# Patient Record
Sex: MALE | Race: WHITE | Employment: FULL TIME | URBAN - METROPOLITAN AREA
[De-identification: names, ages, dates, MRNs, and addresses within clinical notes are randomized per-mention and may not be internally consistent; named-entity substitution may affect disease eponyms.]

---

## 2022-02-10 ENCOUNTER — APPOINTMENT (OUTPATIENT)
Dept: GENERAL RADIOLOGY | Age: 47
DRG: 432 | End: 2022-02-10
Attending: EMERGENCY MEDICINE
Payer: COMMERCIAL

## 2022-02-10 ENCOUNTER — APPOINTMENT (OUTPATIENT)
Dept: ULTRASOUND IMAGING | Age: 47
DRG: 432 | End: 2022-02-10
Attending: EMERGENCY MEDICINE
Payer: COMMERCIAL

## 2022-02-10 ENCOUNTER — HOSPITAL ENCOUNTER (INPATIENT)
Age: 47
LOS: 10 days | Discharge: HOME | DRG: 432 | End: 2022-02-20
Attending: EMERGENCY MEDICINE | Admitting: FAMILY MEDICINE
Payer: COMMERCIAL

## 2022-02-10 ENCOUNTER — APPOINTMENT (OUTPATIENT)
Dept: VASCULAR SURGERY | Age: 47
DRG: 432 | End: 2022-02-10
Attending: FAMILY MEDICINE
Payer: COMMERCIAL

## 2022-02-10 DIAGNOSIS — E87.20 ACIDOSIS: ICD-10-CM

## 2022-02-10 DIAGNOSIS — D72.829 LEUKOCYTOSIS, UNSPECIFIED TYPE: ICD-10-CM

## 2022-02-10 DIAGNOSIS — K70.31 ALCOHOLIC CIRRHOSIS OF LIVER WITH ASCITES (HCC): ICD-10-CM

## 2022-02-10 DIAGNOSIS — R79.1 ELEVATED INR: ICD-10-CM

## 2022-02-10 DIAGNOSIS — N17.9 AKI (ACUTE KIDNEY INJURY) (HCC): ICD-10-CM

## 2022-02-10 DIAGNOSIS — D69.6 THROMBOCYTOPENIA (HCC): ICD-10-CM

## 2022-02-10 DIAGNOSIS — D64.9 ANEMIA, UNSPECIFIED TYPE: ICD-10-CM

## 2022-02-10 DIAGNOSIS — R79.89 ELEVATED SERUM CREATININE: ICD-10-CM

## 2022-02-10 DIAGNOSIS — K70.31 ASCITES DUE TO ALCOHOLIC CIRRHOSIS (HCC): ICD-10-CM

## 2022-02-10 DIAGNOSIS — F10.930 ALCOHOL WITHDRAWAL SYNDROME WITHOUT COMPLICATION (HCC): ICD-10-CM

## 2022-02-10 DIAGNOSIS — R17 JAUNDICE: ICD-10-CM

## 2022-02-10 DIAGNOSIS — R60.0 BILATERAL LOWER EXTREMITY EDEMA: ICD-10-CM

## 2022-02-10 DIAGNOSIS — K72.10 CHRONIC LIVER FAILURE WITHOUT HEPATIC COMA (HCC): Primary | ICD-10-CM

## 2022-02-10 PROBLEM — E80.6 HYPERBILIRUBINEMIA: Status: ACTIVE | Noted: 2022-02-10

## 2022-02-10 PROBLEM — K74.60 CIRRHOSIS OF LIVER (HCC): Status: ACTIVE | Noted: 2022-02-10

## 2022-02-10 LAB
ALBUMIN SERPL-MCNC: 2.2 G/DL (ref 3.5–5)
ALBUMIN/GLOB SERPL: 0.6 {RATIO} (ref 1.1–2.2)
ALP SERPL-CCNC: 117 U/L (ref 45–117)
ALT SERPL-CCNC: 59 U/L (ref 12–78)
AMORPH CRY URNS QL MICRO: ABNORMAL
ANION GAP SERPL CALC-SCNC: 8 MMOL/L (ref 5–15)
APPEARANCE UR: ABNORMAL
APTT PPP: 31.9 SEC (ref 22.1–31)
AST SERPL-CCNC: 72 U/L (ref 15–37)
ATRIAL RATE: 70 BPM
ATRIAL RATE: 91 BPM
BACTERIA URNS QL MICRO: ABNORMAL /HPF
BASOPHILS # BLD: 0 K/UL (ref 0–0.1)
BASOPHILS NFR BLD: 0 % (ref 0–1)
BILIRUB DIRECT SERPL-MCNC: 14.9 MG/DL (ref 0–0.2)
BILIRUB SERPL-MCNC: 20.7 MG/DL (ref 0.2–1)
BILIRUB UR QL CFM: POSITIVE
BUN SERPL-MCNC: 33 MG/DL (ref 6–20)
BUN/CREAT SERPL: 15 (ref 12–20)
CALCIUM SERPL-MCNC: 9.2 MG/DL (ref 8.5–10.1)
CALCULATED P AXIS, ECG09: 33 DEGREES
CALCULATED P AXIS, ECG09: 33 DEGREES
CALCULATED R AXIS, ECG10: 14 DEGREES
CALCULATED R AXIS, ECG10: 8 DEGREES
CALCULATED T AXIS, ECG11: -10 DEGREES
CALCULATED T AXIS, ECG11: -22 DEGREES
CHLORIDE SERPL-SCNC: 114 MMOL/L (ref 97–108)
CO2 SERPL-SCNC: 16 MMOL/L (ref 21–32)
COLOR UR: ABNORMAL
COMMENT, HOLDF: NORMAL
CREAT SERPL-MCNC: 2.27 MG/DL (ref 0.7–1.3)
DIAGNOSIS, 93000: NORMAL
DIAGNOSIS, 93000: NORMAL
DIFFERENTIAL METHOD BLD: ABNORMAL
EOSINOPHIL # BLD: 0 K/UL (ref 0–0.4)
EOSINOPHIL NFR BLD: 0 % (ref 0–7)
EPITH CASTS URNS QL MICRO: ABNORMAL /LPF
ERYTHROCYTE [DISTWIDTH] IN BLOOD BY AUTOMATED COUNT: 17.1 % (ref 11.5–14.5)
GLOBULIN SER CALC-MCNC: 3.7 G/DL (ref 2–4)
GLUCOSE SERPL-MCNC: 137 MG/DL (ref 65–100)
GLUCOSE UR STRIP.AUTO-MCNC: NEGATIVE MG/DL
HCT VFR BLD AUTO: 24.5 % (ref 36.6–50.3)
HGB BLD-MCNC: 7.3 G/DL (ref 12.1–17)
HGB UR QL STRIP: NEGATIVE
IMM GRANULOCYTES # BLD AUTO: 0.5 K/UL (ref 0–0.04)
IMM GRANULOCYTES NFR BLD AUTO: 2 % (ref 0–0.5)
INR PPP: 2.2 (ref 0.9–1.1)
KETONES UR QL STRIP.AUTO: NEGATIVE MG/DL
LEUKOCYTE ESTERASE UR QL STRIP.AUTO: ABNORMAL
LIPASE SERPL-CCNC: 233 U/L (ref 73–393)
LYMPHOCYTES # BLD: 0.5 K/UL (ref 0.8–3.5)
LYMPHOCYTES NFR BLD: 2 % (ref 12–49)
MCH RBC QN AUTO: 34.8 PG (ref 26–34)
MCHC RBC AUTO-ENTMCNC: 29.8 G/DL (ref 30–36.5)
MCV RBC AUTO: 116.7 FL (ref 80–99)
MONOCYTES # BLD: 1.6 K/UL (ref 0–1)
MONOCYTES NFR BLD: 6 % (ref 5–13)
MUCOUS THREADS URNS QL MICRO: ABNORMAL /LPF
NEUTS SEG # BLD: 23.3 K/UL (ref 1.8–8)
NEUTS SEG NFR BLD: 90 % (ref 32–75)
NITRITE UR QL STRIP.AUTO: POSITIVE
NRBC # BLD: 0.02 K/UL (ref 0–0.01)
NRBC BLD-RTO: 0.1 PER 100 WBC
P-R INTERVAL, ECG05: 132 MS
P-R INTERVAL, ECG05: 138 MS
PH UR STRIP: 5.5 [PH] (ref 5–8)
PLATELET # BLD AUTO: 122 K/UL (ref 150–400)
PMV BLD AUTO: 11 FL (ref 8.9–12.9)
POTASSIUM SERPL-SCNC: 4.2 MMOL/L (ref 3.5–5.1)
PROT SERPL-MCNC: 5.9 G/DL (ref 6.4–8.2)
PROT UR STRIP-MCNC: ABNORMAL MG/DL
PROTHROMBIN TIME: 22.2 SEC (ref 9–11.1)
Q-T INTERVAL, ECG07: 372 MS
Q-T INTERVAL, ECG07: 388 MS
QRS DURATION, ECG06: 86 MS
QRS DURATION, ECG06: 92 MS
QTC CALCULATION (BEZET), ECG08: 419 MS
QTC CALCULATION (BEZET), ECG08: 457 MS
RBC # BLD AUTO: 2.1 M/UL (ref 4.1–5.7)
RBC #/AREA URNS HPF: ABNORMAL /HPF (ref 0–5)
RBC MORPH BLD: ABNORMAL
RBC MORPH BLD: ABNORMAL
SAMPLES BEING HELD,HOLD: NORMAL
SODIUM SERPL-SCNC: 138 MMOL/L (ref 136–145)
SP GR UR REFRACTOMETRY: 1.02 (ref 1–1.03)
THERAPEUTIC RANGE,PTTT: ABNORMAL SECS (ref 58–77)
UROBILINOGEN UR QL STRIP.AUTO: 0.2 EU/DL (ref 0.2–1)
VENTRICULAR RATE, ECG03: 70 BPM
VENTRICULAR RATE, ECG03: 91 BPM
WBC # BLD AUTO: 25.9 K/UL (ref 4.1–11.1)
WBC URNS QL MICRO: ABNORMAL /HPF (ref 0–4)

## 2022-02-10 PROCEDURE — 99223 1ST HOSP IP/OBS HIGH 75: CPT | Performed by: INTERNAL MEDICINE

## 2022-02-10 PROCEDURE — 87040 BLOOD CULTURE FOR BACTERIA: CPT

## 2022-02-10 PROCEDURE — 96365 THER/PROPH/DIAG IV INF INIT: CPT

## 2022-02-10 PROCEDURE — 76705 ECHO EXAM OF ABDOMEN: CPT

## 2022-02-10 PROCEDURE — 85610 PROTHROMBIN TIME: CPT

## 2022-02-10 PROCEDURE — 65660000000 HC RM CCU STEPDOWN

## 2022-02-10 PROCEDURE — 36415 COLL VENOUS BLD VENIPUNCTURE: CPT

## 2022-02-10 PROCEDURE — P9047 ALBUMIN (HUMAN), 25%, 50ML: HCPCS | Performed by: INTERNAL MEDICINE

## 2022-02-10 PROCEDURE — 81001 URINALYSIS AUTO W/SCOPE: CPT

## 2022-02-10 PROCEDURE — 74011000258 HC RX REV CODE- 258: Performed by: INTERNAL MEDICINE

## 2022-02-10 PROCEDURE — 83880 ASSAY OF NATRIURETIC PEPTIDE: CPT

## 2022-02-10 PROCEDURE — 85730 THROMBOPLASTIN TIME PARTIAL: CPT

## 2022-02-10 PROCEDURE — 84484 ASSAY OF TROPONIN QUANT: CPT

## 2022-02-10 PROCEDURE — 71045 X-RAY EXAM CHEST 1 VIEW: CPT

## 2022-02-10 PROCEDURE — 85025 COMPLETE CBC W/AUTO DIFF WBC: CPT

## 2022-02-10 PROCEDURE — 80076 HEPATIC FUNCTION PANEL: CPT

## 2022-02-10 PROCEDURE — 74011000250 HC RX REV CODE- 250: Performed by: FAMILY MEDICINE

## 2022-02-10 PROCEDURE — 74011250637 HC RX REV CODE- 250/637: Performed by: FAMILY MEDICINE

## 2022-02-10 PROCEDURE — 83690 ASSAY OF LIPASE: CPT

## 2022-02-10 PROCEDURE — 93005 ELECTROCARDIOGRAM TRACING: CPT

## 2022-02-10 PROCEDURE — 74011250636 HC RX REV CODE- 250/636: Performed by: INTERNAL MEDICINE

## 2022-02-10 PROCEDURE — 80048 BASIC METABOLIC PNL TOTAL CA: CPT

## 2022-02-10 PROCEDURE — 99285 EMERGENCY DEPT VISIT HI MDM: CPT

## 2022-02-10 RX ORDER — SODIUM CHLORIDE 0.9 % (FLUSH) 0.9 %
5-40 SYRINGE (ML) INJECTION EVERY 8 HOURS
Status: DISCONTINUED | OUTPATIENT
Start: 2022-02-10 | End: 2022-02-20 | Stop reason: HOSPADM

## 2022-02-10 RX ORDER — DIPHENOXYLATE HYDROCHLORIDE AND ATROPINE SULFATE 2.5; .025 MG/1; MG/1
2 TABLET ORAL
COMMUNITY
Start: 2022-01-20 | End: 2022-08-03 | Stop reason: ALTCHOICE

## 2022-02-10 RX ORDER — SODIUM BICARBONATE 650 MG/1
650 TABLET ORAL 3 TIMES DAILY
Status: DISCONTINUED | OUTPATIENT
Start: 2022-02-10 | End: 2022-02-10

## 2022-02-10 RX ORDER — ADALIMUMAB 40MG/0.8ML
40 KIT SUBCUTANEOUS EVERY 2 WEEKS
COMMUNITY
End: 2022-03-02

## 2022-02-10 RX ORDER — MIDODRINE HYDROCHLORIDE 5 MG/1
10 TABLET ORAL
Status: DISCONTINUED | OUTPATIENT
Start: 2022-02-10 | End: 2022-02-17

## 2022-02-10 RX ORDER — CHOLESTYRAMINE 4 G/5.5G
POWDER, FOR SUSPENSION ORAL
COMMUNITY
Start: 2022-01-20 | End: 2022-03-02

## 2022-02-10 RX ORDER — POLYETHYLENE GLYCOL 3350 17 G/17G
17 POWDER, FOR SOLUTION ORAL DAILY PRN
Status: DISCONTINUED | OUTPATIENT
Start: 2022-02-10 | End: 2022-02-20 | Stop reason: HOSPADM

## 2022-02-10 RX ORDER — FOLIC ACID 1 MG/1
1 TABLET ORAL
COMMUNITY
Start: 2022-02-01 | End: 2022-08-03 | Stop reason: ALTCHOICE

## 2022-02-10 RX ORDER — PREDNISOLONE 15 MG/5ML
SOLUTION ORAL
COMMUNITY
Start: 2022-02-01 | End: 2022-02-10

## 2022-02-10 RX ORDER — LANOLIN ALCOHOL/MO/W.PET/CERES
100 CREAM (GRAM) TOPICAL DAILY
Status: DISCONTINUED | OUTPATIENT
Start: 2022-02-11 | End: 2022-02-20 | Stop reason: HOSPADM

## 2022-02-10 RX ORDER — ONDANSETRON 2 MG/ML
4 INJECTION INTRAMUSCULAR; INTRAVENOUS
Status: DISCONTINUED | OUTPATIENT
Start: 2022-02-10 | End: 2022-02-20 | Stop reason: HOSPADM

## 2022-02-10 RX ORDER — SODIUM CHLORIDE 0.9 % (FLUSH) 0.9 %
5-40 SYRINGE (ML) INJECTION AS NEEDED
Status: DISCONTINUED | OUTPATIENT
Start: 2022-02-10 | End: 2022-02-20 | Stop reason: HOSPADM

## 2022-02-10 RX ORDER — ACETAMINOPHEN 650 MG/1
650 SUPPOSITORY RECTAL
Status: DISCONTINUED | OUTPATIENT
Start: 2022-02-10 | End: 2022-02-10

## 2022-02-10 RX ORDER — ACETAMINOPHEN 325 MG/1
650 TABLET ORAL
Status: DISCONTINUED | OUTPATIENT
Start: 2022-02-10 | End: 2022-02-10

## 2022-02-10 RX ORDER — FOLIC ACID 1 MG/1
1 TABLET ORAL DAILY
Status: DISCONTINUED | OUTPATIENT
Start: 2022-02-11 | End: 2022-02-20 | Stop reason: HOSPADM

## 2022-02-10 RX ORDER — UREA 10 %
100 LOTION (ML) TOPICAL DAILY
COMMUNITY
End: 2022-08-03 | Stop reason: ALTCHOICE

## 2022-02-10 RX ORDER — ALBUMIN HUMAN 250 G/1000ML
25 SOLUTION INTRAVENOUS EVERY 6 HOURS
Status: DISCONTINUED | OUTPATIENT
Start: 2022-02-10 | End: 2022-02-20 | Stop reason: HOSPADM

## 2022-02-10 RX ORDER — MIDODRINE HYDROCHLORIDE 10 MG/1
10 TABLET ORAL
COMMUNITY
Start: 2022-01-20 | End: 2022-08-03 | Stop reason: ALTCHOICE

## 2022-02-10 RX ORDER — DIPHENHYDRAMINE HCL 25 MG
25 TABLET ORAL DAILY
COMMUNITY
End: 2022-08-03 | Stop reason: ALTCHOICE

## 2022-02-10 RX ORDER — PREDNISONE 10 MG/1
15 TABLET ORAL
Status: DISCONTINUED | OUTPATIENT
Start: 2022-02-11 | End: 2022-02-10

## 2022-02-10 RX ORDER — SODIUM BICARBONATE 650 MG/1
650 TABLET ORAL 2 TIMES DAILY
COMMUNITY
Start: 2022-02-08 | End: 2022-08-03 | Stop reason: ALTCHOICE

## 2022-02-10 RX ORDER — PREDNISOLONE SODIUM PHOSPHATE 15 MG/5ML
5 SOLUTION ORAL DAILY
COMMUNITY
Start: 2022-02-01 | End: 2022-03-02

## 2022-02-10 RX ORDER — UREA 10 %
100 LOTION (ML) TOPICAL DAILY
Status: DISCONTINUED | OUTPATIENT
Start: 2022-02-11 | End: 2022-02-20 | Stop reason: HOSPADM

## 2022-02-10 RX ADMIN — SODIUM BICARBONATE 650 MG: 650 TABLET ORAL at 17:51

## 2022-02-10 RX ADMIN — ALBUMIN (HUMAN) 25 G: 0.25 INJECTION, SOLUTION INTRAVENOUS at 21:08

## 2022-02-10 RX ADMIN — SODIUM CHLORIDE, PRESERVATIVE FREE 10 ML: 5 INJECTION INTRAVENOUS at 17:51

## 2022-02-10 RX ADMIN — PHYTONADIONE 10 MG: 10 INJECTION, EMULSION INTRAMUSCULAR; INTRAVENOUS; SUBCUTANEOUS at 20:40

## 2022-02-10 RX ADMIN — MIDODRINE HYDROCHLORIDE 10 MG: 5 TABLET ORAL at 17:51

## 2022-02-10 NOTE — PROGRESS NOTES
Admission Medication Reconciliation:    Information obtained from:  Patient/RxQuery  RxQuery data available¹:  YES    Comments/Recommendations: Updated PTA meds/reviewed patient's allergies. 1)  Patient knew his medications well and was able to provide all medications and most doses with little prompting. 2)  Medication changes (since last review): Added  - Humira    Adjusted  - Lomotil changed to 2 tablets q6h PRN  - midodrine changed to 10mg TID  - sodium bicarb changed to 1 tab BID - new medication; patient was unsure if once or twice daily    Removed  - duplicate prednisolone    3)  Of note, patient has been taking prednisolone solution, 10 mL daily. He states today was his last day of 10mL and tomorrow he was supposed to start the 5mL dose for the remainder of the steroid course. 4)  Patient states he has been on Humira for a while, however, recently was instructed by his PCP to hold this medication until he was able to be seen by a GI specialist. His last dose was more than 2 weeks ago at this point. ¹RxQuery pharmacy benefit data reflects medications filled and processed through the patient's insurance, however   this data does NOT capture whether the medication was picked up or is currently being taken by the patient. Allergies:  Naprosyn [naproxen] and Other medication    Significant PMH/Disease States:   Past Medical History:   Diagnosis Date    Alcoholic cirrhosis ([facility]     Crohn disease ([facility]     Small bowel obstruction ([facility]      Chief Complaint for this Admission:    Chief Complaint   Patient presents with    Jaundice    Shortness of Breath     Prior to Admission Medications:   Prior to Admission Medications   Prescriptions Last Dose Informant Taking? Prevalite 4 gram packet Not Taking at Unknown time Self No   Sig: MIX 1 PACKET IN 8 OUNCES OF LIQUID TWICE A DAY      adalimumab (Humira) 40 mg/0.8 mL injection  Self Yes   Si mg by SubCUTAneous route Once every 2 weeks. cyanocobalamin (VITAMIN B12) 100 mcg tablet 2/10/2022 at Unknown time Self Yes   Sig: Take 100 mcg by mouth daily. diphenhydrAMINE (BENADRYL) 25 mg tablet 2/10/2022 at Unknown time Self Yes   Sig: Take 25 mg by mouth daily. diphenoxylate-atropine (LOMOTIL) 2.5-0.025 mg per tablet  Self Yes   Sig: Take 2 Tablets by mouth every six (6) hours as needed. folic acid (FOLVITE) 1 mg tablet 2/10/2022 at Unknown time Self Yes   Sig: Take 1 mg by mouth.   midodrine (PROAMATINE) 10 mg tablet 2/10/2022 at 0800 Self Yes   Sig: Take 10 mg by mouth three (3) times daily (with meals). prednisoLONE (ORAPRED) 15 mg/5 mL (3 mg/mL) solution 2/10/2022 at Unknown time Self Yes   Sig: Take 5 mL by mouth daily. 10 ml PO daily x 7 days, followed by 5 ml PO daily x7 days   sodium bicarbonate 650 mg tablet 2/10/2022 Self Yes   Sig: Take 650 mg by mouth two (2) times a day. Facility-Administered Medications: None     Please contact the main inpatient pharmacy with any questions or concerns at [phone] and we will direct you to the clinical pharmacist covering this patient's care while in-house.      Thomas Ramos, ANGELOD

## 2022-02-10 NOTE — ED TRIAGE NOTES
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

## 2022-02-10 NOTE — ED PROVIDER NOTES
HPI     Please note that this dictation was completed with [employer], the computer voice recognition software. Quite often unanticipated grammatical, syntax, homophones, and other interpretive errors are inadvertently transcribed by the computer software. Please disregard these errors. Please excuse any errors that have escaped final proofreading. 25-year-old male referred by his family physician for worsening liver enzymes. Patient has a complicated past medical history including cirrhosis that they believe is secondary to alcoholic cirrhosis as well as possibly Humira. He used Humira from September until about 3 to 4 weeks ago. He was [facility] to [facility] in January. Patient states that he was therefore liver failure. Diagnoses in epic which have crossed over but records not available are renal failure, metabolic acidosis, acute metabolic encephalopathy, immunocompromise, sepsis, liver mass which patient reports that he had an MRI for showing a fatty liver, Crohn's disease, macrocytic anemia and hyponatremia. He does not know his baseline creatinine at this point. Patient had labs drawn twice since then. Patient reports they are getting worse. I do not have those labs available at this point. Epic states that it is in the results section but it is not present there. Because of the labs, he was referred to the emergency department. Patient has been more sleepy but sleeping regularly at night and not during the day. Denies vomiting. He has an ostomy in the stool output is brown not black. He states that he has not drank alcohol in 6 weeks. Patient denies any abdominal pain, fevers, chest pain. He complains of some shortness of breath. No other complaints at this time. Social history: Last drank alcohol 6 weeks ago. Here with wife.     Past Medical History:   Diagnosis Date    [facility]     Crohn disease ([facility]     Small bowel obstruction [facility])        Past Surgical History:   Procedure Laterality Date    HX OTHER SURGICAL      bowel obstruction and ostomy    HX OTHER SURGICAL  2001    bowel resection secondary to tear in bowel and abscess         No family history on file. Social History     Socioeconomic History    Marital status:      Spouse name: Not on file    Number of children: Not on file    Years of education: Not on file    Highest education level: Not on file   Occupational History    Not on file   Tobacco Use    Smoking status: Not on file    Smokeless tobacco: Not on file   Substance and Sexual Activity    Alcohol use: Not on file    Drug use: Not on file    Sexual activity: Not on file   Other Topics Concern    Not on file   Social History Narrative    Not on file     Social Determinants of Health     Financial Resource Strain:     Difficulty of Paying Living Expenses: Not on file   Food Insecurity:     Worried About Running Out of Food in the Last Year: Not on file    Fabricio of Food in the Last Year: Not on file   Transportation Needs:     Lack of Transportation (Medical): Not on file    Lack of Transportation (Non-Medical):  Not on file   Physical Activity:     Days of Exercise per Week: Not on file    Minutes of Exercise per Session: Not on file   Stress:     Feeling of Stress : Not on file   Social Connections:     Frequency of Communication with Friends and Family: Not on file    Frequency of Social Gatherings with Friends and Family: Not on file    Attends Adventism Services: Not on file    Active Member of Clubs or Organizations: Not on file    Attends Club or Organization Meetings: Not on file    Marital Status: Not on file   Intimate Partner Violence:     Fear of Current or Ex-Partner: Not on file    Emotionally Abused: Not on file    Physically Abused: Not on file    Sexually Abused: Not on file   Housing Stability:     Unable to Pay for Housing in the Last Year: Not on file    Number of Places Lived in the Last Year: Not on file    Unstable Housing in the Last Year: Not on file         ALLERGIES: Naprosyn [naproxen] and Other medication    Review of Systems   Constitutional: Negative for chills and fever. HENT: Negative for congestion. Respiratory: Positive for shortness of breath. Negative for chest tightness. Cardiovascular: Negative for chest pain. Gastrointestinal: Negative for abdominal pain. Skin:        jaundiced   All other systems reviewed and are negative. Vitals:    02/10/22 1304 02/10/22 1507   BP: 124/77 128/78   Pulse: 91 78   Resp: 24 16   Temp: 97.6 °F (36.4 °C) 97.7 °F (36.5 °C)   SpO2: 99% 97%            Physical Exam  Vitals and nursing note reviewed. Constitutional:       General: He is not in acute distress. Appearance: Normal appearance. He is well-developed. He is not ill-appearing. HENT:      Head: Normocephalic and atraumatic. Nose: No congestion or rhinorrhea. Mouth/Throat:      Mouth: Mucous membranes are moist.      Pharynx: No pharyngeal swelling or oropharyngeal exudate. Eyes:      General: Scleral icterus present. Right eye: No discharge. Left eye: No discharge. Conjunctiva/sclera: Conjunctivae normal.   Cardiovascular:      Rate and Rhythm: Normal rate and regular rhythm. Heart sounds: Normal heart sounds. No murmur heard. No friction rub. No gallop. Pulmonary:      Effort: Pulmonary effort is normal. No respiratory distress. Breath sounds: Normal breath sounds. No wheezing or rales. Abdominal:      General: Bowel sounds are normal. There is distension (+ fluid wave). Palpations: Abdomen is soft. Tenderness: There is no abdominal tenderness. There is no guarding. Comments: Ostomy right side of abdomen with brown stool   Musculoskeletal:         General: No tenderness. Normal range of motion. Cervical back: Normal range of motion and neck supple.       Right lower leg: Edema present. Left lower leg: Edema present. Lymphadenopathy:      Cervical: No cervical adenopathy. Skin:     General: Skin is warm and dry. Coloration: Skin is jaundiced. Skin is not pale. Neurological:      Mental Status: He is alert and oriented to person, place, and time. Cranial Nerves: No cranial nerve deficit. Coordination: Coordination normal.                MDM     Complicated [id number] male with a history of multiple medical problems including bowel resections, small bowel obstruction,  Recent admission for sepsis and septic shock, LYNSEY and many others here with reported worsening laboratory work concerning for liver failure. Patient does have ascites clinically. He is obviously jaundice. Appears fairly well-hydrated. Blood pressure okay. Labs are notable for anemia and is well as a leukocytosis. He has an elevated creatinine but I do not know his baseline creatinine. He is no longer immunosuppressed with Humira. Given the shortness of breath have added on a chest x-ray, high-sensitivity troponin as well as proBNP. Ordered liver ultrasound. Likely admit. Perfect Serve Consult for Admission  3:51 PM    ED Room Number: ER29/29  Patient Name and age:  Daniela Felton III 55 y.o.  male  Working Diagnosis:   1. Chronic liver failure without hepatic coma ([facility]    2. Alcoholic cirrhosis of liver with ascites (HCC)    3. Elevated serum creatinine    4. Anemia, unspecified type    5. Leukocytosis, unspecified type    6. Thrombocytopenia (HCC)    7. Elevated INR        COVID-19 Suspicion:  no  Sepsis present:  no  Reassessment needed: no  Code Status:  Full Code  Readmission: no  Isolation Requirements:  no  Recommended Level of Care:  telemetry  Department:[facility] Adult ED - 21   Other:  PCP Dr. Willis Lara at [phone] wants to speak to admitting doctor before evening call. US pending. ED EKG interpretation:  Rhythm: normal sinus rhythm; and regular .  Rate (approx.): 70; Axis: normal; P wave: normal; QRS interval: normal ; ST/T wave: normal;. This EKG was interpreted by Jared Hyatt MD,ED Provider. Recent Results (from the past 24 hour(s))   CBC WITH AUTOMATED DIFF    Collection Time: 02/10/22  1:29 PM   Result Value Ref Range    WBC 25.9 (H) 4.1 - 11.1 K/uL    RBC 2.10 (L) 4.10 - 5.70 M/uL    HGB 7.3 (L) 12.1 - 17.0 g/dL    HCT 24.5 (L) 36.6 - 50.3 %    .7 (H) 80.0 - 99.0 FL    MCH 34.8 (H) 26.0 - 34.0 PG    MCHC 29.8 (L) 30.0 - 36.5 g/dL    RDW 17.1 (H) 11.5 - 14.5 %    PLATELET 766 (L) 799 - 400 K/uL    MPV 11.0 8.9 - 12.9 FL    NRBC 0.1 (H) 0  WBC    ABSOLUTE NRBC 0.02 (H) 0.00 - 0.01 K/uL    NEUTROPHILS 90 (H) 32 - 75 %    LYMPHOCYTES 2 (L) 12 - 49 %    MONOCYTES 6 5 - 13 %    EOSINOPHILS 0 0 - 7 %    BASOPHILS 0 0 - 1 %    IMMATURE GRANULOCYTES 2 (H) 0.0 - 0.5 %    ABS. NEUTROPHILS 23.3 (H) 1.8 - 8.0 K/UL    ABS. LYMPHOCYTES 0.5 (L) 0.8 - 3.5 K/UL    ABS. MONOCYTES 1.6 (H) 0.0 - 1.0 K/UL    ABS. EOSINOPHILS 0.0 0.0 - 0.4 K/UL    ABS. BASOPHILS 0.0 0.0 - 0.1 K/UL    ABS. IMM. GRANS. 0.5 (H) 0.00 - 0.04 K/UL    DF SMEAR SCANNED      RBC COMMENTS MACROCYTOSIS  1+        RBC COMMENTS ANISOCYTOSIS  1+       SAMPLES BEING HELD    Collection Time: 02/10/22  1:29 PM   Result Value Ref Range    SAMPLES BEING HELD 1red     COMMENT        Add-on orders for these samples will be processed based on acceptable specimen integrity and analyte stability, which may vary by analyte.    PROTHROMBIN TIME + INR    Collection Time: 02/10/22  1:29 PM   Result Value Ref Range    INR 2.2 (H) 0.9 - 1.1      Prothrombin time 22.2 (H) 9.0 - 11.1 sec   PTT    Collection Time: 02/10/22  1:29 PM   Result Value Ref Range    aPTT 31.9 (H) 22.1 - 31.0 sec    aPTT, therapeutic range     58.0 - 93.5 SECS   METABOLIC PANEL, BASIC    Collection Time: 02/10/22  1:29 PM   Result Value Ref Range    Sodium 138 136 - 145 mmol/L    Potassium 4.2 3.5 - 5.1 mmol/L    Chloride 114 (H) 97 - 108 mmol/L    CO2 16 (L) 21 - 32 mmol/L    Anion gap 8 5 - 15 mmol/L    Glucose 137 (H) 65 - 100 mg/dL    BUN 33 (H) 6 - 20 MG/DL    Creatinine 2.27 (H) 0.70 - 1.30 MG/DL    BUN/Creatinine ratio 15 12 - 20      GFR est AA 38 (L) >60 ml/min/1.73m2    GFR est non-AA 31 (L) >60 ml/min/1.73m2    Calcium 9.2 8.5 - 10.1 MG/DL   LIPASE    Collection Time: 02/10/22  1:29 PM   Result Value Ref Range    Lipase 233 73 - 393 U/L       No results found.

## 2022-02-11 ENCOUNTER — APPOINTMENT (OUTPATIENT)
Dept: VASCULAR SURGERY | Age: 47
DRG: 432 | End: 2022-02-11
Attending: FAMILY MEDICINE
Payer: COMMERCIAL

## 2022-02-11 ENCOUNTER — APPOINTMENT (OUTPATIENT)
Dept: NON INVASIVE DIAGNOSTICS | Age: 47
DRG: 432 | End: 2022-02-11
Attending: INTERNAL MEDICINE
Payer: COMMERCIAL

## 2022-02-11 LAB
ALBUMIN SERPL-MCNC: 2.6 G/DL (ref 3.5–5)
ALBUMIN/GLOB SERPL: 0.8 {RATIO} (ref 1.1–2.2)
ALP SERPL-CCNC: 102 U/L (ref 45–117)
ALT SERPL-CCNC: 49 U/L (ref 12–78)
AMMONIA PLAS-SCNC: 19 UMOL/L
ANION GAP SERPL CALC-SCNC: 7 MMOL/L (ref 5–15)
AST SERPL-CCNC: 63 U/L (ref 15–37)
BASOPHILS # BLD: 0 K/UL (ref 0–0.1)
BASOPHILS NFR BLD: 0 % (ref 0–1)
BILIRUB SERPL-MCNC: 19.9 MG/DL (ref 0.2–1)
BNP SERPL-MCNC: 4533 PG/ML
BUN SERPL-MCNC: 33 MG/DL (ref 6–20)
BUN/CREAT SERPL: 17 (ref 12–20)
CALCIUM SERPL-MCNC: 9.2 MG/DL (ref 8.5–10.1)
CHLORIDE SERPL-SCNC: 114 MMOL/L (ref 97–108)
CO2 SERPL-SCNC: 17 MMOL/L (ref 21–32)
CREAT SERPL-MCNC: 1.97 MG/DL (ref 0.7–1.3)
DIFFERENTIAL METHOD BLD: ABNORMAL
ECHO LV E' SEPTAL VELOCITY: 12 CM/S
ECHO MV A VELOCITY: 0.44 M/S
ECHO MV E VELOCITY: 1.12 M/S
ECHO MV E/A RATIO: 2.55
ECHO MV E/E' SEPTAL: 9.33
EOSINOPHIL # BLD: 0 K/UL (ref 0–0.4)
EOSINOPHIL NFR BLD: 0 % (ref 0–7)
ERYTHROCYTE [DISTWIDTH] IN BLOOD BY AUTOMATED COUNT: 17.2 % (ref 11.5–14.5)
ERYTHROCYTE [DISTWIDTH] IN BLOOD BY AUTOMATED COUNT: 17.4 % (ref 11.5–14.5)
GLOBULIN SER CALC-MCNC: 3.2 G/DL (ref 2–4)
GLUCOSE SERPL-MCNC: 87 MG/DL (ref 65–100)
HCT VFR BLD AUTO: 19.4 % (ref 36.6–50.3)
HCT VFR BLD AUTO: 20.8 % (ref 36.6–50.3)
HCT VFR BLD AUTO: 22.4 % (ref 36.6–50.3)
HGB BLD-MCNC: 5.9 G/DL (ref 12.1–17)
HGB BLD-MCNC: 6.3 G/DL (ref 12.1–17)
HGB BLD-MCNC: 7 G/DL (ref 12.1–17)
HISTORY CHECKED?,CKHIST: NORMAL
IMM GRANULOCYTES # BLD AUTO: 0.2 K/UL (ref 0–0.04)
IMM GRANULOCYTES NFR BLD AUTO: 1 % (ref 0–0.5)
INR PPP: 1.7 (ref 0.9–1.1)
LYMPHOCYTES # BLD: 1 K/UL (ref 0.8–3.5)
LYMPHOCYTES NFR BLD: 5 % (ref 12–49)
MCH RBC QN AUTO: 35.1 PG (ref 26–34)
MCH RBC QN AUTO: 35.4 PG (ref 26–34)
MCHC RBC AUTO-ENTMCNC: 30.3 G/DL (ref 30–36.5)
MCHC RBC AUTO-ENTMCNC: 30.4 G/DL (ref 30–36.5)
MCV RBC AUTO: 115.5 FL (ref 80–99)
MCV RBC AUTO: 116.9 FL (ref 80–99)
MONOCYTES # BLD: 1 K/UL (ref 0–1)
MONOCYTES NFR BLD: 5 % (ref 5–13)
NEUTS SEG # BLD: 17.3 K/UL (ref 1.8–8)
NEUTS SEG NFR BLD: 89 % (ref 32–75)
NRBC # BLD: 0 K/UL (ref 0–0.01)
NRBC # BLD: 0 K/UL (ref 0–0.01)
NRBC BLD-RTO: 0 PER 100 WBC
NRBC BLD-RTO: 0 PER 100 WBC
PLATELET # BLD AUTO: 78 K/UL (ref 150–400)
PLATELET # BLD AUTO: 87 K/UL (ref 150–400)
PMV BLD AUTO: 11 FL (ref 8.9–12.9)
PMV BLD AUTO: 11.4 FL (ref 8.9–12.9)
POTASSIUM SERPL-SCNC: 3.8 MMOL/L (ref 3.5–5.1)
PROT SERPL-MCNC: 5.8 G/DL (ref 6.4–8.2)
PROTHROMBIN TIME: 17.7 SEC (ref 9–11.1)
RBC # BLD AUTO: 1.68 M/UL (ref 4.1–5.7)
RBC # BLD AUTO: 1.78 M/UL (ref 4.1–5.7)
RBC MORPH BLD: ABNORMAL
SODIUM SERPL-SCNC: 138 MMOL/L (ref 136–145)
TROPONIN-HIGH SENSITIVITY: 12 NG/L (ref 0–76)
WBC # BLD AUTO: 18.9 K/UL (ref 4.1–11.1)
WBC # BLD AUTO: 19.5 K/UL (ref 4.1–11.1)

## 2022-02-11 PROCEDURE — P9016 RBC LEUKOCYTES REDUCED: HCPCS

## 2022-02-11 PROCEDURE — 36430 TRANSFUSION BLD/BLD COMPNT: CPT

## 2022-02-11 PROCEDURE — 85027 COMPLETE CBC AUTOMATED: CPT

## 2022-02-11 PROCEDURE — 93306 TTE W/DOPPLER COMPLETE: CPT

## 2022-02-11 PROCEDURE — 74011250637 HC RX REV CODE- 250/637: Performed by: FAMILY MEDICINE

## 2022-02-11 PROCEDURE — 74011250636 HC RX REV CODE- 250/636: Performed by: INTERNAL MEDICINE

## 2022-02-11 PROCEDURE — 85610 PROTHROMBIN TIME: CPT

## 2022-02-11 PROCEDURE — 74011000250 HC RX REV CODE- 250: Performed by: FAMILY MEDICINE

## 2022-02-11 PROCEDURE — 85018 HEMOGLOBIN: CPT

## 2022-02-11 PROCEDURE — P9047 ALBUMIN (HUMAN), 25%, 50ML: HCPCS | Performed by: INTERNAL MEDICINE

## 2022-02-11 PROCEDURE — 74011250636 HC RX REV CODE- 250/636: Performed by: FAMILY MEDICINE

## 2022-02-11 PROCEDURE — 80053 COMPREHEN METABOLIC PANEL: CPT

## 2022-02-11 PROCEDURE — 99233 SBSQ HOSP IP/OBS HIGH 50: CPT | Performed by: INTERNAL MEDICINE

## 2022-02-11 PROCEDURE — 36415 COLL VENOUS BLD VENIPUNCTURE: CPT

## 2022-02-11 PROCEDURE — 86900 BLOOD TYPING SEROLOGIC ABO: CPT

## 2022-02-11 PROCEDURE — 86923 COMPATIBILITY TEST ELECTRIC: CPT

## 2022-02-11 PROCEDURE — 65660000000 HC RM CCU STEPDOWN

## 2022-02-11 PROCEDURE — 85025 COMPLETE CBC W/AUTO DIFF WBC: CPT

## 2022-02-11 PROCEDURE — 82140 ASSAY OF AMMONIA: CPT

## 2022-02-11 PROCEDURE — 93970 EXTREMITY STUDY: CPT

## 2022-02-11 PROCEDURE — 30233N1 TRANSFUSION OF NONAUTOLOGOUS RED BLOOD CELLS INTO PERIPHERAL VEIN, PERCUTANEOUS APPROACH: ICD-10-PCS | Performed by: INTERNAL MEDICINE

## 2022-02-11 PROCEDURE — 74011000258 HC RX REV CODE- 258: Performed by: FAMILY MEDICINE

## 2022-02-11 RX ORDER — SODIUM CHLORIDE 9 MG/ML
250 INJECTION, SOLUTION INTRAVENOUS AS NEEDED
Status: DISCONTINUED | OUTPATIENT
Start: 2022-02-11 | End: 2022-02-20 | Stop reason: HOSPADM

## 2022-02-11 RX ADMIN — ALBUMIN (HUMAN) 25 G: 0.25 INJECTION, SOLUTION INTRAVENOUS at 14:52

## 2022-02-11 RX ADMIN — PIPERACILLIN AND TAZOBACTAM 3.38 G: 3; .375 INJECTION, POWDER, LYOPHILIZED, FOR SOLUTION INTRAVENOUS at 23:23

## 2022-02-11 RX ADMIN — Medication 100 MG: at 10:00

## 2022-02-11 RX ADMIN — MIDODRINE HYDROCHLORIDE 10 MG: 5 TABLET ORAL at 08:52

## 2022-02-11 RX ADMIN — SODIUM CHLORIDE, PRESERVATIVE FREE 10 ML: 5 INJECTION INTRAVENOUS at 08:58

## 2022-02-11 RX ADMIN — VITAM B12 100 MCG: 100 TAB at 10:00

## 2022-02-11 RX ADMIN — FOLIC ACID 1 MG: 1 TABLET ORAL at 08:52

## 2022-02-11 RX ADMIN — ALBUMIN (HUMAN) 25 G: 0.25 INJECTION, SOLUTION INTRAVENOUS at 03:12

## 2022-02-11 RX ADMIN — MIDODRINE HYDROCHLORIDE 10 MG: 5 TABLET ORAL at 16:58

## 2022-02-11 RX ADMIN — MIDODRINE HYDROCHLORIDE 10 MG: 5 TABLET ORAL at 13:08

## 2022-02-11 RX ADMIN — SODIUM CHLORIDE 250 ML: 9 INJECTION, SOLUTION INTRAVENOUS at 10:50

## 2022-02-11 RX ADMIN — SODIUM CHLORIDE, PRESERVATIVE FREE 10 ML: 5 INJECTION INTRAVENOUS at 13:08

## 2022-02-11 RX ADMIN — PIPERACILLIN AND TAZOBACTAM 3.38 G: 3; .375 INJECTION, POWDER, LYOPHILIZED, FOR SOLUTION INTRAVENOUS at 10:04

## 2022-02-11 RX ADMIN — ALBUMIN (HUMAN) 25 G: 0.25 INJECTION, SOLUTION INTRAVENOUS at 10:00

## 2022-02-11 RX ADMIN — PIPERACILLIN AND TAZOBACTAM 3.38 G: 3; .375 INJECTION, POWDER, LYOPHILIZED, FOR SOLUTION INTRAVENOUS at 16:58

## 2022-02-11 NOTE — CONSULTS
[address] [facility], [place], [address], [facility], [place]      Franky Sood, REMI Gallagher, [facility]     Joann Lott, [facility]   COLTON Caldera, [facility]       Nohelia Meehan [address]    at [address], [address] [address]  22.    [phone]    FAX: [address] [place] [address]    at [address], [address]    [phone]    FAX: [phone]         HEPATOLOGY CONSULT NOTE  I was asked to see this patient in consultation by Dr José Antonio Ma for management of cirrhosis. I have reviewed the Emergency room note, Hospital admission note, Notes by all other physicians who have seen the patient during this hospitalization to date. I have reviewed the problem list and the reason for this hospitalization. I have reviewed the allergies and the medications the patient was taking at home prior to this hospitalization. HISTORY:  The patient is a 55 y.o. male with Crohns disease. He has CT evidence of fatty liver dating back to 2019. In 9/2021 he was started on Humara for treatment of Crohns. In 1/2021 he was hospitalized at [facility] for jaundice with TBILI 9.0 and mild increase in INR to 1.38. He was DC to home in 1/20. He returned to the hospital on 1/26 with TBILI 19, INR 2.50. On 1/28 ETOH blood level was 8. He was treated with prednisone 15 mg every day for presumed alcohol induced hepatitis. US of the liver suggested fatty liver, cirrhosis, small amount of ascites. He was discharged to home on 2/7 with TBILI 19, INR 2.2, Scr 1.7 mg on prednisone 15 mg every day. I was called by his PCP Dr Carolina Heaton in [place], [place] yesterday and suggested he come to the ED at [facility]. He came to the ED today.     In the ED Laboratory studies were significant for:    WBC 25, HB 7.3 gms, , Scr 2.27 mg, AST 72, ALT 59, TBILI 21 mg, INR 2.2,     Imaging of the liver with Ultrasound demonstrated fatty liver and moderate ascites. .    Hepatology Plan:  IV albumin at usual dose. Hold diuretics. Serology for other causes of chronic liver disease. PEth level  Vitamin K 3 does over 3 days. Start liver transplant evaluation. ECHO and Nuclear stress    ASSESSMENT AND PLAN:  Cirrhosis  The diagnosis of cirrhosis is based upon imaging, laboratory studies, complications of cirrhosis. Cirrhosis is presumed secondary to alcohol. The CTP is 12. Child class C. The MELD score is 35. This is chronic decompensated liver disease most likely due to ETOH. This is not acute liver failure due to Humara. The patient has a MELD score greater than 15 and has developed complications of cirrhosis. Will initiate liver transplant evaluation testing. ECHO heat  Nuclear stress. Alcohol liver disease  Suspect the patient has alcohol induced liver disease based upon imaging showing fatty lvier dating back to 2019, pattern of AST>ALT,     The patient states he has been consuming 1-2 alcoholic beverages daily. However, there was a positive ETOH level 2 days after being hospitalized in 1/2022. He is on prednisone for alcoholic hepatitis  Will stop this as risk of infection is > potential benefit    The patient has never been told he had a medical issue from alcohol. The first time he was ever hospitalized due to alcohol and liver disease was in 1/2022. He has never lost time from work due to alcohol. He has no DUI. Unclear why he will not admit to more than 1-2 alcohol beverages per day. The wife told me separately he will go through a 1/2 gallon of [employer] in 2-3 days. Ascites   Ascites developed for the first time in 1/2022. Ascites is persistent despite due to LYNSEY.     Paracentesis is not needed at this time  Will start IV albumin  Hold diuretics    Lower extremity edema  Edema has developed in 1/2022  Will treat with IV albumin    Acute kidney injury  The patient has developed an elevation in serum creatinine   LYNSEY is secondary to the effects of cirrhosis, ETOH, diuretics. It is unlikely this is HRS. Will start IV albumin    Hepatic encephalopathy   He is alert and oriented and canm carry on conversation. Will get serum ammonia  Overt HE has not developed to date. There is no reason for treatment with lactulose of xifaxan    Tremor  Suspect this is due to alcohol withdrawal.  Blood ETOH was positive in 1/2022    Anemia   This is due to multifactorial causes including portal hypertension with chronic GI blood loss, bone marrow suppression secondary to alcohol. Will obtain FE panel to assess for iron stores. Will need EGD at some point to assess for esophageal varices. Thrombocytopenia   This is secondary to cirrhosis. There is no evidence of overt bleeding. No treatment is required. The platelet count is adequate for the patient to undergo procedures without the need for platelet transfusion or platelet growth factors. Coagulopathy  This is secondary to cirrhosis, malnutrition from alcoholism  Will treat with 3 doses of Vitamin K over 3 days. There is no evidence of bleeding. No need for FFP at this time. Metabolic acidosis  This may be due to infection/sepsis. Blood cultures. Start empiric ABX. Stop NABICARB pills,        SYSTEM REVIEW:  Constitution systems: weight loss. Eyes: Yellow   ENT: Negative for sore throat, painful swallowing. Respiratory: Negative for cough, hemoptysis, SOB. Cardiology: Lower leg swelling. GI:  Negative for constipation or diarrhea. : Negative for urinary frequency, dysuria, hematuria, nocturia. Skin: Negative for rash. Hematology: Negative for easy bruising, blood clots. Musculo-skelatal: Negative for back pain, muscle pain, weakness. Neurologic: Tremor. Psychology: Negative for anxiety, depression. FAMILY HISTORY:  The father  Has/had the following following chronic disease(s): complications of Agent Organge. The mother  of DM. There is no family history of liver disease. SOCIAL HISTORY:  The patient is . The patient has 1 child. The patient has never used tobacco products. The patient admits to consuming 1-2 alcoholic beverages per day. I suspect there is a lot more. The patient currently works full time for [employer]. PHYSICAL EXAMINATION:  VS: per nursing note  General:  Ill appearing  Eyes:  Sclera deeply icteric  ENT:  No oral lesions. Thyroid normal.  Nodes:  No adenopathy. Skin:  Spider angiomata. Jaundice. Respiratory:  Lungs clear to auscultation. Cardiovascular:  Regular heart rate. Abdomen:  Soft non-tender, Some ascites may be present. Extremities:  3+ lower extremity edema. Neurologic:  Alert and oriented. Tremor. Cranial nerves grossly intact. Asterixis present. LABORATORY:  Results for Carrington Covarrubias (MRN [id number]) as of 2/10/2022 19:29   Ref. Range 2/10/2022 13:29   WBC Latest Ref Range: 4.1 - 11.1 K/uL 25.9 (H)   HGB Latest Ref Range: 12.1 - 17.0 g/dL 7.3 (L)   PLATELET Latest Ref Range: 150 - 400 K/uL 122 (L)   INR Latest Ref Range: 0.9 - 1.1   2.2 (H)   Sodium Latest Ref Range: 136 - 145 mmol/L 138   Potassium Latest Ref Range: 3.5 - 5.1 mmol/L 4.2   Chloride Latest Ref Range: 97 - 108 mmol/L 114 (H)   CO2 Latest Ref Range: 21 - 32 mmol/L 16 (L)   Glucose Latest Ref Range: 65 - 100 mg/dL 137 (H)   BUN Latest Ref Range: 6 - 20 MG/DL 33 (H)   Creatinine Latest Ref Range: 0.70 - 1.30 MG/DL 2.27 (H)   Bilirubin, total Latest Ref Range: 0.2 - 1.0 MG/DL 20.7 (H)   Albumin Latest Ref Range: 3.5 - 5.0 g/dL 2.2 (L)   ALT Latest Ref Range: 12 - 78 U/L 59   AST Latest Ref Range: 15 - 37 U/L 72 (H)   Alk.  phosphatase Latest Ref Range: 45 - 117 U/L 117       RADIOLOGY:  Ultrasound of liver. Echogenic consistent with fatty liver. NO obvious cirrhosis. No liver mass lesions. No dilated bile ducts. Moderate ascites.       Ilene Nur MD  [facility] [address], [address] [place] [address].  [phone]  [address] [place] [address]

## 2022-02-11 NOTE — ED NOTES
Bedside and Verbal shift change report given to Abraham Medina RN (oncoming nurse) by Enos Osler, RN (offgoing nurse). Report included the following information SBAR, Kardex, ED Summary, Intake/Output, MAR, Recent Results and Med Rec Status.

## 2022-02-11 NOTE — ED NOTES
0802 Bedside and Verbal shift change report given to Sahil De La Vega (oncoming nurse) by Sherryle Munda, RN (offgoing nurse). Report included the following information SBAR, ED Summary, MAR and Recent Results. 0850 Pt to ECHO with tech    0959 Pt back from echo. Albumin started.

## 2022-02-11 NOTE — H&P
[address] [facility] Adult  Hospitalist Group  History and Physical    Date of Service:  2/10/2022  Primary Care Provider: Master Davenport MD  Source of information: The patient and Chart review and his PCP    Chief Complaint: Jaundice and Shortness of Breath      History of Presenting Illness:   Diallo Chopra III is a 55 y.o. male who came to [facility] specifically for admission and for evaluation by Dr Umer Moreau - Hepatology- due to related to elevated bilirubin and suspected cirrhosis. He was referred for admission by his family physician. Patient has a complicated past medical history including suspectedcirrhosis that they believe is secondary to alcohol use/abuse as well as possibly Humira. He used Humira from September until about 3 to 4 weeks ago due to crohns. He was admitted to [facility] then [facility] in January. Patient states that he was therefore liver failure. Diagnoses in epic which have crossed over but records not available are renal failure, metabolic acidosis, acute metabolic encephalopathy, immunocompromise, sepsis, liver mass which patient reports that he had an MRI for showing a fatty liver, Crohn's disease, macrocytic anemia and hyponatremia. He does not know his baseline creatinine at this point. Patient had labs drawn twice since then. Patient reports they are getting worse. His creatinine at one time was as high as 9- but most recently 1.5  Patient has been more sleepy but sleeping regularly at night and not during the day. Denies vomiting. He has an ostomy in the stool output is brown not black. He states that he has not drank alcohol in 6 weeks. Patient denies any abdominal pain, fevers, chest pain. He complains of some shortness of breath. no N/D, no diarrhea, no rashes, + leg swelling, . REVIEW OF SYSTEMS:  A comprehensive review of systems was negative except for that written in the History of Present Illness.      Past Medical History: Diagnosis Date    [facility]olic [facility] ([facility]     Crohn disease ([facility]     Small bowel obstruction (HCC)     hypotension- on midodrine    Past Surgical History:   Procedure Laterality Date    HX OTHER SURGICAL      bowel obstruction and ostomy    HX OTHER SURGICAL  2001    bowel resection secondary to tear in bowel and abscess     Prior to Admission medications    Medication Sig Start Date End Date Taking? Authorizing Provider   Prevalite 4 gram packet MIX 1 PACKET IN 8 OUNCES OF LIQUID TWICE A DAY 1/20/22  Yes Other, MD Nona   diphenhydrAMINE (BENADRYL) 25 mg tablet Take 25 mg by mouth daily. Yes Other, MD Nona   diphenoxylate-atropine (LOMOTIL) 2.5-0.025 mg per tablet Take 2 Tablets by mouth every six (6) hours as needed. 1/20/22  Yes Other, MD Nona   folic acid (FOLVITE) 1 mg tablet Take 1 mg by mouth. 2/1/22  Yes Other, MD Nona   midodrine (PROAMATINE) 10 mg tablet Take 10 mg by mouth three (3) times daily (with meals). 1/20/22  Yes Other, MD Nona   prednisoLONE (ORAPRED) 15 mg/5 mL (3 mg/mL) solution Take 5 mL by mouth daily. 10 ml PO daily x 7 days, followed by 5 ml PO daily x7 days 2/1/22  Yes Other, MD Nona   sodium bicarbonate 650 mg tablet Take 650 mg by mouth two (2) times a day. 2/8/22  Yes Other, MD Nona   cyanocobalamin (VITAMIN B12) 100 mcg tablet Take 100 mcg by mouth daily. Yes Other, MD Nona   adalimumab (Humira) 40 mg/0.8 mL injection 40 mg by SubCUTAneous route Once every 2 weeks. Yes Provider, Historical     Allergies   Allergen Reactions    Naprosyn [Naproxen] Itching    Other Medication Other (comments)     Some Crohns disease medication      No family history of liver disease or cancer  Social History:   Patient , with daughter living with him- worked for [employer] for a few years- admitted to alcohol use daily until 6 months ago    Family and social history were personally reviewed, all pertinent and relevant details are outlined as above.     Objective: Visit Vitals  /74   Pulse 76   Temp 97.9 °F (36.6 °C)   Resp 22   SpO2 95%      O2 Device: None (Room air)    PHYSICAL EXAM:   General: Alert x oriented x 3, awake, no acute distress, resting in bed, pleasant male, no acute distress  HEENT: PEERL, EOMI, moist mucus membranes, scleral icterus  Neck: Supple, no JVD, no meningeal signs  Chest: Clear to auscultation bilaterally   CVS: RRR, S1 S2 heard, no murmurs  Abd: Soft, non-tender, mildly-distended, +bowel sounds, ileostomy R side, prior surgical scars lower abdomen    Ext: No clubbing, no cyanosis, 2++ edema R>L  Neuro/Psych: Pleasant mood and affect, CN 2-12 grossly intact, sensory grossly within normal limit, Strength 5/5 in all extremities,   Cap refill: Brisk, less than 3 seconds  Pulses: 2+, symmetric in all extremities  Skin: Warm, dry, jaundiced    Data Review: All diagnostic labs and studies have been reviewed. Abnormal Labs Reviewed   CBC WITH AUTOMATED DIFF - Abnormal; Notable for the following components:       Result Value    WBC 25.9 (*)     RBC 2.10 (*)     HGB 7.3 (*)     HCT 24.5 (*)     .7 (*)     MCH 34.8 (*)     MCHC 29.8 (*)     RDW 17.1 (*)     PLATELET 105 (*)     NRBC 0.1 (*)     ABSOLUTE NRBC 0.02 (*)     NEUTROPHILS 90 (*)     LYMPHOCYTES 2 (*)     IMMATURE GRANULOCYTES 2 (*)     ABS. NEUTROPHILS 23.3 (*)     ABS. LYMPHOCYTES 0.5 (*)     ABS. MONOCYTES 1.6 (*)     ABS. IMM.  GRANS. 0.5 (*)     All other components within normal limits   PROTHROMBIN TIME + INR - Abnormal; Notable for the following components:    INR 2.2 (*)     Prothrombin time 22.2 (*)     All other components within normal limits   PTT - Abnormal; Notable for the following components:    aPTT 31.9 (*)     All other components within normal limits   METABOLIC PANEL, BASIC - Abnormal; Notable for the following components:    Chloride 114 (*)     CO2 16 (*)     Glucose 137 (*)     BUN 33 (*)     Creatinine 2.27 (*)     GFR est AA 38 (*)     GFR est non-AA 31 (*)     All other components within normal limits   HEPATIC FUNCTION PANEL - Abnormal; Notable for the following components:    Protein, total 5.9 (*)     Albumin 2.2 (*)     A-G Ratio 0.6 (*)     Bilirubin, total 20.7 (*)     Bilirubin, direct 14.9 (*)     AST (SGOT) 72 (*)     All other components within normal limits       All Micro Results     Procedure Component Value Units Date/Time    CULTURE, BLOOD, PAIRED [[id number]] Collected: 02/10/22 2029    Order Status: Completed Specimen: Blood Updated: 02/10/22 2047          IMAGING:   US ABD LTD   Final Result      1. Hepatomegaly, hepatic steatosis and diffuse periportal edema, with an   echogenic lesion in the left hepatic lobe which may represent a hemangioma. However, further characterization and evaluation for chronic liver disease is   suggested with nonemergent dedicated contrast enhanced hepatic MRI. 2. Mild to moderate ascites. 3. Mild right renal cortical thinning is suggested without hydronephrosis or   abnormal echogenicity. Correlate with renal function parameters. XR CHEST PORT   Final Result   Moderately severe acute left lung infiltrate, mild patchy possible right lung   infiltrate. Follow-up with two-view standard chest radiograph when clinically   possible. .  .               ECG/ECHO:    Results for orders placed or performed during the hospital encounter of 02/10/22   EKG, 12 LEAD, INITIAL   Result Value Ref Range    Ventricular Rate 70 BPM    Atrial Rate 70 BPM    P-R Interval 138 ms    QRS Duration 92 ms    Q-T Interval 388 ms    QTC Calculation (Bezet) 419 ms    Calculated P Axis 33 degrees    Calculated R Axis 8 degrees    Calculated T Axis -10 degrees    Diagnosis       Normal sinus rhythm  Normal ECG  When compared with ECG of 10-FEB-2022 13:24,  MANUAL COMPARISON REQUIRED, DATA IS UNCONFIRMED  Confirmed by Daniel Holman MD, Panda Patterson ([id number]) on 2/10/2022 7:11:42 PM          Assessment:   Given the patient's current clinical presentation, there is a high level of concern for decompensation if discharged from the emergency department. Complex decision making was performed, which includes reviewing the patient's available past medical records, laboratory results, and imaging studies. Active Problems:    Acidosis (2/10/2022)      Hyperbilirubinemia (2/10/2022)      Cirrhosis of liver ([address] (2/10/2022)      Plan:     1. Hyperbilirubinemia with jaundice- likely related to alcohol use  - liver US ordered and LFTs reviewed- hepatology consulted  Patient was on prednisone outpatient- resume    2. Crohns- humira on hold- ileostomy from prior surgery appears stable  3. CH- hypotension- on midodrine- resume  4. Bilat LE edema- venous dopplers- no DVT prophylaxis due to autoanticoagulation with elevated INR  5. Coagulopathy- from liver disease- monitor INR  6. Thrombocytopenia- ? Due to liver dz- monitor for now  7. Leukocytosis- ? Due to steroids, monitor for signs of infection  8. Acidosis- likely metabolic from liver dz- resume bicarb  9. LYNSEY on CKD3- monitor creatinine and start IVFLuids in creatinine worsens  10 Full Code- surrogate decision maker- pt's wife        DIET: ADULT DIET Regular   ISOLATION PRECAUTIONS: There are currently no Active Isolations  CODE STATUS: Full Code   DVT PROPHYLAXIS: SCDs  FUNCTIONAL STATUS PRIOR TO HOSPITALIZATION: Fully active and ambulatory; able to carry on all self-care without restriction. EARLY MOBILITY ASSESSMENT: Recommend routine ambulation while hospitalized with the assistance of nursing staff  ANTICIPATED DISCHARGE: Greater than 48 hours.   EMERGENCY CONTACT/SURROGATE DECISION MAKER: wife    Signed By: Malcolm Kim MD     February 10, 2022

## 2022-02-11 NOTE — PROGRESS NOTES
Transition of Care Plan   RUR: 13%   Disposition: The disposition plan is home with family assistance   F/U with PCP/Specialist     Transport: wife      Reason for Admission:  Acidosis, hyperbilirubinemia, & cirrhosis of liver                     RUR Score:  13%                   Plan for utilizing home health:   Not recommended        PCP: First and Last name:  Sheryl Brooks MD     Name of Practice:    Are you a current patient: Yes/No:    Approximate date of last visit:    Can you participate in a virtual visit with your PCP:                     Current Advanced Directive/Advance Care Plan: Full Code      Healthcare Decision Maker:                 Transition of Care Plan:                      CRM met with patient, introduced self, explained role, verified demographics, and offered assistance. The patient lives with his wife and daughter in a home with 1 step to enter. The patient is independent in his ADL's/IADL's and has a wheelchair, bedside commode, shower bench, walker, and cane. The [facility] uses [facility] [place] near his home. Care Management Interventions  PCP Verified by CM: Yes  Palliative Care Criteria Met (RRAT>21 & CHF Dx)?: No  Mode of Transport at Discharge:  Other (see comment) (wife)  Transition of Care Consult (CM Consult): Discharge Planning  Discharge Durable Medical Equipment: No  Health Maintenance Reviewed: Yes  Physical Therapy Consult: No  Occupational Therapy Consult: No  Speech Therapy Consult: No  Support Systems: Spouse/Significant Other  Confirm Follow Up Transport: Family  [employer] Information Provided?: No  Discharge Location  Patient Expects to be Discharged to[de-identified] Home with family assistance    RISHI Marquis

## 2022-02-11 NOTE — ED NOTES
TRANSFER - OUT REPORT:    Verbal report given to [facility], RN (name) on Prudence Dikes III  being transferred to [address] [place] [address](unit) for routine progression of care       Report consisted of patients Situation, Background, Assessment and   Recommendations(SBAR). Information from the following report(s) SBAR, Kardex, ED Summary, Intake/Output, MAR, Recent Results, Med Rec Status and Cardiac Rhythm NSR was reviewed with the receiving nurse. Lines:   Peripheral IV 02/10/22 Left Antecubital (Active)   Site Assessment Clean, dry, & intact 02/11/22 0400   Phlebitis Assessment 0 02/11/22 0400   Infiltration Assessment 0 02/11/22 0400   Dressing Status Clean, dry, & intact 02/11/22 0400       Peripheral IV 02/10/22 Right Antecubital (Active)   Site Assessment Clean, dry, & intact 02/11/22 0400   Phlebitis Assessment 0 02/11/22 0400   Infiltration Assessment 0 02/11/22 0400   Dressing Status Clean, dry, & intact 02/11/22 0400        Opportunity for questions and clarification was provided.

## 2022-02-11 NOTE — PROGRESS NOTES
Called about patient hemoglobin level of 5.9. It was 7.3 on admission. The patient nurse suspected lab error, lab was drawn from IV line site, will repeat a CBC stat and will transfuse if hemoglobin is less than 7. Repeat hemoglobin level is 6.3. Will transfuse patient with 1 unit of packed red blood cell.

## 2022-02-11 NOTE — PROGRESS NOTES
[facility], MD, FACP, Erickson Eliersunil Ibrahim, [place]      REMI Carey, [facility]     Joann Lott, [facility]   ZoranJOLLY Bravo-SONIA Madisonmadhuri Dorene, [facility]       Nohelia Elizondo [address]    at [address], [address] [place] [address], [place]  22.    [phone]    FAX: [address] [place] [address] Avenue    at [address], [address], [address]    [phone]    FAX: [phone]       HEPATOLOGY PROGRESS NOTE  The patient is a 55 y.o. male with Crohns disease. He has CT evidence of fatty liver dating back to 2019. In 9/2021 he was started on Humara for treatment of Crohns. In 1/2021 he was hospitalized at [facility] for jaundice with TBILI 9.0 and mild increase in INR to 1.38. He was DC to home in 1/20. He returned to the hospital on 1/26 with TBILI 19, INR 2.50. On 1/28 ETOH blood level was 8. He was treated with prednisone 15 mg every day for presumed alcohol induced hepatitis. US of the liver suggested fatty liver, cirrhosis, small amount of ascites. He was discharged to home on 2/7 with TBILI 19, INR 2.2, Scr 1.7 mg on prednisone 15 mg every day. I was called by his PCP Dr Harjit Kent in [place], [place] yesterday and suggested he come to the ED at [facility]. He came to the ED today. In the ED Laboratory studies were significant for:    WBC 25, HB 7.3 gms, , Scr 2.27 mg, AST 72, ALT 59, TBILI 21 mg, INR 2.2,     Imaging of the liver with Ultrasound demonstrated fatty liver and moderate ascites. [facility] course:  SCr is down to 1.97  INR is down to 1.7  Lower edema has almost all resolved  ECHO heart was normal.    Hepatology Plan:  Continue IV albumin   Continue to hold diuretics.   Complete 3 doses of Vitamin K   Start liver transplant evaluation. ASSESSMENT AND PLAN:  Cirrhosis  The diagnosis of cirrhosis is based upon imaging, laboratory studies, complications of cirrhosis. Cirrhosis is presumed secondary to alcohol. On admission the CTP is 12. Child class C. The MELD score is 35. This is chronic decompensated liver disease most likely due to ETOH. This is not acute liver failure due to Humara. The patient has a MELD score greater than 15 and has developed complications of cirrhosis. Will initiate liver transplant evaluation testing. ECHO heart was normal.  Nuclear stress. Alcohol liver disease  Suspect the patient has alcohol induced liver disease based upon imaging showing fatty liver dating back to 2019, pattern of AST>ALT,     He was initially adamant that he only consumed 1-2 alcoholic beverages daily. However, upon further discussion today he admits to drinking 1/2 gallon of burbon or 39 drinks per week. There was a positive ETOH level 2 days after being hospitalized in 1/2022. He was on prednisone for alcoholic hepatitis  This was stopped on admission since the risk of infection is > potential benefit    The patient has never been told he had a medical issue from alcohol. The first time he was ever hospitalized due to alcohol and liver disease was in 1/2022. He has never lost time from work due to alcohol. He has no DUI. Ascites   Ascites developed for the first time in 1/2022. Ascites is persistent despite due to LYNSEY. Paracentesis is not needed at this time  Will start IV albumin  Hold diuretics    Lower extremity edema  Edema has developed in 1/2022  This has nearly all resolved with IV albumin    Acute kidney injury  The patient has developed an elevation in serum creatinine   LYNSEY is secondary to the effects of cirrhosis, ETOH, diuretics. LYNSEY is improving with IV albumin    Hepatic encephalopathy   He is alert and oriented and canm carry on conversation.     Will get serum ammonia  Overt HE has not developed to date. There is no reason for treatment with lactulose of xifaxan    Tremor  Suspect this is due to alcohol withdrawal.  Blood ETOH was positive in 1/2022    Anemia   This is due to multifactorial causes including portal hypertension with chronic GI blood loss, bone marrow suppression secondary to alcohol. Will obtain FE panel to assess for iron stores. Will need EGD at some point to assess for esophageal varices. Thrombocytopenia   This is secondary to cirrhosis. There is no evidence of overt bleeding. No treatment is required. The platelet count is adequate for the patient to undergo procedures without the need for platelet transfusion or platelet growth factors. Coagulopathy  This is secondary to cirrhosis, malnutrition from alcoholism  Will treat with 3 doses of Vitamin K over 3 days. There is no evidence of bleeding. No need for FFP at this time. Metabolic acidosis  This may be due to infection/sepsis. Blood cultures. Start empiric ABX. Stop NABICARB pills,      PHYSICAL EXAMINATION:  VS: per nursing note  General:  Ill appearing  Eyes:  Sclera deeply icteric  ENT:  No oral lesions. Thyroid normal.  Nodes:  No adenopathy. Skin:  Spider angiomata. Jaundice. Respiratory:  Lungs clear to auscultation. Cardiovascular:  Regular heart rate. Abdomen:  Soft non-tender, Some ascites may be present. Extremities:  1+ lower extremity edema. Neurologic:  Alert and oriented. Tremor. Cranial nerves grossly intact. Asterixis present. LABORATORY:  Results for Julisa Gomez (MRN [id number]) as of 2/11/2022 18:20   Ref.  Range 2/10/2022 13:29 2/11/2022 04:34 2/11/2022 05:30   WBC Latest Ref Range: 4.1 - 11.1 K/uL 25.9 (H) 18.9 (H) 19.5 (H)   HGB Latest Ref Range: 12.1 - 17.0 g/dL 7.3 (L) 5.9 (LL) 6.3 (L)   PLATELET Latest Ref Range: 150 - 400 K/uL 122 (L) 87 (L) 78 (L)   INR Latest Ref Range: 0.9 - 1.1   2.2 (H) 1.7 (H)    Sodium Latest Ref Range: 136 - 145 mmol/L 138 138    Potassium Latest Ref Range: 3.5 - 5.1 mmol/L 4.2 3.8    Chloride Latest Ref Range: 97 - 108 mmol/L 114 (H) 114 (H)    CO2 Latest Ref Range: 21 - 32 mmol/L 16 (L) 17 (L)    Glucose Latest Ref Range: 65 - 100 mg/dL 137 (H) 87    BUN Latest Ref Range: 6 - 20 MG/DL 33 (H) 33 (H)    Creatinine Latest Ref Range: 0.70 - 1.30 MG/DL 2.27 (H) 1.97 (H)    Bilirubin, total Latest Ref Range: 0.2 - 1.0 MG/DL 20.7 (H) 19.9 (H)    Albumin Latest Ref Range: 3.5 - 5.0 g/dL 2.2 (L) 2.6 (L)    ALT Latest Ref Range: 12 - 78 U/L 59 49    AST Latest Ref Range: 15 - 37 U/L 72 (H) 63 (H)    Alk. phosphatase Latest Ref Range: 45 - 117 U/L 117 102    Ammonia Latest Ref Range: <32 UMOL/L  19        RADIOLOGY:  Ultrasound of liver. Echogenic consistent with fatty liver. NO obvious cirrhosis. No liver mass lesions. No dilated bile ducts. Moderate ascites.       MD Thom [facility] [address], [address] [facility]  [address].  [phone]  [address] [facility]

## 2022-02-11 NOTE — ED NOTES
Bedside and Verbal shift change report given to Joan Villalpando RN (oncoming nurse) by Javier Cancino RN (offgoing nurse). Report included the following information SBAR, Kardex, ED Summary, Intake/Output, MAR, Recent Results, Med Rec Status and Cardiac Rhythm NSR.

## 2022-02-12 LAB
ALBUMIN SERPL-MCNC: 2.7 G/DL (ref 3.5–5)
ALBUMIN/GLOB SERPL: 1 {RATIO} (ref 1.1–2.2)
ALP SERPL-CCNC: 86 U/L (ref 45–117)
ALT SERPL-CCNC: 38 U/L (ref 12–78)
ANION GAP SERPL CALC-SCNC: 7 MMOL/L (ref 5–15)
AST SERPL-CCNC: 52 U/L (ref 15–37)
BILIRUB SERPL-MCNC: 24.9 MG/DL (ref 0.2–1)
BUN SERPL-MCNC: 32 MG/DL (ref 6–20)
BUN/CREAT SERPL: 17 (ref 12–20)
CALCIUM SERPL-MCNC: 9 MG/DL (ref 8.5–10.1)
CHLORIDE SERPL-SCNC: 114 MMOL/L (ref 97–108)
CO2 SERPL-SCNC: 17 MMOL/L (ref 21–32)
COMMENT, HOLDF: NORMAL
CREAT SERPL-MCNC: 1.91 MG/DL (ref 0.7–1.3)
ERYTHROCYTE [DISTWIDTH] IN BLOOD BY AUTOMATED COUNT: 19.9 % (ref 11.5–14.5)
GLOBULIN SER CALC-MCNC: 2.6 G/DL (ref 2–4)
GLUCOSE SERPL-MCNC: 56 MG/DL (ref 65–100)
HCT VFR BLD AUTO: 19.4 % (ref 36.6–50.3)
HCT VFR BLD AUTO: 21.6 % (ref 36.6–50.3)
HGB BLD-MCNC: 6.1 G/DL (ref 12.1–17)
HGB BLD-MCNC: 7 G/DL (ref 12.1–17)
HISTORY CHECKED?,CKHIST: NORMAL
HIV 1+2 AB+HIV1 P24 AG SERPL QL IA: NONREACTIVE
HIV12 RESULT COMMENT, HHIVC: NORMAL
INR PPP: 1.5 (ref 0.9–1.1)
MAGNESIUM SERPL-MCNC: 1.6 MG/DL (ref 1.6–2.4)
MCH RBC QN AUTO: 34.5 PG (ref 26–34)
MCHC RBC AUTO-ENTMCNC: 31.4 G/DL (ref 30–36.5)
MCV RBC AUTO: 109.6 FL (ref 80–99)
NRBC # BLD: 0 K/UL (ref 0–0.01)
NRBC BLD-RTO: 0 PER 100 WBC
PHOSPHATE SERPL-MCNC: 2.2 MG/DL (ref 2.6–4.7)
PLATELET # BLD AUTO: 87 K/UL (ref 150–400)
PMV BLD AUTO: 10.9 FL (ref 8.9–12.9)
POTASSIUM SERPL-SCNC: 3.8 MMOL/L (ref 3.5–5.1)
PROT SERPL-MCNC: 5.3 G/DL (ref 6.4–8.2)
PROTHROMBIN TIME: 15.2 SEC (ref 9–11.1)
RBC # BLD AUTO: 1.77 M/UL (ref 4.1–5.7)
SAMPLES BEING HELD,HOLD: NORMAL
SODIUM SERPL-SCNC: 138 MMOL/L (ref 136–145)
WBC # BLD AUTO: 18.1 K/UL (ref 4.1–11.1)

## 2022-02-12 PROCEDURE — 74011000258 HC RX REV CODE- 258: Performed by: INTERNAL MEDICINE

## 2022-02-12 PROCEDURE — 74011250636 HC RX REV CODE- 250/636: Performed by: INTERNAL MEDICINE

## 2022-02-12 PROCEDURE — 86644 CMV ANTIBODY: CPT

## 2022-02-12 PROCEDURE — 87389 HIV-1 AG W/HIV-1&-2 AB AG IA: CPT

## 2022-02-12 PROCEDURE — 83735 ASSAY OF MAGNESIUM: CPT

## 2022-02-12 PROCEDURE — 80053 COMPREHEN METABOLIC PANEL: CPT

## 2022-02-12 PROCEDURE — 82107 ALPHA-FETOPROTEIN L3: CPT

## 2022-02-12 PROCEDURE — 85027 COMPLETE CBC AUTOMATED: CPT

## 2022-02-12 PROCEDURE — 84100 ASSAY OF PHOSPHORUS: CPT

## 2022-02-12 PROCEDURE — 85610 PROTHROMBIN TIME: CPT

## 2022-02-12 PROCEDURE — 74011250637 HC RX REV CODE- 250/637: Performed by: FAMILY MEDICINE

## 2022-02-12 PROCEDURE — 99233 SBSQ HOSP IP/OBS HIGH 50: CPT | Performed by: INTERNAL MEDICINE

## 2022-02-12 PROCEDURE — 36430 TRANSFUSION BLD/BLD COMPNT: CPT

## 2022-02-12 PROCEDURE — 86780 TREPONEMA PALLIDUM: CPT

## 2022-02-12 PROCEDURE — 86695 HERPES SIMPLEX TYPE 1 TEST: CPT

## 2022-02-12 PROCEDURE — 74011000258 HC RX REV CODE- 258: Performed by: FAMILY MEDICINE

## 2022-02-12 PROCEDURE — 86664 EPSTEIN-BARR NUCLEAR ANTIGEN: CPT

## 2022-02-12 PROCEDURE — 74011250636 HC RX REV CODE- 250/636: Performed by: FAMILY MEDICINE

## 2022-02-12 PROCEDURE — 86787 VARICELLA-ZOSTER ANTIBODY: CPT

## 2022-02-12 PROCEDURE — P9047 ALBUMIN (HUMAN), 25%, 50ML: HCPCS | Performed by: INTERNAL MEDICINE

## 2022-02-12 PROCEDURE — P9016 RBC LEUKOCYTES REDUCED: HCPCS

## 2022-02-12 PROCEDURE — 85018 HEMOGLOBIN: CPT

## 2022-02-12 PROCEDURE — 36415 COLL VENOUS BLD VENIPUNCTURE: CPT

## 2022-02-12 PROCEDURE — 65660000000 HC RM CCU STEPDOWN

## 2022-02-12 RX ORDER — SODIUM CHLORIDE 9 MG/ML
250 INJECTION, SOLUTION INTRAVENOUS AS NEEDED
Status: DISCONTINUED | OUTPATIENT
Start: 2022-02-12 | End: 2022-02-20 | Stop reason: HOSPADM

## 2022-02-12 RX ORDER — ALBUTEROL SULFATE 0.83 MG/ML
5 SOLUTION RESPIRATORY (INHALATION)
Status: DISCONTINUED | OUTPATIENT
Start: 2022-02-12 | End: 2022-02-13

## 2022-02-12 RX ADMIN — ALBUMIN (HUMAN) 25 G: 0.25 INJECTION, SOLUTION INTRAVENOUS at 14:00

## 2022-02-12 RX ADMIN — PIPERACILLIN AND TAZOBACTAM 3.38 G: 3; .375 INJECTION, POWDER, LYOPHILIZED, FOR SOLUTION INTRAVENOUS at 16:29

## 2022-02-12 RX ADMIN — MIDODRINE HYDROCHLORIDE 10 MG: 5 TABLET ORAL at 12:00

## 2022-02-12 RX ADMIN — Medication 100 MG: at 08:43

## 2022-02-12 RX ADMIN — MIDODRINE HYDROCHLORIDE 10 MG: 5 TABLET ORAL at 07:07

## 2022-02-12 RX ADMIN — ALBUMIN (HUMAN) 25 G: 0.25 INJECTION, SOLUTION INTRAVENOUS at 22:38

## 2022-02-12 RX ADMIN — ALBUMIN (HUMAN) 25 G: 0.25 INJECTION, SOLUTION INTRAVENOUS at 02:51

## 2022-02-12 RX ADMIN — FOLIC ACID 1 MG: 1 TABLET ORAL at 08:43

## 2022-02-12 RX ADMIN — PHYTONADIONE 10 MG: 10 INJECTION, EMULSION INTRAMUSCULAR; INTRAVENOUS; SUBCUTANEOUS at 16:21

## 2022-02-12 RX ADMIN — MIDODRINE HYDROCHLORIDE 10 MG: 5 TABLET ORAL at 16:30

## 2022-02-12 RX ADMIN — VITAM B12 100 MCG: 100 TAB at 08:43

## 2022-02-12 RX ADMIN — PIPERACILLIN AND TAZOBACTAM 3.38 G: 3; .375 INJECTION, POWDER, LYOPHILIZED, FOR SOLUTION INTRAVENOUS at 07:06

## 2022-02-12 NOTE — PROGRESS NOTES
[address] [facility] Adult  [facility]                                                                                          Hospitalist Progress Note  Sara Flynn MD  Answering service: [phone] OR [phone] from in house phone        Date of Service:  2022  NAME:  Genesis Ayers III  :  1975  MRN:  [id number]      Admission Summary:   Alexsander Ely is a 55 y.o. male who came to [facility] specifically for admission and for evaluation by Dr Young Courser - Hepatology- due to related to elevated bilirubin and suspected cirrhosis. He was referred for admission by his family physician. Priscilla Barrios has a complicated past medical history including suspectedcirrhosis that they believe is secondary to alcohol use/abuse as well as possibly Humira.  He used Humira from September until about 3 to 4 weeks ago due to Manuela Conner was admitted to [facility] in January. Priscilla Barrios states that he was therefore liver failure.  Diagnoses in epic which have crossed over but records not available are renal failure, metabolic acidosis, acute metabolic encephalopathy, immunocompromise, sepsis, liver mass which patient reports that he had an MRI for showing a fatty liver, Crohn's disease, macrocytic anemia and hyponatremia.  He does not know his baseline creatinine at this point.  Patient had labs drawn twice since then. Priscilla Barrios reports they are getting worse.  His creatinine at one time was as high as 9- but most recently 1.5  Patient has been more sleepy but sleeping regularly at night and not during the day.  Denies vomiting. [facility] has an ostomy in the stool output is brown not black. [facility] states that he has not drank alcohol in 6 weeks.  Patient denies any abdominal pain, fevers, chest pain.  He complains of some shortness of breath.  no N/D, no diarrhea, no rashes, + leg swelling,       Interval history / Subjective:   Patient reports some abd pain  Started IV abx  Further workup per hepatology     Assessment & Plan:         1. Hyperbilirubinemia with jaundice- likely related to alcohol use  - liver US ordered and LFTs reviewed- hepatology consulted  Further workup per hepatology  2. Crohns- humira on hold- ileostomy from prior surgery appears stable  3. CH- hypotension- on midodrine- resume  4. Bilat LE edema- venous dopplers- no DVT prophylaxis due to autoanticoagulation with elevated INR  5. Coagulopathy- from liver disease- monitor INR, cont VIT K  6. Thrombocytopenia- ? Due to liver dz- monitor for now  7. Leukocytosis- UTI   Started IV abx- monitor cultures  8. Acidosis- likely metabolic from liver dz- monitor off bicarb  9. LYNSEY on CKD3- monitor creatinine   10. Anemia- blood transfusion ordered       Code status: Full Code  [address] discussed with: patient  Anticipated Disposition: > 48hrs     Hospital Problems  Never Reviewed          Codes Class Noted POA    Acidosis ICD-10-CM: E87.2  ICD-9-CM: 276.2  2/10/2022 Unknown        Hyperbilirubinemia ICD-10-CM: E80.6  ICD-9-CM: 782.4  2/10/2022 Unknown        Cirrhosis of liver ([facility] [address] ICD-10-CM: K74.60  ICD-9-CM: 571.5  2/10/2022 Unknown                Review of Systems:   A comprehensive review of systems was negative except for that written in the HPI. Vital Signs:    Last 24hrs VS reviewed since prior progress note.  Most recent are:  Visit Vitals  /67   Pulse 97   Temp 98.2 °F (36.8 °C)   Resp 18   Ht 6' (1.829 m)   Wt 126.7 kg (279 lb 6.4 oz)   SpO2 94%   BMI 37.89 kg/m²     Patient Vitals for the past 24 hrs:   Temp Pulse Resp BP SpO2   02/11/22 2200 -- 97 -- -- --   02/11/22 2000 -- 97 -- -- --   02/11/22 1959 98.2 °F (36.8 °C) 99 18 111/67 94 %   02/11/22 1811 -- 96 -- -- --   02/11/22 1451 98 °F (36.7 °C) 93 23 114/72 95 %   02/11/22 1330 -- 89 23 118/75 93 %   02/11/22 1300 97.5 °F (36.4 °C) 95 24 114/67 92 %   02/11/22 1230 97.5 °F (36.4 °C) 94 25 115/77 91 %   02/11/22 1215 -- 88 22 106/69 93 % 02/11/22 1200 -- 89 22 107/72 92 %   02/11/22 1145 97 °F (36.1 °C) 88 23 115/73 91 %   02/11/22 1130 97.1 °F (36.2 °C) 88 26 113/76 91 %   02/11/22 1115 97.4 °F (36.3 °C) 92 27 110/60 92 %   02/11/22 1100 97.5 °F (36.4 °C) 94 24 113/72 91 %   02/11/22 1053 97.5 °F (36.4 °C) 92 18 111/65 93 %   02/11/22 0925 -- -- -- 112/76 --   02/11/22 0802 -- 89 20 112/76 94 %   02/11/22 0600 -- 87 23 -- 95 %   02/11/22 0500 -- 85 23 120/73 94 %   02/11/22 0400 98.6 °F (37 °C) 83 22 123/71 93 %   02/11/22 0300 -- 94 22 127/81 94 %   02/11/22 0200 -- 74 19 119/80 93 %   02/11/22 0100 -- 77 18 117/74 93 %   02/11/22 0000 98.6 °F (37 °C) 78 23 106/69 96 %   02/10/22 2300 -- 74 23 117/71 96 %       Intake/Output Summary (Last 24 hours) at 2/11/2022 2240  Last data filed at 2/11/2022 1619  Gross per 24 hour   Intake 660 ml   Output 775 ml   Net -115 ml        Physical Examination:     I had a face to face encounter with this patient and independently examined them on 2/11/2022 as outlined below:          Constitutional:  No acute distress, cooperative, jaundiced   HEENT:  Oral mucosa moist, scleral icterus    Resp:  CTA bilaterally. No wheezing/rhonchi/rales. No accessory muscle use. CV:  Regular rhythm, normal rate, no murmurs,     GI:  Soft, distended, mild tenderness. Surgical scars, ileostomy     Musculoskeletal:  bilat LE edema, no cyanosis    Neurologic:  Moves all extremities. AAOx3, CN II-XII reviewed            Data Review:    Review and/or order of clinical lab test  Review and/or order of tests in the radiology section of CPT  Review and/or order of tests in the medicine section of [facility]    ULTRASOUND OF THE RIGHT UPPER QUADRANT     INDICATION: jaundice     COMPARISON: None.     TECHNIQUE:  Sonography of the right upper quadrant was performed.      FINDINGS:     GALLBLADDER: Mildly distended but no gallstones, or wall thickening. Algis Broaden    COMMON DUCT: The common bile duct cannot be visualized by the technologist.  LIVER: The liver is enlarged, diffusely heterogeneous and upper normal in  echogenicity with prominent periportal edema throughout both lobes. Portal  venous flow is hepatopedal, but portal and hepatic venous branches were not  insonated. Mildly echogenic lesion in the left hepatic lobe measures 3.9 x 3.5 x  3.0 cm. PANCREAS: The visualized portions of the pancreas are normal.   RIGHT KIDNEY: 11.2 cm in length, with mild cortical thinning suggested. . No  hydronephrosis, shadowing calculus, or contour-deforming renal mass.     Incidentally noted small to moderate ascites throughout the upper abdomen.     IMPRESSION     1. Hepatomegaly, hepatic steatosis and diffuse periportal edema, with an  echogenic lesion in the left hepatic lobe which may represent a hemangioma. However, further characterization and evaluation for chronic liver disease is  suggested with nonemergent dedicated contrast enhanced hepatic MRI. 2. Mild to moderate ascites. 3. Mild right renal cortical thinning is suggested without hydronephrosis or  abnormal echogenicity. Correlate with renal function parameters. Labs:     Recent Labs     02/11/22  1849 02/11/22  0530 02/11/22 0434 02/11/22 0434   WBC  --  19.5*  --  18.9*   HGB 7.0* 6.3*   < > 5.9*   HCT 22.4* 20.8*   < > 19.4*   PLT  --  78*  --  87*    < > = values in this interval not displayed. Recent Labs     02/11/22  0434 02/10/22  1329    138   K 3.8 4.2   * 114*   CO2 17* 16*   BUN 33* 33*   CREA 1.97* 2.27*   GLU 87 137*   CA 9.2 9.2     Recent Labs     02/11/22  0434 02/10/22  1329   ALT 49 59    117   TBILI 19.9* 20.7*   TP 5.8* 5.9*   ALB 2.6* 2.2*   GLOB 3.2 3.7   LPSE  --  233     Recent Labs     02/11/22  0434 02/10/22  1329   INR 1.7* 2.2*   PTP 17.7* 22.2*   APTT  --  31.9*      No results for input(s): FE, TIBC, PSAT, FERR in the last 72 hours. No results found for: FOL, RBCF   No results for input(s): PH, PCO2, PO2 in the last 72 hours.   No results for input(s): CPK, CKNDX, TROIQ in the last 72 hours.     No lab exists for component: CPKMB  No results found for: CHOL, CHOLX, CHLST, CHOLV, HDL, HDLP, LDL, LDLC, DLDLP, TGLX, TRIGL, TRIGP, CHHD, CHHDX  No results found for: [facility]  Lab Results   Component Value Date/Time    Color DARK YELLOW 02/10/2022 09:53 PM    Appearance CLOUDY (A) 02/10/2022 09:53 PM    Specific gravity 1.017 02/10/2022 09:53 PM    pH (UA) 5.5 02/10/2022 09:53 PM    Protein TRACE (A) 02/10/2022 09:53 PM    Glucose Negative 02/10/2022 09:53 PM    Ketone Negative 02/10/2022 09:53 PM    Urobilinogen 0.2 02/10/2022 09:53 PM    Nitrites Positive (A) 02/10/2022 09:53 PM    Leukocyte Esterase TRACE (A) 02/10/2022 09:53 PM    Epithelial cells FEW 02/10/2022 09:53 PM    Bacteria 2+ (A) 02/10/2022 09:53 PM    WBC 0-4 02/10/2022 09:53 PM    RBC 0-5 02/10/2022 09:53 PM         Medications Reviewed:     Current Facility-Administered Medications   Medication Dose Route Frequency    0.9% sodium chloride infusion 250 mL  250 mL IntraVENous PRN    piperacillin-tazobactam (ZOSYN) 3.375 g in 0.9% sodium chloride (MBP/ADV) 100 mL MBP  3.375 g IntraVENous Q8H    sodium chloride (NS) flush 5-40 mL  5-40 mL IntraVENous Q8H    sodium chloride (NS) flush 5-40 mL  5-40 mL IntraVENous PRN    polyethylene glycol (MIRALAX) packet 17 g  17 g Oral DAILY PRN    ondansetron (ZOFRAN) injection 4 mg  4 mg IntraVENous Q6H PRN    midodrine (PROAMATINE) tablet 10 mg  10 mg Oral TID WITH MEALS    cyanocobalamin (VITAMIN B12) tablet 100 mcg  100 mcg Oral DAILY    folic acid (FOLVITE) tablet 1 mg  1 mg Oral DAILY    albumin human 25% (BUMINATE) solution 25 g  25 g IntraVENous Q6H    phytonadione (vitamin K1) (AQUA-MEPHYTON) 10 mg in 0.9% sodium chloride 50 mL IVPB  10 mg IntraVENous DAILY    thiamine HCL (B-1) tablet 100 mg  100 mg Oral DAILY     ______________________________________________________________________  EXPECTED LENGTH OF STAY: - - -  ACTUAL LENGTH OF STAY: 3 [facility] Yo Sanchez MD

## 2022-02-12 NOTE — PROGRESS NOTES
Bedside shift change report given to [address] [place] [address] Road (oncoming nurse) by Viktoria English RN (offgoing nurse). Report included the following information SBAR, Kardex, Intake/Output, MAR and Recent Results.

## 2022-02-12 NOTE — PROGRESS NOTES
[facility], MD, FACP, [place], [place]      REMI Dennis, [facility]     Joann PALOMINO Kaylie, [facility]   COLTON Barrett [facility]       Nohelia Meehan [facility] [address]    at [place] [address], [address] [address]  [address].    [phone]    FAX: [address] [place] [address] Avenue    at [address], [address], [address]    [phone]    FAX: [phone]       HEPATOLOGY PROGRESS NOTE  The patient is a 55 y.o. male with Crohns disease. He has CT evidence of fatty liver dating back to 2019. In 9/2021 he was started on Humara for treatment of Crohns. In 1/2021 he was hospitalized at [facility] for jaundice with TBILI 9.0 and mild increase in INR to 1.38. He was DC to home in 1/20. He returned to the hospital on 1/26 with TBILI 19, INR 2.50. On 1/28 ETOH blood level was 8. He was treated with prednisone 15 mg every day for presumed alcohol induced hepatitis. US of the liver suggested fatty liver, cirrhosis, small amount of ascites. He was discharged to home on 2/7 with TBILI 19, INR 2.2, Scr 1.7 mg on prednisone 15 mg every day. I was called by his PCP Dr Mac Otoole in [place], [place] yesterday and suggested he come to the ED at [facility]. He came to the ED today. In the ED Laboratory studies were significant for:    WBC 25, HB 7.3 gms, , Scr 2.27 mg, AST 72, ALT 59, TBILI 21 mg, INR 2.2,     Imaging of the liver with Ultrasound demonstrated fatty liver and moderate ascites. [facility] Hospital course:  SCr is down to 1.91  INR is down to 1.5  Lower edema has resolved  ECHO heart was normal.    Hepatology Plan:  Continue IV albumin   Continue to hold diuretics.   Complete 3 doses of Vitamin K   Start liver transplant evaluation. ASSESSMENT AND PLAN:  Cirrhosis  The diagnosis of cirrhosis is based upon imaging, laboratory studies, complications of cirrhosis. Cirrhosis is presumed secondary to alcohol. On admission the CTP is 12. Child class C. The MELD score is 35. This is chronic decompensated liver disease most likely due to ETOH. This is not acute liver failure due to Humara. The patient has a MELD score greater than 15 and has developed complications of cirrhosis. Will initiate liver transplant evaluation testing. ECHO heart was normal.  Nuclear stress. Alcohol liver disease  Suspect the patient has alcohol induced liver disease based upon imaging showing fatty liver dating back to 2019, pattern of AST>ALT,     He was initially adamant that he only consumed 1-2 alcoholic beverages daily. However, upon further discussion today he admits to drinking 1/2 gallon of burbon or 39 drinks per week. There was a positive ETOH level 2 days after being hospitalized in 1/2022. He was on prednisone for alcoholic hepatitis  This was stopped on admission since the risk of infection is > potential benefit    The patient has never been told he had a medical issue from alcohol. The first time he was ever hospitalized due to alcohol and liver disease was in 1/2022. He has never lost time from work due to alcohol. He has no DUI. Ascites   Ascites developed for the first time in 1/2022. Ascites is persistent despite due to LYNSEY. Paracentesis is not needed at this time  Will start IV albumin  Hold diuretics    Lower extremity edema  Edema has developed in 1/2022  This has nearly all resolved with IV albumin    Acute kidney injury  The patient has developed an elevation in serum creatinine   LYNSEY is secondary to the effects of cirrhosis, ETOH, diuretics. LYNSEY is improving with IV albumin    Hepatic encephalopathy   He is alert and oriented and canm carry on conversation.     Will get serum ammonia  Overt HE has not developed to date. There is no reason for treatment with lactulose of xifaxan    Tremor  Suspect this is due to alcohol withdrawal.  Blood ETOH was positive in 1/2022    Anemia   This is due to multifactorial causes including portal hypertension with chronic GI blood loss, bone marrow suppression secondary to alcohol. Will obtain FE panel to assess for iron stores. Will need EGD at some point to assess for esophageal varices. Thrombocytopenia   This is secondary to cirrhosis. There is no evidence of overt bleeding. No treatment is required. The platelet count is adequate for the patient to undergo procedures without the need for platelet transfusion or platelet growth factors. Coagulopathy  This is secondary to cirrhosis, malnutrition from alcoholism  Will treat with 3 doses of Vitamin K over 3 days. There is no evidence of bleeding. No need for FFP at this time. Metabolic acidosis  This may be due to infection/sepsis. Blood cultures. Start empiric ABX. Stop NABICARB pills,      PHYSICAL EXAMINATION:  VS: per nursing note  General:  Ill appearing  Eyes:  Sclera deeply icteric  ENT:  No oral lesions. Thyroid normal.  Nodes:  No adenopathy. Skin:  Spider angiomata. Jaundice. Respiratory:  Lungs clear to auscultation. Cardiovascular:  Regular heart rate. Abdomen:  Soft non-tender, Some ascites may be present. Extremities:  1+ lower extremity edema. Neurologic:  Alert and oriented. Tremor. Cranial nerves grossly intact. Asterixis present. LABORATORY:  Results for Mihir Meléndez (MRN [id number]) as of 2/12/2022 16:28   Ref.  Range 2/10/2022 13:29 2/11/2022 04:34 2/12/2022 02:50   WBC Latest Ref Range: 4.1 - 11.1 K/uL 25.9 (H) 18.9 (H) 18.1 (H)   HGB Latest Ref Range: 12.1 - 17.0 g/dL 7.3 (L) 5.9 (LL) 6.1 (L)   PLATELET Latest Ref Range: 150 - 400 K/uL 122 (L) 87 (L) 87 (L)   INR Latest Ref Range: 0.9 - 1.1   2.2 (H) 1.7 (H) 1.5 (H)   Sodium Latest Ref Range: 136 - 145 mmol/L 138 138 138   Potassium Latest Ref Range: 3.5 - 5.1 mmol/L 4.2 3.8 3.8   Chloride Latest Ref Range: 97 - 108 mmol/L 114 (H) 114 (H) 114 (H)   CO2 Latest Ref Range: 21 - 32 mmol/L 16 (L) 17 (L) 17 (L)   Glucose Latest Ref Range: 65 - 100 mg/dL 137 (H) 87 56 (L)   BUN Latest Ref Range: 6 - 20 MG/DL 33 (H) 33 (H) 32 (H)   Creatinine Latest Ref Range: 0.70 - 1.30 MG/DL 2.27 (H) 1.97 (H) 1.91 (H)   Bilirubin, total Latest Ref Range: 0.2 - 1.0 MG/DL 20.7 (H) 19.9 (H) 24.9 (H)   Albumin Latest Ref Range: 3.5 - 5.0 g/dL 2.2 (L) 2.6 (L) 2.7 (L)   ALT Latest Ref Range: 12 - 78 U/L 59 49 38   AST Latest Ref Range: 15 - 37 U/L 72 (H) 63 (H) 52 (H)   Alk. phosphatase Latest Ref Range: 45 - 117 U/L 117 102 86   Ammonia Latest Ref Range: <32 UMOL/L  19        RADIOLOGY:  Ultrasound of liver. Echogenic consistent with fatty liver. NO obvious cirrhosis. No liver mass lesions. No dilated bile ducts. Moderate ascites.       Neri Kirby MD  [facility] [address], [address] [facility] [address]  [address].  [phone]  [address] [facility]

## 2022-02-12 NOTE — PROGRESS NOTES
Bedside shift change report given to Lizzy Louis RN (oncoming nurse) by Felix Mcdowell RN (offgoing nurse). Report included the following information SBAR, Kardex, Intake/Output, MAR and Recent Results.

## 2022-02-13 ENCOUNTER — APPOINTMENT (OUTPATIENT)
Dept: CT IMAGING | Age: 47
DRG: 432 | End: 2022-02-13
Attending: NURSE PRACTITIONER
Payer: COMMERCIAL

## 2022-02-13 ENCOUNTER — APPOINTMENT (OUTPATIENT)
Dept: NUCLEAR MEDICINE | Age: 47
DRG: 432 | End: 2022-02-13
Attending: NURSE PRACTITIONER
Payer: COMMERCIAL

## 2022-02-13 LAB
ALBUMIN SERPL-MCNC: 3 G/DL (ref 3.5–5)
ALBUMIN/GLOB SERPL: 1.3 {RATIO} (ref 1.1–2.2)
ALP SERPL-CCNC: 93 U/L (ref 45–117)
ALT SERPL-CCNC: 31 U/L (ref 12–78)
ANION GAP SERPL CALC-SCNC: 10 MMOL/L (ref 5–15)
AST SERPL-CCNC: 48 U/L (ref 15–37)
BILIRUB SERPL-MCNC: 27.1 MG/DL (ref 0.2–1)
BUN SERPL-MCNC: 29 MG/DL (ref 6–20)
BUN/CREAT SERPL: 14 (ref 12–20)
CALCIUM SERPL-MCNC: 8.8 MG/DL (ref 8.5–10.1)
CHLORIDE SERPL-SCNC: 112 MMOL/L (ref 97–108)
CMV IGG SERPL IA-ACNC: 6.9 U/ML (ref 0–0.59)
CMV IGM SERPL IA-ACNC: <30 AU/ML (ref 0–29.9)
CO2 SERPL-SCNC: 15 MMOL/L (ref 21–32)
COMMENT, HOLDF: NORMAL
COVID-19 RAPID TEST, COVR: DETECTED
CREAT SERPL-MCNC: 2.04 MG/DL (ref 0.7–1.3)
EBV NA IGG SER-ACNC: >600 U/ML (ref 0–17.9)
EBV VCA IGG SER-ACNC: >600 U/ML (ref 0–17.9)
EBV VCA IGM SER-ACNC: <36 U/ML (ref 0–35.9)
ERYTHROCYTE [DISTWIDTH] IN BLOOD BY AUTOMATED COUNT: 21.5 % (ref 11.5–14.5)
GLOBULIN SER CALC-MCNC: 2.4 G/DL (ref 2–4)
GLUCOSE SERPL-MCNC: 76 MG/DL (ref 65–100)
HCT VFR BLD AUTO: 21.4 % (ref 36.6–50.3)
HGB BLD-MCNC: 6.8 G/DL (ref 12.1–17)
HISTORY CHECKED?,CKHIST: NORMAL
HSV1 IGG SER IA-ACNC: <0.91 INDEX (ref 0–0.9)
HSV2 IGG SER IA-ACNC: <0.91 INDEX (ref 0–0.9)
INR PPP: 1.5 (ref 0.9–1.1)
INTERPRETATION, 169995: ABNORMAL
IRON SATN MFR SERPL: 60 % (ref 20–50)
IRON SERPL-MCNC: 47 UG/DL (ref 35–150)
MCH RBC QN AUTO: 33.5 PG (ref 26–34)
MCHC RBC AUTO-ENTMCNC: 31.8 G/DL (ref 30–36.5)
MCV RBC AUTO: 105.4 FL (ref 80–99)
NRBC # BLD: 0 K/UL (ref 0–0.01)
NRBC BLD-RTO: 0 PER 100 WBC
PLATELET # BLD AUTO: 73 K/UL (ref 150–400)
PMV BLD AUTO: 10.8 FL (ref 8.9–12.9)
POTASSIUM SERPL-SCNC: 3.2 MMOL/L (ref 3.5–5.1)
PROT SERPL-MCNC: 5.4 G/DL (ref 6.4–8.2)
PROTHROMBIN TIME: 15.6 SEC (ref 9–11.1)
RBC # BLD AUTO: 2.03 M/UL (ref 4.1–5.7)
SAMPLES BEING HELD,HOLD: NORMAL
SODIUM SERPL-SCNC: 137 MMOL/L (ref 136–145)
SOURCE, COVRS: ABNORMAL
TIBC SERPL-MCNC: 78 UG/DL (ref 250–450)
VZV IGG SER IA-ACNC: 2214 INDEX
WBC # BLD AUTO: 14.4 K/UL (ref 4.1–11.1)

## 2022-02-13 PROCEDURE — 74011000250 HC RX REV CODE- 250: Performed by: NURSE PRACTITIONER

## 2022-02-13 PROCEDURE — 87635 SARS-COV-2 COVID-19 AMP PRB: CPT

## 2022-02-13 PROCEDURE — 99233 SBSQ HOSP IP/OBS HIGH 50: CPT | Performed by: INTERNAL MEDICINE

## 2022-02-13 PROCEDURE — 36415 COLL VENOUS BLD VENIPUNCTURE: CPT

## 2022-02-13 PROCEDURE — 71250 CT THORAX DX C-: CPT

## 2022-02-13 PROCEDURE — 83540 ASSAY OF IRON: CPT

## 2022-02-13 PROCEDURE — 94640 AIRWAY INHALATION TREATMENT: CPT

## 2022-02-13 PROCEDURE — 74011250637 HC RX REV CODE- 250/637: Performed by: FAMILY MEDICINE

## 2022-02-13 PROCEDURE — 36430 TRANSFUSION BLD/BLD COMPNT: CPT

## 2022-02-13 PROCEDURE — A9540 TC99M MAA: HCPCS

## 2022-02-13 PROCEDURE — 65660000000 HC RM CCU STEPDOWN

## 2022-02-13 PROCEDURE — 74011250636 HC RX REV CODE- 250/636: Performed by: INTERNAL MEDICINE

## 2022-02-13 PROCEDURE — 80053 COMPREHEN METABOLIC PANEL: CPT

## 2022-02-13 PROCEDURE — 74011000258 HC RX REV CODE- 258: Performed by: INTERNAL MEDICINE

## 2022-02-13 PROCEDURE — P9016 RBC LEUKOCYTES REDUCED: HCPCS

## 2022-02-13 PROCEDURE — 74011250637 HC RX REV CODE- 250/637: Performed by: NURSE PRACTITIONER

## 2022-02-13 PROCEDURE — 94664 DEMO&/EVAL PT USE INHALER: CPT

## 2022-02-13 PROCEDURE — 85610 PROTHROMBIN TIME: CPT

## 2022-02-13 PROCEDURE — 74011250636 HC RX REV CODE- 250/636: Performed by: FAMILY MEDICINE

## 2022-02-13 PROCEDURE — P9047 ALBUMIN (HUMAN), 25%, 50ML: HCPCS | Performed by: INTERNAL MEDICINE

## 2022-02-13 PROCEDURE — 85027 COMPLETE CBC AUTOMATED: CPT

## 2022-02-13 PROCEDURE — 74011000258 HC RX REV CODE- 258: Performed by: FAMILY MEDICINE

## 2022-02-13 RX ORDER — ALBUTEROL SULFATE 90 UG/1
2 AEROSOL, METERED RESPIRATORY (INHALATION)
Status: DISCONTINUED | OUTPATIENT
Start: 2022-02-13 | End: 2022-02-20 | Stop reason: HOSPADM

## 2022-02-13 RX ORDER — SODIUM BICARBONATE 650 MG/1
650 TABLET ORAL 2 TIMES DAILY
Status: DISCONTINUED | OUTPATIENT
Start: 2022-02-13 | End: 2022-02-15

## 2022-02-13 RX ORDER — ALBUTEROL SULFATE 0.83 MG/ML
2.5 SOLUTION RESPIRATORY (INHALATION)
Status: DISCONTINUED | OUTPATIENT
Start: 2022-02-13 | End: 2022-02-13

## 2022-02-13 RX ORDER — SODIUM CHLORIDE 9 MG/ML
250 INJECTION, SOLUTION INTRAVENOUS AS NEEDED
Status: DISCONTINUED | OUTPATIENT
Start: 2022-02-13 | End: 2022-02-20 | Stop reason: HOSPADM

## 2022-02-13 RX ADMIN — ALBUTEROL SULFATE 2 PUFF: 90 AEROSOL, METERED RESPIRATORY (INHALATION) at 22:35

## 2022-02-13 RX ADMIN — MIDODRINE HYDROCHLORIDE 10 MG: 5 TABLET ORAL at 07:37

## 2022-02-13 RX ADMIN — ALBUMIN (HUMAN) 25 G: 0.25 INJECTION, SOLUTION INTRAVENOUS at 06:53

## 2022-02-13 RX ADMIN — VITAM B12 100 MCG: 100 TAB at 09:00

## 2022-02-13 RX ADMIN — ALBUMIN (HUMAN) 25 G: 0.25 INJECTION, SOLUTION INTRAVENOUS at 12:14

## 2022-02-13 RX ADMIN — POTASSIUM BICARBONATE 40 MEQ: 782 TABLET, EFFERVESCENT ORAL at 18:05

## 2022-02-13 RX ADMIN — PIPERACILLIN AND TAZOBACTAM 3.38 G: 3; .375 INJECTION, POWDER, LYOPHILIZED, FOR SOLUTION INTRAVENOUS at 01:56

## 2022-02-13 RX ADMIN — SODIUM BICARBONATE 650 MG: 650 TABLET ORAL at 18:05

## 2022-02-13 RX ADMIN — PHYTONADIONE 10 MG: 10 INJECTION, EMULSION INTRAMUSCULAR; INTRAVENOUS; SUBCUTANEOUS at 11:00

## 2022-02-13 RX ADMIN — ALBUMIN (HUMAN) 25 G: 0.25 INJECTION, SOLUTION INTRAVENOUS at 16:37

## 2022-02-13 RX ADMIN — PIPERACILLIN AND TAZOBACTAM 3.38 G: 3; .375 INJECTION, POWDER, LYOPHILIZED, FOR SOLUTION INTRAVENOUS at 09:00

## 2022-02-13 RX ADMIN — MIDODRINE HYDROCHLORIDE 10 MG: 5 TABLET ORAL at 16:35

## 2022-02-13 RX ADMIN — PIPERACILLIN AND TAZOBACTAM 3.38 G: 3; .375 INJECTION, POWDER, LYOPHILIZED, FOR SOLUTION INTRAVENOUS at 23:08

## 2022-02-13 RX ADMIN — FOLIC ACID 1 MG: 1 TABLET ORAL at 09:00

## 2022-02-13 RX ADMIN — PIPERACILLIN AND TAZOBACTAM 3.38 G: 3; .375 INJECTION, POWDER, LYOPHILIZED, FOR SOLUTION INTRAVENOUS at 16:36

## 2022-02-13 RX ADMIN — MIDODRINE HYDROCHLORIDE 10 MG: 5 TABLET ORAL at 12:13

## 2022-02-13 RX ADMIN — ALBUTEROL SULFATE 5 MG: 2.5 SOLUTION RESPIRATORY (INHALATION) at 00:39

## 2022-02-13 RX ADMIN — Medication 100 MG: at 09:00

## 2022-02-13 RX ADMIN — ALBUMIN (HUMAN) 25 G: 0.25 INJECTION, SOLUTION INTRAVENOUS at 22:19

## 2022-02-13 NOTE — PROGRESS NOTES
TRANSFER - OUT REPORT:    Verbal report given on Lisa Osman III  being transferred to (unit) for routine progression of care. Report consisted of patients Situation, Background, Assessment and   Recommendations(SBAR). Information from the following report(s) SBAR, Kardex, Intake/Output, MAR and Recent Results was reviewed with the receiving nurse. Lines:   Peripheral IV 02/10/22 Right Antecubital (Active)   Site Assessment Clean, dry, & intact 02/13/22 0859   Phlebitis Assessment 0 02/13/22 0859   Infiltration Assessment 0 02/13/22 0859   Dressing Status Clean, dry, & intact 02/13/22 0859   Dressing Type Transparent;Tape 02/13/22 0859   Hub Color/Line Status Pink; Infusing 02/13/22 0859   Action Taken Open ports on tubing capped 02/12/22 2048   Alcohol Cap Used Yes 02/12/22 2048       Peripheral IV 02/13/22 Distal;Posterior;Right Forearm (Active)   Site Assessment Clean, dry, & intact 02/13/22 0859   Phlebitis Assessment 0 02/13/22 0859   Infiltration Assessment 0 02/13/22 0859   Dressing Status Clean, dry, & intact 02/13/22 0859   Dressing Type Transparent;Tape 02/13/22 0859   Hub Color/Line Status Pink; Infusing 02/13/22 0859   Action Taken Open ports on tubing capped;Blood drawn 02/13/22 0157   Alcohol Cap Used Yes 02/13/22 0157        Opportunity for questions and clarification was provided.       Patient transported with:   Patient-specific medications from Pharmacy

## 2022-02-13 NOTE — PROGRESS NOTES
2129-NP notified that pt was feeling SOB, new set of VS obtained. RR of 26, O2 sat on RA was 95%, BP stable, and pt denies pain at this time. Pt afebrile. RN spoke with blood bank, as the pt was ordered to receive two units on previous nightshift and had only received one. Per blood bank, policy allows staff to give the second unit if the Hgb redraw was 7.0 or less. RN verified that pt should still receive blood with Josefa Collins NP. Per NP, Hgb is 7.0 and blood is to be held at this time. 4949-RN received a call from CT stating that the radiologist, Dr. Liliam Whitfield, did not want to give IV contrast for the ordered CTA to r/o PE's, as the pt kidney function was decreased. RN was instructed to inform the NP, and to relay to him that the radiologist recommends a nuclear perfusion (VQ) study if desired to r/o PE.     NP changed order, RN attempted to call CT too inform them but got a busy signal.    RN also called NM and the call went straight to voicemail.    6142-RN left a voicemail with NM to request that NM be called in.    CT scan obtained this AM prior to blood start. RN informed NP, Josefa Collins, of critical low CO2 level of 15.    8607-RN discussed scan and rational with radiologist who approved calling in NM. RN called Xray who agreed to page the on call and inform them of need for scan today.

## 2022-02-13 NOTE — PROGRESS NOTES
Overnight Hospitalist Progress Note    Name: Юлия Morales III  YOB: 1975  MRN: [id number]  Admission Date: 2/10/2022    Date of service: 02/12/22, 11:51 PM     __________________________________________________________    Patient reporting shortness of breath, onset earlier this evening while lying in bed. States that he feels he is having trouble \"getting air in\" and notes that it is reminiscent of childhood asthma exacerbation. Patient denies cough, chest pain and palpitations. Stat CT of the chest noted worsening airspace disease, bilateral pleural effusions, anasarca and ascites. VQ scan was ordered to rule out pulmonary embolism. Rapid Covid was positive    · Alert, oriented and in no acute distress  · Respirations rapid, approximately 26 to 30 breaths/min. Late expiratory wheeze left greater than right, no rhonchi or rales. No accessory muscle use  · Abdomen soft, nontender not distended    Patient Vitals for the past 12 hrs:   Temp Pulse Resp BP SpO2   02/12/22 2254 98.2 °F (36.8 °C) 93 26 114/69 95 %   02/12/22 2223 -- 97 -- -- --   02/12/22 2048 98 °F (36.7 °C) 96 20 112/68 96 %   02/12/22 2021 -- 96 -- -- --   02/12/22 1800 -- 97 -- -- --   02/12/22 1400 -- 99 -- -- --   02/12/22 1200 -- 96 -- -- --     Recent Results (from the past 12 hour(s))   HGB & HCT    Collection Time: 02/12/22  7:04 PM   Result Value Ref Range    HGB 7.0 (L) 12.1 - 17.0 g/dL    HCT 21.6 (L) 36.6 - 50.3 %     Acute onset shortness of breath without hypoxia  COVID+  Cirrhosis  Splenomegaly  /Most recent imaging on 2/10 noted bilateral lung infiltrates. CT done tonight shows worsening airspace disease edema versus pneumonia. Bilateral pleural effusions, anasarca and ascites. Cirrhosis and splenomegaly. Rapid COVID positive. Anticoagulants held due to coagulopathy due to liver disease. Consider pulmonary embolism.    · Albuterol via nebulizer now and as needed  · Continuous pulse ox with supplemental oxygen as needed  · Radiology refusing to do CTA because of elevated creatinine. Will do VQ scan and lower extremity Dopplers to evaluate for PE    · Covid isolation.   Hold off on steroids since patient is not  hypoxic    Patient presentation and plan of care d/w Dr. Bolivar Whealtey      __________________________________________________________    Geri Fuentes, PIERO, RN, NP-C  [phone] or via Perfect [employer]

## 2022-02-13 NOTE — PROGRESS NOTES
Formerly [facility], MD, FACP, Erickson Elier [place], [place]      Wolf REMI Gallow[place], [facility]     Joann Lott, [facility]   Alyssa Wilderdarlene, JOLLY-C    Nubia Joan, [facility]       Noheliaslade Meehan Zay [address]    at [address], [facility] Severiano Lowe  22. [phone]    FAX: [address] [place] [address] Avenue    at [address], [address], [address]    [phone]    FAX: [phone]       HEPATOLOGY PROGRESS NOTE  The patient is a 55 y.o. male with Crohns disease. He has CT evidence of fatty liver dating back to 2019. In 9/2021 he was started on Humara for treatment of Crohns. In 1/2021 he was hospitalized at [facility] for jaundice with TBILI 9.0 and mild increase in INR to 1.38. He was DC to home in 1/20. He returned to the hospital on 1/26 with TBILI 19, INR 2.50. On 1/28 ETOH blood level was 8. He was treated with prednisone 15 mg every day for presumed alcohol induced hepatitis. US of the liver suggested fatty liver, cirrhosis, small amount of ascites. He was discharged to home on 2/7 with TBILI 19, INR 2.2, Scr 1.7 mg on prednisone 15 mg every day. I was called by his PCP Dr Keshav Valero in [facility] yesterday and suggested he come to the ED at [address] [place] [address]. He came to the ED today. In the ED Laboratory studies were significant for:    WBC 25, HB 7.3 gms, , Scr 2.27 mg, AST 72, ALT 59, TBILI 21 mg, INR 2.2,     Imaging of the liver with Ultrasound demonstrated fatty liver and moderate ascites. [facility] course:  SCr is has stabilized in 1.91 - 2.04 mg range  INR is stable at 1.5  Lower edema has resolved  ECHO heart was normal.    He has tested COVID positive and  CXR shows bilateral airspace disease.     Now in isolation. Hepatology Plan:  Continue IV albumin   Continue to hold diuretics. Complete 3 doses of Vitamin K   Nuc stress for Liver transplant evaluation on hold until out of isolation. ASSESSMENT AND PLAN:  Cirrhosis  The diagnosis of cirrhosis is based upon imaging, laboratory studies, complications of cirrhosis. Cirrhosis is presumed secondary to alcohol. On admission the CTP is 12. Child class C. The MELD score is 35. This is chronic decompensated liver disease most likely due to ETOH. This is not acute liver failure due to Humara. The patient has a MELD score greater than 15 and has developed complications of cirrhosis. Will initiate liver transplant evaluation testing. ECHO heart was normal.  Nuclear stress. Alcohol liver disease  Suspect the patient has alcohol induced liver disease based upon imaging showing fatty liver dating back to 2019, pattern of AST>ALT,     He was initially adamant that he only consumed 1-2 alcoholic beverages daily. However, upon further discussion today he admits to drinking 1/2 gallon of burbon or 39 drinks per week. There was a positive ETOH level 2 days after being hospitalized in 1/2022. He was on prednisone for alcoholic hepatitis  This was stopped on admission since the risk of infection is > potential benefit    The patient has never been told he had a medical issue from alcohol. The first time he was ever hospitalized due to alcohol and liver disease was in 1/2022. He has never lost time from work due to alcohol. He has no DUI. Ascites   Ascites developed for the first time in 1/2022. Ascites is persistent despite due to LYNSEY.     Paracentesis is not needed at this time  Will start IV albumin  Hold diuretics    Lower extremity edema  Edema has developed in 1/2022  This has nearly all resolved with IV albumin    Acute kidney injury  The patient has developed an elevation in serum creatinine   LYNSEY is secondary to the effects of cirrhosis, ETOH, diuretics. LYNSEY is improving with IV albumin    Hepatic encephalopathy   He is alert and oriented and canm carry on conversation. Will get serum ammonia  Overt HE has not developed to date. There is no reason for treatment with lactulose of xifaxan    Tremor  Suspect this is due to alcohol withdrawal.  Blood ETOH was positive in 1/2022  This appears to have resolved. Anemia   This is due to multifactorial causes including portal hypertension with chronic GI blood loss, bone marrow suppression secondary to alcohol. Will obtain FE panel to assess for iron stores. Will need EGD at some point to assess for esophageal varices. Thrombocytopenia   This is secondary to cirrhosis. There is no evidence of overt bleeding. No treatment is required. The platelet count is adequate for the patient to undergo procedures without the need for platelet transfusion or platelet growth factors. Coagulopathy  This is secondary to cirrhosis, malnutrition from alcoholism  Will treat with 3 doses of Vitamin K over 3 days. There is no evidence of bleeding. No need for FFP at this time. Metabolic acidosis  This may be due to infection/sepsis. Blood cultures. Start empiric ABX. Stop NABICARB pills,      PHYSICAL EXAMINATION:  VS: per nursing note  General:  Ill appearing  Eyes:  Sclera deeply icteric  ENT:  No oral lesions. Thyroid normal.  Nodes:  No adenopathy. Skin:  Spider angiomata. Jaundice. Respiratory:  Lungs clear to auscultation. Cardiovascular:  Regular heart rate. Abdomen:  Soft non-tender, Some ascites may be present. Extremities:  1+ lower extremity edema. Neurologic:  Alert and oriented. Tremor. Cranial nerves grossly intact. Asterixis present. LABORATORY:  Results for Jyothi Duran (MRN [id number]) as of 2/13/2022 17:07   Ref.  Range 2/11/2022 04:34 2/12/2022 02:50 2/13/2022 02:07   WBC Latest Ref Range: 4.1 - 11.1 K/uL 18.9 (H) 18.1 (H) 14.4 (H)   HGB Latest Ref Range: 12.1 - 17.0 g/dL 5.9 (LL) 6.1 (L) 6.8 (L)   PLATELET Latest Ref Range: 150 - 400 K/uL 87 (L) 87 (L) 73 (L)   INR Latest Ref Range: 0.9 - 1.1   1.7 (H) 1.5 (H) 1.5 (H)   Sodium Latest Ref Range: 136 - 145 mmol/L 138 138 137   Potassium Latest Ref Range: 3.5 - 5.1 mmol/L 3.8 3.8 3.2 (L)   Chloride Latest Ref Range: 97 - 108 mmol/L 114 (H) 114 (H) 112 (H)   CO2 Latest Ref Range: 21 - 32 mmol/L 17 (L) 17 (L) 15 (LL)   Glucose Latest Ref Range: 65 - 100 mg/dL 87 56 (L) 76   BUN Latest Ref Range: 6 - 20 MG/DL 33 (H) 32 (H) 29 (H)   Creatinine Latest Ref Range: 0.70 - 1.30 MG/DL 1.97 (H) 1.91 (H) 2.04 (H)   Bilirubin, total Latest Ref Range: 0.2 - 1.0 MG/DL 19.9 (H) 24.9 (H) 27.1 (H)   Albumin Latest Ref Range: 3.5 - 5.0 g/dL 2.6 (L) 2.7 (L) 3.0 (L)   ALT Latest Ref Range: 12 - 78 U/L 49 38 31   AST Latest Ref Range: 15 - 37 U/L 63 (H) 52 (H) 48 (H)   Alk. phosphatase Latest Ref Range: 45 - 117 U/L 102 86 93       RADIOLOGY:  Ultrasound of liver. Echogenic consistent with fatty liver. NO obvious cirrhosis. No liver mass lesions. No dilated bile ducts. Moderate ascites.       Pippa Moreno MD  [facility] [address], [address] Severiano Graham  22.  [phone]  [facility]

## 2022-02-13 NOTE — PROGRESS NOTES
[facility] Adult  [facility]                                                                                          Hospitalist Progress Note  Mic Rey MD  Answering service: [phone] OR [phone] from in house phone        Date of Service:  2022  NAME:  Yamilka Duarte III  :  1975  MRN:  [id number]      Admission Summary:   Morgan Aragon is a 55 y.o. male who came to [facility] specifically for admission and for evaluation by Dr Ramsey Hein - Hepatology- due to related to elevated bilirubin and suspected cirrhosis. He was referred for admission by his family physician. Arely Suresh has a complicated past medical history including suspectedcirrhosis that they believe is secondary to alcohol use/abuse as well as possibly Humira.  He used Humira from September until about 3 to 4 weeks ago due to Fidelia Grief was admitted to [facility] in January. Arely Suresh states that he was therefore liver failure.  Diagnoses in epic which have crossed over but records not available are renal failure, metabolic acidosis, acute metabolic encephalopathy, immunocompromise, sepsis, liver mass which patient reports that he had an MRI for showing a fatty liver, Crohn's disease, macrocytic anemia and hyponatremia.  He does not know his baseline creatinine at this point.  Patient had labs drawn twice since then. Arely Suresh reports they are getting worse.  His creatinine at one time was as high as 9- but most recently 1.5  Patient has been more sleepy but sleeping regularly at night and not during the day.  Denies vomiting. Deanne Montiel has an ostomy in the stool output is brown not black. Deanne Montiel states that he has not drank alcohol in 6 weeks.  Patient denies any abdominal pain, fevers, chest pain.  He complains of some shortness of breath.  no N/D, no diarrhea, no rashes, + leg swelling,       Interval history / Subjective:   Patient reports some abd pain  cont IV abx, blood transfusion ordered for low Hb- no evidence of bleeding  Further workup per hepatology     Assessment & Plan:         1. Hyperbilirubinemia with jaundice- likely related to alcohol use  - liver US ordered and LFTs reviewed- hepatology consulted  Further workup per hepatology  2. Crohns- humira on hold- ileostomy from prior surgery appears stable  3. CH- hypotension- on midodrine- resume  4. Bilat LE edema- venous dopplers- no DVT prophylaxis due to autoanticoagulation with elevated INR  5. Coagulopathy- from liver disease-   monitor INR- down to 1.5, completed VIT K  6. Thrombocytopenia- ? Due to liver dz- monitor for now  7. Leukocytosis- UTI   Started IV abx- monitor cultures  8. Acidosis- likely metabolic from liver dz- monitor off bicarb  9. LYNSEY on CKD3- monitor creatinine   10. Anemia-persists- another blood transfusion ordered  Monitor Hb for signs of bleeding- no blood from ostomy       Code status: Full Code  [address] discussed with: patient  Anticipated Disposition: > 48hrs     Hospital Problems  Never Reviewed          Codes Class Noted POA    Acidosis ICD-10-CM: E87.2  ICD-9-CM: 276.2  2/10/2022 Unknown        Hyperbilirubinemia ICD-10-CM: E80.6  ICD-9-CM: 782.4  2/10/2022 Unknown        Cirrhosis of liver ([facility] [address] ICD-10-CM: K74.60  ICD-9-CM: 571.5  2/10/2022 Unknown                Review of Systems:   A comprehensive review of systems was negative except for that written in the HPI. Vital Signs:    Last 24hrs VS reviewed since prior progress note.  Most recent are:  Visit Vitals  /69   Pulse 93   Temp 98.2 °F (36.8 °C)   Resp 26   Ht 6' (1.829 m)   Wt 115.6 kg (254 lb 12.8 oz)   SpO2 95%   BMI 34.56 kg/m²     Patient Vitals for the past 24 hrs:   Temp Pulse Resp BP SpO2   02/12/22 2254 98.2 °F (36.8 °C) 93 26 114/69 95 %   02/12/22 2223 -- 97 -- -- --   02/12/22 2048 98 °F (36.7 °C) 96 20 112/68 96 %   02/12/22 2021 -- 96 -- -- --   02/12/22 1800 -- 97 -- -- --   02/12/22 1400 -- 99 -- -- --   02/12/22 1200 -- 96 -- -- --   02/12/22 1057 98.2 °F (36.8 °C) 96 20 113/73 95 %   02/12/22 1000 -- (!) 108 -- -- --   02/12/22 0843 97.3 °F (36.3 °C) (!) 105 20 110/70 96 %   02/12/22 0731 97.7 °F (36.5 °C) 94 18 106/64 95 %   02/12/22 0706 97.8 °F (36.6 °C) 96 20 97/64 95 %   02/12/22 0649 98.1 °F (36.7 °C) 95 20 107/65 95 %   02/12/22 0621 98.1 °F (36.7 °C) 94 20 (!) 148/67 93 %   02/12/22 0600 -- 95 -- -- --   02/12/22 0400 -- 97 -- -- --   02/12/22 0343 98.2 °F (36.8 °C) 85 18 115/70 94 %   02/12/22 0200 -- 87 -- -- --   02/11/22 2321 98.5 °F (36.9 °C) 98 18 109/68 93 %       Intake/Output Summary (Last 24 hours) at 2/12/2022 2305  Last data filed at 2/12/2022 2051  Gross per 24 hour   Intake 529.6 ml   Output 1875 ml   Net -1345.4 ml        Physical Examination:     I had a face to face encounter with this patient and independently examined them on 2/12/2022 as outlined below:          Constitutional:  No acute distress, cooperative, jaundiced   HEENT:  Oral mucosa moist, scleral icterus    Resp:  CTA bilaterally. No wheezing/rhonchi/rales. No accessory muscle use. CV:  Regular rhythm, normal rate, no murmurs,     GI:  Soft, distended, mild tenderness. Surgical scars, ileostomy     Musculoskeletal:  bilat LE edema, no cyanosis    Neurologic:  Moves all extremities. AAOx3, CN II-XII reviewed            Data Review:    Review and/or order of clinical lab test  Review and/or order of tests in the radiology section of CPT  Review and/or order of tests in the medicine section of [facility]    ULTRASOUND OF THE RIGHT UPPER QUADRANT     INDICATION: jaundice     COMPARISON: None.     TECHNIQUE:  Sonography of the right upper quadrant was performed.      FINDINGS:     GALLBLADDER: Mildly distended but no gallstones, or wall thickening. Zimmerman Chime    COMMON DUCT: The common bile duct cannot be visualized by the technologist.  LIVER: The liver is enlarged, diffusely heterogeneous and upper normal in  echogenicity with prominent periportal edema throughout both lobes. Portal  venous flow is hepatopedal, but portal and hepatic venous branches were not  insonated. Mildly echogenic lesion in the left hepatic lobe measures 3.9 x 3.5 x  3.0 cm. PANCREAS: The visualized portions of the pancreas are normal.   RIGHT KIDNEY: 11.2 cm in length, with mild cortical thinning suggested. . No  hydronephrosis, shadowing calculus, or contour-deforming renal mass.     Incidentally noted small to moderate ascites throughout the upper abdomen.     IMPRESSION     1. Hepatomegaly, hepatic steatosis and diffuse periportal edema, with an  echogenic lesion in the left hepatic lobe which may represent a hemangioma. However, further characterization and evaluation for chronic liver disease is  suggested with nonemergent dedicated contrast enhanced hepatic MRI. 2. Mild to moderate ascites. 3. Mild right renal cortical thinning is suggested without hydronephrosis or  abnormal echogenicity. Correlate with renal function parameters. Labs:     Recent Labs     02/12/22  1904 02/12/22 0250 02/11/22  1849 02/11/22  0530   WBC  --  18.1*  --  19.5*   HGB 7.0* 6.1*   < > 6.3*   HCT 21.6* 19.4*   < > 20.8*   PLT  --  87*  --  78*    < > = values in this interval not displayed. Recent Labs     02/12/22  0250 02/11/22  0434 02/10/22  1329    138 138   K 3.8 3.8 4.2   * 114* 114*   CO2 17* 17* 16*   BUN 32* 33* 33*   CREA 1.91* 1.97* 2.27*   GLU 56* 87 137*   CA 9.0 9.2 9.2   MG 1.6  --   --    PHOS 2.2*  --   --      Recent Labs     02/12/22  0250 02/11/22  0434 02/10/22  1329   ALT 38 49 59   AP 86 102 117   TBILI 24.9* 19.9* 20.7*   TP 5.3* 5.8* 5.9*   ALB 2.7* 2.6* 2.2*   GLOB 2.6 3.2 3.7   LPSE  --   --  233     Recent Labs     02/12/22 0250 02/11/22  0434 02/10/22  1329   INR 1.5* 1.7* 2.2*   PTP 15.2* 17.7* 22.2*   APTT  --   --  31.9*      No results for input(s): FE, TIBC, PSAT, FERR in the last 72 hours.    No results found for: FOL, RBCF No results for input(s): PH, PCO2, PO2 in the last 72 hours. No results for input(s): CPK, CKNDX, TROIQ in the last 72 hours.     No lab exists for component: CPKMB  No results found for: CHOL, CHOLX, CHLST, CHOLV, HDL, HDLP, LDL, LDLC, DLDLP, TGLX, TRIGL, TRIGP, CHHD, CHHDX  No results found for: Tonya Kirbyon  Lab Results   Component Value Date/Time    Color DARK YELLOW 02/10/2022 09:53 PM    Appearance CLOUDY (A) 02/10/2022 09:53 PM    Specific gravity 1.017 02/10/2022 09:53 PM    pH (UA) 5.5 02/10/2022 09:53 PM    Protein TRACE (A) 02/10/2022 09:53 PM    Glucose Negative 02/10/2022 09:53 PM    Ketone Negative 02/10/2022 09:53 PM    Urobilinogen 0.2 02/10/2022 09:53 PM    Nitrites Positive (A) 02/10/2022 09:53 PM    Leukocyte Esterase TRACE (A) 02/10/2022 09:53 PM    Epithelial cells FEW 02/10/2022 09:53 PM    Bacteria 2+ (A) 02/10/2022 09:53 PM    WBC 0-4 02/10/2022 09:53 PM    RBC 0-5 02/10/2022 09:53 PM         Medications Reviewed:     Current Facility-Administered Medications   Medication Dose Route Frequency    0.9% sodium chloride infusion 250 mL  250 mL IntraVENous PRN    0.9% sodium chloride infusion 250 mL  250 mL IntraVENous PRN    piperacillin-tazobactam (ZOSYN) 3.375 g in 0.9% sodium chloride (MBP/ADV) 100 mL MBP  3.375 g IntraVENous Q8H    sodium chloride (NS) flush 5-40 mL  5-40 mL IntraVENous Q8H    sodium chloride (NS) flush 5-40 mL  5-40 mL IntraVENous PRN    polyethylene glycol (MIRALAX) packet 17 g  17 g Oral DAILY PRN    ondansetron (ZOFRAN) injection 4 mg  4 mg IntraVENous Q6H PRN    midodrine (PROAMATINE) tablet 10 mg  10 mg Oral TID WITH MEALS    cyanocobalamin (VITAMIN B12) tablet 100 mcg  100 mcg Oral DAILY    folic acid (FOLVITE) tablet 1 mg  1 mg Oral DAILY    albumin human 25% (BUMINATE) solution 25 g  25 g IntraVENous Q6H    phytonadione (vitamin K1) (AQUA-MEPHYTON) 10 mg in 0.9% sodium chloride 50 mL IVPB  10 mg IntraVENous DAILY    thiamine HCL (B-1) tablet 100 mg  100 mg Oral DAILY     ______________________________________________________________________  EXPECTED LENGTH OF STAY: - - -  ACTUAL LENGTH OF STAY:          2                 Javier Brown MD

## 2022-02-13 NOTE — PROGRESS NOTES
Ileostomy bag had stool and air this am. Reminded pt to change it and asked if he wanted help. He said he didn't. A couple of hours later, his ileostomy bag was more full and encouraged to change and as he got up, it leaked out. Pt got up at this time and cleaned himself and changed his bag. Wife stated he does this at home too, but is pretty good about changing it. Receiving 1 unit of PC's today so IV medication was off. Noted LT AC was swollen from mid forearm to mid upper arm. IV stopped in that arm and saline lock removed. No complaints of pain at present. Resting well. Bedside shift change report given to Azam Antunez RN (oncoming nurse) by Lawson Morgan RN (offgoing nurse). Report included the following information SBAR, Kardex, Intake/Output and MAR.

## 2022-02-14 LAB
ABO + RH BLD: NORMAL
AFP L3 MFR SERPL: 17.5 % (ref 0–9.9)
AFP SERPL-MCNC: 2.3 NG/ML (ref 0–8)
ALBUMIN SERPL-MCNC: 3.8 G/DL (ref 3.5–5)
ALBUMIN/GLOB SERPL: 1.7 {RATIO} (ref 1.1–2.2)
ALP SERPL-CCNC: 85 U/L (ref 45–117)
ALT SERPL-CCNC: 28 U/L (ref 12–78)
ANION GAP SERPL CALC-SCNC: 9 MMOL/L (ref 5–15)
AST SERPL-CCNC: 43 U/L (ref 15–37)
BILIRUB SERPL-MCNC: 32.9 MG/DL (ref 0.2–1)
BLD PROD TYP BPU: NORMAL
BLOOD GROUP ANTIBODIES SERPL: NORMAL
BPU ID: NORMAL
BUN SERPL-MCNC: 28 MG/DL (ref 6–20)
BUN/CREAT SERPL: 12 (ref 12–20)
CALCIUM SERPL-MCNC: 9 MG/DL (ref 8.5–10.1)
CHLORIDE SERPL-SCNC: 110 MMOL/L (ref 97–108)
CO2 SERPL-SCNC: 19 MMOL/L (ref 21–32)
CREAT SERPL-MCNC: 2.28 MG/DL (ref 0.7–1.3)
CROSSMATCH RESULT,%XM: NORMAL
ERYTHROCYTE [DISTWIDTH] IN BLOOD BY AUTOMATED COUNT: 20.6 % (ref 11.5–14.5)
GLOBULIN SER CALC-MCNC: 2.2 G/DL (ref 2–4)
GLUCOSE SERPL-MCNC: 66 MG/DL (ref 65–100)
HCT VFR BLD AUTO: 25 % (ref 36.6–50.3)
HGB BLD-MCNC: 8.1 G/DL (ref 12.1–17)
INR PPP: 1.5 (ref 0.9–1.1)
MCH RBC QN AUTO: 34 PG (ref 26–34)
MCHC RBC AUTO-ENTMCNC: 32.4 G/DL (ref 30–36.5)
MCV RBC AUTO: 105 FL (ref 80–99)
NRBC # BLD: 0 K/UL (ref 0–0.01)
NRBC BLD-RTO: 0 PER 100 WBC
PLATELET # BLD AUTO: 78 K/UL (ref 150–400)
PMV BLD AUTO: 11.2 FL (ref 8.9–12.9)
POTASSIUM SERPL-SCNC: 3.6 MMOL/L (ref 3.5–5.1)
PROT SERPL-MCNC: 6 G/DL (ref 6.4–8.2)
PROTHROMBIN TIME: 15.6 SEC (ref 9–11.1)
RBC # BLD AUTO: 2.38 M/UL (ref 4.1–5.7)
SODIUM SERPL-SCNC: 138 MMOL/L (ref 136–145)
SPECIMEN EXP DATE BLD: NORMAL
STATUS OF UNIT,%ST: NORMAL
T PALLIDUM AB SER QL IA: NON REACTIVE
UNIT DIVISION, %UDIV: 0
WBC # BLD AUTO: 17.4 K/UL (ref 4.1–11.1)

## 2022-02-14 PROCEDURE — 85610 PROTHROMBIN TIME: CPT

## 2022-02-14 PROCEDURE — 74011000250 HC RX REV CODE- 250: Performed by: FAMILY MEDICINE

## 2022-02-14 PROCEDURE — 74011250636 HC RX REV CODE- 250/636: Performed by: FAMILY MEDICINE

## 2022-02-14 PROCEDURE — 65660000000 HC RM CCU STEPDOWN

## 2022-02-14 PROCEDURE — 94760 N-INVAS EAR/PLS OXIMETRY 1: CPT

## 2022-02-14 PROCEDURE — 80053 COMPREHEN METABOLIC PANEL: CPT

## 2022-02-14 PROCEDURE — 36415 COLL VENOUS BLD VENIPUNCTURE: CPT

## 2022-02-14 PROCEDURE — 74011000258 HC RX REV CODE- 258: Performed by: FAMILY MEDICINE

## 2022-02-14 PROCEDURE — 85027 COMPLETE CBC AUTOMATED: CPT

## 2022-02-14 PROCEDURE — P9047 ALBUMIN (HUMAN), 25%, 50ML: HCPCS | Performed by: INTERNAL MEDICINE

## 2022-02-14 PROCEDURE — 74011250636 HC RX REV CODE- 250/636: Performed by: INTERNAL MEDICINE

## 2022-02-14 PROCEDURE — 74011250637 HC RX REV CODE- 250/637: Performed by: FAMILY MEDICINE

## 2022-02-14 RX ADMIN — VITAM B12 100 MCG: 100 TAB at 09:09

## 2022-02-14 RX ADMIN — PIPERACILLIN AND TAZOBACTAM 3.38 G: 3; .375 INJECTION, POWDER, LYOPHILIZED, FOR SOLUTION INTRAVENOUS at 15:17

## 2022-02-14 RX ADMIN — SODIUM CHLORIDE, PRESERVATIVE FREE 10 ML: 5 INJECTION INTRAVENOUS at 05:35

## 2022-02-14 RX ADMIN — MIDODRINE HYDROCHLORIDE 10 MG: 5 TABLET ORAL at 19:28

## 2022-02-14 RX ADMIN — ALBUMIN (HUMAN) 25 G: 0.25 INJECTION, SOLUTION INTRAVENOUS at 18:03

## 2022-02-14 RX ADMIN — SODIUM BICARBONATE 650 MG: 650 TABLET ORAL at 09:09

## 2022-02-14 RX ADMIN — MIDODRINE HYDROCHLORIDE 10 MG: 5 TABLET ORAL at 09:09

## 2022-02-14 RX ADMIN — SODIUM CHLORIDE, PRESERVATIVE FREE 10 ML: 5 INJECTION INTRAVENOUS at 23:28

## 2022-02-14 RX ADMIN — SODIUM CHLORIDE, PRESERVATIVE FREE 10 ML: 5 INJECTION INTRAVENOUS at 15:17

## 2022-02-14 RX ADMIN — ALBUMIN (HUMAN) 25 G: 0.25 INJECTION, SOLUTION INTRAVENOUS at 05:35

## 2022-02-14 RX ADMIN — MIDODRINE HYDROCHLORIDE 10 MG: 5 TABLET ORAL at 11:47

## 2022-02-14 RX ADMIN — PIPERACILLIN AND TAZOBACTAM 3.38 G: 3; .375 INJECTION, POWDER, LYOPHILIZED, FOR SOLUTION INTRAVENOUS at 07:13

## 2022-02-14 RX ADMIN — FOLIC ACID 1 MG: 1 TABLET ORAL at 09:09

## 2022-02-14 RX ADMIN — ALBUMIN (HUMAN) 25 G: 0.25 INJECTION, SOLUTION INTRAVENOUS at 23:27

## 2022-02-14 RX ADMIN — SODIUM BICARBONATE 650 MG: 650 TABLET ORAL at 18:03

## 2022-02-14 RX ADMIN — ALBUMIN (HUMAN) 25 G: 0.25 INJECTION, SOLUTION INTRAVENOUS at 11:47

## 2022-02-14 RX ADMIN — Medication 100 MG: at 09:09

## 2022-02-14 NOTE — PROGRESS NOTES
HUBERT:  1. RUR-14%  2. Return home when medically stable. 3. Hepatology following. CM following for transition of care needs, and available if any needs arise.     Yousuf Stapleton, [facility]

## 2022-02-14 NOTE — PROGRESS NOTES
Problem: Falls - Risk of  Goal: *Absence of Falls  Description: Document Veronica Yip Fall Risk and appropriate interventions in the flowsheet. Outcome: Progressing Towards Goal  Note: Fall Risk Interventions:  Mobility Interventions: Communicate number of staff needed for ambulation/transfer         Medication Interventions: Evaluate medications/consider consulting pharmacy    Elimination Interventions: Call light in reach    History of Falls Interventions: Door open when patient unattended         Problem: Patient Education: Go to Patient Education Activity  Goal: Patient/Family Education  Outcome: Progressing Towards Goal     Problem: Airway Clearance - Ineffective  Goal: Achieve or maintain patent airway  Outcome: Progressing Towards Goal     Problem: Gas Exchange - Impaired  Goal: Absence of hypoxia  Outcome: Progressing Towards Goal  Goal: Promote optimal lung function  Outcome: Progressing Towards Goal     Problem: Breathing Pattern - Ineffective  Goal: Ability to achieve and maintain a regular respiratory rate  Outcome: Progressing Towards Goal     Problem:  Body Temperature -  Risk of, Imbalanced  Goal: Ability to maintain a body temperature within defined limits  Outcome: Progressing Towards Goal     Problem: Isolation Precautions - Risk of Spread of Infection  Goal: Prevent transmission of infectious organism to others  Outcome: Progressing Towards Goal     Problem: Nutrition Deficits  Goal: Optimize nutrtional status  Outcome: Progressing Towards Goal     Problem: Risk for Fluid Volume Deficit  Goal: Maintain normal heart rhythm  Outcome: Progressing Towards Goal  Goal: Maintain absence of muscle cramping  Outcome: Progressing Towards Goal  Goal: Maintain normal serum potassium, sodium, calcium, phosphorus, and pH  Outcome: Progressing Towards Goal     Problem: Loneliness or Risk for Loneliness  Goal: Demonstrate positive use of time alone when socialization is not possible  Outcome: Progressing Towards Goal     Problem: Fatigue  Goal: Verbalize increase energy and improved vitality  Outcome: Progressing Towards Goal     Problem: Pressure Injury - Risk of  Goal: *Prevention of pressure injury  Description: Document Omar Scale and appropriate interventions in the flowsheet. Outcome: Progressing Towards Goal  Note: Pressure Injury Interventions:             Activity Interventions: Assess need for specialty bed    Mobility Interventions: Assess need for specialty bed    Nutrition Interventions: Document food/fluid/supplement intake

## 2022-02-14 NOTE — PROGRESS NOTES
[address] [facility]                                                                                          Hospitalist Progress Note  Rocío Troy MD  Answering service: [phone] OR [phone] from in house phone        Date of Service:  2022  NAME:  Юлия Morales III  :  1975  MRN:  [id number]      Admission Summary:   Kathrine Escalante is a 55 y.o. male who came to [facility] specifically for admission and for evaluation by Dr Gus White - Hepatology- due to related to elevated bilirubin and suspected cirrhosis. He was referred for admission by his family physician. Sol Srivastava has a complicated past medical history including suspectedcirrhosis that they believe is secondary to alcohol use/abuse as well as possibly Humira.  He used Humira from September until about 3 to 4 weeks ago due to Marah Labrum was admitted to [facility] in January. Sol Srivastava states that he was therefore liver failure.  Diagnoses in epic which have crossed over but records not available are renal failure, metabolic acidosis, acute metabolic encephalopathy, immunocompromise, sepsis, liver mass which patient reports that he had an MRI for showing a fatty liver, Crohn's disease, macrocytic anemia and hyponatremia.  He does not know his baseline creatinine at this point.  Patient had labs drawn twice since then. Sol Srivastava reports they are getting worse.  His creatinine at one time was as high as 9- but most recently 1.5  Patient has been more sleepy but sleeping regularly at night and not during the day.  Denies vomiting. Vita Araiza has an ostomy in the stool output is brown not black. Vita Araiza states that he has not drank alcohol in 6 weeks.  Patient denies any abdominal pain, fevers, chest pain.  He complains of some shortness of breath.  no N/D, no diarrhea, no rashes, + leg swelling,       Interval history / Subjective:   Patient reports some abd pain  Episode of shortness of breath last night- workup showing pulm edema and no PE  Further workup per hepatology     Assessment & Plan:         1. Hyperbilirubinemia with jaundice- likely related to alcohol use  - liver US ordered and LFTs reviewed- hepatology consulted  Further workup per hepatology  2. Crohns- humira on hold- ileostomy from prior surgery appears stable  3. CH- hypotension- on midodrine- resume  4. Bilat LE edema- venous dopplers- no DVT prophylaxis due to autoanticoagulation with elevated INR  5. Coagulopathy- from liver disease-   monitor INR- down to 1.5, completed VIT K  6. Thrombocytopenia- ? Due to liver dz- monitor for now  7. Leukocytosis- UTI   Started IV abx- monitor cultures  8. Acidosis- likely metabolic from liver dz- monitor off bicarb  9. LYNSEY on CKD3- monitor creatinine   10. Anemia-persists- another blood transfusion ordered  Monitor Hb for signs of bleeding- no blood from ostomy  11. Shortness of breath- likely due to pulm edema from blood transfusion- monitor and diurese only if hypoxic due to elevated creatinine  12. COVID + status- likely due to recent past infection and not reactivation from new infection  He is asymptomatic       Code status: Full Code  [facility] 496 discussed with: patient  Anticipated Disposition: > 48hrs     Hospital Problems  Never Reviewed          Codes Class Noted POA    Acidosis ICD-10-CM: E87.2  ICD-9-CM: 276.2  2/10/2022 Unknown        Hyperbilirubinemia ICD-10-CM: E80.6  ICD-9-CM: 782.4  2/10/2022 Unknown        Cirrhosis of liver ([facility] [address] ICD-10-CM: K74.60  ICD-9-CM: 571.5  2/10/2022 Unknown                Review of Systems:   A comprehensive review of systems was negative except for that written in the HPI. Vital Signs:    Last 24hrs VS reviewed since prior progress note.  Most recent are:  Visit Vitals  /77 (BP 1 Location: Left upper arm, BP Patient Position: At rest)   Pulse 93   Temp 98.4 °F (36.9 °C)   Resp 22   Ht 6' (1.829 m)   Wt 115.6 kg (254 lb 12.8 oz)   SpO2 96%   BMI 34.56 kg/m²     Patient Vitals for the past 24 hrs:   Temp Pulse Resp BP SpO2   02/13/22 1800 -- 93 -- -- --   02/13/22 1405 -- 84 -- -- --   02/13/22 1217 -- 93 -- -- --   02/13/22 1022 98.4 °F (36.9 °C) 90 22 129/77 96 %   02/13/22 1000 -- 97 -- -- --   02/13/22 0734 99.4 °F (37.4 °C) 98 24 120/70 97 %   02/13/22 0659 98.5 °F (36.9 °C) 98 26 103/60 97 %   02/13/22 0600 -- (!) 101 -- -- --   02/13/22 0538 99 °F (37.2 °C) 100 26 (!) 101/45 97 %   02/13/22 0503 98.7 °F (37.1 °C) 98 24 104/65 96 %   02/13/22 0445 99.5 °F (37.5 °C) 97 20 114/68 97 %   02/13/22 0420 98 °F (36.7 °C) 100 28 (!) 108/59 94 %   02/13/22 0359 -- (!) 102 -- -- --   02/13/22 0319 98.1 °F (36.7 °C) (!) 103 26 120/73 95 %   02/13/22 0219 -- (!) 105 -- -- --   02/12/22 2254 98.2 °F (36.8 °C) 93 26 114/69 95 %   02/12/22 2223 -- 97 -- -- --   02/12/22 2048 98 °F (36.7 °C) 96 20 112/68 96 %   02/12/22 2021 -- 96 -- -- --       Intake/Output Summary (Last 24 hours) at 2/13/2022 1908  Last data filed at 2/13/2022 1641  Gross per 24 hour   Intake 375.8 ml   Output 1850 ml   Net -1474.2 ml        Physical Examination:     I had a face to face encounter with this patient and independently examined them on 2/13/2022 as outlined below:          Constitutional:  No acute distress, cooperative, jaundiced   HEENT:  Oral mucosa moist, scleral icterus    Resp:  diffuse crackles. No wheezing/rhonchi/rales. No accessory muscle use. CV:  Regular rhythm, normal rate, no murmurs,     GI:  Soft, distended, mild tenderness. Surgical scars, ileostomy     Musculoskeletal:  bilat LE edema, no cyanosis    Neurologic:  Moves all extremities.   AAOx3, CN II-XII reviewed            Data Review:    Review and/or order of clinical lab test  Review and/or order of tests in the radiology section of CPT  Review and/or order of tests in the medicine section of CPT    ULTRASOUND OF THE RIGHT UPPER QUADRANT     INDICATION: jaundice     COMPARISON: None.     TECHNIQUE:  Sonography of the right upper quadrant was performed.      FINDINGS:     GALLBLADDER: Mildly distended but no gallstones, or wall thickening. Lillian Remedies COMMON DUCT: The common bile duct cannot be visualized by the technologist.  LIVER: The liver is enlarged, diffusely heterogeneous and upper normal in  echogenicity with prominent periportal edema throughout both lobes. Portal  venous flow is hepatopedal, but portal and hepatic venous branches were not  insonated. Mildly echogenic lesion in the left hepatic lobe measures 3.9 x 3.5 x  3.0 cm. PANCREAS: The visualized portions of the pancreas are normal.   RIGHT KIDNEY: 11.2 cm in length, with mild cortical thinning suggested. . No  hydronephrosis, shadowing calculus, or contour-deforming renal mass.     Incidentally noted small to moderate ascites throughout the upper abdomen.     IMPRESSION     1. Hepatomegaly, hepatic steatosis and diffuse periportal edema, with an  echogenic lesion in the left hepatic lobe which may represent a hemangioma. However, further characterization and evaluation for chronic liver disease is  suggested with nonemergent dedicated contrast enhanced hepatic MRI. 2. Mild to moderate ascites. 3. Mild right renal cortical thinning is suggested without hydronephrosis or  abnormal echogenicity. Correlate with renal function parameters. Labs:     Recent Labs     02/13/22 0208 02/12/22  1904 02/12/22 0250 02/12/22 0250   WBC 14.4*  --   --  18.1*   HGB 6.8* 7.0*   < > 6.1*   HCT 21.4* 21.6*   < > 19.4*   PLT 73*  --   --  87*    < > = values in this interval not displayed.      Recent Labs     02/13/22 0207 02/12/22 0250 02/11/22  0434    138 138   K 3.2* 3.8 3.8   * 114* 114*   CO2 15* 17* 17*   BUN 29* 32* 33*   CREA 2.04* 1.91* 1.97*   GLU 76 56* 87   CA 8.8 9.0 9.2   MG  --  1.6  --    PHOS  --  2.2*  --      Recent Labs     02/13/22 0207 02/12/22  0250 02/11/22  0434   ALT 31 38 49   AP 93 86 102   TBILI 27.1* 24.9* 19.9*   TP 5.4* 5.3* 5.8*   ALB 3.0* 2.7* 2.6*   GLOB 2.4 2.6 3.2     Recent Labs     02/13/22  0207 02/12/22  0250 02/11/22  0434   INR 1.5* 1.5* 1.7*   PTP 15.6* 15.2* 17.7*      Recent Labs     02/13/22  0207   TIBC 78*   PSAT 60*      No results found for: FOL, RBCF   No results for input(s): PH, PCO2, PO2 in the last 72 hours. No results for input(s): CPK, CKNDX, TROIQ in the last 72 hours.     No lab exists for component: CPKMB  No results found for: CHOL, CHOLX, CHLST, CHOLV, HDL, HDLP, LDL, LDLC, DLDLP, TGLX, TRIGL, TRIGP, CHHD, CHHDX  No results found for: [facility]  Lab Results   Component Value Date/Time    Color DARK YELLOW 02/10/2022 09:53 PM    Appearance CLOUDY (A) 02/10/2022 09:53 PM    Specific gravity 1.017 02/10/2022 09:53 PM    pH (UA) 5.5 02/10/2022 09:53 PM    Protein TRACE (A) 02/10/2022 09:53 PM    Glucose Negative 02/10/2022 09:53 PM    Ketone Negative 02/10/2022 09:53 PM    Urobilinogen 0.2 02/10/2022 09:53 PM    Nitrites Positive (A) 02/10/2022 09:53 PM    Leukocyte Esterase TRACE (A) 02/10/2022 09:53 PM    Epithelial cells FEW 02/10/2022 09:53 PM    Bacteria 2+ (A) 02/10/2022 09:53 PM    WBC 0-4 02/10/2022 09:53 PM    RBC 0-5 02/10/2022 09:53 PM         Medications Reviewed:     Current Facility-Administered Medications   Medication Dose Route Frequency    0.9% sodium chloride infusion 250 mL  250 mL IntraVENous PRN    albuterol (PROVENTIL VENTOLIN) nebulizer solution 2.5 mg  2.5 mg Nebulization Q6H PRN    sodium bicarbonate tablet 650 mg  650 mg Oral BID    0.9% sodium chloride infusion 250 mL  250 mL IntraVENous PRN    0.9% sodium chloride infusion 250 mL  250 mL IntraVENous PRN    piperacillin-tazobactam (ZOSYN) 3.375 g in 0.9% sodium chloride (MBP/ADV) 100 mL MBP  3.375 g IntraVENous Q8H    sodium chloride (NS) flush 5-40 mL  5-40 mL IntraVENous Q8H    sodium chloride (NS) flush 5-40 mL  5-40 mL IntraVENous PRN    polyethylene glycol (MIRALAX) packet 17 g  17 g Oral DAILY PRN    ondansetron (ZOFRAN) injection 4 mg  4 mg IntraVENous Q6H PRN    midodrine (PROAMATINE) tablet 10 mg  10 mg Oral TID WITH MEALS    cyanocobalamin (VITAMIN B12) tablet 100 mcg  100 mcg Oral DAILY    folic acid (FOLVITE) tablet 1 mg  1 mg Oral DAILY    albumin human 25% (BUMINATE) solution 25 g  25 g IntraVENous Q6H    thiamine HCL (B-1) tablet 100 mg  100 mg Oral DAILY     ______________________________________________________________________  EXPECTED LENGTH OF STAY: - - -  ACTUAL LENGTH OF STAY:          3                 Jonh Ventura MD

## 2022-02-14 NOTE — PROGRESS NOTES
[address] [facility]  [facility]                                                                                          Hospitalist Progress Note  Parke Cogan, MD  Answering service: [phone] OR [phone] from in house phone        Date of Service:  2022  NAME:  Phillip Jimenez III  :  1975  MRN:  [id number]      Admission Summary:   Martina Jensen is a 55 y.o. male who came to [facility] specifically for admission and for evaluation by Dr Nancy Patterson - Hepatology- due to related to elevated bilirubin and suspected cirrhosis. He was referred for admission by his family physician. Anika Ferreira has a complicated past medical history including suspectedcirrhosis that they believe is secondary to alcohol use/abuse as well as possibly Humira.  He used Humira from September until about 3 to 4 weeks ago due to Mar Rene was admitted to [facility] in January. Anika Ferreira states that he was therefore liver failure.  Diagnoses in epic which have crossed over but records not available are renal failure, metabolic acidosis, acute metabolic encephalopathy, immunocompromise, sepsis, liver mass which patient reports that he had an MRI for showing a fatty liver, Crohn's disease, macrocytic anemia and hyponatremia.  He does not know his baseline creatinine at this point.  Patient had labs drawn twice since then. Anika Ferreira reports they are getting worse.  His creatinine at one time was as high as 9- but most recently 1.5  Patient has been more sleepy but sleeping regularly at night and not during the day.  Denies vomiting. Zadie Bernheim has an ostomy in the stool output is brown not black. Zadie Bernheim states that he has not drank alcohol in 6 weeks.  Patient denies any abdominal pain, fevers, chest pain.  He complains of some shortness of breath.  no N/D, no diarrhea, no rashes, + leg swelling,       Interval history / Subjective:   Patient reports some abd pain  Episode of shortness of breath night before last- workup showing pulm edema and no PE  Bilirubin continues to rise- Further workup per hepatology     Assessment & Plan:         1. Hyperbilirubinemia with jaundice- likely related to alcohol use  - liver US ordered and LFTs reviewed- hepatology consulted  Further workup per hepatology, bilirubin continues to rise  2. Crohns- humira on hold- ileostomy from prior surgery appears stable  3. CH- hypotension- on midodrine- resume  4. Bilat LE edema- venous dopplers neg for DVT- no DVT prophylaxis due to autoanticoagulation with elevated INR  5. Coagulopathy- from liver disease-   monitor INR- down to 1.5, completed VIT K  6. Thrombocytopenia- ? Due to liver dz- monitor for now  7. Leukocytosis- UTI   Started IV abx- monitor cultures  8. Acidosis- likely metabolic from liver dz- improved with resumption of bicarb  9. LYNSEY on CKD3- monitor creatinine- continues to rise- consult nephrology for assistance with management   10. Anemia-Hb improved after 2 unit PRBC transfusion- hb=8.1 today  Monitor Hb for signs of bleeding- no blood from ostomy  11. Shortness of breath- likely due to pulm edema from blood transfusion- monitor and diurese only if hypoxic due to elevated creatinine  12. COVID + status- likely due to recent past infection and not reactivation from new infection  He is asymptomatic       Code status: Full Code  [facility]  Anticipated Disposition: > 48hrs     Hospital Problems  Never Reviewed          Codes Class Noted POA    Acidosis ICD-10-CM: E87.2  ICD-9-CM: 276.2  2/10/2022 Unknown        Hyperbilirubinemia ICD-10-CM: E80.6  ICD-9-CM: 782.4  2/10/2022 Unknown        Cirrhosis of liver ([address] ICD-10-CM: K74.60  ICD-9-CM: 571.5  2/10/2022 Unknown                Review of Systems:   A comprehensive review of systems was negative except for that written in the HPI. Vital Signs:    Last 24hrs VS reviewed since prior progress note.  Most recent are:  Visit Vitals  /65 (BP 1 Location: Left arm, BP Patient Position: At rest)   Pulse 91   Temp 98 °F (36.7 °C)   Resp 20   Ht 6' (1.829 m)   Wt 126.5 kg (278 lb 14.1 oz)   SpO2 93%   BMI 37.82 kg/m²     Patient Vitals for the past 24 hrs:   Temp Pulse Resp BP SpO2   02/14/22 1000 -- 91 -- -- --   02/14/22 0749 98 °F (36.7 °C) 96 20 117/65 93 %   02/14/22 0554 -- 97 -- -- --   02/14/22 0538 98.6 °F (37 °C) (!) 109 22 116/69 94 %   02/14/22 0357 -- (!) 101 -- -- --   02/14/22 0200 -- 97 -- -- --   02/13/22 2239 -- -- -- -- 96 %   02/13/22 2200 -- 97 -- -- --   02/13/22 2130 98 °F (36.7 °C) 98 26 125/75 95 %   02/13/22 2000 -- (!) 110 -- -- --   02/13/22 1800 -- 93 -- -- --   02/13/22 1405 -- 84 -- -- --   02/13/22 1217 -- 93 -- -- --   02/13/22 1022 98.4 °F (36.9 °C) 90 22 129/77 96 %       Intake/Output Summary (Last 24 hours) at 2/14/2022 1015  Last data filed at 2/13/2022 1641  Gross per 24 hour   Intake --   Output 625 ml   Net -625 ml        Physical Examination:     I had a face to face encounter with this patient and independently examined them on 2/14/2022 as outlined below:          Constitutional:  No acute distress, cooperative, jaundiced   HEENT:  Oral mucosa moist, scleral icterus    Resp:  diffuse crackles. No wheezing/rhonchi/rales. No accessory muscle use. CV:  Regular rhythm, normal rate, no murmurs,     GI:  Soft, distended, mild tenderness. Surgical scars, ileostomy     Musculoskeletal:  bilat LE edema, no cyanosis    Neurologic:  Moves all extremities.   AAOx3, CN II-XII reviewed            Data Review:    Review and/or order of clinical lab test  Review and/or order of tests in the radiology section of CPT  Review and/or order of tests in the medicine section of CPT    ULTRASOUND OF THE RIGHT UPPER QUADRANT     INDICATION: jaundice     COMPARISON: None.     TECHNIQUE:  Sonography of the right upper quadrant was performed.      FINDINGS:     GALLBLADDER: Mildly distended but no gallstones, or wall thickening. Kade Major COMMON DUCT: The common bile duct cannot be visualized by the technologist.  LIVER: The liver is enlarged, diffusely heterogeneous and upper normal in  echogenicity with prominent periportal edema throughout both lobes. Portal  venous flow is hepatopedal, but portal and hepatic venous branches were not  insonated. Mildly echogenic lesion in the left hepatic lobe measures 3.9 x 3.5 x  3.0 cm. PANCREAS: The visualized portions of the pancreas are normal.   RIGHT KIDNEY: 11.2 cm in length, with mild cortical thinning suggested. . No  hydronephrosis, shadowing calculus, or contour-deforming renal mass.     Incidentally noted small to moderate ascites throughout the upper abdomen.     IMPRESSION     1. Hepatomegaly, hepatic steatosis and diffuse periportal edema, with an  echogenic lesion in the left hepatic lobe which may represent a hemangioma. However, further characterization and evaluation for chronic liver disease is  suggested with nonemergent dedicated contrast enhanced hepatic MRI. 2. Mild to moderate ascites. 3. Mild right renal cortical thinning is suggested without hydronephrosis or  abnormal echogenicity. Correlate with renal function parameters.     Labs:     Recent Labs     02/14/22 0010 02/13/22 0208   WBC 17.4* 14.4*   HGB 8.1* 6.8*   HCT 25.0* 21.4*   PLT 78* 73*     Recent Labs     02/14/22 0010 02/13/22 0207 02/12/22  0250    137 138   K 3.6 3.2* 3.8   * 112* 114*   CO2 19* 15* 17*   BUN 28* 29* 32*   CREA 2.28* 2.04* 1.91*   GLU 66 76 56*   CA 9.0 8.8 9.0   MG  --   --  1.6   PHOS  --   --  2.2*     Recent Labs     02/14/22 0010 02/13/22 0207 02/12/22  0250   ALT 28 31 38   AP 85 93 86   TBILI 32.9* 27.1* 24.9*   TP 6.0* 5.4* 5.3*   ALB 3.8 3.0* 2.7*   GLOB 2.2 2.4 2.6     Recent Labs     02/14/22 0010 02/13/22 0207 02/12/22  0250   INR 1.5* 1.5* 1.5*   PTP 15.6* 15.6* 15.2*      Recent Labs     02/13/22  0207   TIBC 78*   PSAT 60*      No results found for: FOL, RBCF   No results for input(s): PH, PCO2, PO2 in the last 72 hours. No results for input(s): CPK, CKNDX, TROIQ in the last 72 hours.     No lab exists for component: CPKMB  No results found for: CHOL, CHOLX, CHLST, CHOLV, HDL, HDLP, LDL, LDLC, DLDLP, TGLX, TRIGL, TRIGP, CHHD, CHHDX  No results found for: [facility]  Lab Results   Component Value Date/Time    Color DARK YELLOW 02/10/2022 09:53 PM    Appearance CLOUDY (A) 02/10/2022 09:53 PM    Specific gravity 1.017 02/10/2022 09:53 PM    pH (UA) 5.5 02/10/2022 09:53 PM    Protein TRACE (A) 02/10/2022 09:53 PM    Glucose Negative 02/10/2022 09:53 PM    Ketone Negative 02/10/2022 09:53 PM    Urobilinogen 0.2 02/10/2022 09:53 PM    Nitrites Positive (A) 02/10/2022 09:53 PM    Leukocyte Esterase TRACE (A) 02/10/2022 09:53 PM    Epithelial cells FEW 02/10/2022 09:53 PM    Bacteria 2+ (A) 02/10/2022 09:53 PM    WBC 0-4 02/10/2022 09:53 PM    RBC 0-5 02/10/2022 09:53 PM         Medications Reviewed:     Current Facility-Administered Medications   Medication Dose Route Frequency    0.9% sodium chloride infusion 250 mL  250 mL IntraVENous PRN    sodium bicarbonate tablet 650 mg  650 mg Oral BID    albuterol (PROVENTIL HFA, VENTOLIN HFA, PROAIR HFA) inhaler 2 Puff  2 Puff Inhalation Q6H PRN    0.9% sodium chloride infusion 250 mL  250 mL IntraVENous PRN    0.9% sodium chloride infusion 250 mL  250 mL IntraVENous PRN    piperacillin-tazobactam (ZOSYN) 3.375 g in 0.9% sodium chloride (MBP/ADV) 100 mL MBP  3.375 g IntraVENous Q8H    sodium chloride (NS) flush 5-40 mL  5-40 mL IntraVENous Q8H    sodium chloride (NS) flush 5-40 mL  5-40 mL IntraVENous PRN    polyethylene glycol (MIRALAX) packet 17 g  17 g Oral DAILY PRN    ondansetron (ZOFRAN) injection 4 mg  4 mg IntraVENous Q6H PRN    midodrine (PROAMATINE) tablet 10 mg  10 mg Oral TID WITH MEALS    cyanocobalamin (VITAMIN B12) tablet 100 mcg  100 mcg Oral DAILY    folic acid (FOLVITE) tablet 1 mg  1 mg Oral DAILY    albumin human 25% (BUMINATE) solution 25 g  25 g IntraVENous Q6H    thiamine HCL (B-1) tablet 100 mg  100 mg Oral DAILY     ______________________________________________________________________  EXPECTED LENGTH OF STAY: - - -  ACTUAL LENGTH OF STAY:          4                 Coy Albarado MD

## 2022-02-15 LAB
ALBUMIN SERPL-MCNC: 3.6 G/DL (ref 3.5–5)
ALBUMIN/GLOB SERPL: 1.8 {RATIO} (ref 1.1–2.2)
ALP SERPL-CCNC: 66 U/L (ref 45–117)
ALT SERPL-CCNC: 19 U/L (ref 12–78)
ANION GAP SERPL CALC-SCNC: 10 MMOL/L (ref 5–15)
AST SERPL-CCNC: 36 U/L (ref 15–37)
BACTERIA SPEC CULT: NORMAL
BILIRUB SERPL-MCNC: 31.8 MG/DL (ref 0.2–1)
BUN SERPL-MCNC: 24 MG/DL (ref 6–20)
BUN/CREAT SERPL: 12 (ref 12–20)
CALCIUM SERPL-MCNC: 9 MG/DL (ref 8.5–10.1)
CHLORIDE SERPL-SCNC: 107 MMOL/L (ref 97–108)
CO2 SERPL-SCNC: 19 MMOL/L (ref 21–32)
CREAT SERPL-MCNC: 2.02 MG/DL (ref 0.7–1.3)
ERYTHROCYTE [DISTWIDTH] IN BLOOD BY AUTOMATED COUNT: 20.6 % (ref 11.5–14.5)
GLOBULIN SER CALC-MCNC: 2 G/DL (ref 2–4)
GLUCOSE SERPL-MCNC: 57 MG/DL (ref 65–100)
HCT VFR BLD AUTO: 21.6 % (ref 36.6–50.3)
HGB BLD-MCNC: 7.2 G/DL (ref 12.1–17)
INR PPP: 2 (ref 0.9–1.1)
MCH RBC QN AUTO: 34.4 PG (ref 26–34)
MCHC RBC AUTO-ENTMCNC: 33.3 G/DL (ref 30–36.5)
MCV RBC AUTO: 103.3 FL (ref 80–99)
NRBC # BLD: 0 K/UL (ref 0–0.01)
NRBC BLD-RTO: 0 PER 100 WBC
PLATELET # BLD AUTO: 72 K/UL (ref 150–400)
PMV BLD AUTO: 11.1 FL (ref 8.9–12.9)
POTASSIUM SERPL-SCNC: 2.9 MMOL/L (ref 3.5–5.1)
PROT SERPL-MCNC: 5.6 G/DL (ref 6.4–8.2)
PROTHROMBIN TIME: 20.6 SEC (ref 9–11.1)
RBC # BLD AUTO: 2.09 M/UL (ref 4.1–5.7)
SERVICE CMNT-IMP: NORMAL
SODIUM SERPL-SCNC: 136 MMOL/L (ref 136–145)
SODIUM UR-SCNC: 16 MMOL/L
WBC # BLD AUTO: 13.5 K/UL (ref 4.1–11.1)

## 2022-02-15 PROCEDURE — 99233 SBSQ HOSP IP/OBS HIGH 50: CPT | Performed by: INTERNAL MEDICINE

## 2022-02-15 PROCEDURE — 80053 COMPREHEN METABOLIC PANEL: CPT

## 2022-02-15 PROCEDURE — P9047 ALBUMIN (HUMAN), 25%, 50ML: HCPCS | Performed by: INTERNAL MEDICINE

## 2022-02-15 PROCEDURE — 36415 COLL VENOUS BLD VENIPUNCTURE: CPT

## 2022-02-15 PROCEDURE — 84300 ASSAY OF URINE SODIUM: CPT

## 2022-02-15 PROCEDURE — 74011250636 HC RX REV CODE- 250/636: Performed by: FAMILY MEDICINE

## 2022-02-15 PROCEDURE — 74011250636 HC RX REV CODE- 250/636: Performed by: INTERNAL MEDICINE

## 2022-02-15 PROCEDURE — 74011000258 HC RX REV CODE- 258: Performed by: FAMILY MEDICINE

## 2022-02-15 PROCEDURE — 85027 COMPLETE CBC AUTOMATED: CPT

## 2022-02-15 PROCEDURE — 85610 PROTHROMBIN TIME: CPT

## 2022-02-15 PROCEDURE — 74011000250 HC RX REV CODE- 250: Performed by: FAMILY MEDICINE

## 2022-02-15 PROCEDURE — 74011250637 HC RX REV CODE- 250/637: Performed by: INTERNAL MEDICINE

## 2022-02-15 PROCEDURE — 74011250637 HC RX REV CODE- 250/637: Performed by: FAMILY MEDICINE

## 2022-02-15 PROCEDURE — 65660000000 HC RM CCU STEPDOWN

## 2022-02-15 RX ORDER — POTASSIUM CHLORIDE 14.9 MG/ML
10 INJECTION INTRAVENOUS
Status: COMPLETED | OUTPATIENT
Start: 2022-02-15 | End: 2022-02-15

## 2022-02-15 RX ORDER — SODIUM BICARBONATE 650 MG/1
1300 TABLET ORAL 2 TIMES DAILY
Status: DISCONTINUED | OUTPATIENT
Start: 2022-02-15 | End: 2022-02-20 | Stop reason: HOSPADM

## 2022-02-15 RX ORDER — POTASSIUM CHLORIDE 750 MG/1
40 TABLET, FILM COATED, EXTENDED RELEASE ORAL
Status: COMPLETED | OUTPATIENT
Start: 2022-02-15 | End: 2022-02-15

## 2022-02-15 RX ADMIN — FOLIC ACID 1 MG: 1 TABLET ORAL at 09:04

## 2022-02-15 RX ADMIN — ALBUMIN (HUMAN) 25 G: 0.25 INJECTION, SOLUTION INTRAVENOUS at 17:07

## 2022-02-15 RX ADMIN — SODIUM CHLORIDE, PRESERVATIVE FREE 10 ML: 5 INJECTION INTRAVENOUS at 22:03

## 2022-02-15 RX ADMIN — SODIUM CHLORIDE, PRESERVATIVE FREE 10 ML: 5 INJECTION INTRAVENOUS at 07:04

## 2022-02-15 RX ADMIN — SODIUM BICARBONATE 650 MG: 650 TABLET ORAL at 09:05

## 2022-02-15 RX ADMIN — ALBUMIN (HUMAN) 25 G: 0.25 INJECTION, SOLUTION INTRAVENOUS at 04:13

## 2022-02-15 RX ADMIN — MIDODRINE HYDROCHLORIDE 10 MG: 5 TABLET ORAL at 12:58

## 2022-02-15 RX ADMIN — ALBUMIN (HUMAN) 25 G: 0.25 INJECTION, SOLUTION INTRAVENOUS at 12:59

## 2022-02-15 RX ADMIN — VITAM B12 100 MCG: 100 TAB at 09:05

## 2022-02-15 RX ADMIN — PIPERACILLIN AND TAZOBACTAM 3.38 G: 3; .375 INJECTION, POWDER, LYOPHILIZED, FOR SOLUTION INTRAVENOUS at 16:10

## 2022-02-15 RX ADMIN — ALBUMIN (HUMAN) 25 G: 0.25 INJECTION, SOLUTION INTRAVENOUS at 22:03

## 2022-02-15 RX ADMIN — POTASSIUM CHLORIDE 10 MEQ: 14.9 INJECTION, SOLUTION INTRAVENOUS at 09:01

## 2022-02-15 RX ADMIN — PIPERACILLIN AND TAZOBACTAM 3.38 G: 3; .375 INJECTION, POWDER, LYOPHILIZED, FOR SOLUTION INTRAVENOUS at 07:03

## 2022-02-15 RX ADMIN — SODIUM CHLORIDE, PRESERVATIVE FREE 20 ML: 5 INJECTION INTRAVENOUS at 13:03

## 2022-02-15 RX ADMIN — Medication 100 MG: at 09:04

## 2022-02-15 RX ADMIN — SODIUM BICARBONATE 1300 MG: 650 TABLET ORAL at 17:08

## 2022-02-15 RX ADMIN — MIDODRINE HYDROCHLORIDE 10 MG: 5 TABLET ORAL at 17:08

## 2022-02-15 RX ADMIN — MIDODRINE HYDROCHLORIDE 10 MG: 5 TABLET ORAL at 07:04

## 2022-02-15 RX ADMIN — PIPERACILLIN AND TAZOBACTAM 3.38 G: 3; .375 INJECTION, POWDER, LYOPHILIZED, FOR SOLUTION INTRAVENOUS at 01:00

## 2022-02-15 RX ADMIN — POTASSIUM CHLORIDE 10 MEQ: 14.9 INJECTION, SOLUTION INTRAVENOUS at 10:49

## 2022-02-15 RX ADMIN — POTASSIUM CHLORIDE 40 MEQ: 750 TABLET, EXTENDED RELEASE ORAL at 09:04

## 2022-02-15 NOTE — CONSULTS
[address]    Name:  Dinah iWlson  MR#:  [id number]  :  1975  ACCOUNT #:  [de-identified]  DATE OF SERVICE:  02/15/2022    INITIAL RENAL EVALUATION    REFERRING PHYSICIAN:  Dr. Jacqulyne Kanner. REASON FOR CONSULTATION:  Abnormal renal function and electrolyte abnormalities. HISTORY OF PRESENT ILLNESS:  The patient is a 51-year-old white man, who was directly admitted from outside out of town facility to be evaluated by the [address] and particularly Dr. Courtney Cade. The patient apparently has underlying condition, liver cirrhosis, which is believed to be related to ethanol abuse. The patient is not able to provide detailed history. He appears to be lethargic, somnolent, although he is able to wake up at times. Most of the data is gathered from accompanying medical records. There is a mention of acute kidney injury at [facility] with metabolic acidosis. At some point, the patient's serum creatinine was as high as 9, but after that it had improved to the lowest level of 1.5. It does not appear that the patient had renal replacement therapy at any point. The patient was found to have serum creatinine of about 2 at the time of his admission to [facility].  I was not able to obtain much information about the duration of the renal issues and particularly symptoms of dysuria. The patient's main problem remains liver failure. PAST MEDICAL HISTORY:  1. Crohn's disease. 2.  Small-bowel obstruction. 3.  Chronic hypotension. 4.  Alcoholic cirrhosis. PAST SURGICAL HISTORY:  1. Creation of ostomy after bowel obstruction and bowel resection. 2.  Debridement of abdominal abscess. ALLERGIES:  ALLERGIC TO NAPROXEN AND SOME CROHN'S DISEASE MEDICATIONS, BUT NAMES ARE NOT PROVIDED. MEDICATIONS:  Prior to admission medication list consists of:  1. Prevalite 4 g.  2.  Benadryl. 3.  Lomotil. 4.  Folvite. 5.  Midodrine. 6.  Prednisone.   7.  Sodium bicarbonate. 8.  Vitamin B12.  9.  Humira. The Humira was given for management of Crohn's disease. FAMILY HISTORY:  Negative for liver disease. SOCIAL HISTORY:  The patient apparently is . He has a daughter living with him. He worked for [employer]. The patient is a former smoker and he used heavily alcohol, but quit six weeks ago. REVIEW OF SYSTEMS:  Not obtained in detail due to the patient's condition. PHYSICAL EXAMINATION:  GENERAL:  Ill-appearing, middle-aged white man with jaundice. He is tachypneic. The patient is moderately obese. VITAL SIGNS:  Blood pressure is 114/68, heart rate is 93, respirations are 20-22. HEENT:  Eyes are closed. Lips are without lesions. LUNGS:  With diffuse crackles. No wheezes. HEART:  With S1 and S2 with regular rate and rhythm with no friction rub or gallop. ABDOMEN:  Soft. Distended with diffuse mild tenderness. The patient has ileostomy and surgical scars. EXTREMITIES:  With edema. No cyanosis. NEUROLOGIC:  Nonfocal.    DIAGNOSTIC DATA:  Ultrasound of the abdomen shows hepatomegaly, hepatic steatosis, and diffuse periportal edema with echogenic lesion in the left hepatic lobe, which may represent a hemangioma, mild-to-moderate ascites, mild right renal cortical thinning but no hydronephrosis. Chest x-ray; moderately severe acute left lung infiltrate, mild patchy possible right lung infiltrate. CAT scan of the abdomen and chest shows pneumonia and pulmonary edema, cirrhosis, splenomegaly, third spacing with anasarca. LABORATORY DATA:  Sodium 136, potassium is 2.9, CO2 is 19, BUN is 24, creatinine is 2.02 which is down from yesterday 2.28. Albumin is 3.6. Hemoglobin is 7.2, white blood count is 13.5, platelets are 72. IMPRESSION:  1. Acute kidney injury of multifactorial origin. The patient may have effective intravascular volume contraction given his chronic hypotension with concomitant third spacing and peripheral edema. Hepatorenal syndrome is unlikely. 2.  Chronic kidney disease is another possibility. There is no sufficient data to back up this diagnosis, although the patient's known best serum creatinine is 1.5. Further evaluation and assessment will be needed. 3.  Metabolic acidosis from acute kidney injury and bicarbonate losses from the gastrointestinal tract. The patient has high output ileostomy. 4.  Profound hypokalemia from gastrointestinal losses and partially related to transcellular potassium shift from metabolic acidosis. The patient is on potassium supplements already. 5.  Significant anemia. The patient may have decreased production of endogenous erythropoietin or his anemia can be a result of bone marrow suppression from liver disease. 6.  Peripheral edema. 7.  COVID pneumonia. The patient tested COVID several days ago. RECOMMENDATIONS:  1. Continue aggressive replacement of potassium. In cirrhosis patients, potassium should be preferably around and above 4.  2.  Continue alkalinization. I would increase the dose to 1300 twice a day. The patient may need to start bicarb drip. 3.  Anemia management. Assess vitamin Q57, folic acid, and iron profile and start erythropoietin if iron saturation is above 20.  4.  Monitor and record urine output. 5.  Avoid any unnecessary nephrotoxic agents. 6.  Check urine sodium and watch for signs of hepatorenal syndrome. 7.  No need for renal replacement therapy consideration at this time. 8.  The patient's overall prognosis and condition are guarded at best.  9.  Check magnesium level. Thank you very much for the opportunity to be part of this patient's care. Our service will follow up with you closely.       Prema Szymanski MD      [id number]  D:  02/15/2022 12:16  T:  02/15/2022 14:47  JOB #:  [id number]  CC:  Shy Partida MD

## 2022-02-15 NOTE — PROGRESS NOTES
[address] [facility]  [facility]                                                                                          Hospitalist Progress Note  Fidel Souza MD  Answering service: [phone] OR [phone] from in house phone        Date of Service:  2/15/2022  NAME:  Daniela Felton III  :  1975  MRN:  [id number]      Admission Summary:   Benoit Chang is a 55 y.o. male who came to [facility] specifically for admission and for evaluation by Dr Owen Varela - Hepatology- due to related to elevated bilirubin and suspected cirrhosis. He was referred for admission by his family physician. Tammy Corea has a complicated past medical history including suspectedcirrhosis that they believe is secondary to alcohol use/abuse as well as possibly Humira.  He used Humira from September until about 3 to 4 weeks ago due to Julius Mis was admitted to [facility] in January. Tammy Corea states that he was therefore liver failure.  Diagnoses in epic which have crossed over but records not available are renal failure, metabolic acidosis, acute metabolic encephalopathy, immunocompromise, sepsis, liver mass which patient reports that he had an MRI for showing a fatty liver, Crohn's disease, macrocytic anemia and hyponatremia.  He does not know his baseline creatinine at this point.  Patient had labs drawn twice since then. Tammy Corea reports they are getting worse.  His creatinine at one time was as high as 9- but most recently 1.5  Patient has been more sleepy but sleeping regularly at night and not during the day.  Denies vomiting. [facility] has an ostomy in the stool output is brown not black. [facility] states that he has not drank alcohol in 6 weeks.  Patient denies any abdominal pain, fevers, chest pain.  He complains of some shortness of breath.  no N/D, no diarrhea, no rashes, + leg swelling,       Interval history / Subjective:   Patient reports some abd pain, high liquid ostomy output  No more shortness of breath - CTA showing pulm edema and no PE  Bilirubin continues to rise- Further workup per hepatology     Assessment & Plan:         1. Hyperbilirubinemia with jaundice- likely related to alcohol use  - liver US ordered and LFTs reviewed- hepatology consulted  Further workup per hepatology, bilirubin continues to rise  2. Crohns- humira on hold- ileostomy from prior surgery appears stable but lots of liquid output  3. CH- hypotension- on midodrine- cont  4. Bilat LE edema- venous dopplers neg for DVT- no DVT prophylaxis due to autoanticoagulation with elevated INR  5. Coagulopathy- from liver disease-   monitor INR-back up to 2, 3 doses of IV VIT K  6. Thrombocytopenia- ? Due to liver dz- monitor for now  7. Leukocytosis- UTI   Cont  IV abx- monitor cultures  8. Acidosis- likely metabolic from liver dz- improved with resumption of bicarb  9. LYNSEY on CKD3- monitor creatinine- continues to rise- consult nephrology for assistance with management   10. Anemia-Hb improved after 2 unit PRBC transfusion- hb=7.2 today  Monitor Hb for signs of bleeding- no blood from ostomy  11. Shortness of breath- likely due to pulm edema from blood transfusion- monitor and diurese only if hypoxic due to elevated creatinine- improved and stable  12. COVID + status- likely due to recent past infection and not reactivation from new infection  He is asymptomatic       Code status: Full Code  [address] discussed with: patient  Anticipated Disposition: > 48hrs     Hospital Problems  Never Reviewed          Codes Class Noted POA    Acidosis ICD-10-CM: E87.2  ICD-9-CM: 276.2  2/10/2022 Unknown        Hyperbilirubinemia ICD-10-CM: E80.6  ICD-9-CM: 782.4  2/10/2022 Unknown        Cirrhosis of liver ([facility] [address] ICD-10-CM: K74.60  ICD-9-CM: 571.5  2/10/2022 Unknown                Review of Systems:   A comprehensive review of systems was negative except for that written in the HPI.        Vital Signs:    Last 24hrs VS reviewed since prior progress note. Most recent are:  Visit Vitals  /68 (BP 1 Location: Left arm, BP Patient Position: At rest)   Pulse 93   Temp 98 °F (36.7 °C)   Resp 18   Ht 6' (1.829 m)   Wt 126.5 kg (278 lb 14.1 oz)   SpO2 96%   BMI 37.82 kg/m²     Patient Vitals for the past 24 hrs:   Temp Pulse Resp BP SpO2   02/15/22 0759 98 °F (36.7 °C) 93 18 114/68 96 %   02/15/22 0600 -- (!) 101 -- -- --   02/15/22 0400 -- (!) 101 -- -- --   02/15/22 0200 -- (!) 103 -- -- --   02/14/22 2200 -- 98 -- -- --   02/14/22 2148 -- -- -- -- 94 %   02/14/22 2045 97.9 °F (36.6 °C) 95 18 108/76 95 %   02/14/22 2000 -- 94 -- -- --   02/14/22 1800 -- (!) 106 -- -- --   02/14/22 1411 97.6 °F (36.4 °C) 89 18 107/62 95 %   02/14/22 1400 -- 92 -- -- --   02/14/22 1200 -- 93 -- -- --   02/14/22 1147 -- 92 -- 114/64 --   02/14/22 1000 -- 91 -- -- --       Intake/Output Summary (Last 24 hours) at 2/15/2022 0842  Last data filed at 2/15/2022 0018  Gross per 24 hour   Intake --   Output 1950 ml   Net -1950 ml        Physical Examination:     I had a face to face encounter with this patient and independently examined them on 2/15/2022 as outlined below:          Constitutional:  No acute distress, cooperative, jaundiced   HEENT:  Oral mucosa moist, scleral icterus    Resp:  diffuse crackles. No wheezing/rhonchi/rales. No accessory muscle use. CV:  Regular rhythm, normal rate, no murmurs,     GI:  Soft, distended, mild tenderness. Surgical scars, ileostomy     Musculoskeletal:  bilat LE edema, no cyanosis    Neurologic:  Moves all extremities. AAOx3, CN II-XII reviewed            Data Review:    Review and/or order of clinical lab test  Review and/or order of tests in the radiology section of CPT  Review and/or order of tests in the medicine section of CPT    ULTRASOUND OF THE RIGHT UPPER QUADRANT     INDICATION: jaundice     COMPARISON: None.     TECHNIQUE:  Sonography of the right upper quadrant was performed.    FINDINGS:     GALLBLADDER: Mildly distended but no gallstones, or wall thickening. Consuelo Bloodgood COMMON DUCT: The common bile duct cannot be visualized by the technologist.  LIVER: The liver is enlarged, diffusely heterogeneous and upper normal in  echogenicity with prominent periportal edema throughout both lobes. Portal  venous flow is hepatopedal, but portal and hepatic venous branches were not  insonated. Mildly echogenic lesion in the left hepatic lobe measures 3.9 x 3.5 x  3.0 cm. PANCREAS: The visualized portions of the pancreas are normal.   RIGHT KIDNEY: 11.2 cm in length, with mild cortical thinning suggested. . No  hydronephrosis, shadowing calculus, or contour-deforming renal mass.     Incidentally noted small to moderate ascites throughout the upper abdomen.     IMPRESSION     1. Hepatomegaly, hepatic steatosis and diffuse periportal edema, with an  echogenic lesion in the left hepatic lobe which may represent a hemangioma. However, further characterization and evaluation for chronic liver disease is  suggested with nonemergent dedicated contrast enhanced hepatic MRI. 2. Mild to moderate ascites. 3. Mild right renal cortical thinning is suggested without hydronephrosis or  abnormal echogenicity. Correlate with renal function parameters.     Labs:     Recent Labs     02/15/22  0434 02/14/22  0010   WBC 13.5* 17.4*   HGB 7.2* 8.1*   HCT 21.6* 25.0*   PLT 72* 78*     Recent Labs     02/15/22  0434 02/14/22  0010 02/13/22  0207    138 137   K 2.9* 3.6 3.2*    110* 112*   CO2 19* 19* 15*   BUN 24* 28* 29*   CREA 2.02* 2.28* 2.04*   GLU 57* 66 76   CA 9.0 9.0 8.8     Recent Labs     02/15/22  0434 02/14/22  0010 02/13/22  0207   ALT 19 28 31   AP 66 85 93   TBILI 31.8* 32.9* 27.1*   TP 5.6* 6.0* 5.4*   ALB 3.6 3.8 3.0*   GLOB 2.0 2.2 2.4     Recent Labs     02/15/22  0434 02/14/22  0010 02/13/22  0207   INR 2.0* 1.5* 1.5*   PTP 20.6* 15.6* 15.6*      Recent Labs     02/13/22  0207   [facility] 78*   PSAT 60*      No results found for: FOL, RBCF   No results for input(s): PH, PCO2, PO2 in the last 72 hours. No results for input(s): CPK, CKNDX, TROIQ in the last 72 hours.     No lab exists for component: CPKMB  No results found for: CHOL, CHOLX, CHLST, CHOLV, HDL, HDLP, LDL, LDLC, DLDLP, TGLX, TRIGL, TRIGP, CHHD, CHHDX  No results found for: [facility]  Lab Results   Component Value Date/Time    Color DARK YELLOW 02/10/2022 09:53 PM    Appearance CLOUDY (A) 02/10/2022 09:53 PM    Specific gravity 1.017 02/10/2022 09:53 PM    pH (UA) 5.5 02/10/2022 09:53 PM    Protein TRACE (A) 02/10/2022 09:53 PM    Glucose Negative 02/10/2022 09:53 PM    Ketone Negative 02/10/2022 09:53 PM    Urobilinogen 0.2 02/10/2022 09:53 PM    Nitrites Positive (A) 02/10/2022 09:53 PM    Leukocyte Esterase TRACE (A) 02/10/2022 09:53 PM    Epithelial cells FEW 02/10/2022 09:53 PM    Bacteria 2+ (A) 02/10/2022 09:53 PM    WBC 0-4 02/10/2022 09:53 PM    RBC 0-5 02/10/2022 09:53 PM         Medications Reviewed:     Current Facility-Administered Medications   Medication Dose Route Frequency    potassium chloride SR (KLOR-CON 10) tablet 40 mEq  40 mEq Oral NOW    potassium chloride 10 mEq in 50 ml IVPB  10 mEq IntraVENous Q1H    0.9% sodium chloride infusion 250 mL  250 mL IntraVENous PRN    sodium bicarbonate tablet 650 mg  650 mg Oral BID    albuterol (PROVENTIL HFA, VENTOLIN HFA, PROAIR HFA) inhaler 2 Puff  2 Puff Inhalation Q6H PRN    0.9% sodium chloride infusion 250 mL  250 mL IntraVENous PRN    0.9% sodium chloride infusion 250 mL  250 mL IntraVENous PRN    piperacillin-tazobactam (ZOSYN) 3.375 g in 0.9% sodium chloride (MBP/ADV) 100 mL MBP  3.375 g IntraVENous Q8H    sodium chloride (NS) flush 5-40 mL  5-40 mL IntraVENous Q8H    sodium chloride (NS) flush 5-40 mL  5-40 mL IntraVENous PRN    polyethylene glycol (MIRALAX) packet 17 g  17 g Oral DAILY PRN    ondansetron (ZOFRAN) injection 4 mg  4 mg IntraVENous Q6H PRN    midodrine (PROAMATINE) tablet 10 mg  10 mg Oral TID WITH MEALS    cyanocobalamin (VITAMIN B12) tablet 100 mcg  100 mcg Oral DAILY    folic acid (FOLVITE) tablet 1 mg  1 mg Oral DAILY    albumin human 25% (BUMINATE) solution 25 g  25 g IntraVENous Q6H    thiamine HCL (B-1) tablet 100 mg  100 mg Oral DAILY     ______________________________________________________________________  EXPECTED LENGTH OF STAY: 4d 16h  ACTUAL LENGTH OF STAY:          5                 Ed MD Jessica

## 2022-02-15 NOTE — PROGRESS NOTES
HUBERT:  1. RUR-14%  2. Return home when stable. 3. Nephrology following. CM participated in IDRs, and following for any hubert needs.     [facility]

## 2022-02-16 LAB
ALBUMIN SERPL-MCNC: 3.5 G/DL (ref 3.5–5)
ALBUMIN/GLOB SERPL: 1.5 {RATIO} (ref 1.1–2.2)
ALP SERPL-CCNC: 63 U/L (ref 45–117)
ALT SERPL-CCNC: 18 U/L (ref 12–78)
ANION GAP SERPL CALC-SCNC: 11 MMOL/L (ref 5–15)
AST SERPL-CCNC: 31 U/L (ref 15–37)
BILIRUB SERPL-MCNC: 32.5 MG/DL (ref 0.2–1)
BUN SERPL-MCNC: 23 MG/DL (ref 6–20)
BUN/CREAT SERPL: 10 (ref 12–20)
CALCIUM SERPL-MCNC: 9 MG/DL (ref 8.5–10.1)
CHLORIDE SERPL-SCNC: 106 MMOL/L (ref 97–108)
CO2 SERPL-SCNC: 20 MMOL/L (ref 21–32)
CREAT SERPL-MCNC: 2.24 MG/DL (ref 0.7–1.3)
ERYTHROCYTE [DISTWIDTH] IN BLOOD BY AUTOMATED COUNT: 20.6 % (ref 11.5–14.5)
GLOBULIN SER CALC-MCNC: 2.4 G/DL (ref 2–4)
GLUCOSE SERPL-MCNC: 106 MG/DL (ref 65–100)
HCT VFR BLD AUTO: 21.3 % (ref 36.6–50.3)
HGB BLD-MCNC: 7 G/DL (ref 12.1–17)
INR PPP: 1.6 (ref 0.9–1.1)
MCH RBC QN AUTO: 34.3 PG (ref 26–34)
MCHC RBC AUTO-ENTMCNC: 32.9 G/DL (ref 30–36.5)
MCV RBC AUTO: 104.4 FL (ref 80–99)
NRBC # BLD: 0 K/UL (ref 0–0.01)
NRBC BLD-RTO: 0 PER 100 WBC
PLATELET # BLD AUTO: 75 K/UL (ref 150–400)
PMV BLD AUTO: 11.1 FL (ref 8.9–12.9)
POTASSIUM SERPL-SCNC: 3 MMOL/L (ref 3.5–5.1)
PROT SERPL-MCNC: 5.9 G/DL (ref 6.4–8.2)
PROTHROMBIN TIME: 16.6 SEC (ref 9–11.1)
RBC # BLD AUTO: 2.04 M/UL (ref 4.1–5.7)
SODIUM SERPL-SCNC: 137 MMOL/L (ref 136–145)
WBC # BLD AUTO: 11.4 K/UL (ref 4.1–11.1)

## 2022-02-16 PROCEDURE — 36415 COLL VENOUS BLD VENIPUNCTURE: CPT

## 2022-02-16 PROCEDURE — 74011250637 HC RX REV CODE- 250/637: Performed by: FAMILY MEDICINE

## 2022-02-16 PROCEDURE — P9047 ALBUMIN (HUMAN), 25%, 50ML: HCPCS | Performed by: INTERNAL MEDICINE

## 2022-02-16 PROCEDURE — 74011250636 HC RX REV CODE- 250/636: Performed by: FAMILY MEDICINE

## 2022-02-16 PROCEDURE — 74011250636 HC RX REV CODE- 250/636: Performed by: INTERNAL MEDICINE

## 2022-02-16 PROCEDURE — 65660000000 HC RM CCU STEPDOWN

## 2022-02-16 PROCEDURE — 85610 PROTHROMBIN TIME: CPT

## 2022-02-16 PROCEDURE — 85027 COMPLETE CBC AUTOMATED: CPT

## 2022-02-16 PROCEDURE — 74011250637 HC RX REV CODE- 250/637: Performed by: INTERNAL MEDICINE

## 2022-02-16 PROCEDURE — 74011000258 HC RX REV CODE- 258: Performed by: FAMILY MEDICINE

## 2022-02-16 PROCEDURE — 80053 COMPREHEN METABOLIC PANEL: CPT

## 2022-02-16 PROCEDURE — 74011000250 HC RX REV CODE- 250: Performed by: FAMILY MEDICINE

## 2022-02-16 PROCEDURE — 83540 ASSAY OF IRON: CPT

## 2022-02-16 RX ORDER — DIPHENOXYLATE HYDROCHLORIDE AND ATROPINE SULFATE 2.5; .025 MG/1; MG/1
2 TABLET ORAL 2 TIMES DAILY
Status: DISCONTINUED | OUTPATIENT
Start: 2022-02-16 | End: 2022-02-17

## 2022-02-16 RX ADMIN — ALBUMIN (HUMAN) 25 G: 0.25 INJECTION, SOLUTION INTRAVENOUS at 04:36

## 2022-02-16 RX ADMIN — MIDODRINE HYDROCHLORIDE 10 MG: 5 TABLET ORAL at 11:12

## 2022-02-16 RX ADMIN — DIPHENOXYLATE HYDROCHLORIDE AND ATROPINE SULFATE 2 TABLET: 2.5; .025 TABLET ORAL at 17:16

## 2022-02-16 RX ADMIN — PIPERACILLIN AND TAZOBACTAM 3.38 G: 3; .375 INJECTION, POWDER, LYOPHILIZED, FOR SOLUTION INTRAVENOUS at 00:18

## 2022-02-16 RX ADMIN — SODIUM BICARBONATE 1300 MG: 650 TABLET ORAL at 17:16

## 2022-02-16 RX ADMIN — MIDODRINE HYDROCHLORIDE 10 MG: 5 TABLET ORAL at 17:16

## 2022-02-16 RX ADMIN — PIPERACILLIN AND TAZOBACTAM 3.38 G: 3; .375 INJECTION, POWDER, LYOPHILIZED, FOR SOLUTION INTRAVENOUS at 17:16

## 2022-02-16 RX ADMIN — SODIUM BICARBONATE 1300 MG: 650 TABLET ORAL at 08:18

## 2022-02-16 RX ADMIN — ALBUMIN (HUMAN) 25 G: 0.25 INJECTION, SOLUTION INTRAVENOUS at 11:12

## 2022-02-16 RX ADMIN — DIPHENOXYLATE HYDROCHLORIDE AND ATROPINE SULFATE 2 TABLET: 2.5; .025 TABLET ORAL at 11:12

## 2022-02-16 RX ADMIN — Medication 100 MG: at 08:18

## 2022-02-16 RX ADMIN — FOLIC ACID 1 MG: 1 TABLET ORAL at 08:18

## 2022-02-16 RX ADMIN — ALBUMIN (HUMAN) 25 G: 0.25 INJECTION, SOLUTION INTRAVENOUS at 22:33

## 2022-02-16 RX ADMIN — ALBUMIN (HUMAN) 25 G: 0.25 INJECTION, SOLUTION INTRAVENOUS at 17:16

## 2022-02-16 RX ADMIN — VITAM B12 100 MCG: 100 TAB at 08:18

## 2022-02-16 RX ADMIN — MIDODRINE HYDROCHLORIDE 10 MG: 5 TABLET ORAL at 07:20

## 2022-02-16 RX ADMIN — SODIUM CHLORIDE, PRESERVATIVE FREE 10 ML: 5 INJECTION INTRAVENOUS at 22:34

## 2022-02-16 RX ADMIN — SODIUM CHLORIDE, PRESERVATIVE FREE 10 ML: 5 INJECTION INTRAVENOUS at 07:20

## 2022-02-16 RX ADMIN — PIPERACILLIN AND TAZOBACTAM 3.38 G: 3; .375 INJECTION, POWDER, LYOPHILIZED, FOR SOLUTION INTRAVENOUS at 07:20

## 2022-02-16 NOTE — ADT AUTH CERT NOTES
Utilization Reviews         Liver Disease Complications - Care Day 6 (2/15/2022) by Lina Quiles RN       Review Status Review Entered   Completed 2/15/2022 15:28      Criteria Review      Care Day: 6 Care Date: 2/15/2022 Level of Care: Telemetry    Guideline Day 3    Level Of Care    ( ) Floor to discharge    2/15/2022 15:28:59 EST by Martine Hussein      LOC IP TELEMETRY    Clinical Status    ( ) * Hemodynamic stability    2/15/2022 15:28:59 EST by Martine Hussein      VS 97.9 117 114/68 18 96% ROOM AIR    (X) * Tachypnea absent    2/15/2022 15:28:59 EST by Martine Hussein      resp 18    (X) * Afebrile    2/15/2022 15:28:59 EST by Martine Hussein      temp 97.9    (X) * No bleeding    2/15/2022 15:28:59 EST by Martine Hussein      none noted    (X) * No peritonitis, or treatment adequate    (X) * Ascites absent or adequately controlled    (X) * Volume status adequately controlled    (X) * Renal function at baseline or acceptable for next level of care    2/15/2022 15:28:59 EST by Martine Hussein      BUN: 24 (H)  Creatinine: 2.02 (H)  BUN/Creatinine ratio: 12    (X) * Electrolyte abnormalities absent or acceptable for next level of care    2/15/2022 15:28:59 EST by Martine Hussein      Sodium: 136  Potassium: 2.9 (L)    (X) * Mental status at baseline    2/15/2022 15:28:59 EST by Martine Hussein      alert,oriented x3    (X) * Diet tolerated    2/15/2022 15:28:59 EST by Martine Hussein      adult regular diet    ( ) * Discharge plans and education understood    Activity    (X) * Ambulatory or acceptable for next level of care    2/15/2022 15:28:59 EST by Martine Hussein      activity as tolerated w/ assistance    Routes    (X) * Oral hydration    2/15/2022 15:28:59 EST by Martine Hussein      toelrating po    (X) * Oral medications or regimen acceptable for next level of care    2/15/2022 15:28:59 EST by Martine Hussein      vitamin b 12 100mcg po qday  folvite 1mg po qday  proamatine 10mg po tid  thiamine 100mg po qday  sodium bicarb 1300mg po bid    (X) * Oral diet or acceptable for next level of care    2/15/2022 15:28:59 EST by Martine Hussein      adult regular diet    * Milestone   Additional Notes   2/15/2022   LOC IP TELEMETRY   VS 97.9 117 114/68 18 96% ROOM AIR   LABS     WBC: 13.5 (H)   NRBC: 0.0   RBC: 2.09 (L)   HGB: 7.2 (L)   HCT: 21.6 (L)   MCV: 103.3 (H)   MCH: 34.4 (H)   MCHC: 33.3   RDW: 20.6 (H)   PLATELET: 72 (L)   MPV: 11.1   ABSOLUTE NRBC: 0.00   INR: 2.0 (H)   Prothrombin time: 20.6 (H)   Sodium: 136   Potassium: 2.9 (L)   Chloride: 107   CO2: 19 (L)   Anion gap: 10   Glucose: 57 (L)   BUN: 24 (H)   Creatinine: 2.02 (H)   BUN/Creatinine ratio: 12   Calcium: 9.0   GFR est non-AA: 36 (L)   GFR est AA: 43 (L)   Bilirubin, total: 31.8 (HH)   Protein, total: 5.6 (L)      MEDS   albumin 25 g iv q6   zosyn 3.375g iv q8   potassium 10meq iv q1 x2   potassium 40meq po x1      ATTENDING NOTE   Patient reports some abd pain, high liquid ostomy output   No more shortness of breath - CTA showing pulm edema and no PE   Bilirubin continues to rise- Further workup per hepatology       Assessment & Plan:            1. Hyperbilirubinemia with jaundice- likely related to alcohol use   - liver US ordered and LFTs reviewed- hepatology consulted   Further workup per hepatology, bilirubin continues to rise   2. Crohns- humira on hold- ileostomy from prior surgery appears stable but lots of liquid output   3. CH- hypotension- on midodrine- cont   4. Bilat LE edema- venous dopplers neg for DVT- no DVT prophylaxis due to autoanticoagulation with elevated INR   5. Coagulopathy- from liver disease-    monitor INR-back up to 2, 3 doses of IV VIT K   6. Thrombocytopenia- ? Due to liver dz- monitor for now   7. Leukocytosis- UTI    Cont  IV abx- monitor cultures   8. Acidosis- likely metabolic from liver dz- improved with resumption of bicarb   9.  LYNSEY on CKD3- monitor creatinine- continues to rise- consult nephrology for assistance with management    10. Anemia-Hb improved after 2 unit PRBC transfusion- hb=7.2 today   Monitor Hb for signs of bleeding- no blood from ostomy   11. Shortness of breath- likely due to pulm edema from blood transfusion- monitor and diurese only if hypoxic due to elevated creatinine- improved and stable   12. COVID + status- likely due to recent past infection and not reactivation from new infection   He is asymptomatic           Code status: Full Code   Prophylaxis:SCDs    Care Plan discussed with: patient      Constitutional: No acute distress, cooperative, jaundiced   HEENT: Oral mucosa moist, scleral icterus    Resp: diffuse crackles. No wheezing/rhonchi/rales. No accessory muscle use. CV: Regular rhythm, normal rate, no murmurs,     GI: Soft, distended, mild tenderness. Surgical scars, ileostomy     Musculoskeletal: bilat LE edema, no cyanosis    Neurologic: Moves all extremities.   AAOx3, CN II-XII reviewed         NEPHROLOGY NOTE   REASON FOR CONSULTATION:  Abnormal renal function and electrolyte abnormalities.       HISTORY OF PRESENT ILLNESS:  The patient is a 49-year-old white man, who was directly admitted from outside out of town facility to be evaluated by the [address] and particularly Dr[address] Arcenio Barajas patient apparently has underlying condition, liver cirrhosis, which is believed to be related to ethanol abuse.  The patient is not able to provide detailed history. Stanley Cortes appears to be lethargic, somnolent, although he is able to wake up at times. [facility] of the data is gathered from accompanying medical records. Demarco Burton is a mention of acute kidney injury at [facility] with metabolic acidosis.  At some point, the patient's serum creatinine was as high as 9, but after that it had improved to the lowest level of 1.5.  It does not appear that the patient had renal replacement therapy at any point.  The patient was found to have serum creatinine of about 2 at the time of his admission to [facility] Lisandro Glass was not able to obtain much information about the duration of the renal issues and particularly symptoms of dysuria.  The patient's main problem remains liver failure.           IMPRESSION:   1.  Acute kidney injury of multifactorial origin.  The patient may have effective intravascular volume contraction given his chronic hypotension with concomitant third spacing and peripheral edema.  Hepatorenal syndrome is unlikely. 2.  Chronic kidney disease is another possibility. Moncho Whatley is no sufficient data to back up this diagnosis, although the patient's known best serum creatinine is 1.5.  Further evaluation and assessment will be needed. 3.  Metabolic acidosis from acute kidney injury and bicarbonate losses from the gastrointestinal tract.  The patient has high output ileostomy. 4.  Profound hypokalemia from gastrointestinal losses and partially related to transcellular potassium shift from metabolic acidosis.  The patient is on potassium supplements already. 5.  Significant anemia.  The patient may have decreased production of endogenous erythropoietin or his anemia can be a result of bone marrow suppression from liver disease. 6.  Peripheral edema. 7.  COVID pneumonia.  The patient tested COVID several days ago.       RECOMMENDATIONS:   1.  Continue aggressive replacement of potassium.  In cirrhosis patients, potassium should be preferably around and above 4.   2.  Continue alkalinization.  I would increase the dose to 1300 twice a day.  The patient may need to start bicarb drip. 3.  Anemia management.  Assess vitamin Q33, folic acid, and iron profile and start erythropoietin if iron saturation is above 20.   4.  Monitor and record urine output. 5.  Avoid any unnecessary nephrotoxic agents. 6.  Check urine sodium and watch for signs of hepatorenal syndrome.    7.  No need for renal replacement therapy consideration at this time.    8.  The patient's overall prognosis and condition are guarded at best.   9.  Check magnesium level.           Liver Disease Complications - Care Day 5 (2/14/2022) by Winnie Lino, RN       Review Status Review Entered   Completed 2/15/2022 11:56      Criteria Review      Care Day: 5 Care Date: 2/14/2022 Level of Care: Telemetry    Guideline Day 3    Level Of Care    ( ) Floor to discharge    2/15/2022 11:56:29 EST by Martine Hussein      LOC IP TELEMETRY    Clinical Status    ( ) * Hemodynamic stability    2/15/2022 11:56:29 EST by Martine Hussein      VS 97.9 94 108/76 18 95% ROOM AIR    (X) * Tachypnea absent    2/15/2022 11:56:29 EST by Martine Hussein      resp 18    (X) * Afebrile    2/15/2022 11:56:29 EST by Martine Hussein      temp 97.9    (X) * No bleeding    2/15/2022 11:56:29 EST by Martine Hussein      none noted    (X) * No peritonitis, or treatment adequate    2/15/2022 11:56:29 EST by Martine Hussein      none noted    (X) * Ascites absent or adequately controlled    2/15/2022 11:56:29 EST by Martine Hussein      none noted    (X) * Volume status adequately controlled    (X) * Renal function at baseline or acceptable for next level of care    2/15/2022 11:56:29 EST by Martine Hussein      BUN: 28 (H)  Creatinine: 2.28 (H)  BUN/Creatinine ratio: 12    (X) * Electrolyte abnormalities absent or acceptable for next level of care    2/15/2022 11:56:29 EST by Martine Hussein      Sodium: 138  Potassium: 3.6  Chloride: 110 (H)  CO2: 19 (L)    (X) * Mental status at baseline    2/15/2022 11:56:29 EST by Martine Hussein      alert,oriented x3    (X) * Diet tolerated    2/15/2022 11:56:29 EST by Martine Hussein      adult regular diet    ( ) * Discharge plans and education understood    Activity    (X) * Ambulatory or acceptable for next level of care    2/15/2022 11:56:29 EST by Martine Hussein      as tolerated w/ assistance    Routes    (X) * Oral hydration    2/15/2022 11:56:29 EST by Martine Hussein      tolerating po    (X) * Oral medications or regimen acceptable for next level of care    2/15/2022 11:56:30 EST by Martine Hussein      sodium bicarb 650mg po bid  thiamine 100mg po qday  vitamin b 12 100mcg po qday  folvite 1mg po qday  proamatine 10mg po tid    (X) * Oral diet or acceptable for next level of care    2/15/2022 11:56:30 EST by Martine Hussein      adult regular diet    * Milestone   Additional Notes   2/14/2022   LOC IP TELEMETRY   VS 97.9 94 108/76 18 95% ROOM AIR   LABS     WBC: 17.4 (H)   NRBC: 0.0   RBC: 2.38 (L)   HGB: 8.1 (L)   HCT: 25.0 (L)   MCV: 105.0 (H)   MCH: 34.0   MCHC: 32.4   RDW: 20.6 (H)   PLATELET: 78 (L)   MPV: 11.2   ABSOLUTE NRBC: 0.00   INR: 1.5 (H)   Prothrombin time: 15.6 (H)   Sodium: 138   Potassium: 3.6   Chloride: 110 (H)   CO2: 19 (L)   Anion gap: 9   Glucose: 66   BUN: 28 (H)   Creatinine: 2.28 (H)   BUN/Creatinine ratio: 12   Calcium: 9.0   GFR est non-AA: 31 (L)   GFR est AA: 38 (L)   Bilirubin, total: 32.9 (HH)   Protein, total: 6.0 (L)   Albumin: 3.8   Globulin: 2.2   A-G Ratio: 1.7   ALT: 28   AST: 43 (H)   Alk. phosphatase: 85      MEDS   albumin 25g iv q6   zosyn 3.375g iv q8      ATTENDING NOTE   Patient reports some abd pain   Episode of shortness of breath night before last- workup showing pulm edema and no PE   Bilirubin continues to rise- Further workup per hepatology       1. Hyperbilirubinemia with jaundice- likely related to alcohol use   - liver US ordered and LFTs reviewed- hepatology consulted   Further workup per hepatology, bilirubin continues to rise   8. Acidosis- likely metabolic from liver dz- improved with resumption of bicarb   9. LYNSEY on CKD3- monitor creatinine- continues to rise- consult nephrology for assistance with management   10. Anemia-Hb improved after 2 unit PRBC transfusion- hb=8.1 today   12.  COVID + status- likely due to recent past infection and not reactivation from new infection   He is asymptomatic   Constitutional: No acute distress, cooperative, jaundiced   HEENT: Oral mucosa moist, scleral icterus    Resp: diffuse crackles. No wheezing/rhonchi/rales. No accessory muscle use. CV: Regular rhythm, normal rate, no murmurs,     GI: Soft, distended, mild tenderness. Surgical scars, ileostomy     Musculoskeletal: bilat LE edema, no cyanosis    Neurologic: Moves all extremities.   AAOx3, CN II-XII reviewed

## 2022-02-16 NOTE — PROGRESS NOTES
Name: Felipa Angel MRN: [id number]   : 1975 [facility]   Date: 2022        IMPRESSION:   · LYNSEY of multifactorial origin - EtOH abuse, diuretics, cirrhosis with effective intravascular contraction. Patient has now Covid. Covid is a common culprit for LYNSEY as well. GFR seems to be stable. Patient's Scr was as high as 9.0 at the referring facility  · Hypokalemia - poor oral intake and diuretics. K is improved, but remains bellow target. · Anemia. The etiology includes bone marrow suppression from cirrhosis, Fe deficiency may be present. · EtOH induced liver disease. Patient is being evaluated for possible liver transplant. · Covid +      PLAN:   · Continue K supplementation. K should be kept around 4.0 considering liver failure, check the Mg level  · Assess iron profile. · Monitor GFR closely. O's and O's to be recorded. · Urine Na  · Avoid nephrotoxic agents. · Continue albumin infusion for mobilization of third spacing. Subjective/Interval History:   I have reviewed the flowsheet and previous days notes. ROS:A comprehensive review of systems was negative except for that written in the HPI. Objective:   Vital Signs:    Visit Vitals  /61 (BP 1 Location: Left arm, BP Patient Position: At rest)   Pulse 94   Temp 98.5 °F (36.9 °C)   Resp 18   Ht 6' (1.829 m) Comment: 's license   Wt 220.3 kg (278 lb 14.1 oz)   SpO2 96%   BMI 37.82 kg/m²       O2 Device: None (Room air)       Temp (24hrs), Av.2 °F (36.8 °C), Min:97.9 °F (36.6 °C), Max:98.5 °F (36.9 °C)       Intake/Output:   Last shift:       07 - 1900  In: -   Out: 600   Last 3 shifts: 1901 -  0700  In: -   Out: 5650 [Urine:425]    Intake/Output Summary (Last 24 hours) at 2022 1105  Last data filed at 2022 0818  Gross per 24 hour   Intake --   Output 3500 ml   Net -3500 ml        Physical Exam:  General:    Asleep, no distress, appears stated age.   Jaundiced, ill appearing. Head:   Normocephalic, without obvious abnormality, atraumatic. Eyes:   Closed. Nose:  Nares normal. No drainage or sinus tenderness. Throat:    Lips, mucosa, and tongue normal.  No Thrush  Neck:  Supple, symmetrical,  no adenopathy, thyroid: non tender    no carotid bruit and no JVD. Lungs:   Diminished to auscultation bilaterally. No Wheezing or Rhonchi. No rales. Chest wall:  No tenderness or deformity. No Accessory muscle use. Heart:   Regular rate and rhythm,  no murmur, rub or gallop. Abdomen:   Soft, non-tender. Distended. Bowel sounds normal.   Extremities: Extremities normal, atraumatic, No cyanosis. Trace to 1+ edema. Skin:     Texture, turgor normal. No rashes or lesions. Jaundiced  Psych:  Calm.   Neurologic: Non focal.      DATA:  Labs:  Recent Labs     02/16/22  0143 02/15/22  0434 02/14/22  0010    136 138   K 3.0* 2.9* 3.6    107 110*   CO2 20* 19* 19*   BUN 23* 24* 28*   CREA 2.24* 2.02* 2.28*   CA 9.0 9.0 9.0   ALB 3.5 3.6 3.8     Recent Labs     02/16/22  0143 02/15/22  0434 02/14/22  0010   WBC 11.4* 13.5* 17.4*   HGB 7.0* 7.2* 8.1*   HCT 21.3* 21.6* 25.0*   PLT 75* 72* 78*     Recent Labs     02/15/22  1705   PHILIPPE 16       Total time spent with patient:  35 minutes    [] Critical Care Provided    Care Plan discussed with:   Staff, Suleiman Alcantara MD

## 2022-02-16 NOTE — PROGRESS NOTES
Pete Chatman MD, FACP, [facility], [place]      Rober Sack, PA-C Fidela Cogan, [facility]     April S Kaylie, [facility]   Regino Lucas BENI-SONIA Aj, [facility]       Noheliaslade Meehan [facility] [address]    at [place] [address], [address] [address]    [phone]    FAX: [address] [place] [address], [address]    [phone]    FAX: [phone]       HEPATOLOGY PROGRESS NOTE  The patient is a 55 y.o. male with Crohns disease. He has CT evidence of fatty liver dating back to 2019. In 9/2021 he was started on Humara for treatment of Crohns. In 1/2021 he was hospitalized at [facility] for jaundice with TBILI 9.0 and mild increase in INR to 1.38. He was DC to home in 1/20. He returned to the hospital on 1/26 with TBILI 19, INR 2.50. On 1/28 ETOH blood level was 8. He was treated with prednisone 15 mg every day for presumed alcohol induced hepatitis. US of the liver suggested fatty liver, cirrhosis, small amount of ascites. He was discharged to home on 2/7 with TBILI 19, INR 2.2, Scr 1.7 mg on prednisone 15 mg every day. I was called by his PCP Dr Hellen Drake in [place] [address] [address] [facility] yesterday and suggested he come to the ED at [facility]. He came to the ED today. In the ED Laboratory studies were significant for:    WBC 25, HB 7.3 gms, , Scr 2.27 mg, AST 72, ALT 59, TBILI 21 mg, INR 2.2,     Imaging of the liver with Ultrasound demonstrated fatty liver and moderate ascites. Leta Cote     Hospital course:  SCr is has stabilized in 1.91 - 2.04 mg range  INR is stable at 1.5  Lower edema has resolved  ECHO heart was normal.  Unable to complete LT evaluation because he is COVID positive and in isolation    He has tested COVID positive and  CXR shows bilateral airspace disease. Now in isolation. Hepatology Plan:  Continue IV albumin /qw  Continue to hold diuretics. Nuc stress for Liver transplant evaluation on hold until out of isolation. ASSESSMENT AND PLAN:  Cirrhosis  The diagnosis of cirrhosis is based upon imaging, laboratory studies, complications of cirrhosis. Cirrhosis is presumed secondary to alcohol. On admission the CTP is 12. Child class C. The MELD score is 35. This is chronic decompensated liver disease most likely due to ETOH. The patient has a MELD score greater than 15 and has developed complications of cirrhosis. Will initiate liver transplant evaluation testing. ECHO heart was normal.  Nuclear stress. Alcohol liver disease  Suspect the patient has alcohol induced liver disease based upon imaging showing fatty liver dating back to 2019, pattern of AST>ALT,     He was initially adamant that he only consumed 1-2 alcoholic beverages daily. However, upon further discussion today he admits to drinking 1/2 gallon of burbon or 39 drinks per week. There was a positive ETOH level 2 days after being hospitalized in 1/2022. He was on prednisone for alcoholic hepatitis  This was stopped on admission since the risk of infection is > potential benefit    The patient has never been told he had a medical issue from alcohol. The first time he was ever hospitalized due to alcohol and liver disease was in 1/2022. He has never lost time from work due to alcohol. He has no DUI. Ascites   Ascites developed for the first time in 1/2022. Ascites is persistent despite due to LYNSEY.     Paracentesis is not needed at this time  Will start IV albumin  Hold diuretics    Lower extremity edema  Edema has developed in 1/2022  This has nearly all resolved with IV albumin    Acute kidney injury  The patient has developed an elevation in serum creatinine   LYNSEY is secondary to the effects of cirrhosis, ETOH, diuretics. LYNSEY is improving with IV albumin    Hepatic encephalopathy   He is alert and oriented and canm carry on conversation. Will get serum ammonia  Overt HE has not developed to date. There is no reason for treatment with lactulose of xifaxan    Tremor  Suspect this is due to alcohol withdrawal.  Blood ETOH was positive in 1/2022  This appears to have resolved. Anemia   This is due to multifactorial causes including portal hypertension with chronic GI blood loss, bone marrow suppression secondary to alcohol. Will obtain FE panel to assess for iron stores. Will need EGD at some point to assess for esophageal varices. Thrombocytopenia   This is secondary to cirrhosis. There is no evidence of overt bleeding. No treatment is required. The platelet count is adequate for the patient to undergo procedures without the need for platelet transfusion or platelet growth factors. Coagulopathy  This is secondary to cirrhosis, malnutrition from alcoholism  Will treat with 3 doses of Vitamin K over 3 days. There is no evidence of bleeding. No need for FFP at this time. Metabolic acidosis  This may be due to infection/sepsis. Blood cultures. Start empiric ABX. Stop NABICARB pills,      PHYSICAL EXAMINATION:  VS: per nursing note  General:  Ill appearing  Eyes:  Sclera deeply icteric  ENT:  No oral lesions. Thyroid normal.  Nodes:  No adenopathy. Skin:  Spider angiomata. Jaundice. Respiratory:  Lungs clear to auscultation. Cardiovascular:  Regular heart rate. Abdomen:  Soft non-tender, Some ascites may be present. Extremities:  1+ lower extremity edema. Neurologic:  Alert and oriented. Tremor. Cranial nerves grossly intact. Asterixis present. LABORATORY:  Results for Fredy Stubbs (MRN [id number]) as of 2/15/2022 19:26   Ref.  Range 2/14/2022 00:10 2/15/2022 04:34   WBC Latest Ref Range: 4.1 - 11.1 K/uL 17.4 (H) 13.5 (H)   HGB Latest Ref Range: 12.1 - 17.0 g/dL 8.1 (L) 7.2 (L)   PLATELET Latest Ref Range: 150 - 400 K/uL 78 (L) 72 (L)   INR Latest Ref Range: 0.9 - 1.1   1.5 (H) 2.0 (H)   Sodium Latest Ref Range: 136 - 145 mmol/L 138 136   Potassium Latest Ref Range: 3.5 - 5.1 mmol/L 3.6 2.9 (L)   Chloride Latest Ref Range: 97 - 108 mmol/L 110 (H) 107   CO2 Latest Ref Range: 21 - 32 mmol/L 19 (L) 19 (L)   Glucose Latest Ref Range: 65 - 100 mg/dL 66 57 (L)   BUN Latest Ref Range: 6 - 20 MG/DL 28 (H) 24 (H)   Creatinine Latest Ref Range: 0.70 - 1.30 MG/DL 2.28 (H) 2.02 (H)   Bilirubin, total Latest Ref Range: 0.2 - 1.0 MG/DL 32.9 (HH) 31.8 (HH)   Albumin Latest Ref Range: 3.5 - 5.0 g/dL 3.8 3.6   ALT Latest Ref Range: 12 - 78 U/L 28 19   AST Latest Ref Range: 15 - 37 U/L 43 (H) 36   Alk. phosphatase Latest Ref Range: 45 - 117 U/L 85 66       RADIOLOGY:  Ultrasound of liver. Echogenic consistent with fatty liver. NO obvious cirrhosis. No liver mass lesions. No dilated bile ducts. Moderate ascites.       Brandi Almaraz MD  [facility] [address], [address] [place] [address].  [phone]  [address]

## 2022-02-17 LAB
ALBUMIN SERPL-MCNC: 3.9 G/DL (ref 3.5–5)
ALBUMIN/GLOB SERPL: 1.6 {RATIO} (ref 1.1–2.2)
ALP SERPL-CCNC: 59 U/L (ref 45–117)
ALT SERPL-CCNC: 15 U/L (ref 12–78)
ANION GAP SERPL CALC-SCNC: 12 MMOL/L (ref 5–15)
AST SERPL-CCNC: 29 U/L (ref 15–37)
BILIRUB SERPL-MCNC: 35.4 MG/DL (ref 0.2–1)
BUN SERPL-MCNC: 23 MG/DL (ref 6–20)
BUN/CREAT SERPL: 10 (ref 12–20)
CALCIUM SERPL-MCNC: 9.4 MG/DL (ref 8.5–10.1)
CHLORIDE SERPL-SCNC: 102 MMOL/L (ref 97–108)
CO2 SERPL-SCNC: 21 MMOL/L (ref 21–32)
CREAT SERPL-MCNC: 2.39 MG/DL (ref 0.7–1.3)
ERYTHROCYTE [DISTWIDTH] IN BLOOD BY AUTOMATED COUNT: 20.2 % (ref 11.5–14.5)
GLOBULIN SER CALC-MCNC: 2.5 G/DL (ref 2–4)
GLUCOSE SERPL-MCNC: 81 MG/DL (ref 65–100)
HCT VFR BLD AUTO: 23.1 % (ref 36.6–50.3)
HGB BLD-MCNC: 7.6 G/DL (ref 12.1–17)
INR PPP: 1.8 (ref 0.9–1.1)
MAGNESIUM SERPL-MCNC: 1.4 MG/DL (ref 1.6–2.4)
MCH RBC QN AUTO: 34.2 PG (ref 26–34)
MCHC RBC AUTO-ENTMCNC: 32.9 G/DL (ref 30–36.5)
MCV RBC AUTO: 104.1 FL (ref 80–99)
NRBC # BLD: 0 K/UL (ref 0–0.01)
NRBC BLD-RTO: 0 PER 100 WBC
PHOSPHATE SERPL-MCNC: 1.1 MG/DL (ref 2.6–4.7)
PLATELET # BLD AUTO: 90 K/UL (ref 150–400)
PMV BLD AUTO: 10.8 FL (ref 8.9–12.9)
POTASSIUM SERPL-SCNC: 2.9 MMOL/L (ref 3.5–5.1)
PROT SERPL-MCNC: 6.4 G/DL (ref 6.4–8.2)
PROTHROMBIN TIME: 18.6 SEC (ref 9–11.1)
RBC # BLD AUTO: 2.22 M/UL (ref 4.1–5.7)
SODIUM SERPL-SCNC: 135 MMOL/L (ref 136–145)
WBC # BLD AUTO: 11.3 K/UL (ref 4.1–11.1)

## 2022-02-17 PROCEDURE — 83735 ASSAY OF MAGNESIUM: CPT

## 2022-02-17 PROCEDURE — 74011250637 HC RX REV CODE- 250/637: Performed by: INTERNAL MEDICINE

## 2022-02-17 PROCEDURE — 74011250636 HC RX REV CODE- 250/636: Performed by: FAMILY MEDICINE

## 2022-02-17 PROCEDURE — 84100 ASSAY OF PHOSPHORUS: CPT

## 2022-02-17 PROCEDURE — 74011250637 HC RX REV CODE- 250/637: Performed by: FAMILY MEDICINE

## 2022-02-17 PROCEDURE — 74011000250 HC RX REV CODE- 250: Performed by: FAMILY MEDICINE

## 2022-02-17 PROCEDURE — 74011250636 HC RX REV CODE- 250/636: Performed by: INTERNAL MEDICINE

## 2022-02-17 PROCEDURE — 83540 ASSAY OF IRON: CPT

## 2022-02-17 PROCEDURE — 85610 PROTHROMBIN TIME: CPT

## 2022-02-17 PROCEDURE — 80053 COMPREHEN METABOLIC PANEL: CPT

## 2022-02-17 PROCEDURE — 94760 N-INVAS EAR/PLS OXIMETRY 1: CPT

## 2022-02-17 PROCEDURE — 74011000258 HC RX REV CODE- 258: Performed by: FAMILY MEDICINE

## 2022-02-17 PROCEDURE — 36415 COLL VENOUS BLD VENIPUNCTURE: CPT

## 2022-02-17 PROCEDURE — 85027 COMPLETE CBC AUTOMATED: CPT

## 2022-02-17 PROCEDURE — P9047 ALBUMIN (HUMAN), 25%, 50ML: HCPCS | Performed by: INTERNAL MEDICINE

## 2022-02-17 PROCEDURE — 65660000000 HC RM CCU STEPDOWN

## 2022-02-17 RX ORDER — CHOLESTYRAMINE 4 G/4.8G
4 POWDER, FOR SUSPENSION ORAL 2 TIMES DAILY
COMMUNITY
End: 2022-08-03 | Stop reason: ALTCHOICE

## 2022-02-17 RX ORDER — MIDODRINE HYDROCHLORIDE 5 MG/1
5 TABLET ORAL
Status: DISCONTINUED | OUTPATIENT
Start: 2022-02-17 | End: 2022-02-20 | Stop reason: HOSPADM

## 2022-02-17 RX ORDER — SODIUM,POTASSIUM PHOSPHATES 280-250MG
2 POWDER IN PACKET (EA) ORAL 4 TIMES DAILY
Status: DISCONTINUED | OUTPATIENT
Start: 2022-02-17 | End: 2022-02-19

## 2022-02-17 RX ORDER — DIPHENOXYLATE HYDROCHLORIDE AND ATROPINE SULFATE 2.5; .025 MG/1; MG/1
1 TABLET ORAL
Status: DISCONTINUED | OUTPATIENT
Start: 2022-02-17 | End: 2022-02-20 | Stop reason: HOSPADM

## 2022-02-17 RX ORDER — POTASSIUM CHLORIDE 7.45 MG/ML
10 INJECTION INTRAVENOUS
Status: COMPLETED | OUTPATIENT
Start: 2022-02-17 | End: 2022-02-17

## 2022-02-17 RX ORDER — MONTELUKAST SODIUM 4 MG/1
2 TABLET, CHEWABLE ORAL 2 TIMES DAILY
Status: DISCONTINUED | OUTPATIENT
Start: 2022-02-17 | End: 2022-02-19

## 2022-02-17 RX ADMIN — FOLIC ACID 1 MG: 1 TABLET ORAL at 10:44

## 2022-02-17 RX ADMIN — DIPHENOXYLATE HYDROCHLORIDE AND ATROPINE SULFATE 2 TABLET: 2.5; .025 TABLET ORAL at 10:43

## 2022-02-17 RX ADMIN — POTASSIUM CHLORIDE 10 MEQ: 7.46 INJECTION, SOLUTION INTRAVENOUS at 10:45

## 2022-02-17 RX ADMIN — MIDODRINE HYDROCHLORIDE 5 MG: 5 TABLET ORAL at 18:20

## 2022-02-17 RX ADMIN — SODIUM CHLORIDE, PRESERVATIVE FREE 10 ML: 5 INJECTION INTRAVENOUS at 22:11

## 2022-02-17 RX ADMIN — Medication 100 MG: at 10:44

## 2022-02-17 RX ADMIN — SODIUM BICARBONATE 1300 MG: 650 TABLET ORAL at 18:22

## 2022-02-17 RX ADMIN — ALBUMIN (HUMAN) 25 G: 0.25 INJECTION, SOLUTION INTRAVENOUS at 22:12

## 2022-02-17 RX ADMIN — POTASSIUM & SODIUM PHOSPHATES POWDER PACK 280-160-250 MG 2 PACKET: 280-160-250 PACK at 22:11

## 2022-02-17 RX ADMIN — POTASSIUM CHLORIDE 10 MEQ: 7.46 INJECTION, SOLUTION INTRAVENOUS at 09:44

## 2022-02-17 RX ADMIN — POTASSIUM CHLORIDE 10 MEQ: 7.46 INJECTION, SOLUTION INTRAVENOUS at 10:50

## 2022-02-17 RX ADMIN — SODIUM BICARBONATE 1300 MG: 650 TABLET ORAL at 10:44

## 2022-02-17 RX ADMIN — ALBUMIN (HUMAN) 25 G: 0.25 INJECTION, SOLUTION INTRAVENOUS at 05:13

## 2022-02-17 RX ADMIN — COLESTIPOL HYDROCHLORIDE 2 G: 1 TABLET, FILM COATED ORAL at 22:11

## 2022-02-17 RX ADMIN — PIPERACILLIN AND TAZOBACTAM 3.38 G: 3; .375 INJECTION, POWDER, LYOPHILIZED, FOR SOLUTION INTRAVENOUS at 09:01

## 2022-02-17 RX ADMIN — SODIUM CHLORIDE, PRESERVATIVE FREE 10 ML: 5 INJECTION INTRAVENOUS at 18:21

## 2022-02-17 RX ADMIN — PIPERACILLIN AND TAZOBACTAM 3.38 G: 3; .375 INJECTION, POWDER, LYOPHILIZED, FOR SOLUTION INTRAVENOUS at 00:31

## 2022-02-17 RX ADMIN — POTASSIUM CHLORIDE 10 MEQ: 7.46 INJECTION, SOLUTION INTRAVENOUS at 10:01

## 2022-02-17 RX ADMIN — POTASSIUM & SODIUM PHOSPHATES POWDER PACK 280-160-250 MG 2 PACKET: 280-160-250 PACK at 18:21

## 2022-02-17 RX ADMIN — SODIUM CHLORIDE, PRESERVATIVE FREE 10 ML: 5 INJECTION INTRAVENOUS at 05:13

## 2022-02-17 RX ADMIN — POTASSIUM & SODIUM PHOSPHATES POWDER PACK 280-160-250 MG 2 PACKET: 280-160-250 PACK at 15:50

## 2022-02-17 RX ADMIN — PIPERACILLIN AND TAZOBACTAM 3.38 G: 3; .375 INJECTION, POWDER, LYOPHILIZED, FOR SOLUTION INTRAVENOUS at 15:50

## 2022-02-17 RX ADMIN — ALBUMIN (HUMAN) 25 G: 0.25 INJECTION, SOLUTION INTRAVENOUS at 18:20

## 2022-02-17 RX ADMIN — ALBUMIN (HUMAN) 25 G: 0.25 INJECTION, SOLUTION INTRAVENOUS at 10:41

## 2022-02-17 RX ADMIN — DIPHENOXYLATE HYDROCHLORIDE AND ATROPINE SULFATE 1 TABLET: 2.5; .025 TABLET ORAL at 22:11

## 2022-02-17 RX ADMIN — VITAM B12 100 MCG: 100 TAB at 10:42

## 2022-02-17 NOTE — PROGRESS NOTES
[address] [facility] Adult  [place] [facility]                                                                                          Hospitalist Progress Note  Kishore Reyonlds MD  Answering service: [phone] OR [phone] from in house phone        NAME:  Destini Szymanski III  :  1975  MRN:  [id number]      Admission Summary:   Billy Huff is a 55 y.o. male who came to [facility] specifically for admission and for evaluation by Dr aCrine Tariq - Hepatology- due to related to elevated bilirubin and suspected cirrhosis. He was referred for admission by his family physician. Shary Schirmer has a complicated past medical history including suspectedcirrhosis that they believe is secondary to alcohol use/abuse as well as possibly Humira.  He used Humira from September until about 3 to 4 weeks ago due to Rebecca Chyle was admitted to [facility] in January. Shary Schirmer states that he was therefore liver failure.  Diagnoses in epic which have crossed over but records not available are renal failure, metabolic acidosis, acute metabolic encephalopathy, immunocompromise, sepsis, liver mass which patient reports that he had an MRI for showing a fatty liver, Crohn's disease, macrocytic anemia and hyponatremia.  He does not know his baseline creatinine at this point.  Patient had labs drawn twice since then. Shary Schirmer reports they are getting worse.  His creatinine at one time was as high as 9- but most recently 1.5  Patient has been more sleepy but sleeping regularly at night and not during the day.  Denies vomiting. Eli Cummings has an ostomy in the stool output is brown not black. Eli Cummings states that he has not drank alcohol in 6 weeks.  Patient denies any abdominal pain, fevers, chest pain.  He complains of some shortness of breath.  no N/D, no diarrhea, no rashes, + leg swelling,       Interval history / Subjective:   Patient reports some abd pain, high liquid ostomy output  No more shortness of breath - CTA showing pulm edema and no PE  Bilirubin continues to rise- Further workup per hepatology     Assessment & Plan:         1. Hyperbilirubinemia with jaundice- likely related to alcohol use  - liver US ordered and LFTs reviewed- hepatology consulted  Further workup per hepatology, bilirubin continues to rise  2. Crohns- humira on hold- ileostomy from prior surgery appears stable but lots of liquid output  3. CH- hypotension- on midodrine- cont  4. Bilat LE edema- venous dopplers neg for DVT- no DVT prophylaxis due to autoanticoagulation with elevated INR  5. Coagulopathy- from liver disease-   monitor INR-back up to 2, 3 doses of IV VIT K  6. Thrombocytopenia- ? Due to liver dz- monitor for now  7. Leukocytosis- UTI   Cont  IV abx- monitor cultures  8. Acidosis- likely metabolic from liver dz- improved with resumption of bicarb  9. LYNSEY on CKD3- monitor creatinine- continues to rise- consult nephrology for assistance with management   10. Anemia-Hb improved after 2 unit PRBC transfusion- hb=7.2 today  Monitor Hb for signs of bleeding- no blood from ostomy  11. Shortness of breath- likely due to pulm edema from blood transfusion- monitor and diurese only if hypoxic due to elevated creatinine- improved and stable  12. COVID + status- likely due to recent past infection and not reactivation from new infection  He is asymptomatic       Code status: Full Code  [address] discussed with: patient  Anticipated Disposition: > 48hrs     Hospital Problems  Never Reviewed          Codes Class Noted POA    Acidosis ICD-10-CM: E87.2  ICD-9-CM: 276.2  2/10/2022 Unknown        Hyperbilirubinemia ICD-10-CM: E80.6  ICD-9-CM: 782.4  2/10/2022 Unknown        Cirrhosis of liver ([facility] [address] ICD-10-CM: K74.60  ICD-9-CM: 571.5  2/10/2022 Unknown                Review of Systems:   A comprehensive review of systems was negative except for that written in the HPI.        Vital Signs:    Last 24hrs VS reviewed since prior progress note. Most recent are:  Visit Vitals  /70   Pulse (!) 102   Temp 97.8 °F (36.6 °C)   Resp 18   Ht 6' (1.829 m)   Wt 126.5 kg (278 lb 14.1 oz)   SpO2 95%   BMI 37.82 kg/m²     Patient Vitals for the past 24 hrs:   Temp Pulse Resp BP SpO2   02/16/22 2200 -- (!) 102 -- -- --   02/16/22 2000 -- 84 -- -- --   02/16/22 1814 97.8 °F (36.6 °C) 98 18 112/70 95 %   02/16/22 1800 -- (!) 113 -- -- --   02/16/22 1427 98.7 °F (37.1 °C) 84 18 110/67 98 %   02/16/22 1200 -- 100 -- -- --   02/16/22 1000 -- 94 -- -- --   02/16/22 0801 98.5 °F (36.9 °C) 97 18 108/61 96 %   02/16/22 0604 -- (!) 104 -- -- --   02/16/22 0403 -- 96 -- -- --   02/16/22 0204 -- 100 -- -- --   02/16/22 0143 98.3 °F (36.8 °C) 97 18 111/63 95 %       Intake/Output Summary (Last 24 hours) at 2/17/2022 0038  Last data filed at 2/17/2022 0025  Gross per 24 hour   Intake --   Output 2850 ml   Net -2850 ml        Physical Examination:           Constitutional:  No acute distress, cooperative, jaundiced   HEENT:  Oral mucosa moist, scleral icterus    Resp:  diffuse crackles. No wheezing/rhonchi/rales. No accessory muscle use. CV:  Regular rhythm, normal rate, no murmurs,     GI:  Soft, distended, mild tenderness. Surgical scars, ileostomy     Musculoskeletal:  bilat LE edema, no cyanosis    Neurologic:  Moves all extremities. AAOx3, CN II-XII reviewed            Data Review:    Review and/or order of clinical lab test  Review and/or order of tests in the radiology section of CPT  Review and/or order of tests in the medicine section of CPT    ULTRASOUND OF THE RIGHT UPPER QUADRANT     INDICATION: jaundice     COMPARISON: None.     TECHNIQUE:  Sonography of the right upper quadrant was performed.      FINDINGS:     GALLBLADDER: Mildly distended but no gallstones, or wall thickening. Rachel Gip    COMMON DUCT: The common bile duct cannot be visualized by the technologist.  LIVER: The liver is enlarged, diffusely heterogeneous and upper normal in  echogenicity with prominent periportal edema throughout both lobes. Portal  venous flow is hepatopedal, but portal and hepatic venous branches were not  insonated. Mildly echogenic lesion in the left hepatic lobe measures 3.9 x 3.5 x  3.0 cm. PANCREAS: The visualized portions of the pancreas are normal.   RIGHT KIDNEY: 11.2 cm in length, with mild cortical thinning suggested. . No  hydronephrosis, shadowing calculus, or contour-deforming renal mass.     Incidentally noted small to moderate ascites throughout the upper abdomen.     IMPRESSION     1. Hepatomegaly, hepatic steatosis and diffuse periportal edema, with an  echogenic lesion in the left hepatic lobe which may represent a hemangioma. However, further characterization and evaluation for chronic liver disease is  suggested with nonemergent dedicated contrast enhanced hepatic MRI. 2. Mild to moderate ascites. 3. Mild right renal cortical thinning is suggested without hydronephrosis or  abnormal echogenicity. Correlate with renal function parameters. Labs:     Recent Labs     02/16/22  0143 02/15/22  0434   WBC 11.4* 13.5*   HGB 7.0* 7.2*   HCT 21.3* 21.6*   PLT 75* 72*     Recent Labs     02/16/22  0143 02/15/22  0434    136   K 3.0* 2.9*    107   CO2 20* 19*   BUN 23* 24*   CREA 2.24* 2.02*   * 57*   CA 9.0 9.0     Recent Labs     02/16/22  0143 02/15/22  0434   ALT 18 19   AP 63 66   TBILI 32.5* 31.8*   TP 5.9* 5.6*   ALB 3.5 3.6   GLOB 2.4 2.0     Recent Labs     02/16/22  0143 02/15/22  0434   INR 1.6* 2.0*   PTP 16.6* 20.6*      No results for input(s): FE, TIBC, PSAT, FERR in the last 72 hours. No results found for: FOL, RBCF   No results for input(s): PH, PCO2, PO2 in the last 72 hours. No results for input(s): CPK, CKNDX, TROIQ in the last 72 hours.     No lab exists for component: CPKMB  No results found for: CHOL, CHOLX, CHLST, CHOLV, HDL, HDLP, LDL, LDLC, DLDLP, TGLX, TRIGL, TRIGP, CHHD, CHHDX  No results found for: [facility]  Lab Results Component Value Date/Time    Color DARK YELLOW 02/10/2022 09:53 PM    Appearance CLOUDY (A) 02/10/2022 09:53 PM    Specific gravity 1.017 02/10/2022 09:53 PM    pH (UA) 5.5 02/10/2022 09:53 PM    Protein TRACE (A) 02/10/2022 09:53 PM    Glucose Negative 02/10/2022 09:53 PM    Ketone Negative 02/10/2022 09:53 PM    Urobilinogen 0.2 02/10/2022 09:53 PM    Nitrites Positive (A) 02/10/2022 09:53 PM    Leukocyte Esterase TRACE (A) 02/10/2022 09:53 PM    Epithelial cells FEW 02/10/2022 09:53 PM    Bacteria 2+ (A) 02/10/2022 09:53 PM    WBC 0-4 02/10/2022 09:53 PM    RBC 0-5 02/10/2022 09:53 PM         Medications Reviewed:     Current Facility-Administered Medications   Medication Dose Route Frequency    diphenoxylate-atropine (LOMOTIL) tablet 2 Tablet  2 Tablet Oral BID    sodium bicarbonate tablet 1,300 mg  1,300 mg Oral BID    0.9% sodium chloride infusion 250 mL  250 mL IntraVENous PRN    albuterol (PROVENTIL HFA, VENTOLIN HFA, PROAIR HFA) inhaler 2 Puff  2 Puff Inhalation Q6H PRN    0.9% sodium chloride infusion 250 mL  250 mL IntraVENous PRN    0.9% sodium chloride infusion 250 mL  250 mL IntraVENous PRN    piperacillin-tazobactam (ZOSYN) 3.375 g in 0.9% sodium chloride (MBP/ADV) 100 mL MBP  3.375 g IntraVENous Q8H    sodium chloride (NS) flush 5-40 mL  5-40 mL IntraVENous Q8H    sodium chloride (NS) flush 5-40 mL  5-40 mL IntraVENous PRN    polyethylene glycol (MIRALAX) packet 17 g  17 g Oral DAILY PRN    ondansetron (ZOFRAN) injection 4 mg  4 mg IntraVENous Q6H PRN    midodrine (PROAMATINE) tablet 10 mg  10 mg Oral TID WITH MEALS    cyanocobalamin (VITAMIN B12) tablet 100 mcg  100 mcg Oral DAILY    folic acid (FOLVITE) tablet 1 mg  1 mg Oral DAILY    albumin human 25% (BUMINATE) solution 25 g  25 g IntraVENous Q6H    thiamine HCL (B-1) tablet 100 mg  100 mg Oral DAILY     ______________________________________________________________________  EXPECTED LENGTH OF STAY: 4d 16h  ACTUAL LENGTH OF STAY:          7                 Roman Romero MD

## 2022-02-17 NOTE — PROGRESS NOTES
[address] [facility] Adult  [facility]ist Group                                                                                          Hospitalist Progress Note  Dee Breen MD  Answering service: [phone] OR [phone] from in house phone        NAME:  Lukas Harris III  :  1975  MRN:  [id number]      Admission Summary:   Yovana Kowalski III is a 55 y.o. male who came to [facility] specifically for admission and for evaluation by Dr Jc Forrest - Hepatology- due to related to elevated bilirubin and suspected cirrhosis. He was referred for admission by his family physician. Haleigh Montejo has a complicated past medical history including suspectedcirrhosis that they believe is secondary to alcohol use/abuse as well as possibly Humira.  He used Humira from September until about 3 to 4 weeks ago due to Gene Buenrostro was admitted to [facility] in January. Haleigh Montejo states that he was therefore liver failure.  Diagnoses in epic which have crossed over but records not available are renal failure, metabolic acidosis, acute metabolic encephalopathy, immunocompromise, sepsis, liver mass which patient reports that he had an MRI for showing a fatty liver, Crohn's disease, macrocytic anemia and hyponatremia.  He does not know his baseline creatinine at this point.  Patient had labs drawn twice since then. Haleigh Montejo reports they are getting worse. His creatinine at one time was as high as 9- but most recently 1.5  Patient has been more sleepy but sleeping regularly at night and not during the day.  Denies vomiting. [facility] has an ostomy in the stool output is brown not black. [facility] states that he has not drank alcohol in 6 weeks.  Patient denies any abdominal pain, fevers, chest pain.  He complains of some shortness of breath.  no N/D, no diarrhea, no rashes, + leg swelling,\"       Interval history / Subjective:   Pt requesting his lomotil to be as needed with more frequency/interval. Still jaundice.  Pt states that he is changing his own ostomy bag? He has been informed to hit the call button if he wishes the staff to change the ostomy bag. Assessment & Plan:     Hyperbilirubinemia with jaundice- thought to be related to alcohol use  -liver US ordered and LFTs reviewed- hepatology consulted  -Further workup per hepatology, bilirubin continues to rise    Crohns  -humira on hold- ileostomy from prior surgery appears stable but lots of liquid output    Chronic hypotension in setting of liver disease  -on scheduled midodrine    Bilat LE edema  -venous dopplers neg for DVT  -no DVT prophylaxis due to autoanticoagulation with elevated INR    Coagulopathy- from liver disease  -high inr which he has been given vit k during hospital stay    Thrombocytopenia in setting of liver disease  -trend with intermittent labs  -transfuse if plt count less than 10K    Leukocytosis- UTI   Cont  IV abx- monitor cultures    LYNSEY and CKD3 with acidosis  -nephrology on board and guiding    Acute blood loss anemia  -received blood transfusion during this hospital stay    COVID + status- likely due to recent past infection and not reactivation from new infection  He is asymptomatic, as per protocol of isolation     Code status: Full    Prophylaxis:SCDs (due to recent acute blood loss anemia and high inr)  Care Plan discussed with: patient  Anticipated Disposition: [address] Problems  Never Reviewed          Codes Class Noted POA    Acidosis ICD-10-CM: E87.2  ICD-9-CM: 276.2  2/10/2022 Unknown        Hyperbilirubinemia ICD-10-CM: E80.6  ICD-9-CM: 782.4  2/10/2022 Unknown        Cirrhosis of liver ([facility] [address] ICD-10-CM: K74.60  ICD-9-CM: 571.5  2/10/2022 Unknown                Review of Systems:   A comprehensive review of systems was negative except for that written in the HPI. Vital Signs:    Last 24hrs VS reviewed since prior progress note.  Most recent are:  Visit Vitals  /68 (BP 1 Location: Left upper arm, BP Patient Position: Supine; At rest)   Pulse 82   Temp 97.8 °F (36.6 °C)   Resp 18   Ht 6' (1.829 m)   Wt 126.5 kg (278 lb 14.1 oz)   SpO2 99%   BMI 37.82 kg/m²     Patient Vitals for the past 24 hrs:   Temp Pulse Resp BP SpO2   02/17/22 1200 -- 82 -- -- --   02/17/22 1002 97.8 °F (36.6 °C) 96 18 113/68 99 %   02/17/22 1000 -- (!) 106 -- -- --   02/17/22 0800 -- -- -- 125/81 --   02/17/22 0600 -- 95 -- -- --   02/17/22 0400 -- 95 -- -- --   02/17/22 0200 -- 93 -- -- --   02/17/22 0148 99.3 °F (37.4 °C) 92 16 108/62 97 %   02/16/22 2200 -- (!) 102 -- -- --   02/16/22 2000 -- 84 -- -- --   02/16/22 1814 97.8 °F (36.6 °C) 98 18 112/70 95 %   02/16/22 1800 -- (!) 113 -- -- --   02/16/22 1427 98.7 °F (37.1 °C) 84 18 110/67 98 %       Intake/Output Summary (Last 24 hours) at 2/17/2022 1350  Last data filed at 2/17/2022 0959  Gross per 24 hour   Intake --   Output 2700 ml   Net -2700 ml        Physical Examination:           Constitutional:  No acute distress  Head: atraumatic  Eyes: icteric, no injection   ENT:  Oral mucosa moist, neck supple, no discharge   Resp:  No wheezing, breath sounds bilaterally, some adventitous sounds  Chest: No accessory muscle use, nonlabored breathing   CV:  Regular rhythm, normal rate, no rubs     GI:  Soft, distended, mild tenderness. Surgical scars, ileostomy     Musculoskeletal:  bilat LE edema, no cyanosis    Neurologic:  Moves all extremities.   AAOx3  Skin: warm, dry, jaundiced   Psych: does not voice si/hi/hallucinations            Data Review:       Labs:     Recent Labs     02/17/22 0459 02/16/22 0143   WBC 11.3* 11.4*   HGB 7.6* 7.0*   HCT 23.1* 21.3*   PLT 90* 75*     Recent Labs     02/17/22 0830 02/17/22  0459 02/16/22  0143 02/15/22  0434   NA  --  135* 137 136   K  --  2.9* 3.0* 2.9*   CL  --  102 106 107   CO2  --  21 20* 19*   BUN  --  23* 23* 24*   CREA  --  2.39* 2.24* 2.02*   GLU  --  81 106* 57*   CA  --  9.4 9.0 9.0   PHOS 1.1*  --   --   --      Recent Labs     02/17/22  0459 02/16/22  0143 02/15/22  0434   ALT 15 18 19   AP 59 63 66   TBILI 35.4* 32.5* 31.8*   TP 6.4 5.9* 5.6*   ALB 3.9 3.5 3.6   GLOB 2.5 2.4 2.0     Recent Labs     02/17/22  0459 02/16/22  0143 02/15/22  0434   INR 1.8* 1.6* 2.0*   PTP 18.6* 16.6* 20.6*        Lab Results   Component Value Date/Time    Color DARK YELLOW 02/10/2022 09:53 PM    Appearance CLOUDY (A) 02/10/2022 09:53 PM    Specific gravity 1.017 02/10/2022 09:53 PM    pH (UA) 5.5 02/10/2022 09:53 PM    Protein TRACE (A) 02/10/2022 09:53 PM    Glucose Negative 02/10/2022 09:53 PM    Ketone Negative 02/10/2022 09:53 PM    Urobilinogen 0.2 02/10/2022 09:53 PM    Nitrites Positive (A) 02/10/2022 09:53 PM    Leukocyte Esterase TRACE (A) 02/10/2022 09:53 PM    Epithelial cells FEW 02/10/2022 09:53 PM    Bacteria 2+ (A) 02/10/2022 09:53 PM    WBC 0-4 02/10/2022 09:53 PM    RBC 0-5 02/10/2022 09:53 PM         Medications Reviewed:     Current Facility-Administered Medications   Medication Dose Route Frequency    potassium, sodium phosphates (NEUTRA-PHOS) packet 2 Packet  2 Packet Oral QID    colestipoL (COLESTID) tablet 2 g  2 g Oral BID    diphenoxylate-atropine (LOMOTIL) tablet 1 Tablet  1 Tablet Oral QID PRN    sodium bicarbonate tablet 1,300 mg  1,300 mg Oral BID    0.9% sodium chloride infusion 250 mL  250 mL IntraVENous PRN    albuterol (PROVENTIL HFA, VENTOLIN HFA, PROAIR HFA) inhaler 2 Puff  2 Puff Inhalation Q6H PRN    0.9% sodium chloride infusion 250 mL  250 mL IntraVENous PRN    0.9% sodium chloride infusion 250 mL  250 mL IntraVENous PRN    piperacillin-tazobactam (ZOSYN) 3.375 g in 0.9% sodium chloride (MBP/ADV) 100 mL MBP  3.375 g IntraVENous Q8H    sodium chloride (NS) flush 5-40 mL  5-40 mL IntraVENous Q8H    sodium chloride (NS) flush 5-40 mL  5-40 mL IntraVENous PRN    polyethylene glycol (MIRALAX) packet 17 g  17 g Oral DAILY PRN    ondansetron (ZOFRAN) injection 4 mg  4 mg IntraVENous Q6H PRN    midodrine (PROAMATINE) tablet 10 mg  10 mg Oral TID WITH MEALS    cyanocobalamin (VITAMIN B12) tablet 100 mcg  100 mcg Oral DAILY    folic acid (FOLVITE) tablet 1 mg  1 mg Oral DAILY    albumin human 25% (BUMINATE) solution 25 g  25 g IntraVENous Q6H    thiamine HCL (B-1) tablet 100 mg  100 mg Oral DAILY     ______________________________________________________________________  EXPECTED LENGTH OF STAY: 4d 16h  ACTUAL LENGTH OF STAY:          7                 Sammy Ocampo MD

## 2022-02-17 NOTE — PROGRESS NOTES
Name: Clark Rowan MRN: [id number]   : 1975 Hospital: . [address]   Date: 2022        IMPRESSION:   · LYNSEY of multifactorial origin - EtOH abuse, diuretics, cirrhosis with effective intravascular contraction. Patient has now Covid. Covid is a common culprit for LYNSEY as well. GFR seems to be stable. Patient's Scr was as high as 9.0 at the referring facility  · Hypokalemia - poor oral intake and diuretics. K is improved, but remains bellow target. GI losses are contributing as well. · Anemia. The etiology includes bone marrow suppression from cirrhosis, Fe deficiency may be present. · EtOH induced liver disease. Patient is being evaluated for possible liver transplant. · Covid +  · Hypophosphatemia      PLAN:   · Continue K supplementation. K should be kept around 4.0 considering liver failure, check the Mg level  · Start PO4 supplementation   · Monitor GFR closely. O's and O's to be recorded. · Renal panel in AM.  · Avoid nephrotoxic agents. · Continue albumin infusion for mobilization of third spacing. Subjective/Interval History:   I have reviewed the flowsheet and previous days notes. ROS:A comprehensive review of systems was negative except for that written in the HPI. Objective:   Vital Signs:    Visit Vitals  /68 (BP 1 Location: Left upper arm, BP Patient Position: Supine; At rest)   Pulse 96   Temp 97.8 °F (36.6 °C)   Resp 18   Ht 6' (1.829 m) Comment: 's license   Wt 313.7 kg (278 lb 14.1 oz)   SpO2 99%   BMI 37.82 kg/m²       O2 Device: None (Room air)       Temp (24hrs), Av.4 °F (36.9 °C), Min:97.8 °F (36.6 °C), Max:99.3 °F (37.4 °C)       Intake/Output:   Last shift:      701 - 1900  In: -   Out: 500   Last 3 shifts: 02/15 1901 -  0700  In: -   Out: 7404 [Urine:225]    Intake/Output Summary (Last 24 hours) at 2022 1148  Last data filed at 2022 0959  Gross per 24 hour   Intake --   Output 2700 ml   Net -2700 ml Physical Exam:  General:    Awake, alert, no distress, appears stated age. Jaundiced, ill appearing. Head:   Normocephalic, without obvious abnormality, atraumatic. Eyes:   Incensed icterus. Nose:  Nares normal. No drainage or sinus tenderness. Throat:    Lips, mucosa, and tongue normal.  No Thrush  Neck:  Supple, symmetrical,  no adenopathy, thyroid: non tender    no carotid bruit and no JVD. Lungs:   Diminished to auscultation bilaterally. No Wheezing or Rhonchi. No rales. Chest wall:  No tenderness or deformity. No Accessory muscle use. Heart:   Regular rate and rhythm,  no murmur, rub or gallop. Abdomen:   Soft, non-tender. Distended. Bowel sounds normal.   Extremities: Extremities normal, atraumatic, No cyanosis. Trace to 1+ edema. Skin:     Texture, turgor normal. No rashes or lesions. Jaundiced  Psych:  Calm.   Neurologic: Non focal.      DATA:  Labs:  Recent Labs     02/17/22  0830 02/17/22  0459 02/16/22  0143 02/15/22  0434   NA  --  135* 137 136   K  --  2.9* 3.0* 2.9*   CL  --  102 106 107   CO2  --  21 20* 19*   BUN  --  23* 23* 24*   CREA  --  2.39* 2.24* 2.02*   CA  --  9.4 9.0 9.0   ALB  --  3.9 3.5 3.6   PHOS 1.1*  --   --   --      Recent Labs     02/17/22  0459 02/16/22  0143 02/15/22  0434   WBC 11.3* 11.4* 13.5*   HGB 7.6* 7.0* 7.2*   HCT 23.1* 21.3* 21.6*   PLT 90* 75* 72*     Recent Labs     02/15/22  1705   PHILIPPE 16       Total time spent with patient:  35 minutes    [] Critical Care Provided    Care Plan discussed with:   Staff, Marcy Funes MD

## 2022-02-17 NOTE — PROGRESS NOTES
Pt complaining of increased colostomy output & states that he takes Lomotil Q6H PRN at home as well as Questran Lite BID. Pt requested his chart be updated to reflect this. Med rec has been updated by RN. MD has been notified & will review.

## 2022-02-18 LAB
ALBUMIN SERPL-MCNC: 3.9 G/DL (ref 3.5–5)
ANION GAP SERPL CALC-SCNC: 14 MMOL/L (ref 5–15)
BASOPHILS # BLD: 0 K/UL (ref 0–0.1)
BASOPHILS NFR BLD: 0 % (ref 0–1)
BUN SERPL-MCNC: 24 MG/DL (ref 6–20)
BUN/CREAT SERPL: 10 (ref 12–20)
CALCIUM SERPL-MCNC: 9.1 MG/DL (ref 8.5–10.1)
CHLORIDE SERPL-SCNC: 98 MMOL/L (ref 97–108)
CO2 SERPL-SCNC: 22 MMOL/L (ref 21–32)
CREAT SERPL-MCNC: 2.5 MG/DL (ref 0.7–1.3)
DIFFERENTIAL METHOD BLD: ABNORMAL
EOSINOPHIL # BLD: 0.4 K/UL (ref 0–0.4)
EOSINOPHIL NFR BLD: 5 % (ref 0–7)
ERYTHROCYTE [DISTWIDTH] IN BLOOD BY AUTOMATED COUNT: 20 % (ref 11.5–14.5)
GLUCOSE SERPL-MCNC: 84 MG/DL (ref 65–100)
HCT VFR BLD AUTO: 23 % (ref 36.6–50.3)
HGB BLD-MCNC: 7.5 G/DL (ref 12.1–17)
IMM GRANULOCYTES # BLD AUTO: 0 K/UL (ref 0–0.04)
IMM GRANULOCYTES NFR BLD AUTO: 0 % (ref 0–0.5)
INR PPP: 1.8 (ref 0.9–1.1)
IRON SATN MFR SERPL: 36 % (ref 20–50)
IRON SATN MFR SERPL: 37 % (ref 20–50)
IRON SERPL-MCNC: 32 UG/DL (ref 35–150)
IRON SERPL-MCNC: 39 UG/DL (ref 35–150)
LYMPHOCYTES # BLD: 1.1 K/UL (ref 0.8–3.5)
LYMPHOCYTES NFR BLD: 13 % (ref 12–49)
MAGNESIUM SERPL-MCNC: 1.3 MG/DL (ref 1.6–2.4)
MCH RBC QN AUTO: 33.8 PG (ref 26–34)
MCHC RBC AUTO-ENTMCNC: 32.6 G/DL (ref 30–36.5)
MCV RBC AUTO: 103.6 FL (ref 80–99)
MONOCYTES # BLD: 0.6 K/UL (ref 0–1)
MONOCYTES NFR BLD: 7 % (ref 5–13)
NEUTS SEG # BLD: 6.5 K/UL (ref 1.8–8)
NEUTS SEG NFR BLD: 75 % (ref 32–75)
NRBC # BLD: 0 K/UL (ref 0–0.01)
NRBC BLD-RTO: 0 PER 100 WBC
PHOSPHATE SERPL-MCNC: 1.2 MG/DL (ref 2.6–4.7)
PLATELET # BLD AUTO: 79 K/UL (ref 150–400)
PMV BLD AUTO: 10.9 FL (ref 8.9–12.9)
POTASSIUM SERPL-SCNC: 2.6 MMOL/L (ref 3.5–5.1)
PROTHROMBIN TIME: 18.6 SEC (ref 9–11.1)
RBC # BLD AUTO: 2.22 M/UL (ref 4.1–5.7)
RBC MORPH BLD: ABNORMAL
SODIUM SERPL-SCNC: 134 MMOL/L (ref 136–145)
TIBC SERPL-MCNC: 109 UG/DL (ref 250–450)
TIBC SERPL-MCNC: 87 UG/DL (ref 250–450)
WBC # BLD AUTO: 8.6 K/UL (ref 4.1–11.1)

## 2022-02-18 PROCEDURE — 65660000000 HC RM CCU STEPDOWN

## 2022-02-18 PROCEDURE — 74011250636 HC RX REV CODE- 250/636: Performed by: FAMILY MEDICINE

## 2022-02-18 PROCEDURE — 74011250637 HC RX REV CODE- 250/637: Performed by: INTERNAL MEDICINE

## 2022-02-18 PROCEDURE — 94760 N-INVAS EAR/PLS OXIMETRY 1: CPT

## 2022-02-18 PROCEDURE — 36415 COLL VENOUS BLD VENIPUNCTURE: CPT

## 2022-02-18 PROCEDURE — 74011250637 HC RX REV CODE- 250/637: Performed by: FAMILY MEDICINE

## 2022-02-18 PROCEDURE — 80069 RENAL FUNCTION PANEL: CPT

## 2022-02-18 PROCEDURE — 85025 COMPLETE CBC W/AUTO DIFF WBC: CPT

## 2022-02-18 PROCEDURE — 74011000250 HC RX REV CODE- 250: Performed by: FAMILY MEDICINE

## 2022-02-18 PROCEDURE — 74011250636 HC RX REV CODE- 250/636: Performed by: INTERNAL MEDICINE

## 2022-02-18 PROCEDURE — 99233 SBSQ HOSP IP/OBS HIGH 50: CPT | Performed by: INTERNAL MEDICINE

## 2022-02-18 PROCEDURE — 74011000258 HC RX REV CODE- 258: Performed by: FAMILY MEDICINE

## 2022-02-18 PROCEDURE — P9047 ALBUMIN (HUMAN), 25%, 50ML: HCPCS | Performed by: INTERNAL MEDICINE

## 2022-02-18 PROCEDURE — 85610 PROTHROMBIN TIME: CPT

## 2022-02-18 PROCEDURE — 83735 ASSAY OF MAGNESIUM: CPT

## 2022-02-18 RX ORDER — MAGNESIUM SULFATE HEPTAHYDRATE 40 MG/ML
2 INJECTION, SOLUTION INTRAVENOUS
Status: COMPLETED | OUTPATIENT
Start: 2022-02-18 | End: 2022-02-18

## 2022-02-18 RX ORDER — POTASSIUM CHLORIDE 750 MG/1
20 TABLET, FILM COATED, EXTENDED RELEASE ORAL 2 TIMES DAILY
Status: DISCONTINUED | OUTPATIENT
Start: 2022-02-18 | End: 2022-02-19

## 2022-02-18 RX ADMIN — DIPHENOXYLATE HYDROCHLORIDE AND ATROPINE SULFATE 1 TABLET: 2.5; .025 TABLET ORAL at 01:53

## 2022-02-18 RX ADMIN — MIDODRINE HYDROCHLORIDE 5 MG: 5 TABLET ORAL at 12:11

## 2022-02-18 RX ADMIN — COLESTIPOL HYDROCHLORIDE 2 G: 1 TABLET, FILM COATED ORAL at 21:37

## 2022-02-18 RX ADMIN — PIPERACILLIN AND TAZOBACTAM 3.38 G: 3; .375 INJECTION, POWDER, LYOPHILIZED, FOR SOLUTION INTRAVENOUS at 08:20

## 2022-02-18 RX ADMIN — MIDODRINE HYDROCHLORIDE 5 MG: 5 TABLET ORAL at 08:20

## 2022-02-18 RX ADMIN — SODIUM BICARBONATE 1300 MG: 650 TABLET ORAL at 17:22

## 2022-02-18 RX ADMIN — MIDODRINE HYDROCHLORIDE 5 MG: 5 TABLET ORAL at 17:24

## 2022-02-18 RX ADMIN — DIPHENOXYLATE HYDROCHLORIDE AND ATROPINE SULFATE 1 TABLET: 2.5; .025 TABLET ORAL at 17:21

## 2022-02-18 RX ADMIN — VITAM B12 100 MCG: 100 TAB at 10:30

## 2022-02-18 RX ADMIN — POTASSIUM & SODIUM PHOSPHATES POWDER PACK 280-160-250 MG 2 PACKET: 280-160-250 PACK at 17:21

## 2022-02-18 RX ADMIN — DIPHENOXYLATE HYDROCHLORIDE AND ATROPINE SULFATE 1 TABLET: 2.5; .025 TABLET ORAL at 10:29

## 2022-02-18 RX ADMIN — PIPERACILLIN AND TAZOBACTAM 3.38 G: 3; .375 INJECTION, POWDER, LYOPHILIZED, FOR SOLUTION INTRAVENOUS at 00:43

## 2022-02-18 RX ADMIN — POTASSIUM & SODIUM PHOSPHATES POWDER PACK 280-160-250 MG 2 PACKET: 280-160-250 PACK at 21:37

## 2022-02-18 RX ADMIN — ALBUMIN (HUMAN) 25 G: 0.25 INJECTION, SOLUTION INTRAVENOUS at 17:20

## 2022-02-18 RX ADMIN — Medication 100 MG: at 10:29

## 2022-02-18 RX ADMIN — ALBUMIN (HUMAN) 25 G: 0.25 INJECTION, SOLUTION INTRAVENOUS at 10:28

## 2022-02-18 RX ADMIN — POTASSIUM CHLORIDE 20 MEQ: 750 TABLET, FILM COATED, EXTENDED RELEASE ORAL at 17:21

## 2022-02-18 RX ADMIN — ALBUMIN (HUMAN) 25 G: 0.25 INJECTION, SOLUTION INTRAVENOUS at 04:48

## 2022-02-18 RX ADMIN — MAGNESIUM SULFATE IN WATER 2 G: 40 INJECTION, SOLUTION INTRAVENOUS at 10:28

## 2022-02-18 RX ADMIN — SODIUM BICARBONATE 1300 MG: 650 TABLET ORAL at 10:29

## 2022-02-18 RX ADMIN — POTASSIUM & SODIUM PHOSPHATES POWDER PACK 280-160-250 MG 2 PACKET: 280-160-250 PACK at 10:29

## 2022-02-18 RX ADMIN — ALBUMIN (HUMAN) 25 G: 0.25 INJECTION, SOLUTION INTRAVENOUS at 23:53

## 2022-02-18 RX ADMIN — COLESTIPOL HYDROCHLORIDE 2 G: 1 TABLET, FILM COATED ORAL at 10:29

## 2022-02-18 RX ADMIN — SODIUM CHLORIDE, PRESERVATIVE FREE 10 ML: 5 INJECTION INTRAVENOUS at 21:37

## 2022-02-18 RX ADMIN — DIPHENOXYLATE HYDROCHLORIDE AND ATROPINE SULFATE 1 TABLET: 2.5; .025 TABLET ORAL at 23:54

## 2022-02-18 RX ADMIN — POTASSIUM CHLORIDE 20 MEQ: 750 TABLET, FILM COATED, EXTENDED RELEASE ORAL at 10:29

## 2022-02-18 RX ADMIN — FOLIC ACID 1 MG: 1 TABLET ORAL at 10:29

## 2022-02-18 RX ADMIN — POTASSIUM & SODIUM PHOSPHATES POWDER PACK 280-160-250 MG 2 PACKET: 280-160-250 PACK at 12:11

## 2022-02-18 NOTE — PROGRESS NOTES
Name: Tiffany Blackwood MRN: [id number]   : 1975 Hospital: . [address]   Date: 2022        IMPRESSION:   · LYNSEY of multifactorial origin - EtOH abuse, diuretics, cirrhosis with effective intravascular contraction. Patient has now Covid. Covid is a common culprit for LYNSEY as well. GFR seems to be stable. Patient's Scr was as high as 9.0 at the referring facility. Serum creatinine is gradually increasing. Patient has been in negative fluid balance for the last 5 days. Some vascular contraction is suspected. · Hypokalemia - poor oral intake and diuretics. K is improved, but remains bellow target. GI losses are contributing as well. · Anemia. The etiology includes bone marrow suppression from cirrhosis, Fe deficiency is not present  · EtOH induced liver disease. Patient is being evaluated for possible liver transplant. · Covid +  · Hypophosphatemia, started on PO4 supplements      PLAN:   · Continue K supplementation. K should be kept around 4.0 considering liver failure, check the Mg level  · Continue PO4 supplementation   · Monitor GFR closely. O's and O's to be recorded. · Renal panel in AM.  · Avoid nephrotoxic agents. · Continue albumin infusion for mobilization of third spacing. · If GFR continues to decline will start saline infusion. Patient is somnolent today, his oralintake is low. · Check ammonia level     Subjective/Interval History:   I have reviewed the flowsheet and previous days notes. ROS:A comprehensive review of systems was negative except for that written in the HPI.     Objective:   Vital Signs:    Visit Vitals  /78 (BP 1 Location: Right arm, BP Patient Position: At rest)   Pulse 96   Temp 98.6 °F (37 °C)   Resp 17   Ht 6' (1.829 m) Comment: 's license   Wt 175.8 kg (278 lb 14.1 oz)   SpO2 96%   BMI 37.82 kg/m²       O2 Device: None (Room air)       Temp (24hrs), Av.7 °F (37.1 °C), Min:98.5 °F (36.9 °C), Max:99.1 °F (37.3 °C)       Intake/Output:   Last shift:      No intake/output data recorded. Last 3 shifts: 02/16 1901 - 02/18 0700  In: -   Out: 3500     Intake/Output Summary (Last 24 hours) at 2/18/2022 1705  Last data filed at 2/18/2022 0444  Gross per 24 hour   Intake --   Output 1450 ml   Net -1450 ml        Physical Exam:  General:    Somnolent, appears stated age. Jaundiced, ill appearing. Lunch was not eaten. Head:   Normocephalic, without obvious abnormality, atraumatic. Eyes:   Incensed icterus. Nose:  Nares normal. No drainage or sinus tenderness. Throat:    Lips, mucosa, and tongue normal.  No Thrush  Neck:  Supple, symmetrical,  no adenopathy, thyroid: non tender    no carotid bruit and no JVD. Lungs:   Diminished to auscultation bilaterally. No Wheezing or Rhonchi. No rales. Chest wall:  No tenderness or deformity. No Accessory muscle use. Heart:   Regular rate and rhythm,  no murmur, rub or gallop. Abdomen:   Soft, non-tender. Distended. Bowel sounds normal.   Extremities: Extremities normal, atraumatic, No cyanosis. Trace to 1+ edema. Skin:     Texture, turgor normal. No rashes or lesions. Jaundiced  Psych:  Calm. Neurologic: Non focal.      DATA:  Labs:  Recent Labs     02/18/22 0442 02/17/22 0830 02/17/22 0459 02/16/22  0143   *  --  135* 137   K 2.6*  --  2.9* 3.0*   CL 98  --  102 106   CO2 22  --  21 20*   BUN 24*  --  23* 23*   CREA 2.50*  --  2.39* 2.24*   CA 9.1  --  9.4 9.0   ALB 3.9  --  3.9 3.5   PHOS 1.2* 1.1*  --   --    MG 1.3* 1.4*  --   --      Recent Labs     02/18/22 0442 02/17/22 0459 02/16/22  0143   WBC 8.6 11.3* 11.4*   HGB 7.5* 7.6* 7.0*   HCT 23.0* 23.1* 21.3*   PLT 79* 90* 75*     No results for input(s): PHILIPPE, KU, CLU, CREAU in the last 72 hours.     No lab exists for component: PROU    Total time spent with patient:  35 minutes    [] Critical Care Provided    Care Plan discussed with:   Staff, Medical Team    Justa Escobar MD

## 2022-02-18 NOTE — PROGRESS NOTES
[address] [facility] Adult  [facility]                                                                                          Hospitalist Progress Note  Lata Rojas MD  Answering service: [phone] OR [phone] from in house phone        NAME:  Joshua Jones III  :  1975  MRN:  [id number]      Admission Summary:   Kalpana Kim III is a 55 y.o. male who came to [facility] specifically for admission and for evaluation by Dr Sugar Wu - Hepatology- due to related to elevated bilirubin and suspected cirrhosis. He was referred for admission by his family physician. Dharmesh Jones has a complicated past medical history including suspectedcirrhosis that they believe is secondary to alcohol use/abuse as well as possibly Humira.  He used Humira from September until about 3 to 4 weeks ago due to Elizabeth Hernandez was admitted to [facility] in January. Dharmesh Jones states that he was therefore liver failure.  Diagnoses in epic which have crossed over but records not available are renal failure, metabolic acidosis, acute metabolic encephalopathy, immunocompromise, sepsis, liver mass which patient reports that he had an MRI for showing a fatty liver, Crohn's disease, macrocytic anemia and hyponatremia.  He does not know his baseline creatinine at this point.  Patient had labs drawn twice since then. Dharmesh Jones reports they are getting worse. His creatinine at one time was as high as 9- but most recently 1.5  Patient has been more sleepy but sleeping regularly at night and not during the day.  Denies vomiting. Neal Shannon has an ostomy in the stool output is brown not black. Neal Shannon states that he has not drank alcohol in 6 weeks.  Patient denies any abdominal pain, fevers, chest pain.  He complains of some shortness of breath.  no N/D, no diarrhea, no rashes, + leg swelling,\"       Interval history / Subjective:   Somnolent today but easily wakes to voice but closes his eyes again.      Assessment & Plan: Hyperbilirubinemia with jaundice- thought to be related to alcohol use  -liver US ordered and LFTs reviewed- hepatology consulted  -Further workup per hepatology, bilirubin high    Crohns  -humira on hold- ileostomy from prior surgery     Chronic hypotension in setting of liver disease  -on scheduled midodrine    Bilat LE edema  -venous dopplers neg for DVT  -no DVT prophylaxis due to autoanticoagulation with elevated INR    Coagulopathy- from liver disease  -high inr which he has been given vit k during hospital stay    Thrombocytopenia in setting of liver disease  -trend with intermittent labs  -transfuse if plt count less than 10K    Leukocytosis- UTI   Cont  IV abx- monitor cultures (2/10 blood cx ngtd)    LYNSEY and CKD3 with acidosis  -nephrology on board and guiding    Hypokalemia, hypomagnesemia  -replete as needed  -intermittent labs to trend    Acute blood loss anemia  -received blood transfusion during this hospital stay  -hgb currently stable around 7.5    Somnolent today, O2 sat okay    COVID + status- likely due to recent past infection and not reactivation from new infection  He is asymptomatic, as per protocol of isolation     Code status: Full    Prophylaxis:SCDs (due to recent acute blood loss anemia and high inr)  Care Plan discussed with: patient  Anticipated Disposition: [facility] 1822 Problems  Never Reviewed          Codes Class Noted POA    Acidosis ICD-10-CM: E87.2  ICD-9-CM: 276.2  2/10/2022 Unknown        Hyperbilirubinemia ICD-10-CM: E80.6  ICD-9-CM: 782.4  2/10/2022 Unknown        Cirrhosis of liver ([facility] [address] ICD-10-CM: K74.60  ICD-9-CM: 571.5  2/10/2022 Unknown                Review of Systems:   Unable to obtain as he appears somnolent and does not respond to all questions asked      Vital Signs:    Last 24hrs VS reviewed since prior progress note.  Most recent are:  Visit Vitals  /73 (BP 1 Location: Right arm, BP Patient Position: At rest)   Pulse 99   Temp 98.6 °F (37 °C) Resp 17   Ht 6' (1.829 m)   Wt 126.5 kg (278 lb 14.1 oz)   SpO2 97%   BMI 37.82 kg/m²     Patient Vitals for the past 24 hrs:   Temp Pulse Resp BP SpO2   02/18/22 1200 -- 99 -- -- --   02/18/22 1000 -- 93 -- -- --   02/18/22 0816 98.6 °F (37 °C) 91 17 111/73 97 %   02/18/22 0600 -- 88 -- -- --   02/18/22 0400 -- 97 -- -- --   02/18/22 0200 -- 99 -- -- --   02/18/22 0152 99.1 °F (37.3 °C) 96 20 105/64 97 %   02/17/22 2200 -- (!) 108 -- -- --   02/17/22 2039 98.5 °F (36.9 °C) 91 18 105/66 97 %   02/17/22 1800 -- 93 -- -- --   02/17/22 1400 -- 93 -- -- --   02/17/22 1359 98.2 °F (36.8 °C) 88 16 111/69 99 %       Intake/Output Summary (Last 24 hours) at 2/18/2022 1336  Last data filed at 2/18/2022 0444  Gross per 24 hour   Intake --   Output 1450 ml   Net -1450 ml        Physical Examination:           Constitutional:  No acute distress  Head: atraumatic  Eyes: icteric, no injection   ENT:  Oral mucosa moist, neck supple, no discharge   Resp:  limited exam, no obvious wheezing of anterior fields  Chest: No accessory muscle use, nonlabored breathing   CV:  Regular rhythm, normal rate, no rubs     GI:  Soft, distended, mild tenderness. Surgical scars, ileostomy     Musculoskeletal:  no pitting edema, symmetrical muscle tone    Neurologic:  Moves all extremities.   Wakes to voice but somnolent  Skin: warm, dry, jaundiced   Psych: does not voice si/hi/hallucinations, currently somnolent            Data Review:       Labs:     Recent Labs     02/18/22  0442 02/17/22  0459   WBC 8.6 11.3*   HGB 7.5* 7.6*   HCT 23.0* 23.1*   PLT 79* 90*     Recent Labs     02/18/22 0442 02/17/22  0830 02/17/22 0459 02/16/22 0143   *  --  135* 137   K 2.6*  --  2.9* 3.0*   CL 98  --  102 106   CO2 22  --  21 20*   BUN 24*  --  23* 23*   CREA 2.50*  --  2.39* 2.24*   GLU 84  --  81 106*   CA 9.1  --  9.4 9.0   MG 1.3* 1.4*  --   --    PHOS 1.2* 1.1*  --   --      Recent Labs     02/18/22 0442 02/17/22 0459 02/16/22 0143   ALT  -- 15 18   AP  --  59 63   TBILI  --  35.4* 32.5*   TP  --  6.4 5.9*   ALB 3.9 3.9 3.5   GLOB  --  2.5 2.4     Recent Labs     02/18/22  0442 02/17/22  0459 02/16/22  0143   INR 1.8* 1.8* 1.6*   PTP 18.6* 18.6* 16.6*        Lab Results   Component Value Date/Time    Color DARK YELLOW 02/10/2022 09:53 PM    Appearance CLOUDY (A) 02/10/2022 09:53 PM    Specific gravity 1.017 02/10/2022 09:53 PM    pH (UA) 5.5 02/10/2022 09:53 PM    Protein TRACE (A) 02/10/2022 09:53 PM    Glucose Negative 02/10/2022 09:53 PM    Ketone Negative 02/10/2022 09:53 PM    Urobilinogen 0.2 02/10/2022 09:53 PM    Nitrites Positive (A) 02/10/2022 09:53 PM    Leukocyte Esterase TRACE (A) 02/10/2022 09:53 PM    Epithelial cells FEW 02/10/2022 09:53 PM    Bacteria 2+ (A) 02/10/2022 09:53 PM    WBC 0-4 02/10/2022 09:53 PM    RBC 0-5 02/10/2022 09:53 PM         Medications Reviewed:     Current Facility-Administered Medications   Medication Dose Route Frequency    potassium chloride SR (KLOR-CON 10) tablet 20 mEq  20 mEq Oral BID    potassium, sodium phosphates (NEUTRA-PHOS) packet 2 Packet  2 Packet Oral QID    colestipoL (COLESTID) tablet 2 g  2 g Oral BID    diphenoxylate-atropine (LOMOTIL) tablet 1 Tablet  1 Tablet Oral QID PRN    midodrine (PROAMATINE) tablet 5 mg  5 mg Oral TID WITH MEALS    sodium bicarbonate tablet 1,300 mg  1,300 mg Oral BID    0.9% sodium chloride infusion 250 mL  250 mL IntraVENous PRN    albuterol (PROVENTIL HFA, VENTOLIN HFA, PROAIR HFA) inhaler 2 Puff  2 Puff Inhalation Q6H PRN    0.9% sodium chloride infusion 250 mL  250 mL IntraVENous PRN    0.9% sodium chloride infusion 250 mL  250 mL IntraVENous PRN    sodium chloride (NS) flush 5-40 mL  5-40 mL IntraVENous Q8H    sodium chloride (NS) flush 5-40 mL  5-40 mL IntraVENous PRN    polyethylene glycol (MIRALAX) packet 17 g  17 g Oral DAILY PRN    ondansetron (ZOFRAN) injection 4 mg  4 mg IntraVENous Q6H PRN    cyanocobalamin (VITAMIN B12) tablet 100 mcg  100 mcg Oral DAILY    folic acid (FOLVITE) tablet 1 mg  1 mg Oral DAILY    albumin human 25% (BUMINATE) solution 25 g  25 g IntraVENous Q6H    thiamine HCL (B-1) tablet 100 mg  100 mg Oral DAILY     ______________________________________________________________________  EXPECTED LENGTH OF STAY: 4d 16h  ACTUAL LENGTH OF STAY:          8                 Mony Carrillo MD

## 2022-02-18 NOTE — CONSULTS
Infectious Disease Consult    Today's Date: 2/18/2022   Admit Date: 2/10/2022    Impression:   · Alcoholic liver disease  · Crohn's disease  · Leukocytosis   · Improved  · Afebrile   · Antibiotics appear to have stopped after a week    Plan:   · Monitor off antibiotic therapy  · Work up any fevers should one occur    Anti-infectives:   · None     Subjective:   Date of Consultation:  February 18, 2022  Referring Physician: Dr Cami Green    Patient is a 55 y.o. male admitted for hepatology eval with s/s alcoholic liver disease/hepatitis. He also has a recent diagnosis of COVID and an abnormal chest film. He was empirically started on antibiotic therapy on admission. He has not had fevers, chills, sweats, etc. He has an ostomy from surgery for his Crohn's. We are asked to see him in consultation. Patient Active Problem List   Diagnosis Code    Acidosis E87.2    Hyperbilirubinemia E80.6    Cirrhosis of liver ([facility] [address] K74.60     Past Medical History:   Diagnosis Date    Alcoholic cirrhosis ([facility] [address]     Crohn disease ([facility] [address]     Small bowel obstruction ([facility] [address]       No family history on file. Social History     Tobacco Use    Smoking status: Not on file    Smokeless tobacco: Not on file   Substance Use Topics    Alcohol use: Not on file     Past Surgical History:   Procedure Laterality Date    HX OTHER SURGICAL      bowel obstruction and ostomy    HX OTHER SURGICAL  2001    bowel resection secondary to tear in bowel and abscess      Prior to Admission medications    Medication Sig Start Date End Date Taking? Authorizing Provider   cholestyramine-aspartame (QUESTRAN LIGHT) 4 gram packet Take 4 g by mouth two (2) times a day. 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 Yes Provider, Historical   diphenhydrAMINE (BENADRYL) 25 mg tablet Take 25 mg by mouth daily.    Yes Other, MD Nona diphenoxylate-atropine (LOMOTIL) 2.5-0.025 mg per tablet Take 2 Tablets by mouth every six (6) hours as needed. 22  Yes Melyssa, MD Nona   folic acid (FOLVITE) 1 mg tablet Take 1 mg by mouth. 22  Yes Other, MD Nona   midodrine (PROAMATINE) 10 mg tablet Take 10 mg by mouth three (3) times daily (with meals). 22  Yes Melyssa, MD Nona   prednisoLONE (ORAPRED) 15 mg/5 mL (3 mg/mL) solution Take 5 mL by mouth daily. 10 ml PO daily x 7 days, followed by 5 ml PO daily x7 days 22  Yes Melyssa, MD Nona   sodium bicarbonate 650 mg tablet Take 650 mg by mouth two (2) times a day. 22  Yes Melyssa, MD Nona   cyanocobalamin (VITAMIN B12) 100 mcg tablet Take 100 mcg by mouth daily. Yes Melyssa, MD Nona   adalimumab (Humira) 40 mg/0.8 mL injection 40 mg by SubCUTAneous route Once every 2 weeks. Yes Provider, Historical   Prevalite 4 gram packet MIX 1 PACKET IN 8 OUNCES OF LIQUID TWICE A DAY  Patient not taking: Reported on 2022   Other, MD Nona       Allergies   Allergen Reactions    Naprosyn [Naproxen] Itching    Other Medication Other (comments)     Some Crohns disease medication        Review of Systems:  A comprehensive review of systems was negative except for that written in the History of Present Illness. Objective:     Visit Vitals  /78 (BP 1 Location: Right arm, BP Patient Position: At rest)   Pulse (!) 105   Temp 98.6 °F (37 °C)   Resp 17   Ht 6' (1.829 m)   Wt 126.5 kg (278 lb 14.1 oz)   SpO2 96%   BMI 37.82 kg/m²     Temp (24hrs), Av.7 °F (37.1 °C), Min:98.5 °F (36.9 °C), Max:99.1 °F (37.3 °C)       Lines:  Peripheral IV:       Physical Exam:  Lungs:  clear to auscultation bilaterally  Heart:  regular rate and rhythm  Abdomen:  soft, non-tender.  Bowel sounds normal. No masses,  no organomegaly, ostomy patent  Skin:  Deeply jaundiced     Data Review:     CBC:  Recent Labs     22  0442 22  0459 22  0143   WBC 8.6 11.3* 11.4*   GRANS 75  --   -- MONOS 7  --   --    EOS 5  --   --    ANEU 6.5  --   --    ABL 1.1  --   --    HGB 7.5* 7.6* 7.0*   HCT 23.0* 23.1* 21.3*   PLT 79* 90* 75*       BMP:  Recent Labs     02/18/22 0442 02/17/22 0459 02/16/22 0143   CREA 2.50* 2.39* 2.24*   BUN 24* 23* 23*   * 135* 137   K 2.6* 2.9* 3.0*   CL 98 102 106   CO2 22 21 20*   AGAP 14 12 11   GLU 84 81 106*       LFTS:  Recent Labs     02/18/22 0442 02/17/22 0459 02/16/22 0143   TBILI  --  35.4* 32.5*   ALT  --  15 18   AP  --  59 63   TP  --  6.4 5.9*   ALB 3.9 3.9 3.5       Microbiology:     All Micro Results     Procedure Component Value Units Date/Time    CULTURE, BLOOD, PAIRED [[id number]] Collected: 02/10/22 2029    Order Status: Completed Specimen: Blood Updated: 02/15/22 0831     Special Requests: NO SPECIAL REQUESTS        Culture result: NO GROWTH 5 DAYS       COVID-19 RAPID TEST [[id number]]  (Abnormal) Collected: 02/13/22 0549    Order Status: Completed Specimen: Nasopharyngeal Updated: 02/13/22 0624     Specimen source Nasopharyngeal        COVID-19 rapid test Detected        Comment: CALLED TO AND READ BACK BY  [email or web address]  Rapid Abbott ID Now       The specimen is POSITIVE for SARS-CoV-2, the novel coronavirus associated with COVID-19. This test has been authorized by the FDA under an Emergency Use Authorization (EUA) for use by authorized laboratories.         Fact sheet for Healthcare Providers: ConventionUpdate.co.nz  Fact sheet for Patients: ConventionUpdate.co.nz       Methodology: Isothermal Nucleic Acid Amplification               Imaging:   Reviewed     Signed By: Clay Lee MD     February 18, 2022

## 2022-02-18 NOTE — PROGRESS NOTES
NUTRITION  Pt seen for:     []           Supplements  [x]             PO intake check   []           Food Allergies  []             Food Preferences/tolerances    []           Rescreen   []             Education    []           Diet order clarification []             Other            RECOMMENDATIONS:     Continue regular diet as ordered    RD has ordered Ensure High Protein BID, Magic Cup 1x/day    SUBJECTIVE/OBJECTIVE:   Information obtained from:   Chart review, pt is covid +       Diet:  regular  Supplements: RD has added  Intake: []           Good     [x]           Fair      []           Poor   No data found.     Weight Changes:  Unable to assess with only 1 weight point  []            Loss  []            Gain  []            Stable  Wt Readings from Last 10 Encounters:   02/14/22 126.5 kg (278 lb 14.1 oz)       Nutrition Problems Identified:  [x]       None: not at nutrition risk  []           Specified food preferences   []           Dislikes supplements              []           Allergies  []           Difficulty chewing or swallowing   []           Poor dentition   []           Nausea/Vomiting  []           Constipation  []           Diarrhea    PLAN:   []           Obtained/adjusted food preferences/tolerances and/or snacks options   []           Dislikes supplements will try a substitution   []           Modify diet for food allergies  []           Adjust texture due to difficulty chewing   [x]    Continue current diet  []           Educated patient  []           Add Supplements  []          Rescreen      Cici Juarez RD, CSP  Contact via [employer]

## 2022-02-19 LAB
ALBUMIN SERPL-MCNC: 4.4 G/DL (ref 3.5–5)
AMMONIA PLAS-SCNC: 44 UMOL/L
ANION GAP SERPL CALC-SCNC: 11 MMOL/L (ref 5–15)
BASOPHILS # BLD: 0 K/UL (ref 0–0.1)
BASOPHILS NFR BLD: 0 % (ref 0–1)
BUN SERPL-MCNC: 28 MG/DL (ref 6–20)
BUN/CREAT SERPL: 11 (ref 12–20)
CALCIUM SERPL-MCNC: 9.2 MG/DL (ref 8.5–10.1)
CHLORIDE SERPL-SCNC: 94 MMOL/L (ref 97–108)
CO2 SERPL-SCNC: 26 MMOL/L (ref 21–32)
CREAT SERPL-MCNC: 2.47 MG/DL (ref 0.7–1.3)
DIFFERENTIAL METHOD BLD: ABNORMAL
EOSINOPHIL # BLD: 0.4 K/UL (ref 0–0.4)
EOSINOPHIL NFR BLD: 4 % (ref 0–7)
ERYTHROCYTE [DISTWIDTH] IN BLOOD BY AUTOMATED COUNT: 19.8 % (ref 11.5–14.5)
GLUCOSE SERPL-MCNC: 86 MG/DL (ref 65–100)
HCT VFR BLD AUTO: 22.2 % (ref 36.6–50.3)
HGB BLD-MCNC: 7.3 G/DL (ref 12.1–17)
IMM GRANULOCYTES # BLD AUTO: 0 K/UL (ref 0–0.04)
IMM GRANULOCYTES NFR BLD AUTO: 0 % (ref 0–0.5)
INR PPP: 1.6 (ref 0.9–1.1)
LYMPHOCYTES # BLD: 1.1 K/UL (ref 0.8–3.5)
LYMPHOCYTES NFR BLD: 12 % (ref 12–49)
MAGNESIUM SERPL-MCNC: 1.8 MG/DL (ref 1.6–2.4)
MCH RBC QN AUTO: 34 PG (ref 26–34)
MCHC RBC AUTO-ENTMCNC: 32.9 G/DL (ref 30–36.5)
MCV RBC AUTO: 103.3 FL (ref 80–99)
MONOCYTES # BLD: 0.6 K/UL (ref 0–1)
MONOCYTES NFR BLD: 7 % (ref 5–13)
NEUTS SEG # BLD: 6.7 K/UL (ref 1.8–8)
NEUTS SEG NFR BLD: 77 % (ref 32–75)
NRBC # BLD: 0 K/UL (ref 0–0.01)
NRBC BLD-RTO: 0 PER 100 WBC
PHOSPHATE SERPL-MCNC: 1.2 MG/DL (ref 2.6–4.7)
PLATELET # BLD AUTO: 83 K/UL (ref 150–400)
PLATELET COMMENTS,PCOM: ABNORMAL
PMV BLD AUTO: 11.4 FL (ref 8.9–12.9)
POTASSIUM SERPL-SCNC: 2.7 MMOL/L (ref 3.5–5.1)
PROTHROMBIN TIME: 16.7 SEC (ref 9–11.1)
RBC # BLD AUTO: 2.15 M/UL (ref 4.1–5.7)
RBC MORPH BLD: ABNORMAL
RBC MORPH BLD: ABNORMAL
SODIUM SERPL-SCNC: 131 MMOL/L (ref 136–145)
WBC # BLD AUTO: 8.8 K/UL (ref 4.1–11.1)

## 2022-02-19 PROCEDURE — 74011250636 HC RX REV CODE- 250/636: Performed by: FAMILY MEDICINE

## 2022-02-19 PROCEDURE — 65660000000 HC RM CCU STEPDOWN

## 2022-02-19 PROCEDURE — 74011250637 HC RX REV CODE- 250/637: Performed by: NURSE PRACTITIONER

## 2022-02-19 PROCEDURE — 94760 N-INVAS EAR/PLS OXIMETRY 1: CPT

## 2022-02-19 PROCEDURE — 74011250637 HC RX REV CODE- 250/637: Performed by: FAMILY MEDICINE

## 2022-02-19 PROCEDURE — P9047 ALBUMIN (HUMAN), 25%, 50ML: HCPCS | Performed by: INTERNAL MEDICINE

## 2022-02-19 PROCEDURE — 82140 ASSAY OF AMMONIA: CPT

## 2022-02-19 PROCEDURE — 74011000250 HC RX REV CODE- 250: Performed by: INTERNAL MEDICINE

## 2022-02-19 PROCEDURE — 99233 SBSQ HOSP IP/OBS HIGH 50: CPT | Performed by: INTERNAL MEDICINE

## 2022-02-19 PROCEDURE — 74011000250 HC RX REV CODE- 250: Performed by: FAMILY MEDICINE

## 2022-02-19 PROCEDURE — 74011250637 HC RX REV CODE- 250/637: Performed by: INTERNAL MEDICINE

## 2022-02-19 PROCEDURE — 80069 RENAL FUNCTION PANEL: CPT

## 2022-02-19 PROCEDURE — 83735 ASSAY OF MAGNESIUM: CPT

## 2022-02-19 PROCEDURE — 85025 COMPLETE CBC W/AUTO DIFF WBC: CPT

## 2022-02-19 PROCEDURE — 85610 PROTHROMBIN TIME: CPT

## 2022-02-19 PROCEDURE — 74011250636 HC RX REV CODE- 250/636: Performed by: INTERNAL MEDICINE

## 2022-02-19 PROCEDURE — 36415 COLL VENOUS BLD VENIPUNCTURE: CPT

## 2022-02-19 RX ORDER — MONTELUKAST SODIUM 4 MG/1
2 TABLET, CHEWABLE ORAL DAILY
Status: DISCONTINUED | OUTPATIENT
Start: 2022-02-20 | End: 2022-02-20 | Stop reason: HOSPADM

## 2022-02-19 RX ORDER — CIPROFLOXACIN 500 MG/1
500 TABLET ORAL EVERY 24 HOURS
Status: DISCONTINUED | OUTPATIENT
Start: 2022-02-19 | End: 2022-02-20 | Stop reason: HOSPADM

## 2022-02-19 RX ORDER — POTASSIUM CHLORIDE 750 MG/1
20 TABLET, FILM COATED, EXTENDED RELEASE ORAL 3 TIMES DAILY
Status: DISCONTINUED | OUTPATIENT
Start: 2022-02-19 | End: 2022-02-20 | Stop reason: HOSPADM

## 2022-02-19 RX ORDER — POTASSIUM CHLORIDE 750 MG/1
40 TABLET, FILM COATED, EXTENDED RELEASE ORAL
Status: COMPLETED | OUTPATIENT
Start: 2022-02-19 | End: 2022-02-19

## 2022-02-19 RX ORDER — LANOLIN ALCOHOL/MO/W.PET/CERES
400 CREAM (GRAM) TOPICAL DAILY
Status: DISCONTINUED | OUTPATIENT
Start: 2022-02-19 | End: 2022-02-20 | Stop reason: HOSPADM

## 2022-02-19 RX ORDER — SODIUM,POTASSIUM PHOSPHATES 280-250MG
3 POWDER IN PACKET (EA) ORAL 4 TIMES DAILY
Status: DISCONTINUED | OUTPATIENT
Start: 2022-02-19 | End: 2022-02-20 | Stop reason: HOSPADM

## 2022-02-19 RX ADMIN — Medication 400 MG: at 19:01

## 2022-02-19 RX ADMIN — CIPROFLOXACIN 500 MG: 500 TABLET, FILM COATED ORAL at 19:01

## 2022-02-19 RX ADMIN — ALBUMIN (HUMAN) 25 G: 0.25 INJECTION, SOLUTION INTRAVENOUS at 17:23

## 2022-02-19 RX ADMIN — SODIUM CHLORIDE, PRESERVATIVE FREE 10 ML: 5 INJECTION INTRAVENOUS at 06:05

## 2022-02-19 RX ADMIN — POTASSIUM PHOSPHATE, MONOBASIC AND POTASSIUM PHOSPHATE, DIBASIC: 224; 236 INJECTION, SOLUTION, CONCENTRATE INTRAVENOUS at 09:21

## 2022-02-19 RX ADMIN — COLESTIPOL HYDROCHLORIDE 2 G: 1 TABLET, FILM COATED ORAL at 09:22

## 2022-02-19 RX ADMIN — ALBUMIN (HUMAN) 25 G: 0.25 INJECTION, SOLUTION INTRAVENOUS at 23:04

## 2022-02-19 RX ADMIN — POTASSIUM CHLORIDE 20 MEQ: 750 TABLET, EXTENDED RELEASE ORAL at 21:09

## 2022-02-19 RX ADMIN — POTASSIUM & SODIUM PHOSPHATES POWDER PACK 280-160-250 MG 2 PACKET: 280-160-250 PACK at 17:23

## 2022-02-19 RX ADMIN — POTASSIUM CHLORIDE 20 MEQ: 750 TABLET, EXTENDED RELEASE ORAL at 19:00

## 2022-02-19 RX ADMIN — FOLIC ACID 1 MG: 1 TABLET ORAL at 09:22

## 2022-02-19 RX ADMIN — POTASSIUM CHLORIDE 20 MEQ: 750 TABLET, FILM COATED, EXTENDED RELEASE ORAL at 09:21

## 2022-02-19 RX ADMIN — DIPHENOXYLATE HYDROCHLORIDE AND ATROPINE SULFATE 1 TABLET: 2.5; .025 TABLET ORAL at 07:36

## 2022-02-19 RX ADMIN — POTASSIUM & SODIUM PHOSPHATES POWDER PACK 280-160-250 MG 3 PACKET: 280-160-250 PACK at 21:09

## 2022-02-19 RX ADMIN — MIDODRINE HYDROCHLORIDE 5 MG: 5 TABLET ORAL at 07:36

## 2022-02-19 RX ADMIN — DIPHENOXYLATE HYDROCHLORIDE AND ATROPINE SULFATE 1 TABLET: 2.5; .025 TABLET ORAL at 13:44

## 2022-02-19 RX ADMIN — SODIUM BICARBONATE 1300 MG: 650 TABLET ORAL at 09:22

## 2022-02-19 RX ADMIN — Medication 100 MG: at 09:21

## 2022-02-19 RX ADMIN — SODIUM BICARBONATE 1300 MG: 650 TABLET ORAL at 17:23

## 2022-02-19 RX ADMIN — MIDODRINE HYDROCHLORIDE 5 MG: 5 TABLET ORAL at 17:23

## 2022-02-19 RX ADMIN — ONDANSETRON HYDROCHLORIDE 4 MG: 2 SOLUTION INTRAMUSCULAR; INTRAVENOUS at 23:04

## 2022-02-19 RX ADMIN — SODIUM CHLORIDE, PRESERVATIVE FREE 10 ML: 5 INJECTION INTRAVENOUS at 23:03

## 2022-02-19 RX ADMIN — DIPHENOXYLATE HYDROCHLORIDE AND ATROPINE SULFATE 1 TABLET: 2.5; .025 TABLET ORAL at 19:01

## 2022-02-19 RX ADMIN — POTASSIUM CHLORIDE 20 MEQ: 750 TABLET, FILM COATED, EXTENDED RELEASE ORAL at 17:23

## 2022-02-19 RX ADMIN — POTASSIUM CHLORIDE 40 MEQ: 750 TABLET, EXTENDED RELEASE ORAL at 06:03

## 2022-02-19 RX ADMIN — ALBUMIN (HUMAN) 25 G: 0.25 INJECTION, SOLUTION INTRAVENOUS at 06:04

## 2022-02-19 RX ADMIN — POTASSIUM & SODIUM PHOSPHATES POWDER PACK 280-160-250 MG 2 PACKET: 280-160-250 PACK at 09:21

## 2022-02-19 RX ADMIN — POTASSIUM & SODIUM PHOSPHATES POWDER PACK 280-160-250 MG 2 PACKET: 280-160-250 PACK at 12:06

## 2022-02-19 RX ADMIN — VITAM B12 100 MCG: 100 TAB at 09:21

## 2022-02-19 RX ADMIN — SODIUM CHLORIDE, PRESERVATIVE FREE 10 ML: 5 INJECTION INTRAVENOUS at 13:44

## 2022-02-19 RX ADMIN — MIDODRINE HYDROCHLORIDE 5 MG: 5 TABLET ORAL at 12:06

## 2022-02-19 RX ADMIN — ALBUMIN (HUMAN) 25 G: 0.25 INJECTION, SOLUTION INTRAVENOUS at 12:07

## 2022-02-19 RX ADMIN — POTASSIUM & SODIUM PHOSPHATES POWDER PACK 280-160-250 MG 3 PACKET: 280-160-250 PACK at 19:01

## 2022-02-19 NOTE — PROGRESS NOTES
Name: Megan Bello MRN: [id number]   : 1975 Hospital: [facility]   Date: 2022        IMPRESSION:   · LYNSEY of multifactorial origin - EtOH abuse, diuretics, cirrhosis with effective intravascular contraction. Patient has now Covid. Covid is a common culprit for LYNSEY as well. GFR seems to be stable. Patient's Scr was as high as 9.0 at the referring facility. Serum creatinine is gradually increasing. Patient has been in negative fluid balance for the last 5 days. Some vascular contraction is suspected. · Hypokalemia - poor oral intake and diuretics. K is improved, but remains bellow target. GI losses are contributing as well. · Anemia. The etiology includes bone marrow suppression from cirrhosis, Fe deficiency is not present  · EtOH induced liver disease. Patient is being evaluated for possible liver transplant. · Covid +  · Hypophosphatemia, started on PO4 supplements      PLAN:   · Continue K supplementation. K should be kept around 4.0 considering liver failure, check the Mg level  · Continue PO4 supplementation   · Monitor GFR closely. O's and O's to be recorded. · Renal panel in AM.  · Avoid nephrotoxic agents. · Continue albumin infusion for mobilization of third spacing. · If GFR continues to decline will start saline infusion. Patient is somnolent today, his oralintake is low. · Check ammonia level     Subjective/Interval History:   I have reviewed the flowsheet and previous days notes. ROS:A comprehensive review of systems was negative except for that written in the HPI.     22 Patient is more alert, conversant. He is inquiring when he will be seen by Dr. Raj Barclay.     Objective:   Vital Signs:    Visit Vitals  /64 (BP 1 Location: Left arm, BP Patient Position: At rest)   Pulse 97   Temp 98.3 °F (36.8 °C)   Resp 16   Ht 6' (1.829 m) Comment: 's license   Wt 105.2 kg (278 lb 14.1 oz)   SpO2 96%   BMI 37.82 kg/m²       O2 Device: None (Room air)       Temp (24hrs), Av.5 °F (36.9 °C), Min:98.2 °F (36.8 °C), Max:98.8 °F (37.1 °C)       Intake/Output:   Last shift:       07 - 1900  In: -   Out: 550 [Urine:150]  Last 3 shifts:  190 -  0700  In: 120 [P.O.:120]  Out: 5400 [Urine:700]    Intake/Output Summary (Last 24 hours) at 2022 1040  Last data filed at 2022 0929  Gross per 24 hour   Intake 120 ml   Output 4500 ml   Net -4380 ml        Physical Exam:  General:    Today - AAO x 3. Conversant  Head:   Normocephalic, without obvious abnormality, atraumatic. Eyes:   Incensed icterus. Nose:  Nares normal. No drainage or sinus tenderness. Throat:    Lips, mucosa, and tongue normal.  No Thrush  Neck:  Supple, symmetrical,  no adenopathy, thyroid: non tender    no carotid bruit and no JVD. Lungs:   Diminished to auscultation bilaterally. No Wheezing or Rhonchi. No rales. Chest wall:  No tenderness or deformity. No Accessory muscle use. Heart:   Regular rate and rhythm,  no murmur, rub or gallop. Abdomen:   Soft, non-tender. Distended. Bowel sounds normal.   Extremities: Extremities normal, atraumatic, No cyanosis. Trace to 1+ edema. Skin:     Texture, turgor normal. No rashes or lesions. Jaundiced  Psych:  Calm. Neurologic: Non focal.      DATA:  Labs:  Recent Labs     22  0353 22  0442 22  0830 22  0459   * 134*  --  135*   K 2.7* 2.6*  --  2.9*   CL 94* 98  --  102   CO2 26 22  --  21   BUN 28* 24*  --  23*   CREA 2.47* 2.50*  --  2.39*   CA 9.2 9.1  --  9.4   ALB 4.4 3.9  --  3.9   PHOS 1.2* 1.2* 1.1*  --    MG 1.8 1.3* 1.4*  --      Recent Labs     22  0353 22  0442 22  0459   WBC 8.8 8.6 11.3*   HGB 7.3* 7.5* 7.6*   HCT 22.2* 23.0* 23.1*   PLT 83* 79* 90*     No results for input(s): PHILIPPE, KU, CLU, CREAU in the last 72 hours.     No lab exists for component: PROU    Total time spent with patient:  35 minutes    [] Critical Care Provided    Care Plan discussed with:   Staff, Medical Team    Laine Darling MD

## 2022-02-19 NOTE — PROGRESS NOTES
Just paged by hepatologist who has deemed from hepatology standpoint no more to offer as inpatient and okay for discharge likely to eat better and ambulate more in his home environment. As for empiric abx, Dr. Austin Murray recommends cipro 500 mg daily. Still has very deranged lytes though very significant dose repletions (including IV runs on top of the oral repletions) have been given for the past several days. Spoke with inpatient pharmacy regarding all this and decided to increase the neutrophos packets and KCL supplements and started magnesium supplement. As tomorrow is Sunday, can f/u on labs on Sunday morning, provide any additional repletions that day and possibly discharge patient later on Sunday with instructions for patient to contact the hepatology clinic with recommendation to get labs that week. Spoke with patient's nurse of above items and tentative plans for discharge Sunday.

## 2022-02-19 NOTE — PROGRESS NOTES
Problem: Discharge Planning  Goal: *Discharge to safe environment  Outcome: Progressing Towards Goal     Problem: Falls - Risk of  Goal: *Absence of Falls  Description: Document Cherylle Cockayne Fall Risk and appropriate interventions in the flowsheet. Outcome: Progressing Towards Goal  Note: Fall Risk Interventions:  Mobility Interventions: Utilize walker, cane, or other assistive device         Medication Interventions: Evaluate medications/consider consulting pharmacy    Elimination Interventions: Call light in reach    History of Falls Interventions: Evaluate medications/consider consulting pharmacy         Problem: Patient Education: Go to Patient Education Activity  Goal: Patient/Family Education  Outcome: Progressing Towards Goal     Problem: Airway Clearance - Ineffective  Goal: Achieve or maintain patent airway  Outcome: Progressing Towards Goal     Problem: Gas Exchange - Impaired  Goal: Absence of hypoxia  Outcome: Progressing Towards Goal  Goal: Promote optimal lung function  Outcome: Progressing Towards Goal     Problem: Breathing Pattern - Ineffective  Goal: Ability to achieve and maintain a regular respiratory rate  Outcome: Progressing Towards Goal     Problem:  Body Temperature -  Risk of, Imbalanced  Goal: Ability to maintain a body temperature within defined limits  Outcome: Progressing Towards Goal  Goal: Will regain or maintain usual level of consciousness  Outcome: Progressing Towards Goal  Goal: Complications related to the disease process, condition or treatment will be avoided or minimized  Outcome: Progressing Towards Goal     Problem: Nutrition Deficits  Goal: Optimize nutrtional status  Outcome: Progressing Towards Goal     Problem: Risk for Fluid Volume Deficit  Goal: Maintain normal heart rhythm  Outcome: Progressing Towards Goal  Goal: Maintain absence of muscle cramping  Outcome: Progressing Towards Goal  Goal: Maintain normal serum potassium, sodium, calcium, phosphorus, and pH  Outcome: Progressing Towards Goal     Problem: Loneliness or Risk for Loneliness  Goal: Demonstrate positive use of time alone when socialization is not possible  Outcome: Progressing Towards Goal     Problem: Patient Education: Go to Patient Education Activity  Goal: Patient/Family Education  Outcome: Progressing Towards Goal     Problem: Pressure Injury - Risk of  Goal: *Prevention of pressure injury  Description: Document Omar Scale and appropriate interventions in the flowsheet.   Outcome: Progressing Towards Goal  Note: Pressure Injury Interventions:       Moisture Interventions: Absorbent underpads    Activity Interventions: Increase time out of bed    Mobility Interventions: HOB 30 degrees or less    Nutrition Interventions: Document food/fluid/supplement intake                     Problem: Patient Education: Go to Patient Education Activity  Goal: Patient/Family Education  Outcome: Progressing Towards Goal

## 2022-02-19 NOTE — PROGRESS NOTES
[address] [facility], MD, FACP, Erickson Mic Ibrahim, [place]      Nick Benson, REMI Rosa, [facility]     April S Kaylie, [facility]   Rina Roe, COLTON Lemos, [facility]       Nohelia Meehan Zay [address]    at [address], [address] [address]  22.    [phone]    FAX: [address] [place] [address] Avenue    at [address], [address], [address]    [phone]    FAX: [phone]       HEPATOLOGY PROGRESS NOTE  The patient is a 55 y.o. male with Crohns disease. He has CT evidence of fatty liver dating back to 2019. In 9/2021 he was started on Humara for treatment of Crohns. In 1/2021 he was hospitalized at [facility] for jaundice with TBILI 9.0 and mild increase in INR to 1.38. He was DC to home in 1/20. He returned to the hospital on 1/26 with TBILI 19, INR 2.50. On 1/28 ETOH blood level was 8. He was treated with prednisone 15 mg every day for presumed alcohol induced hepatitis. US of the liver suggested fatty liver, cirrhosis, small amount of ascites. He was discharged to home on 2/7 with TBILI 19, INR 2.2, Scr 1.7 mg on prednisone 15 mg every day. I was called by his PCP Dr Cosmo Hunter in [place] [address] [address] [facility] yesterday and suggested he come to the ED at [facility]. He came to the ED today. In the ED Laboratory studies were significant for:    WBC 25, HB 7.3 gms, , Scr 2.27 mg, AST 72, ALT 59, TBILI 21 mg, INR 2.2,     Imaging of the liver with Ultrasound demonstrated fatty liver and moderate ascites. Rachel [facility] Hospital course:  SCr is has stabilized in 2.5 mg mg range  INR is stable at 1.6  Remains in isolation. Hepatology Plan:  Can stop IV albumin now that Salbumin is normal.    Continue to hold diuretics.       ASSESSMENT AND PLAN:  Cirrhosis  The diagnosis of cirrhosis is based upon imaging, laboratory studies, complications of cirrhosis. Cirrhosis is presumed secondary to alcohol. On admission the CTP is 12. Child class C. The MELD score is 35. This is chronic decompensated liver disease most likely due to ETOH. The patient has a MELD score greater than 15 and has developed complications of cirrhosis. Will initiate liver transplant evaluation testing. ECHO heart was normal.  Nuclear stress. Alcohol liver disease  Suspect the patient has alcohol induced liver disease based upon imaging showing fatty liver dating back to 2019, pattern of AST>ALT,     He was initially adamant that he only consumed 1-2 alcoholic beverages daily. However, upon further discussion today he admits to drinking 1/2 gallon of burbon or 39 drinks per week. There was a positive ETOH level 2 days after being hospitalized in 1/2022. He was on prednisone for alcoholic hepatitis  This was stopped on admission since the risk of infection is > potential benefit    The patient has never been told he had a medical issue from alcohol. The first time he was ever hospitalized due to alcohol and liver disease was in 1/2022. He has never lost time from work due to alcohol. He has no DUI. Ascites   Ascites developed for the first time in 1/2022. Ascites is persistent despite due to LYNSEY. Paracentesis is not needed at this time  Will start IV albumin  Hold diuretics    Lower extremity edema  Edema has developed in 1/2022  This has nearly all resolved with IV albumin    Acute kidney injury  The patient has developed an elevation in serum creatinine   LYNSEY is secondary to the effects of cirrhosis, ETOH, diuretics. LYNSEY is improving with IV albumin    Hepatic encephalopathy   He is alert and oriented and canm carry on conversation. Will get serum ammonia  Overt HE has not developed to date.     There is no reason for treatment with lactulose of xifaxan    Tremor  Suspect this is due to alcohol withdrawal.  Blood ETOH was positive in 1/2022  This appears to have resolved. Anemia   This is due to multifactorial causes including portal hypertension with chronic GI blood loss, bone marrow suppression secondary to alcohol. Will obtain FE panel to assess for iron stores. Will need EGD at some point to assess for esophageal varices. Thrombocytopenia   This is secondary to cirrhosis. There is no evidence of overt bleeding. No treatment is required. The platelet count is adequate for the patient to undergo procedures without the need for platelet transfusion or platelet growth factors. Coagulopathy  This is secondary to cirrhosis, malnutrition from alcoholism  Will treat with 3 doses of Vitamin K over 3 days. There is no evidence of bleeding. No need for FFP at this time. Metabolic acidosis  This may be due to infection/sepsis. Blood cultures. Start empiric ABX. Stop NABICARB pills,      PHYSICAL EXAMINATION:  VS: per nursing note  General:  Ill appearing  Eyes:  Sclera deeply icteric  ENT:  No oral lesions. Thyroid normal.  Nodes:  No adenopathy. Skin:  Spider angiomata. Jaundice. Respiratory:  Lungs clear to auscultation. Cardiovascular:  Regular heart rate. Abdomen:  Soft non-tender, Some ascites may be present. Extremities:  No lower extremity edema. Neurologic:  Alert and oriented. Tremor. Cranial nerves grossly intact. Asterixis present. LABORATORY:  Results for José Antonio Shannon (MRN [id number]) as of 2/19/2022 05:02   Ref.  Range 2/17/2022 04:59 2/18/2022 04:42 2/19/2022 03:53   WBC Latest Ref Range: 4.1 - 11.1 K/uL 11.3 (H) 8.6 8.8   HGB Latest Ref Range: 12.1 - 17.0 g/dL 7.6 (L) 7.5 (L) 7.3 (L)   PLATELET Latest Ref Range: 150 - 400 K/uL 90 (L) 79 (L) 83 (L)   INR Latest Ref Range: 0.9 - 1.1   1.8 (H) 1.8 (H) 1.6 (H)   Sodium Latest Ref Range: 136 - 145 mmol/L 135 (L) 134 (L)    Potassium Latest Ref Range: 3.5 - 5.1 mmol/L 2.9 (L) 2.6 (LL)    Chloride Latest Ref Range: 97 - 108 mmol/L 102 98    CO2 Latest Ref Range: 21 - 32 mmol/L 21 22    Glucose Latest Ref Range: 65 - 100 mg/dL 81 84    BUN Latest Ref Range: 6 - 20 MG/DL 23 (H) 24 (H)    Creatinine Latest Ref Range: 0.70 - 1.30 MG/DL 2.39 (H) 2.50 (H)    Bilirubin, total Latest Ref Range: 0.2 - 1.0 MG/DL 35.4 (HH)     Albumin Latest Ref Range: 3.5 - 5.0 g/dL 3.9 3.9    ALT Latest Ref Range: 12 - 78 U/L 15     AST Latest Ref Range: 15 - 37 U/L 29     Alk. phosphatase Latest Ref Range: 45 - 117 U/L 59         RADIOLOGY:  Ultrasound of liver. Echogenic consistent with fatty liver. NO obvious cirrhosis. No liver mass lesions. No dilated bile ducts. Moderate ascites.       Lizbet Lockwood MD  [facility] [address]  [address], [address] [place] [address] 22.  [phone]  [address] [place] [address]

## 2022-02-19 NOTE — PROGRESS NOTES
[address] [facility] Adult  Hospitalist Group                                                                                          Hospitalist Progress Note  Julian Rutherford MD  Answering service: [phone] OR [phone] from in house phone        NAME:  Kristine Carey III  :  1975  MRN:  [id number]      Admission Summary:   Israel Solitario III is a 55 y.o. male who came to [facility] specifically for admission and for evaluation by Dr Jordan Martinez - Hepatology- due to related to elevated bilirubin and suspected cirrhosis. He was referred for admission by his family physician. Mark Delgadillo has a complicated past medical history including suspectedcirrhosis that they believe is secondary to alcohol use/abuse as well as possibly Humira.  He used Humira from September until about 3 to 4 weeks ago due to [place] was admitted to [facility] in January. Mark Delgadillo states that he was therefore liver failure.  Diagnoses in epic which have crossed over but records not available are renal failure, metabolic acidosis, acute metabolic encephalopathy, immunocompromise, sepsis, liver mass which patient reports that he had an MRI for showing a fatty liver, Crohn's disease, macrocytic anemia and hyponatremia.  He does not know his baseline creatinine at this point.  Patient had labs drawn twice since then. Mark Delgadillo reports they are getting worse.  His creatinine at one time was as high as 9- but most recently 1.5  Patient has been more sleepy but sleeping regularly at night and not during the day.  Denies vomiting. Tiffany Schneider has an ostomy in the stool output is brown not black. Tiffany Schneider states that he has not drank alcohol in 6 weeks.  Patient denies any abdominal pain, fevers, chest pain.  He complains of some shortness of breath.  no N/D, no diarrhea, no rashes, + leg swelling,\"       Interval history / Subjective:   Awake, his ostomy bag is getting full and he states that he has to drain this himself as he states they do not do this right     Assessment & Plan:     Hyperbilirubinemia with jaundice- thought to be related to alcohol use  -liver US ordered and LFTs reviewed- hepatology consulted  -Further workup per hepatology, bilirubin high    Crohns  -humira on hold- ileostomy from prior surgery     Chronic hypotension in setting of liver disease  -on scheduled midodrine    Bilat LE edema  -venous dopplers neg for DVT  -no DVT prophylaxis due to autoanticoagulation with elevated INR    Coagulopathy- from liver disease  -high inr which he has been given vit k during hospital stay    Thrombocytopenia in setting of liver disease  -trend with intermittent labs  -transfuse if plt count less than 10K    Leukocytosis- UTI   -(2/10 blood cx ngtd)  -ID service evaluated patient with recs to observe without abx (no fever/no cough)    LYNSEY and CKD3 with acidosis with electrolyte derangement  -srcr relatively stable now, replete lytes as needed    Hypokalemia, hypomagnesemia, hypophosphatemia  -replete as needed  -intermittent labs to trend    Acute blood loss anemia  -received blood transfusion during this hospital stay  -hgb currently stable around 7.3       COVID + status- likely due to recent past infection and not reactivation from new infection  He is asymptomatic, as per protocol of isolation     Code status: Full    Prophylaxis:SCDs (due to recent acute blood loss anemia and high inr)  Care Plan discussed with: patient  Anticipated Disposition: [place] [address] Problems  Never Reviewed          Codes Class Noted POA    Acidosis ICD-10-CM: E87.2  ICD-9-CM: 276.2  2/10/2022 Unknown        Hyperbilirubinemia ICD-10-CM: E80.6  ICD-9-CM: 782.4  2/10/2022 Unknown        Cirrhosis of liver ([facility] [address] ICD-10-CM: K74.60  ICD-9-CM: 571.5  2/10/2022 Unknown                Review of Systems:   No cp/sob/headache/uncontrolled pain/subjective fevers/cough/etc. ROS other than noted above      Vital Signs:    Last 24hrs VS reviewed since prior progress note. Most recent are:  Visit Vitals  /63 (BP 1 Location: Left arm, BP Patient Position: At rest)   Pulse 95   Temp 98.9 °F (37.2 °C)   Resp 18   Ht 6' (1.829 m)   Wt 126.5 kg (278 lb 14.1 oz)   SpO2 96%   BMI 37.82 kg/m²     Patient Vitals for the past 24 hrs:   Temp Pulse Resp BP SpO2   02/19/22 1444 98.9 °F (37.2 °C) 95 18 100/63 96 %   02/19/22 1400 -- 96 -- -- --   02/19/22 1209 -- (!) 105 -- 105/64 --   02/19/22 1200 -- (!) 107 -- -- --   02/19/22 1000 -- 97 -- -- --   02/19/22 0901 98.3 °F (36.8 °C) 94 16 105/64 96 %   02/19/22 0600 -- 98 -- -- --   02/19/22 0400 98.2 °F (36.8 °C) 96 16 104/62 96 %   02/19/22 0200 -- (!) 112 -- -- --   02/18/22 2046 98.8 °F (37.1 °C) 90 18 (!) 99/59 97 %   02/18/22 2000 -- 94 -- -- --   02/18/22 1800 -- (!) 105 -- -- --       Intake/Output Summary (Last 24 hours) at 2/19/2022 1509  Last data filed at 2/19/2022 1445  Gross per 24 hour   Intake 120 ml   Output 5400 ml   Net -5280 ml        Physical Examination:           Constitutional:  No acute distress  Head: atraumatic  Eyes: icteric, no injection   ENT:  Oral mucosa moist, neck supple, no discharge   Resp:  limited exam, no obvious wheezing of anterior fields  Chest: No accessory muscle use, nonlabored breathing   CV:  Regular rhythm, normal rate, no rubs     GI:  Soft, distended, mild tenderness. Surgical scars, ostomy bag full    Musculoskeletal:  no pitting edema, symmetrical muscle tone    Neurologic:  Moves all extremities.   Wakes to voice but somnolent  Skin: warm, dry, jaundiced   Psych: does not voice si/hi/hallucinations, he seems annoyed regarding the management of his ostomy bag (he states that staff does not appropriately care for this so he states he drains the bag himself)            Data Review:       Labs:     Recent Labs     02/19/22 0353 02/18/22 0442   WBC 8.8 8.6   HGB 7.3* 7.5*   HCT 22.2* 23.0*   PLT 83* 79*     Recent Labs     02/19/22  0353 02/18/22 0442 02/17/22  0830 02/17/22 0459   * 134*  --  135*   K 2.7* 2.6*  --  2.9*   CL 94* 98  --  102   CO2 26 22  --  21   BUN 28* 24*  --  23*   CREA 2.47* 2.50*  --  2.39*   GLU 86 84  --  81   CA 9.2 9.1  --  9.4   MG 1.8 1.3* 1.4*  --    PHOS 1.2* 1.2* 1.1*  --      Recent Labs     02/19/22 0353 02/18/22 0442 02/17/22 0459   ALT  --   --  15   AP  --   --  59   TBILI  --   --  35.4*   TP  --   --  6.4   ALB 4.4 3.9 3.9   GLOB  --   --  2.5     Recent Labs     02/19/22 0353 02/18/22 0442 02/17/22 0459   INR 1.6* 1.8* 1.8*   PTP 16.7* 18.6* 18.6*          Medications Reviewed:     Current Facility-Administered Medications   Medication Dose Route Frequency    potassium chloride SR (KLOR-CON 10) tablet 20 mEq  20 mEq Oral BID    potassium, sodium phosphates (NEUTRA-PHOS) packet 2 Packet  2 Packet Oral QID    colestipoL (COLESTID) tablet 2 g  2 g Oral BID    diphenoxylate-atropine (LOMOTIL) tablet 1 Tablet  1 Tablet Oral QID PRN    midodrine (PROAMATINE) tablet 5 mg  5 mg Oral TID WITH MEALS    sodium bicarbonate tablet 1,300 mg  1,300 mg Oral BID    0.9% sodium chloride infusion 250 mL  250 mL IntraVENous PRN    albuterol (PROVENTIL HFA, VENTOLIN HFA, PROAIR HFA) inhaler 2 Puff  2 Puff Inhalation Q6H PRN    0.9% sodium chloride infusion 250 mL  250 mL IntraVENous PRN    0.9% sodium chloride infusion 250 mL  250 mL IntraVENous PRN    sodium chloride (NS) flush 5-40 mL  5-40 mL IntraVENous Q8H    sodium chloride (NS) flush 5-40 mL  5-40 mL IntraVENous PRN    polyethylene glycol (MIRALAX) packet 17 g  17 g Oral DAILY PRN    ondansetron (ZOFRAN) injection 4 mg  4 mg IntraVENous Q6H PRN    cyanocobalamin (VITAMIN B12) tablet 100 mcg  100 mcg Oral DAILY    folic acid (FOLVITE) tablet 1 mg  1 mg Oral DAILY    albumin human 25% (BUMINATE) solution 25 g  25 g IntraVENous Q6H    thiamine HCL (B-1) tablet 100 mg  100 mg Oral DAILY     ______________________________________________________________________  EXPECTED LENGTH OF STAY: 4d 16h  ACTUAL LENGTH OF STAY:          9                 Alfred Carey MD

## 2022-02-20 VITALS
WEIGHT: 278.88 LBS | HEIGHT: 72 IN | TEMPERATURE: 98.2 F | DIASTOLIC BLOOD PRESSURE: 79 MMHG | RESPIRATION RATE: 17 BRPM | SYSTOLIC BLOOD PRESSURE: 117 MMHG | HEART RATE: 92 BPM | OXYGEN SATURATION: 98 % | BODY MASS INDEX: 37.77 KG/M2

## 2022-02-20 PROBLEM — E87.20 ACIDOSIS: Status: RESOLVED | Noted: 2022-02-10 | Resolved: 2022-02-20

## 2022-02-20 LAB
ALBUMIN SERPL-MCNC: 4.5 G/DL (ref 3.5–5)
ALBUMIN SERPL-MCNC: 4.5 G/DL (ref 3.5–5)
ALBUMIN/GLOB SERPL: 1.7 {RATIO} (ref 1.1–2.2)
ALP SERPL-CCNC: 47 U/L (ref 45–117)
ALT SERPL-CCNC: 12 U/L (ref 12–78)
ANION GAP SERPL CALC-SCNC: 11 MMOL/L (ref 5–15)
AST SERPL-CCNC: 31 U/L (ref 15–37)
BASOPHILS # BLD: 0 K/UL (ref 0–0.1)
BASOPHILS NFR BLD: 0 % (ref 0–1)
BILIRUB DIRECT SERPL-MCNC: 23.4 MG/DL (ref 0–0.2)
BILIRUB SERPL-MCNC: 35.5 MG/DL (ref 0.2–1)
BUN SERPL-MCNC: 36 MG/DL (ref 6–20)
BUN/CREAT SERPL: 12 (ref 12–20)
CALCIUM SERPL-MCNC: 9.5 MG/DL (ref 8.5–10.1)
CHLORIDE SERPL-SCNC: 93 MMOL/L (ref 97–108)
CO2 SERPL-SCNC: 28 MMOL/L (ref 21–32)
CREAT SERPL-MCNC: 3.11 MG/DL (ref 0.7–1.3)
DIFFERENTIAL METHOD BLD: ABNORMAL
EOSINOPHIL # BLD: 0.2 K/UL (ref 0–0.4)
EOSINOPHIL NFR BLD: 2 % (ref 0–7)
ERYTHROCYTE [DISTWIDTH] IN BLOOD BY AUTOMATED COUNT: 19.8 % (ref 11.5–14.5)
GLOBULIN SER CALC-MCNC: 2.6 G/DL (ref 2–4)
GLUCOSE SERPL-MCNC: 89 MG/DL (ref 65–100)
HCT VFR BLD AUTO: 22.5 % (ref 36.6–50.3)
HGB BLD-MCNC: 7.4 G/DL (ref 12.1–17)
IMM GRANULOCYTES # BLD AUTO: 0 K/UL
IMM GRANULOCYTES NFR BLD AUTO: 0 %
INR PPP: 1.6 (ref 0.9–1.1)
LYMPHOCYTES # BLD: 2.1 K/UL (ref 0.8–3.5)
LYMPHOCYTES NFR BLD: 22 % (ref 12–49)
MCH RBC QN AUTO: 33.6 PG (ref 26–34)
MCHC RBC AUTO-ENTMCNC: 32.9 G/DL (ref 30–36.5)
MCV RBC AUTO: 102.3 FL (ref 80–99)
MONOCYTES # BLD: 0.5 K/UL (ref 0–1)
MONOCYTES NFR BLD: 5 % (ref 5–13)
NEUTS SEG # BLD: 6.8 K/UL (ref 1.8–8)
NEUTS SEG NFR BLD: 71 % (ref 32–75)
NRBC # BLD: 0 K/UL (ref 0–0.01)
NRBC BLD-RTO: 0 PER 100 WBC
PHOSPHATE SERPL-MCNC: 1.8 MG/DL (ref 2.6–4.7)
PLATELET # BLD AUTO: 98 K/UL (ref 150–400)
PLATELET COMMENTS,PCOM: ABNORMAL
PMV BLD AUTO: 10.8 FL (ref 8.9–12.9)
POTASSIUM SERPL-SCNC: 4.1 MMOL/L (ref 3.5–5.1)
PROT SERPL-MCNC: 7.1 G/DL (ref 6.4–8.2)
PROTHROMBIN TIME: 16.6 SEC (ref 9–11.1)
RBC # BLD AUTO: 2.2 M/UL (ref 4.1–5.7)
RBC MORPH BLD: ABNORMAL
RBC MORPH BLD: ABNORMAL
SODIUM SERPL-SCNC: 132 MMOL/L (ref 136–145)
WBC # BLD AUTO: 9.6 K/UL (ref 4.1–11.1)
WBC MORPH BLD: ABNORMAL

## 2022-02-20 PROCEDURE — 74011250637 HC RX REV CODE- 250/637: Performed by: INTERNAL MEDICINE

## 2022-02-20 PROCEDURE — 80076 HEPATIC FUNCTION PANEL: CPT

## 2022-02-20 PROCEDURE — 85025 COMPLETE CBC W/AUTO DIFF WBC: CPT

## 2022-02-20 PROCEDURE — 74011250637 HC RX REV CODE- 250/637: Performed by: FAMILY MEDICINE

## 2022-02-20 PROCEDURE — 74011250636 HC RX REV CODE- 250/636: Performed by: INTERNAL MEDICINE

## 2022-02-20 PROCEDURE — 85610 PROTHROMBIN TIME: CPT

## 2022-02-20 PROCEDURE — 74011000250 HC RX REV CODE- 250: Performed by: FAMILY MEDICINE

## 2022-02-20 PROCEDURE — 77010033678 HC OXYGEN DAILY

## 2022-02-20 PROCEDURE — 94760 N-INVAS EAR/PLS OXIMETRY 1: CPT

## 2022-02-20 PROCEDURE — 36415 COLL VENOUS BLD VENIPUNCTURE: CPT

## 2022-02-20 PROCEDURE — P9047 ALBUMIN (HUMAN), 25%, 50ML: HCPCS | Performed by: INTERNAL MEDICINE

## 2022-02-20 PROCEDURE — 80069 RENAL FUNCTION PANEL: CPT

## 2022-02-20 RX ORDER — SODIUM,POTASSIUM PHOSPHATES 280-250MG
POWDER IN PACKET (EA) ORAL
Qty: 40 PACKET | Refills: 0 | Status: SHIPPED | OUTPATIENT
Start: 2022-02-20 | End: 2022-03-02

## 2022-02-20 RX ORDER — CIPROFLOXACIN 500 MG/1
TABLET ORAL
Qty: 5 TABLET | Refills: 0 | Status: SHIPPED | OUTPATIENT
Start: 2022-02-20 | End: 2022-03-02

## 2022-02-20 RX ADMIN — SODIUM BICARBONATE 1300 MG: 650 TABLET ORAL at 09:17

## 2022-02-20 RX ADMIN — SODIUM CHLORIDE, PRESERVATIVE FREE 10 ML: 5 INJECTION INTRAVENOUS at 05:35

## 2022-02-20 RX ADMIN — POTASSIUM & SODIUM PHOSPHATES POWDER PACK 280-160-250 MG 3 PACKET: 280-160-250 PACK at 12:00

## 2022-02-20 RX ADMIN — MIDODRINE HYDROCHLORIDE 5 MG: 5 TABLET ORAL at 11:58

## 2022-02-20 RX ADMIN — ALBUMIN (HUMAN) 25 G: 0.25 INJECTION, SOLUTION INTRAVENOUS at 05:34

## 2022-02-20 RX ADMIN — POTASSIUM CHLORIDE 20 MEQ: 750 TABLET, EXTENDED RELEASE ORAL at 09:17

## 2022-02-20 RX ADMIN — MIDODRINE HYDROCHLORIDE 5 MG: 5 TABLET ORAL at 07:18

## 2022-02-20 RX ADMIN — Medication 100 MG: at 09:17

## 2022-02-20 RX ADMIN — POTASSIUM & SODIUM PHOSPHATES POWDER PACK 280-160-250 MG 3 PACKET: 280-160-250 PACK at 09:16

## 2022-02-20 RX ADMIN — DIPHENOXYLATE HYDROCHLORIDE AND ATROPINE SULFATE 1 TABLET: 2.5; .025 TABLET ORAL at 01:22

## 2022-02-20 RX ADMIN — Medication 400 MG: at 09:17

## 2022-02-20 RX ADMIN — VITAM B12 100 MCG: 100 TAB at 09:17

## 2022-02-20 RX ADMIN — FOLIC ACID 1 MG: 1 TABLET ORAL at 09:17

## 2022-02-20 RX ADMIN — DIPHENOXYLATE HYDROCHLORIDE AND ATROPINE SULFATE 1 TABLET: 2.5; .025 TABLET ORAL at 07:18

## 2022-02-20 RX ADMIN — COLESTIPOL HYDROCHLORIDE 2 G: 1 TABLET, FILM COATED ORAL at 09:17

## 2022-02-20 RX ADMIN — ALBUMIN (HUMAN) 25 G: 0.25 INJECTION, SOLUTION INTRAVENOUS at 11:58

## 2022-02-20 NOTE — PROGRESS NOTES
Problem: Discharge Planning  Goal: *Discharge to safe environment  Outcome: Progressing Towards Goal     Problem: Falls - Risk of  Goal: *Absence of Falls  Description: Document Mitchellelias Uriostegui Fall Risk and appropriate interventions in the flowsheet. Outcome: Progressing Towards Goal  Note: Fall Risk Interventions:  Mobility Interventions: Utilize walker, cane, or other assistive device         Medication Interventions: Evaluate medications/consider consulting pharmacy    Elimination Interventions: Call light in reach    History of Falls Interventions: Evaluate medications/consider consulting pharmacy         Problem: Patient Education: Go to Patient Education Activity  Goal: Patient/Family Education  Outcome: Progressing Towards Goal     Problem: Airway Clearance - Ineffective  Goal: Achieve or maintain patent airway  Outcome: Progressing Towards Goal     Problem: Gas Exchange - Impaired  Goal: Absence of hypoxia  Outcome: Progressing Towards Goal  Goal: Promote optimal lung function  Outcome: Progressing Towards Goal     Problem: Breathing Pattern - Ineffective  Goal: Ability to achieve and maintain a regular respiratory rate  Outcome: Progressing Towards Goal     Problem: Isolation Precautions - Risk of Spread of Infection  Goal: Prevent transmission of infectious organism to others  Outcome: Progressing Towards Goal     Problem: Risk for Fluid Volume Deficit  Goal: Maintain normal heart rhythm  Outcome: Progressing Towards Goal  Goal: Maintain absence of muscle cramping  Outcome: Progressing Towards Goal  Goal: Maintain normal serum potassium, sodium, calcium, phosphorus, and pH  Outcome: Progressing Towards Goal     Problem: Fatigue  Goal: Verbalize increase energy and improved vitality  Outcome: Progressing Towards Goal     Problem: Pressure Injury - Risk of  Goal: *Prevention of pressure injury  Description: Document Omar Scale and appropriate interventions in the flowsheet.   Outcome: Progressing Towards Goal  Note: Pressure Injury Interventions:       Moisture Interventions: Absorbent underpads    Activity Interventions: Increase time out of bed    Mobility Interventions: HOB 30 degrees or less    Nutrition Interventions: Document food/fluid/supplement intake

## 2022-02-20 NOTE — PROGRESS NOTES
3340 \[facility] [place] [facility]\"", MD, FACP, Erickson Mic Ibrahim, [place]      REMI Jeffrey, [facility]     Joann Lott, [facility]   Laureano Canseco ANDRÉS Cancino, [facility]       Nohelia Rejialycia [facility] 136    at [address]    [address] [place] [address], [facility] Severiano Lowe  22. [phone]    FAX: [address] [place] [address] Avenue    [address], [address]    [phone]    FAX: [phone]       HEPATOLOGY PROGRESS NOTE  The patient is a 55 y.o. male with Crohns disease. He has CT evidence of fatty liver dating back to 2019. In 9/2021 he was started on Humara for treatment of Crohns. In 1/2021 he was hospitalized at [facility] for jaundice with TBILI 9.0 and mild increase in INR to 1.38. He was DC to home in 1/20. He returned to the hospital on 1/26 with TBILI 19, INR 2.50. On 1/28 ETOH blood level was 8. He was treated with prednisone 15 mg every day for presumed alcohol induced hepatitis. US of the liver suggested fatty liver, cirrhosis, small amount of ascites. He was discharged to home on 2/7 with TBILI 19, INR 2.2, Scr 1.7 mg on prednisone 15 mg every day. I was called by his PCP Dr Laureano Cartwright in [place], [facility] about is case and I suggested he come to the ED at [facility]. In the ED Laboratory studies were significant for:    WBC 25, HB 7.3 gms, , Scr 2.27 mg, AST 72, ALT 59, TBILI 21 mg, INR 2.2,     Imaging of the liver with Ultrasound demonstrated fatty liver and moderate ascites. [facility] Hospital course:  SCr is has stabilized in 2.5 mg mg range  INR is stable at 1.6  He remains in isolation. Hepatology Plan:  Since he tested positive for COVID we cannot complete his pre-LT evaluation testing.   Since he is now very clinically stable, edema has resolved, ascites has resolved, TBILI, INR, Sc are all stable with minimal intervention, I suggest we send him home to complete his quarentene for COVID. We will then complete LT evaluation testing as an OP. ASSESSMENT AND PLAN:  Cirrhosis  The diagnosis of cirrhosis is based upon imaging, laboratory studies, complications of cirrhosis. Cirrhosis is secondary to alcohol. Serology for all other causes of cirrhosis are negative    On admission the CTP is 12. Child class C. The MELD score is 35. The patient has a MELD score greater than 15 and has developed complications of cirrhosis. Will initiate liver transplant evaluation testing. ECHO heart was normal.  Nuclear stress coud not be done because he tested COVID positive and has been in isolation. Alcohol liver disease  The diagnosis is based upon imaging showing fatty liver dating back to 2019, pattern of AST>ALT,   He was initially adamant that he only consumed 1-2 alcoholic beverages daily. However, upon further he did admit to drinking 1/2 gallon of burbon or 39 drinks per week. He was on prednisone for alcoholic hepatitis  This was stopped on admission since the risk of infection is > potential benefit    The patient has never been told he had a medical issue from alcohol. The first time he was ever hospitalized due to alcohol and liver disease was in 1/2022. He has never lost time from work due to alcohol. He has no DUI. Ascites   Ascites developed for the first time in 1/2022. Ascites hs now resolved   No diuretics at NE. Not clear if he ever had SBP during previous hospitalization. Would place on Cipro 500 mg every day for now to reduce the risk of SBP    Lower extremity edema  Edema has developed in 1/2022  This has resolved     Acute kidney injury  LYNSEY is secondary to the effects of cirrhosis, ETOH, diuretics. LYNSEY has improved with IV albumin  Scr is now stable in the 2.5 mg range.   Continue midodrine at DC.    Hepatic encephalopathy   He is alert and oriented and can carry on conversation. Serum ammonia was 10 and 44. Overt HE has not developed to date. There is no reason for treatment with lactulose of xifaxan    Anemia   This is due to multifactorial causes including portal hypertension with chronic GI blood loss, bone marrow suppression secondary to alcohol. Will obtain FE panel to assess for iron stores. Will need EGD at some point to assess for esophageal varices. Thrombocytopenia   This is secondary to cirrhosis. There is no evidence of overt bleeding. No treatment is required. The platelet count is adequate for the patient to undergo procedures without the need for platelet transfusion or platelet growth factors. Coagulopathy  This is secondary to cirrhosis, malnutrition from alcoholism  Will treat with 3 doses of Vitamin K over 3 days. There is no evidence of bleeding. No need for FFP at this time. PHYSICAL EXAMINATION:  VS: per nursing note  General:  Ill appearing  Eyes:  Sclera deeply icteric  ENT:  No oral lesions. Thyroid normal.  Nodes:  No adenopathy. Skin:  Spider angiomata. Jaundice. Respiratory:  Lungs clear to auscultation. Cardiovascular:  Regular heart rate. Abdomen:  Soft non-tender, Some ascites may be present. Extremities:  No lower extremity edema. Neurologic:  Alert and oriented. Tremor. Cranial nerves grossly intact. Asterixis present. LABORATORY:  Results for Patrick Loo (MRN [id number]) as of 2/19/2022 05:02   Ref.  Range 2/17/2022 04:59 2/18/2022 04:42 2/19/2022 03:53   WBC Latest Ref Range: 4.1 - 11.1 K/uL 11.3 (H) 8.6 8.8   HGB Latest Ref Range: 12.1 - 17.0 g/dL 7.6 (L) 7.5 (L) 7.3 (L)   PLATELET Latest Ref Range: 150 - 400 K/uL 90 (L) 79 (L) 83 (L)   INR Latest Ref Range: 0.9 - 1.1   1.8 (H) 1.8 (H) 1.6 (H)   Sodium Latest Ref Range: 136 - 145 mmol/L 135 (L) 134 (L)    Potassium Latest Ref Range: 3.5 - 5.1 mmol/L 2.9 (L) 2.6 (LL) Chloride Latest Ref Range: 97 - 108 mmol/L 102 98    CO2 Latest Ref Range: 21 - 32 mmol/L 21 22    Glucose Latest Ref Range: 65 - 100 mg/dL 81 84    BUN Latest Ref Range: 6 - 20 MG/DL 23 (H) 24 (H)    Creatinine Latest Ref Range: 0.70 - 1.30 MG/DL 2.39 (H) 2.50 (H)    Bilirubin, total Latest Ref Range: 0.2 - 1.0 MG/DL 35.4 (HH)     Albumin Latest Ref Range: 3.5 - 5.0 g/dL 3.9 3.9    ALT Latest Ref Range: 12 - 78 U/L 15     AST Latest Ref Range: 15 - 37 U/L 29     Alk. phosphatase Latest Ref Range: 45 - 117 U/L 59         RADIOLOGY:  Ultrasound of liver. Echogenic consistent with fatty liver. NO obvious cirrhosis. No liver mass lesions. No dilated bile ducts. Moderate ascites.       MD [address], [address] [place] [address] 22.  [phone]  [address] [place] [address]

## 2022-02-20 NOTE — PROGRESS NOTES
IV removed, discharge instructions reviewed, opportunity for questions provided. Pt discharged with belongings via RN.

## 2022-02-20 NOTE — DISCHARGE SUMMARY
Discharge Summary       PATIENT ID: Paula Meier  MRN: [id number]   YOB: 1975    DATE OF ADMISSION: 2/10/2022  2:54 PM    DATE OF DISCHARGE: 2/20/2022  PRIMARY CARE PROVIDER: Viri Hensley MD     ATTENDING PHYSICIAN:   DISCHARGING PROVIDER: Devante Flores MD    To contact this individual call [phone] and ask the  to page. If unavailable ask to be transferred the Adult Hospitalist Department. CONSULTATIONS: IP CONSULT TO HEPATOLOGY  IP CONSULT TO INFECTIOUS DISEASES    PROCEDURES/SURGERIES: * No surgery found *    DISCHARGE DIAGNOSES:   ADMISSION SUMMARY AND HOSPITAL COURSE:     Admitted as per hepatologist for rising bilirubin, in the works for liver transplant review eval.  Electrolyte derangement. Followed by both hepatologist and nephrologist while inpatient guiding the above items. Pt stabilized and hepatology recommended cipro 500 mg by mouth daily for empiric coverage. Will need repeat labs on Monday and appropriate followup. NOTIFY YOUR PHYSICIAN FOR ANY OF THE FOLLOWING:   Fever over 101 degrees for 24 hours. Chest pain, shortness of breath, fever, chills, nausea, vomiting, diarrhea, change in mentation, falling, weakness, bleeding. Severe pain or pain not relieved by medications, as well as any other signs or symptoms that you may have questions about.     FOLLOW UP APPOINTMENTS:    Follow-up Information     Follow up With Specialties Details Why Contact Info    Viri Hensley MD Family Medicine Call Call Will need repeat labs (cbc, renal panel, INR, magnesium) on Monday [address] [facility]  [phone]      Leveda Snellen, MD Hepatology Call Call office--Will need repeat labs (cbc, renal panel, INR, magnesium) on Monday [address] [place] [address] 9      Benji Alston MD Nephrology Call Call to schedule followup appt Call--Will need repeat labs (cbc, renal panel, INR, magnesium) on Monday [address]  [phone]               DIET: regular    ACTIVITY: as tolerated      DISCHARGE MEDICATIONS:  Current Discharge Medication List      START taking these medications    Details   ciprofloxacin HCl (CIPRO) 500 mg tablet Take one tablet by mouth daily as recommended by your liver specialist as empiric antibiotic coverage. You will need to see your liver specialist's office for repeat labwork  Qty: 5 Tablet, Refills: 0  Start date: 2/20/2022      potassium, sodium phosphates (NEUTRA-PHOS) 280-160-250 mg packet Take two packets by mouth twice daily. You will need to repeat labs on Monday to see if any changes needs to be made. Qty: 40 Packet, Refills: 0  Start date: 2/20/2022         CONTINUE these medications which have NOT CHANGED    Details   !! cholestyramine-aspartame (QUESTRAN LIGHT) 4 gram packet Take 4 g by mouth two (2) times a day. 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 diphenhydrAMINE (BENADRYL) 25 mg tablet Take 25 mg by mouth daily. diphenoxylate-atropine (LOMOTIL) 2.5-0.025 mg per tablet Take 2 Tablets by mouth every six (6) hours as needed. folic acid (FOLVITE) 1 mg tablet Take 1 mg by mouth.      midodrine (PROAMATINE) 10 mg tablet Take 10 mg by mouth three (3) times daily (with meals). prednisoLONE (ORAPRED) 15 mg/5 mL (3 mg/mL) solution Take 5 mL by mouth daily. 10 ml PO daily x 7 days, followed by 5 ml PO daily x7 days      sodium bicarbonate 650 mg tablet Take 650 mg by mouth two (2) times a day. cyanocobalamin (VITAMIN B12) 100 mcg tablet Take 100 mcg by mouth daily. adalimumab (Humira) 40 mg/0.8 mL injection 40 mg by SubCUTAneous route Once every 2 weeks. !! Prevalite 4 gram packet MIX 1 PACKET IN 8 OUNCES OF LIQUID TWICE A DAY       ! ! - Potential duplicate medications found. Please discuss with provider. DISPOSITION:    Home With:   OT  PT  HH  RN       Long term SNF/Inpatient Rehab   x Independent/assisted living    Hospice    Other:       PATIENT CONDITION AT DISCHARGE:     Functional status    Poor     Deconditioned    x Independent      Cognition   x  Lucid     Forgetful     Dementia        Code status   x  Full code     DNR      PHYSICAL EXAMINATION AT DISCHARGE:  General:          Alert, cooperative, no distress   HEENT:           Atraumatic, icteric  Neck:               Supple  Lungs:             Clear to auscultation bilaterally. No Wheezing or Rhonchi. No rales. Chest wall:      No tenderness  No Accessory muscle use. Heart:              Regular  rhythm,  No  rubs  Abdomen:        Soft, non-tender. Not distended. Bowel sounds normal, ostomy bag and old scars  Extremities:     No cyanosis. No clubbing,    Skin:                Jaundiced   Psych:             Not anxious or agitated.   Neurologic:      Alert, moves all extremities, answers questions appropriately and responds to commands       Greater than 30 minutes were spent with the patient on counseling and coordination of care    Signed:   Kimberly Cee MD  2/20/2022  12:15 PM

## 2022-02-20 NOTE — PROGRESS NOTES
Name: Mony Carrion MRN: [id number]   : 1975 [facility]   Date: 2022        IMPRESSION:   · LYNSEY of multifactorial origin - EtOH abuse, diuretics, cirrhosis with effective intravascular contraction. Patient has now Covid. Covid is a common culprit for LYNSEY as well. GFR seems to be stable. Patient's Scr was as high as 9.0 at the referring facility. Serum creatinine is gradually increasing. Patient has been in negative fluid balance for the last 5 days. Some vascular contraction is suspected. · Hypokalemia - poor oral intake and diuretics. K is improved, but remains bellow target. GI losses are contributing as well. K is now recovered. · Anemia. The etiology includes bone marrow suppression from cirrhosis, Fe deficiency is not present  · EtOH induced liver disease. Patient is being evaluated for possible liver transplant. · Covid +  · Hypophosphatemia, started on PO4 supplements. PO4 is improved. PLAN:   · Continue K supplementation. K should be kept around 4.0 considering liver failure, check the Mg level  · Continue PO4 supplementation . · Patient can be discharged on neutra phos 2 packet a day. · He will need f/u as outpatient with a local nephrologist     Subjective/Interval History:   I have reviewed the flowsheet and previous days notes. ROS:A comprehensive review of systems was negative except for that written in the HPI.     22 Patient is more alert, conversant. He is inquiring when he will be seen by Dr. Yousuf Stapleton.     Objective:   Vital Signs:    Visit Vitals  /74 (BP 1 Location: Left arm, BP Patient Position: At rest)   Pulse 93   Temp 98.8 °F (37.1 °C)   Resp 18   Ht 6' (1.829 m) Comment: 's license   Wt 622.4 kg (278 lb 14.1 oz)   SpO2 97%   BMI 37.82 kg/m²       O2 Device: None (Room air)       Temp (24hrs), Av °F (37.2 °C), Min:98.8 °F (37.1 °C), Max:99.1 °F (37.3 °C)       Intake/Output:   Last shift:      No intake/output data recorded. Last 3 shifts: 02/18 1901 - 02/20 0700  In: 290 [P.O.:240; I.V.:50]  Out: 5250 [Urine:900]    Intake/Output Summary (Last 24 hours) at 2/20/2022 1012  Last data filed at 2/19/2022 2304  Gross per 24 hour   Intake 170 ml   Output 2350 ml   Net -2180 ml        Physical Exam:  General:    Today - AAO x 3. Conversant  Head:   Normocephalic, without obvious abnormality, atraumatic. Eyes:   Incensed icterus. Nose:  Nares normal. No drainage or sinus tenderness. Throat:    Lips, mucosa, and tongue normal.  No Thrush  Neck:  Supple, symmetrical,  no adenopathy, thyroid: non tender    no carotid bruit and no JVD. Lungs:   Diminished to auscultation bilaterally. No Wheezing or Rhonchi. No rales. Chest wall:  No tenderness or deformity. No Accessory muscle use. Heart:   Regular rate and rhythm,  no murmur, rub or gallop. Abdomen:   Soft, non-tender. Distended. Bowel sounds normal.   Extremities: Extremities normal, atraumatic, No cyanosis. Trace to 1+ edema. Skin:     Texture, turgor normal. No rashes or lesions. Jaundiced  Psych:  Calm. Neurologic: Non focal.      DATA:  Labs:  Recent Labs     02/20/22  0558 02/19/22  0353 02/18/22  0442   * 131* 134*   K 4.1 2.7* 2.6*   CL 93* 94* 98   CO2 28 26 22   BUN 36* 28* 24*   CREA 3.11* 2.47* 2.50*   CA 9.5 9.2 9.1   ALB 4.5  4.5 4.4 3.9   PHOS 1.8* 1.2* 1.2*   MG  --  1.8 1.3*     Recent Labs     02/20/22  0558 02/19/22  0353 02/18/22 0442   WBC 9.6 8.8 8.6   HGB 7.4* 7.3* 7.5*   HCT 22.5* 22.2* 23.0*   PLT 98* 83* 79*     No results for input(s): PHILIPPE, KU, CLU, CREAU in the last 72 hours.     No lab exists for component: PROU    Total time spent with patient:  35 minutes    [] Critical Care Provided    Care Plan discussed with:   Staff, Medical Team    Shanita Edwards MD

## 2022-02-21 ENCOUNTER — PATIENT OUTREACH (OUTPATIENT)
Dept: CASE MANAGEMENT | Age: 47
End: 2022-02-21

## 2022-02-21 NOTE — PROGRESS NOTES
Patient contacted regarding COVID-19 diagnosis. Discussed COVID-19 related testing which was available at this time. Test results were positive. Patient informed of results, if available? yes. Care Transition Nurse contacted the patient by telephone to perform post discharge assessment. Call within 2 business days of discharge: Yes Verified name and  with patient as identifiers. Provided introduction to self, and explanation of the CTN/ACM role, and reason for call due to risk factors for infection and/or exposure to COVID-19. Symptoms reviewed with patient who verbalized the following symptoms: no new symptoms and no worsening symptoms  Denies SOB or cough      Due to no new or worsening symptoms encounter was not routed to provider for escalation. Discussed follow-up appointments. If no appointment was previously scheduled, appointment scheduling offered:  yes. [facility] follow up appointment(s):   Future Appointments   Date Time Provider Gabriele Cristobal   2022  8:30 AM Zack Hassan MD [facility]     [facility] follow up appointment(s): Patient is to call and make an appt with PCP for lab work for CBC, Renal, INR, and magnisium    Interventions to address risk factors: Scheduled appointment with PCP-Dr Larry Singleton, Scheduled appointment with Specialist-Dr Nancy Patterson 22 at 8:30am, Dr Obrien-Nephrology to call and schedule and Assessment and support for treatment adherence and medication management-COVID and electrolyte imbalance     Advance Care Planning:   Does patient have an Advance Directive: decision makers updated. Educated patient about risk for severe COVID-19 due to risk factors according to CDC guidelines. CTN reviewed discharge instructions, medical action plan and red flag symptoms with the patient who verbalized understanding. Discussed COVID vaccination status: yes. Education provided on COVID-19 vaccination as appropriate.  Discussed exposure protocols and quarantine with CDC Guidelines. Patient was given an opportunity to verbalize any questions and concerns and agrees to contact CTN or health care provider for questions related to their healthcare. Reviewed and educated patient on any new and changed medications related to discharge diagnosis     Was patient discharged with a pulse oximeter? no    CTN provided contact information. Plan for follow-up call in 5-7 days based on severity of symptoms and risk factors. PCP office called and informed of patient D/C on 2/20 and needing CBC, renal, INR, Mag to be drawn today.

## 2022-02-24 ENCOUNTER — DOCUMENTATION ONLY (OUTPATIENT)
Dept: HEMATOLOGY | Age: 47
End: 2022-02-24

## 2022-02-25 ENCOUNTER — TELEPHONE (OUTPATIENT)
Dept: HEMATOLOGY | Age: 47
End: 2022-02-25

## 2022-02-25 DIAGNOSIS — K74.60 CIRRHOSIS OF LIVER WITH ASCITES, UNSPECIFIED HEPATIC CIRRHOSIS TYPE (HCC): Primary | ICD-10-CM

## 2022-02-25 DIAGNOSIS — R18.8 CIRRHOSIS OF LIVER WITH ASCITES, UNSPECIFIED HEPATIC CIRRHOSIS TYPE (HCC): Primary | ICD-10-CM

## 2022-02-25 NOTE — TELEPHONE ENCOUNTER
Called patient to let him know cardiac stress test has been scheduled. Patient said he has another appointment that day, but he's not sure what time. I gave him Central Scheduling's phone number and asked him to call and reschedule, if needed. Patient confirmed he's aware of appointment with Dr. Artemio Crowe on 3/2/2022.

## 2022-03-02 ENCOUNTER — OFFICE VISIT (OUTPATIENT)
Dept: HEMATOLOGY | Age: 47
End: 2022-03-02
Payer: COMMERCIAL

## 2022-03-02 ENCOUNTER — HOSPITAL ENCOUNTER (OUTPATIENT)
Dept: LAB | Age: 47
Discharge: HOME | End: 2022-03-02
Payer: COMMERCIAL

## 2022-03-02 ENCOUNTER — PATIENT OUTREACH (OUTPATIENT)
Dept: CASE MANAGEMENT | Age: 47
End: 2022-03-02

## 2022-03-02 VITALS
HEART RATE: 71 BPM | SYSTOLIC BLOOD PRESSURE: 98 MMHG | HEIGHT: 72 IN | OXYGEN SATURATION: 97 % | BODY MASS INDEX: 29.36 KG/M2 | RESPIRATION RATE: 20 BRPM | WEIGHT: 216.8 LBS | TEMPERATURE: 97.6 F | DIASTOLIC BLOOD PRESSURE: 52 MMHG

## 2022-03-02 DIAGNOSIS — K70.30 ALCOHOLIC CIRRHOSIS OF LIVER WITHOUT ASCITES (HCC): ICD-10-CM

## 2022-03-02 DIAGNOSIS — K70.30 ALCOHOLIC CIRRHOSIS OF LIVER WITHOUT ASCITES (HCC): Primary | ICD-10-CM

## 2022-03-02 LAB
ALBUMIN SERPL-MCNC: 3.5 G/DL (ref 3.4–5)
ALBUMIN/GLOB SERPL: 1 {RATIO} (ref 0.8–1.7)
ALP SERPL-CCNC: 71 U/L (ref 45–117)
ALT SERPL-CCNC: 15 U/L (ref 16–61)
ANION GAP SERPL CALC-SCNC: 15 MMOL/L (ref 3–18)
AST SERPL-CCNC: 104 U/L (ref 10–38)
BASOPHILS # BLD: 0.1 K/UL (ref 0–0.1)
BASOPHILS NFR BLD: 1 % (ref 0–2)
BILIRUB DIRECT SERPL-MCNC: 29.8 MG/DL (ref 0–0.2)
BILIRUB SERPL-MCNC: 41.9 MG/DL (ref 0.2–1)
BUN SERPL-MCNC: 80 MG/DL (ref 7–18)
BUN/CREAT SERPL: 13 (ref 12–20)
CALCIUM SERPL-MCNC: 8.8 MG/DL (ref 8.5–10.1)
CHLORIDE SERPL-SCNC: 82 MMOL/L (ref 100–111)
CO2 SERPL-SCNC: 28 MMOL/L (ref 21–32)
CREAT SERPL-MCNC: 6.16 MG/DL (ref 0.6–1.3)
DIFFERENTIAL METHOD BLD: ABNORMAL
EOSINOPHIL # BLD: 0 K/UL (ref 0–0.4)
EOSINOPHIL NFR BLD: 0 % (ref 0–5)
ERYTHROCYTE [DISTWIDTH] IN BLOOD BY AUTOMATED COUNT: 18.7 % (ref 11.6–14.5)
GLOBULIN SER CALC-MCNC: 3.4 G/DL (ref 2–4)
GLUCOSE SERPL-MCNC: 91 MG/DL (ref 74–99)
HCT VFR BLD AUTO: 26.8 % (ref 36–48)
HGB BLD-MCNC: 9.1 G/DL (ref 13–16)
IMM GRANULOCYTES # BLD AUTO: 0.1 K/UL (ref 0–0.04)
IMM GRANULOCYTES NFR BLD AUTO: 1 % (ref 0–0.5)
INR PPP: 1.8 (ref 0.8–1.2)
LYMPHOCYTES # BLD: 1.1 K/UL (ref 0.9–3.6)
LYMPHOCYTES NFR BLD: 9 % (ref 21–52)
MCH RBC QN AUTO: 34 PG (ref 24–34)
MCHC RBC AUTO-ENTMCNC: 34 G/DL (ref 31–37)
MCV RBC AUTO: 100 FL (ref 78–100)
MONOCYTES # BLD: 1.4 K/UL (ref 0.05–1.2)
MONOCYTES NFR BLD: 11 % (ref 3–10)
NEUTS SEG # BLD: 9.9 K/UL (ref 1.8–8)
NEUTS SEG NFR BLD: 79 % (ref 40–73)
NRBC # BLD: 0 K/UL (ref 0–0.01)
NRBC BLD-RTO: 0 PER 100 WBC
PLATELET # BLD AUTO: 306 K/UL (ref 135–420)
PMV BLD AUTO: 11.3 FL (ref 9.2–11.8)
POTASSIUM SERPL-SCNC: 3.4 MMOL/L (ref 3.5–5.5)
PROT SERPL-MCNC: 6.9 G/DL (ref 6.4–8.2)
PROTHROMBIN TIME: 20.1 SEC (ref 11.5–15.2)
RBC # BLD AUTO: 2.68 M/UL (ref 4.35–5.65)
SODIUM SERPL-SCNC: 125 MMOL/L (ref 136–145)
WBC # BLD AUTO: 12.6 K/UL (ref 4.6–13.2)

## 2022-03-02 PROCEDURE — 85610 PROTHROMBIN TIME: CPT

## 2022-03-02 PROCEDURE — 85025 COMPLETE CBC W/AUTO DIFF WBC: CPT

## 2022-03-02 PROCEDURE — 99215 OFFICE O/P EST HI 40 MIN: CPT | Performed by: INTERNAL MEDICINE

## 2022-03-02 PROCEDURE — 80048 BASIC METABOLIC PNL TOTAL CA: CPT

## 2022-03-02 PROCEDURE — 36415 COLL VENOUS BLD VENIPUNCTURE: CPT

## 2022-03-02 PROCEDURE — 80076 HEPATIC FUNCTION PANEL: CPT

## 2022-03-02 NOTE — PROGRESS NOTES
03/02/22   Care Transitions Outreach Attempt-LMTCB.     Radha Cramer MSN, RN, CCM / Care Transition Nurse / [phone]

## 2022-03-02 NOTE — PROGRESS NOTES
Swapna Jones MD, Fl[place], [place], [place]      Leroy Godfrey, PA-C    Rosa Isela Agustin, [facility]     Joann Lott, [facility]   Gabriel Mcduffie P-C    Linda Catherine, [facility]       Nohelia Meehan Zay [address]    at [address], [facility] Severiano Lowe  22.    [phone]    FAX: [address] [place] [address], [address]    [phone]    FAX: [phone]       Patient Care Team:  Martha Gonzalez MD as PCP - General (Family Medicine)  Cheri Almonte, SAHIL as Nurse Navigator (Hepatology)  Debria Soulier, RN as Nurse Navigator (Hepatology)      Problem List  Date Reviewed: 3/29/2022          Codes Class Noted    Alcoholic liver disease [facility]) ICD-10-CM: K70.9  ICD-9-CM: 571.3  3/29/2022        Ascites ICD-10-CM: R18.8  ICD-9-CM: 789.59  3/29/2022        Anasarca ICD-10-CM: R60.1  ICD-9-CM: 782.3  4/41/7059        Alcoholic hepatitis KWQ-35-FV: K70.10  ICD-9-CM: 571.1  3/29/2022        Crohn disease ([facility] ICD-10-CM: K50.90  ICD-9-CM: 555.9  Unknown        Hyperbilirubinemia ICD-10-CM: E80.6  ICD-9-CM: 782.4  2/10/2022        Cirrhosis of liver [facility]) ICD-10-CM: K74.60  ICD-9-CM: 571.5  2/10/2022                This is the first office visit for Glenna Garcia III at [facility]. We participated in the care of this patient during a recent hospitalization at [facility] for treatment of cirrhosis secondary to alcoholic liver disease. The patient was discharged 1 week ago and this is the transitional care office visit. The hospital course, active problem list, all pertinent past medical history, medications, endoscopic studies, radiologic findings and laboratory findings related to the liver disorder were reviewed with the patient.       The patient is a 55 y.o. male with Crohns disease. He has CT evidence of fatty liver dating back to 2019. In 9/2021 he was started on Humara for treatment of Crohns. In 1/2021 he was hospitalized at [facility] for jaundice with TBILI 9.0 and mild increase in INR to 1.38. He was DC to home in 1/20.       He returned to the hospital on 1/26 with TBILI 19, INR 2.50. On 1/28 ETOH blood level was 8. He was treated with prednisone 15 mg every day for presumed alcohol induced hepatitis. US of the liver suggested fatty liver, cirrhosis, small amount of ascites. He was discharged to home on 2/7 with TBILI 19, INR 2.2, Scr 1.7 mg on prednisone 15 mg every day. He was then hospitalized at [facility] in 2/2022 for decompensated cirrhosis. During that hospitalization he underwent LT evaluation. After about 1 week in the hospital he tested positive for COVID and palced in isolation. He was asymptomatic from [facility]. The patient consumes 6+ alcoholic drinks per day and has done this for many years. He was initially adamant that he only consumed 2-3 drinks per day but after I reviewed how many bottles of burbon he buys per week-month he realized and admitted to more. The patient has been abstinent from all alcoholic beverages since 83/8373. The patient has the following symptoms which could be attributed to the liver disorder:    fatigue,   yellowing of the eyes or skin,   dark urine,   swelling of the abdomen,   swelling of the lower extremities,     The patient is not experiencing the following symptoms which are commonly seen in this liver disorder:   problems concentrating,   hematemesis,   hematochezia. The patient has Severe limitations in functional activities which can be attributed to the liver disease     ASSESSMENT AND PLAN:  Cirrhosis  The diagnosis of cirrhosis is based upon imaging, laboratory studies, complications of cirrhosis. Cirrhosis is secondary to alcohol.        The CTP is 11.  Child class C. The MELD score is 40.\\    Based upon the MELD and CTP scores the patient has a mortality of about 70% within the next 90 days     ADDENDUM:  After labs returned the patient was called and told to go to [facility] ED for admission and to complete LT evaluation. He will not survive unless he is accepted as a LT candidate. Serologic testing for causes of chronic liver disease was negative     Ascites   Ascites developed for the first time in 1/2022. Ascites is refractory to current doses of diuretics because of kidney disease     Lower extremity edema  Edema is refractory to the current doses of diuretics because of kidney disease     Screening for Esophageal varices   The patient has not had an EGD to screen for varices. Hepatic encephalopathy   Overt HE has not developed to date. There is no reason for treatment with lactulose of xifaxan    Thrombocytopenia   The patient had a PLT count in the 70-90 range   The PLT count is currently normal at 306. Anemia   This is due to multifactorial causes including recent GI bleeding and acute blood loss, bone marrow suppression secondary to   alcohol. Screening for Hepatocellular Carcinoma  [facility] screening has not been not been performed does not suggest [facility] Treatment of other medical problems in patients with chronic liver disease  There are no contraindications for the patient to take most medications that are necessary for treatment of other medical issues. The patient has cirrhrosis and should avoid taking NSAIDs which are associated with a higher rate of developing LYNSEY. The patient had HE and should avoid taking Benzodiazapines which could exacerbate HE. Counseling for alcohol in patients with chronic liver disease  The patient has not consumed alcohol since 12/2021. Unfortunately, the patient is too ill for alcohol rehab.     The LT program will have to make a decision to offer LT or not based upon his promise never to consume alcohol again. Leta Cote ALLERGIES  Allergies   Allergen Reactions    Naprosyn [Naproxen] Itching    Other Medication Other (comments)     Some Crohns disease medication       MEDICATIONS  Current Outpatient Medications   Medication Sig    cholestyramine-aspartame (QUESTRAN LIGHT) 4 gram packet Take 4 g by mouth two (2) times a day. 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  diphenoxylate-atropine (LOMOTIL) 2.5-0.025 mg per tablet Take 2 Tablets by mouth every six (6) hours as needed.  folic acid (FOLVITE) 1 mg tablet Take 1 mg by mouth.  midodrine (PROAMATINE) 10 mg tablet Take 10 mg by mouth three (3) times daily (with meals).  sodium bicarbonate 650 mg tablet Take 650 mg by mouth two (2) times a day.  cyanocobalamin (VITAMIN B12) 100 mcg tablet Take 100 mcg by mouth daily. No current facility-administered medications for this visit. SYSTEM REVIEW NOT RELATED TO LIVER DISEASE OR REVIEWED ABOVE:  Constitution systems: Negative for fever, chills, weight gain, weight loss. Eyes: Negative for visual changes. ENT: Negative for sore throat, painful swallowing. Respiratory: Negative for cough, hemoptysis, SOB. Cardiology: Negative for chest pain, palpitations. GI:  Negative for constipation or diarrhea. : Negative for urinary frequency, dysuria, hematuria, nocturia. Skin: Negative for rash. Hematology: Negative for easy bruising, blood clots. Musculo-skelatal: Negative for back pain, muscle pain, weakness. Neurologic: Negative for headaches, dizziness, vertigo, memory problems not related to HE. Psychology: Negative for anxiety, depression. FAMILY HISTORY:  The father  Has/had the following following chronic disease(s): complications of Agent Organge. The mother  of DM. There is no family history of liver disease.       SOCIAL HISTORY:  The patient is . The patient has 1 child. The patient has never used tobacco products. The patient admits to consuming 1-2 alcoholic beverages per day. I suspect there is a lot more. The patient currently works full time for [employer]. PHYSICAL EXAMINATION:  Visit Vitals  BP (!) 98/52 (BP 1 Location: Left arm, BP Patient Position: Sitting, BP Cuff Size: Adult)   Pulse 71   Temp 97.6 °F (36.4 °C) (Tympanic)   Resp 20   Ht 6' (1.829 m)   Wt 216 lb 12.8 oz (98.3 kg)   SpO2 97%   BMI 29.40 kg/m²     General: Lethargic. Eyes: Sclera deeply icteric. ENT: No oral lesions. Thyroid normal.  Nodes: No adenopathy. Skin: No spider angiomata. No jaundice. No palmar erythema. Respiratory: Lungs clear to auscultation. Cardiovascular: Regular heart rate. No murmurs. No JVD. Abdomen: Distended with some ascites. Extremities: 3+ lower edema. Neurologic: Alert and oriented. Cranial nerves grossly intact. No asterixis. LABORATORY STUDIES:  Liver [place] of [address] [place] [address] & Units 3/2/2022   WBC 4.6 - 13.2 K/uL 12.6   ANC 1.8 - 8.0 K/UL 9.9 (H)   HGB 13.0 - 16.0 g/dL 9.1 (L)    - 420 K/uL 306   INR 0.8 - 1.2   1.8 (H)   AST 10 - 38 U/L 104 (H)   ALT 16 - 61 U/L 15 (L)   Alk Phos 45 - 117 U/L 71   Bili, Total 0.2 - 1.0 MG/DL 41.9 (HH)   Bili, Direct 0.0 - 0.2 MG/DL 29.8 (H)   Albumin 3.4 - 5.0 g/dL 3.5   BUN 7.0 - 18 MG/DL 80 (H)   Creat 0.6 - 1.3 MG/DL 6.16 (H)   Na 136 - 145 mmol/L 125 (L)   K 3.5 - 5.5 mmol/L 3.4 (L)   Cl 100 - 111 mmol/L 82 (L)   CO2 21 - 32 mmol/L 28   Glucose 74 - 99 mg/dL 91     SEROLOGIES:  Not available or performed. Testing will be performed as indicated. LIVER HISTOLOGY:  Not available or performed    ENDOSCOPIC PROCEDURES:  Not available or performed    RADIOLOGY:  2/2022. Ultrasound of liver. Echogenic consistent with cirrhosis. No liver mass lesions. No dilated bile ducts. Moderate ascites.     OTHER TESTING:  Not available or performed    FOLLOW-UP:  All of the issues listed above in the Assessment and Plan were discussed with the patient. All questions were answered. The patient expressed a clear understanding of the above. Follow-up [address] 32 after he undergoes LT. If he does not undergo LT he will not survive.         Zach Morales MD  [address]  [address] [place] [address], [address] [place] [address]  Jada Friday, [address]  12 [facility]

## 2022-03-03 ENCOUNTER — TELEPHONE (OUTPATIENT)
Dept: HEMATOLOGY | Age: 47
End: 2022-03-03

## 2022-03-03 NOTE — TELEPHONE ENCOUNTER
Received phone call from Lincoln gutiérrez, [facility] lab yesterday at 4:55pm regarding critical lab value, T.Bili=41.9, for this pt. Went to Dr. Jc Forrest to report lab result, he was on Zoom call. Sent Dr. Jc Forrest a text message regarding these critical lab values and requested he check \"staff messages\" in [facility] for more info. This morning sent Dr. Jc Forrest a text message regarding the critical lab value for this pt requesting feedback.

## 2022-03-03 NOTE — TELEPHONE ENCOUNTER
Reviewed labs with Dr. Ignacio Kent. MELD score is 40. Dr. Ignacio Kent has recommended hospitalization at [facility] for urgent liver transplant evaluation. Dr. Ignacio Kent contacted Dr. Kwok Carlee [facility] hepatology) and made him aware we are advising patient to report to [facility] ED. Called patient and reviewed lab results. I told him he has a high risk of deterioration and death without medical attention. I relayed message from Dr. Ignacio Kent that his liver isn't going to recover and he needs to be hospitalized at a transplant center, where he can be considered for urgent liver transplant. I advised him to report to [facility] ED and let him know Dr. Ignacio Kent has already spoken with a liver doctor there. I offered to contact patient's wife since she isn't there with him, but he said she'd be home from work in 10 minutes and he'd let her know. I stressed importance of getting to [facility]. E-mail update sent to Sarah Waggoner pre-liver transplant coordinator.

## 2022-03-03 NOTE — TELEPHONE ENCOUNTER
Patient's wife called to get more information after  relayed message. I spoke with wife, reviewed plan, and answered her questions to the best of my ability.

## 2022-03-11 ENCOUNTER — PATIENT OUTREACH (OUTPATIENT)
Dept: CASE MANAGEMENT | Age: 47
End: 2022-03-11

## 2022-03-11 NOTE — PROGRESS NOTES
03/11/22   Patient is currently in a  COVID Episode, is currently admitted at [facility] for a liver transplant. Episode closed.     Billy Herring MSN, RN, CCM / Care Transition Nurse / [phone]

## 2022-03-18 PROBLEM — K74.60 CIRRHOSIS OF LIVER (HCC): Status: ACTIVE | Noted: 2022-02-10

## 2022-03-19 PROBLEM — E80.6 HYPERBILIRUBINEMIA: Status: ACTIVE | Noted: 2022-02-10

## 2022-03-29 PROBLEM — R18.8 ASCITES: Status: ACTIVE | Noted: 2022-03-29

## 2022-03-29 PROBLEM — R60.1 ANASARCA: Status: ACTIVE | Noted: 2022-03-29

## 2022-03-29 PROBLEM — K70.9 ALCOHOLIC LIVER DISEASE (HCC): Status: ACTIVE | Noted: 2022-03-29

## 2022-03-29 PROBLEM — K70.10 ALCOHOLIC HEPATITIS: Status: ACTIVE | Noted: 2022-03-29

## 2022-08-03 ENCOUNTER — OFFICE VISIT (OUTPATIENT)
Dept: HEMATOLOGY | Age: 47
End: 2022-08-03
Payer: COMMERCIAL

## 2022-08-03 VITALS
DIASTOLIC BLOOD PRESSURE: 78 MMHG | OXYGEN SATURATION: 95 % | WEIGHT: 186.13 LBS | BODY MASS INDEX: 25.24 KG/M2 | SYSTOLIC BLOOD PRESSURE: 108 MMHG | TEMPERATURE: 96.9 F | HEART RATE: 112 BPM

## 2022-08-03 DIAGNOSIS — Z79.60 LONG-TERM USE OF IMMUNOSUPPRESSANT MEDICATION: ICD-10-CM

## 2022-08-03 DIAGNOSIS — Z94.4 LIVER TRANSPLANT RECIPIENT (HCC): ICD-10-CM

## 2022-08-03 DIAGNOSIS — K70.9 ALCOHOLIC LIVER DISEASE (HCC): Primary | ICD-10-CM

## 2022-08-03 PROCEDURE — 99215 OFFICE O/P EST HI 40 MIN: CPT | Performed by: INTERNAL MEDICINE

## 2022-08-03 RX ORDER — MIRTAZAPINE 30 MG/1
TABLET, FILM COATED ORAL
COMMUNITY
End: 2022-09-22 | Stop reason: DRUGHIGH

## 2022-08-03 RX ORDER — ONDANSETRON 4 MG/1
4 TABLET, FILM COATED ORAL
COMMUNITY

## 2022-08-03 RX ORDER — ASPIRIN 81 MG/1
TABLET ORAL DAILY
COMMUNITY

## 2022-08-03 RX ORDER — DAPSONE 25 MG/1
50 TABLET ORAL DAILY
COMMUNITY

## 2022-08-03 RX ORDER — METOCLOPRAMIDE 10 MG/1
10 TABLET ORAL 2 TIMES DAILY
COMMUNITY

## 2022-08-03 RX ORDER — PANTOPRAZOLE SODIUM 40 MG/1
40 TABLET, DELAYED RELEASE ORAL DAILY
COMMUNITY
End: 2022-08-04 | Stop reason: SDUPTHER

## 2022-08-03 RX ORDER — URSODIOL 300 MG/1
300 CAPSULE ORAL 2 TIMES DAILY
COMMUNITY
End: 2022-09-22 | Stop reason: SDUPTHER

## 2022-08-03 RX ORDER — DRONABINOL 5 MG/1
10 CAPSULE ORAL 2 TIMES DAILY
COMMUNITY

## 2022-08-03 RX ORDER — TACROLIMUS 1 MG/1
1 CAPSULE ORAL EVERY 12 HOURS
COMMUNITY

## 2022-08-03 RX ORDER — MYCOPHENOLIC ACID 360 MG/1
1080 TABLET, DELAYED RELEASE ORAL 4 TIMES DAILY
COMMUNITY
End: 2022-08-04 | Stop reason: DRUGHIGH

## 2022-08-03 RX ORDER — GLUCOSAMINE SULFATE 1500 MG
5000 POWDER IN PACKET (EA) ORAL DAILY
COMMUNITY

## 2022-08-03 NOTE — Clinical Note
8/27/2022    Patient: Verna Croft III   YOB: 1975   Date of Visit: 8/3/2022     Yong Moreno MD  [place] [address]  Via Fax: [phone]    Dear Yong Moreno MD,      Thank you for referring Mr. Verna Croft to [address] [place] [address],[address] for evaluation. My notes for this consultation are attached. If you have questions, please do not hesitate to call me. I look forward to following your patient along with you.       Sincerely,    Jhonatan Chahal MD

## 2022-08-03 NOTE — PROGRESS NOTES
[address] [facility], [place], [address], [place], [place]      REMI Syed, [facility]-BC     Joann Lott, [facility]   COLTON Rodrigues, [facility]       Nohelia Deputado Zay [address] 136    at [address], [facility] Severiano Lowe  22.    [phone]    FAX: [address] [place] [address]    at [address], [address]    [phone]    FAX: [phone]     Patient Care Team:  Loc Chacon MD as PCP - General (Family Medicine)  Antonino Ram RN as Nurse Navigator (Hepatology)  Navarro Sabillon RN as Nurse Navigator (Hepatology)      Problem List  Date Reviewed: 8/27/2022            Codes Class Noted    Liver transplant recipient [facility]) ICD-10-CM: Z94.4  ICD-9-CM: V42.7  8/27/2022        Long-term use of immunosuppressant medication ICD-10-CM: Z79.899  ICD-9-CM: V58.69  8/27/2022        Liver transplant complication [facility]) VRU-98-YK: T86.40  ICD-9-CM: 996.82  4/15/9046        Alcoholic liver disease ([facility] ICD-10-CM: K70.9  ICD-9-CM: 571.3  3/29/2022        Crohn disease ([facility] ICD-10-CM: K50.90  ICD-9-CM: 555.9  Unknown           Rocío Rice III returns to the [facility] for management of liver transplant graft function, to monitor and adjust immune suppression and to assess for recurrence of the primary liver disease. The active problem list, all pertinent past medical history, medications, liver histology, endoscopic studies, radiologic findings and laboratory findings related to the liver disorder were reviewed with the patient. The patient underwent a liver transplant at [facility] in 3/2022. This is the first appointment back at [facility] since undergoing liver transplantation.     The patient is taking the following for immune suppression: Tacrolimus, Myfortic    The patient has the following symptoms which could be attributed to the liver disorder:    fatigue,   Weak in the legs,  Poor appetite    The patient is not experiencing the following symptoms which are commonly seen in this liver disorder:   fevers,   chills,   diffuse abdominal pain,   swelling of the abdomen,   swelling of the lower extremities,     The patient has Severe limitations in functional activities which can be attributed to the liver disease       ASSESSMENT AND PLAN:  Liver transplant   This was for cirrhosis secondary to severe alcoholic hepatitis  The date of the LT was 3/2022. Liver transaminases are normal.  ALP is elevated. Liver function is normal.  The platelet count is normal.      Immune Suppression  Tacrolimus Is being taken at a dose of 1 mg BID. This is causing   No significant adverse effects. The immune suppression blood level is in the proper range. Will continue the current dose. Myphortic Is being taken at a dose of 1080 mg QID. This is causing no significant side effects. Will continue the current dose     The patient is no longer taking prednsione. Prevention of infections  The patient is taking Nystatin swish and swallow to prevent thrush. This will continue until 3 months post-LT  The patient is taking valcyte to prevent recurrence of CMV infection. This will continue for 3 months post-LT. The patient is taking bactrim to prevent PCP pneumonia. This will continue for 6 months post-LT. Chronic kidney injury   The Screat is 5.7 mg  The patient is on HD TIW. Aspirin and NSAIDs should be avoided since these agents can worsen renal insufficiency. Hypercholesterolemia   This can be caused by immune suppression. Serum cholesterol is normal and does not need to be treated at this time. Hypertension   This is a common side effect of immune suppression.     Blood pressure is normal and does not need to be treated at this time. Low serum magnesium   This is a common side effect of immune suppression. The patient has a normal or near normal magnesium level and does not need supplementation. Osteoporosis   Osteoporosis is common in patients with cirhrosis prior to liver transplant. The patient was too ill to undergo DEXA scan prior to LT. Monitoring for skin Cancer  The patient was counseled regarding increased risk of skin cancer in transplant recipients and need to have any new skin lesions evaluated by dermatology and removed if suspicious. The patient was instructed to see Dermatology annually examination. Vaccinations  Routine vaccinations against other bacterial and viral agents can be performed as long as this is with attentuatted virus. Live virus vaccines should not be administered. Annual flu vaccination should be administered. The patient has received 2 doses of COVID-19 vaccine. Allergies   Allergen Reactions    Naprosyn [Naproxen] Itching    Other Medication Other (comments)     Some Crohns disease medication       Current Outpatient Medications   Medication Sig    pantoprazole (PROTONIX) 40 mg tablet Take 1 Tablet by mouth Daily (before breakfast). Indications: gastroesophageal reflux disease    mycophenolate sodium (Myfortic) 360 mg delayed release tablet Take 3 Tablets by mouth four (4) times daily for 90 days. ursodioL (ACTIGALL) 300 mg capsule Take 300 mg by mouth two (2) times a day. tacrolimus (PROGRAF) 1 mg capsule Take 1 mg by mouth every twelve (12) hours. 4mg BID    dapsone 25 mg tablet Take 50 mg by mouth in the morning. aspirin delayed-release 81 mg tablet Take  by mouth daily. dronabinoL (MARINOL) 5 mg capsule Take 10 mg by mouth two (2) times a day. metoclopramide HCl (REGLAN) 10 mg tablet Take 10 mg by mouth two (2) times a day.     cholecalciferol (VITAMIN D3) 25 mcg (1,000 unit) cap Take 5,000 Units by mouth in the morning. ondansetron hcl (ZOFRAN) 4 mg tablet Take 4 mg by mouth every eight (8) hours as needed for Nausea or Vomiting.    mirtazapine (REMERON) 30 mg tablet Take  by mouth nightly. No current facility-administered medications for this visit. SYSTEM REVIEW NOT RELATED TO LIVER DISEASE OR REVIEWED ABOVE:  Constitution systems: Negative for fever, chills, weight gain, weight loss. Eyes: Negative for visual changes. ENT: Negative for sore throat, painful swallowing. Respiratory: Negative for cough, hemoptysis, SOB. Cardiology: Negative for chest pain, palpitations. GI:  Negative for constipation or diarrhea. : Negative for urinary frequency, dysuria, hematuria, nocturia. Skin: Negative for rash. Hematology: Negative for easy bruising, blood clots. Musculo-skelatal: Negative for back pain, muscle pain, weakness. Neurologic: Negative for headaches, dizziness, vertigo, memory problems not related to HE. Psychology: Negative for anxiety, depression. FAMILY HISTORY:  The father  Has/had the following following chronic disease(s): complications of Agent Organge. The mother  of DM. There is no family history of liver disease. SOCIAL HISTORY:  The patient is . The patient has 1 child. The patient has never used tobacco products. The patient admits to consuming 41-48 alcoholic beverages per day. The patient currently works full time for [employer]. PHYSICAL EXAMINATION:  Visit Vitals  /78   Pulse (!) 112   Temp 96.9 °F (36.1 °C) (Tympanic)   Wt 186 lb 2 oz (84.4 kg)   SpO2 95%   BMI 25.24 kg/m²     General: No acute distress. Eyes: Sclera anicteric. ENT: No oral lesions. Thyroid normal.  Nodes: No adenopathy. Skin: No spider angiomata. No jaundice. No palmar erythema. Respiratory: Lungs clear to auscultation. Cardiovascular: Regular heart rate. No murmurs. No JVD.   Abdomen: Normal liver transplant incision that is well healed. Soft non-tender. Liver size normal to percussion/palpation. Spleen not palpable. No obvious ascites. Extremities: No edema. Muscle wasting. Neurologic: Alert and oriented. Cranial nerves grossly intact. LABORATORY STUDIES:  From 8/2022  AST/ALT/ALP/T Bili/ALB:  35/15/658/1.2/3.9  WBC/HB/PLT/INR:  8.4/10.4/218  NA/BUN/CREAT:  137/19/5.7  MG. 1.6  TAC:  7.8    SEROLOGIES:  Not available or performed. Testing was performed today. LIVER HISTOLOGY:  Not available or performed    ENDOSCOPIC PROCEDURES:  Not available or performed    RADIOLOGY:  Not available or performed    OTHER TESTING:  Not available or performed    FOLLOW-UP:  All of the issues listed above in the Assessment and Plan were discussed with the patient. All questions were answered. The patient expressed a clear understanding of the above. Laboratory studies should be performed twice weekly to assess liver graft function and blood levels of immune suppression.     The patient has a follow-up appointment at the liver transplant center in 11/14/2022     Follow-up [address] 2 weeks       Thang Galloway MD  [address]  [address] [place] [address] Rd, [address]  12 [facility]

## 2022-08-04 RX ORDER — MYCOPHENOLIC ACID 180 MG/1
180 TABLET, DELAYED RELEASE ORAL EVERY 12 HOURS
COMMUNITY
Start: 2022-07-22 | End: 2022-08-04 | Stop reason: DRUGHIGH

## 2022-08-04 RX ORDER — PANTOPRAZOLE SODIUM 40 MG/1
40 TABLET, DELAYED RELEASE ORAL
Qty: 90 TABLET | Refills: 3 | Status: SHIPPED | OUTPATIENT
Start: 2022-08-04 | End: 2022-09-22 | Stop reason: SDUPTHER

## 2022-08-04 RX ORDER — MYCOPHENOLIC ACID 360 MG/1
1080 TABLET, DELAYED RELEASE ORAL 4 TIMES DAILY
Qty: 360 TABLET | Refills: 2 | Status: ON HOLD | OUTPATIENT
Start: 2022-08-04 | End: 2022-09-02 | Stop reason: DRUGHIGH

## 2022-08-10 ENCOUNTER — TELEPHONE (OUTPATIENT)
Dept: HEMATOLOGY | Age: 47
End: 2022-08-10

## 2022-08-10 DIAGNOSIS — Z94.4 LIVER TRANSPLANT RECIPIENT (HCC): ICD-10-CM

## 2022-08-10 DIAGNOSIS — Z94.4 LIVER TRANSPLANT RECIPIENT (HCC): Primary | ICD-10-CM

## 2022-08-10 DIAGNOSIS — K50.919 CROHN'S DISEASE WITH COMPLICATION, UNSPECIFIED GASTROINTESTINAL TRACT LOCATION (HCC): Primary | ICD-10-CM

## 2022-08-10 NOTE — TELEPHONE ENCOUNTER
Per Dr. Lashanda Petty, patient needs to have a CT Enterography. Scheduled patient at [facility], [place]  , [place], [place], on 8/26/22 @ 1000, to arrive at 0830. Pt to not have food 4 hours prior to procedure, needs to drink a lot of water 24 hours prior to procedure. Patient may take his medications with water only. All of this was communicated to the patient who verbally confirmed understanding. detailed exam

## 2022-08-19 ENCOUNTER — TELEPHONE (OUTPATIENT)
Dept: HEMATOLOGY | Age: 47
End: 2022-08-19

## 2022-08-19 NOTE — TELEPHONE ENCOUNTER
Called patient to review recent lab results, no answer, went to voicemail. Called patients wife, Natalia Maurer, no answer, left voicemail message that patients labs looked good, stable. Reminded her of patients appointment with Dr. Maite Benjamin on Monday, 8/22/22 @ 4:15.

## 2022-08-22 ENCOUNTER — OFFICE VISIT (OUTPATIENT)
Dept: HEMATOLOGY | Age: 47
End: 2022-08-22
Payer: COMMERCIAL

## 2022-08-22 VITALS
WEIGHT: 185 LBS | SYSTOLIC BLOOD PRESSURE: 125 MMHG | BODY MASS INDEX: 25.06 KG/M2 | OXYGEN SATURATION: 94 % | HEART RATE: 111 BPM | HEIGHT: 72 IN | DIASTOLIC BLOOD PRESSURE: 82 MMHG | TEMPERATURE: 97.7 F

## 2022-08-22 DIAGNOSIS — Z94.4 LIVER TRANSPLANT RECIPIENT (HCC): Primary | ICD-10-CM

## 2022-08-22 PROCEDURE — 99215 OFFICE O/P EST HI 40 MIN: CPT | Performed by: INTERNAL MEDICINE

## 2022-08-22 NOTE — PROGRESS NOTES
[facility], later [facility], [place] [employer] E[place]unil Ibrahim, [place]      REMI Syed, [facility]-BC     Joann Lott, [facility]   COLTON Rodrigues, [facility]       Nohelia Deputado Zay De [address]    at [address], [facility] Severiano Lowe  22.    [phone]    FAX: [address] [place] [address], [address]    [phone]    FAX: [phone]     Patient Care Team:  Loc Chacon MD as PCP - General (Family Medicine)  Antonino Ram RN as Nurse Navigator (Hepatology)  Navarro Sabillon RN as Nurse Navigator (Hepatology)      Problem List  Date Reviewed: 8/27/2022            Codes Class Noted    Liver transplant recipient [facility]) ICD-10-CM: Z94.4  ICD-9-CM: V42.7  8/27/2022        Long-term use of immunosuppressant medication ICD-10-CM: Z79.899  ICD-9-CM: V58.69  8/27/2022        Liver transplant complication [facility]) RIP-98-MU: T86.40  ICD-9-CM: 996.82  8/06/9747        Alcoholic liver disease ([facility] ICD-10-CM: K70.9  ICD-9-CM: 571.3  3/29/2022        Crohn disease ([facility] ICD-10-CM: K50.90  ICD-9-CM: 555.9  Unknown           Rocío Rice III returns to the [address] for management of liver transplant graft function, to monitor and adjust immune suppression and to assess for recurrence of the primary liver disease. The active problem list, all pertinent past medical history, medications, liver histology, endoscopic studies, radiologic findings and laboratory findings related to the liver disorder were reviewed with the patient. The patient underwent a liver transplant at [facility] [place] in 3/2022. This is the first appointment back at [facility] since undergoing liver transplantation.     The patient is taking the following for immune suppression: Tacrolimus, Myfortic    The patient has the following symptoms which could be attributed to the liver disorder:    fatigue,   Legs are getting stronger and he walked into the office without a Rolex. His appetite has really picked up. The patient is not experiencing the following symptoms which are commonly seen in this liver disorder:   fevers,   chills,   diffuse abdominal pain,   swelling of the abdomen,   swelling of the lower extremities,     The patient has Severe limitations in functional activities which can be attributed to the liver disease       ASSESSMENT AND PLAN:  Liver transplant   This was for cirrhosis secondary to severe alcoholic hepatitis  The date of the LT was 3/2022. Liver transaminases are normal.  ALP is elevated. Liver function is normal.  The platelet count is normal.      Immune Suppression  Tacrolimus Is being taken at a dose of 1 mg BID. This is causing   No significant adverse effects. The immune suppression blood level is in the proper range. Will continue the current dose. Myphortic Is being taken at a dose of 1080 mg QID. This is causing no significant side effects. Will continue the current dose     The patient is no longer taking prednsione. Prevention of infections  The patient is taking Nystatin swish and swallow to prevent thrush. This will continue until 3 months post-LT  The patient is taking valcyte to prevent recurrence of CMV infection. This will continue for 3 months post-LT. The patient is taking bactrim to prevent PCP pneumonia. This will continue for 6 months post-LT. Chronic kidney injury   The Screat is down to 4.4 mg  HE is making urine. The patient remains on HD TIW. Aspirin and NSAIDs should be avoided since these agents can worsen renal insufficiency. Hypercholesterolemia   This can be caused by immune suppression.     Serum cholesterol is normal and does not need to be treated at this time. Hypertension   This is a common side effect of immune suppression. Blood pressure is normal and does not need to be treated at this time. Low serum magnesium   This is a common side effect of immune suppression. The patient has a normal or near normal magnesium level and does not need supplementation. Osteoporosis   Osteoporosis is common in patients with cirhrosis prior to liver transplant. The patient was too ill to undergo DEXA scan prior to LT. Monitoring for skin Cancer  The patient was counseled regarding increased risk of skin cancer in transplant recipients and need to have any new skin lesions evaluated by dermatology and removed if suspicious. The patient was instructed to see Dermatology annually examination. Vaccinations  Routine vaccinations against other bacterial and viral agents can be performed as long as this is with attentuatted virus. Live virus vaccines should not be administered. Annual flu vaccination should be administered. The patient has received 2 doses of COVID-19 vaccine. Allergies   Allergen Reactions    Naprosyn [Naproxen] Itching    Other Medication Other (comments)     Some Crohns disease medication       Current Outpatient Medications   Medication Sig    pantoprazole (PROTONIX) 40 mg tablet Take 1 Tablet by mouth Daily (before breakfast). Indications: gastroesophageal reflux disease    mycophenolate sodium (Myfortic) 360 mg delayed release tablet Take 3 Tablets by mouth four (4) times daily for 90 days. ursodioL (ACTIGALL) 300 mg capsule Take 300 mg by mouth two (2) times a day. tacrolimus (PROGRAF) 1 mg capsule Take 1 mg by mouth every twelve (12) hours. 4mg BID    dapsone 25 mg tablet Take 50 mg by mouth in the morning. aspirin delayed-release 81 mg tablet Take  by mouth daily. dronabinoL (MARINOL) 5 mg capsule Take 10 mg by mouth two (2) times a day.     metoclopramide HCl (REGLAN) 10 mg tablet Take 10 mg by mouth two (2) times a day. cholecalciferol (VITAMIN D3) 25 mcg (1,000 unit) cap Take 5,000 Units by mouth in the morning. ondansetron hcl (ZOFRAN) 4 mg tablet Take 4 mg by mouth every eight (8) hours as needed for Nausea or Vomiting.    mirtazapine (REMERON) 30 mg tablet Take  by mouth nightly. No current facility-administered medications for this visit. SYSTEM REVIEW NOT RELATED TO LIVER DISEASE OR REVIEWED ABOVE:  Constitution systems: Negative for fever, chills, weight gain, weight loss. Eyes: Negative for visual changes. ENT: Negative for sore throat, painful swallowing. Respiratory: Negative for cough, hemoptysis, SOB. Cardiology: Negative for chest pain, palpitations. GI:  Negative for constipation or diarrhea. : Negative for urinary frequency, dysuria, hematuria, nocturia. Skin: Negative for rash. Hematology: Negative for easy bruising, blood clots. Musculo-skelatal: Negative for back pain, muscle pain, weakness. Neurologic: Negative for headaches, dizziness, vertigo, memory problems not related to HE. Psychology: Negative for anxiety, depression. FAMILY HISTORY:  The father  Has/had the following following chronic disease(s): complications of Agent Organge. The mother  of DM. There is no family history of liver disease. SOCIAL HISTORY:  The patient is . The patient has 1 child. The patient has never used tobacco products. The patient admits to consuming 62-52 alcoholic beverages per day. The patient currently works full time for [employer]. PHYSICAL EXAMINATION:  Visit Vitals  /82   Pulse (!) 111   Temp 97.7 °F (36.5 °C)   Ht 6' (1.829 m)   Wt 185 lb (83.9 kg)   SpO2 94%   BMI 25.09 kg/m²     General: No acute distress. Eyes: Sclera anicteric. ENT: No oral lesions. Thyroid normal.  Nodes: No adenopathy. Skin: No spider angiomata. No jaundice. No palmar erythema.   Respiratory: Lungs clear to auscultation. Cardiovascular: Regular heart rate. No murmurs. No JVD. Abdomen: Normal liver transplant incision that is well healed. Soft non-tender. Liver size normal to percussion/palpation. Spleen not palpable. No obvious ascites. Extremities: No edema. Muscle wasting. Neurologic: Alert and oriented. Cranial nerves grossly intact. LABORATORY STUDIES:  From 8/2022  AST/ALT/ALP/T Bili/ALB:  35/15/658/1.2/3.9  WBC/HB/PLT/INR:  8.4/10.4/218  NA/BUN/CREAT:  137/19/5.7  MG. 1.6  TAC:  7.8    SEROLOGIES:  Not available or performed. Testing was performed today. LIVER HISTOLOGY:  Not available or performed    ENDOSCOPIC PROCEDURES:  Not available or performed    RADIOLOGY:  Not available or performed    OTHER TESTING:  Not available or performed    FOLLOW-UP:  All of the issues listed above in the Assessment and Plan were discussed with the patient. All questions were answered. The patient expressed a clear understanding of the above. Laboratory studies should be performed twice weekly to assess liver graft function and blood levels of immune suppression.     The patient has a follow-up appointment at the liver transplant center in 11/14/2022     Follow-up [place] [address] 2 weeks       Patricio Barragan MD  [address]  [address] [place] [address] 58, [address]  12 [facility]

## 2022-08-22 NOTE — Clinical Note
8/27/2022    Patient: Jesu Dunn III   YOB: 1975   Date of Visit: 8/22/2022     Maxi Torres MD  [address] [place] [id number]  Via Fax: [phone]    Dear Maxi Torres MD,      Thank you for referring Mr. Jesu Dunn to [address] [place] [address],[address] for evaluation. My notes for this consultation are attached. If you have questions, please do not hesitate to call me. I look forward to following your patient along with you.       Sincerely,    Rae Silva MD

## 2022-08-24 DIAGNOSIS — Z79.60 LONG-TERM USE OF IMMUNOSUPPRESSANT MEDICATION: ICD-10-CM

## 2022-08-24 DIAGNOSIS — Z94.4 LIVER TRANSPLANT RECIPIENT (HCC): Primary | ICD-10-CM

## 2022-08-25 ENCOUNTER — TELEPHONE (OUTPATIENT)
Dept: HEMATOLOGY | Age: 47
End: 2022-08-25

## 2022-08-25 NOTE — TELEPHONE ENCOUNTER
Received call from patients wife, Pranav Kendrick, stating the patient is in dialysis ([address] in [place]) and informed them he needed a blood transfusion 2/2 hemoglobin at 7.0. Asked Jeffy Quevedo NP, if patient needed a blood transfusion and she replied, \"No, he does not. \" Informed Pranav Kendrick he does not need a blood transfusion. \" Patient will have labs drawn tomorrow at his CT Enterograph at [facility].

## 2022-08-26 LAB
A-G RATIO,AGRAT: 1 RATIO (ref 1.1–2.6)
ABSOLUTE LYMPHOCYTE COUNT, 10803: 2.5 K/UL (ref 1–4.8)
ALBUMIN SERPL-MCNC: 3.5 G/DL (ref 3.5–5)
ALP SERPL-CCNC: 382 U/L (ref 25–115)
ALT SERPL-CCNC: 9 U/L (ref 5–40)
ANION GAP SERPL CALC-SCNC: 13 MMOL/L (ref 3–15)
AST SERPL W P-5'-P-CCNC: 23 U/L (ref 10–37)
BASOPHILS # BLD: 0 K/UL (ref 0–0.2)
BASOPHILS NFR BLD: 0 % (ref 0–2)
BILIRUB SERPL-MCNC: 1.2 MG/DL (ref 0.2–1.2)
BUN SERPL-MCNC: 23 MG/DL (ref 6–22)
CALCIUM SERPL-MCNC: 9.1 MG/DL (ref 8.4–10.5)
CHLORIDE SERPL-SCNC: 99 MMOL/L (ref 98–110)
CO2 SERPL-SCNC: 25 MMOL/L (ref 20–32)
CREAT SERPL-MCNC: 4.8 MG/DL (ref 0.5–1.2)
EOSINOPHIL # BLD: 0.1 K/UL (ref 0–0.5)
EOSINOPHIL NFR BLD: 2 % (ref 0–6)
ERYTHROCYTE [DISTWIDTH] IN BLOOD BY AUTOMATED COUNT: 14.9 % (ref 10–15.5)
GLOBULIN,GLOB: 3.4 G/DL (ref 2–4)
GLOMERULAR FILTRATION RATE: 14.4 ML/MIN/1.73 SQ.M.
GLUCOSE SERPL-MCNC: 82 MG/DL (ref 70–99)
GRANULOCYTES,GRANS: 47 % (ref 40–75)
HCT VFR BLD AUTO: 21.8 % (ref 39.3–51.6)
HGB BLD-MCNC: 6.7 G/DL (ref 13.1–17.2)
LYMPHOCYTES, LYMLT: 43 % (ref 20–45)
MACROCYTOSIS,MACRO: ABNORMAL
MAGNESIUM SERPL-MCNC: 1.5 MG/DL (ref 1.6–2.5)
MCH RBC QN AUTO: 30 PG (ref 26–34)
MCHC RBC AUTO-ENTMCNC: 31 G/DL (ref 31–36)
MCV RBC AUTO: 97 FL (ref 80–95)
MONOCYTES # BLD: 0.4 K/UL (ref 0.1–1)
MONOCYTES NFR BLD: 7 % (ref 3–12)
NEUTROPHILS # BLD AUTO: 2.7 K/UL (ref 1.8–7.7)
PLATELET # BLD AUTO: 220 K/UL (ref 140–440)
PMV BLD AUTO: 9.4 FL (ref 9–13)
POTASSIUM SERPL-SCNC: 4.5 MMOL/L (ref 3.5–5.5)
PROGRAF LEVEL (FK506), 10002: 5.3 NG/ML (ref 2–20)
PROT SERPL-MCNC: 6.9 G/DL (ref 6.4–8.3)
RBC # BLD AUTO: 2.25 M/UL (ref 3.8–5.8)
SMEAR EVAL, 1131: ABNORMAL
SODIUM SERPL-SCNC: 137 MMOL/L (ref 133–145)
WBC # BLD AUTO: 5.8 K/UL (ref 4–11)

## 2022-08-27 PROBLEM — Z94.4 LIVER TRANSPLANT RECIPIENT (HCC): Status: ACTIVE | Noted: 2022-08-27

## 2022-08-27 PROBLEM — K74.60 CIRRHOSIS OF LIVER (HCC): Status: RESOLVED | Noted: 2022-02-10 | Resolved: 2022-08-27

## 2022-08-27 PROBLEM — R60.1 ANASARCA: Status: RESOLVED | Noted: 2022-03-29 | Resolved: 2022-08-27

## 2022-08-27 PROBLEM — K70.10 ALCOHOLIC HEPATITIS: Status: RESOLVED | Noted: 2022-03-29 | Resolved: 2022-08-27

## 2022-08-27 PROBLEM — T86.40 LIVER TRANSPLANT COMPLICATION (HCC): Status: ACTIVE | Noted: 2022-08-27

## 2022-08-27 PROBLEM — Z79.60 LONG-TERM USE OF IMMUNOSUPPRESSANT MEDICATION: Status: ACTIVE | Noted: 2022-08-27

## 2022-08-27 PROBLEM — E80.6 HYPERBILIRUBINEMIA: Status: RESOLVED | Noted: 2022-02-10 | Resolved: 2022-08-27

## 2022-08-27 PROBLEM — R18.8 ASCITES: Status: RESOLVED | Noted: 2022-03-29 | Resolved: 2022-08-27

## 2022-08-31 LAB
A-G RATIO,AGRAT: 1.1 RATIO (ref 1.1–2.6)
ABSOLUTE LYMPHOCYTE COUNT, 10803: 2.6 K/UL (ref 1–4.8)
ALBUMIN SERPL-MCNC: 3.7 G/DL (ref 3.5–5)
ALP SERPL-CCNC: 372 U/L (ref 25–115)
ALT SERPL-CCNC: 10 U/L (ref 5–40)
ANION GAP SERPL CALC-SCNC: 15 MMOL/L (ref 3–15)
AST SERPL W P-5'-P-CCNC: 25 U/L (ref 10–37)
BASOPHILS # BLD: 0 K/UL (ref 0–0.2)
BASOPHILS NFR BLD: 0 % (ref 0–2)
BILIRUB SERPL-MCNC: 1.2 MG/DL (ref 0.2–1.2)
BUN SERPL-MCNC: 28 MG/DL (ref 6–22)
CALCIUM SERPL-MCNC: 9.2 MG/DL (ref 8.4–10.5)
CHLORIDE SERPL-SCNC: 100 MMOL/L (ref 98–110)
CO2 SERPL-SCNC: 23 MMOL/L (ref 20–32)
CREAT SERPL-MCNC: 4.8 MG/DL (ref 0.5–1.2)
EOSINOPHIL # BLD: 0.1 K/UL (ref 0–0.5)
EOSINOPHIL NFR BLD: 2 % (ref 0–6)
ERYTHROCYTE [DISTWIDTH] IN BLOOD BY AUTOMATED COUNT: 15.5 % (ref 10–15.5)
GLOBULIN,GLOB: 3.4 G/DL (ref 2–4)
GLOMERULAR FILTRATION RATE: 14.4 ML/MIN/1.73 SQ.M.
GLUCOSE SERPL-MCNC: 91 MG/DL (ref 70–99)
GRANULOCYTES,GRANS: 52 % (ref 40–75)
HCT VFR BLD AUTO: 20.4 % (ref 39.3–51.6)
HGB BLD-MCNC: 6.1 G/DL (ref 13.1–17.2)
LYMPHOCYTES, LYMLT: 39 % (ref 20–45)
MAGNESIUM SERPL-MCNC: 1.6 MG/DL (ref 1.6–2.5)
MCH RBC QN AUTO: 30 PG (ref 26–34)
MCHC RBC AUTO-ENTMCNC: 30 G/DL (ref 31–36)
MCV RBC AUTO: 100 FL (ref 80–95)
MONOCYTES # BLD: 0.5 K/UL (ref 0.1–1)
MONOCYTES NFR BLD: 7 % (ref 3–12)
NEUTROPHILS # BLD AUTO: 3.4 K/UL (ref 1.8–7.7)
PLATELET # BLD AUTO: 251 K/UL (ref 140–440)
PMV BLD AUTO: 9.7 FL (ref 9–13)
POTASSIUM SERPL-SCNC: 4.9 MMOL/L (ref 3.5–5.5)
PROGRAF LEVEL (FK506), 10002: 4.6 NG/ML (ref 2–20)
PROT SERPL-MCNC: 7.1 G/DL (ref 6.4–8.3)
RBC # BLD AUTO: 2.04 M/UL (ref 3.8–5.8)
SODIUM SERPL-SCNC: 138 MMOL/L (ref 133–145)
WBC # BLD AUTO: 6.6 K/UL (ref 4–11)

## 2022-09-01 ENCOUNTER — TELEPHONE (OUTPATIENT)
Dept: HEMATOLOGY | Age: 47
End: 2022-09-01

## 2022-09-01 ENCOUNTER — HOSPITAL ENCOUNTER (INPATIENT)
Age: 47
LOS: 2 days | Discharge: HOME | DRG: 682 | End: 2022-09-03
Attending: EMERGENCY MEDICINE | Admitting: INTERNAL MEDICINE
Payer: COMMERCIAL

## 2022-09-01 DIAGNOSIS — N18.6 ESRD (END STAGE RENAL DISEASE) (HCC): ICD-10-CM

## 2022-09-01 DIAGNOSIS — D84.9 IMMUNOCOMPROMISED (HCC): ICD-10-CM

## 2022-09-01 DIAGNOSIS — Z94.4 LIVER TRANSPLANT STATUS (HCC): ICD-10-CM

## 2022-09-01 DIAGNOSIS — D64.9 SEVERE ANEMIA: Primary | ICD-10-CM

## 2022-09-01 PROBLEM — Z99.2 ESRD ON HEMODIALYSIS (HCC): Status: ACTIVE | Noted: 2022-09-01

## 2022-09-01 PROBLEM — E43 SEVERE PROTEIN-CALORIE MALNUTRITION (HCC): Status: ACTIVE | Noted: 2022-09-01

## 2022-09-01 LAB
ALBUMIN SERPL-MCNC: 2.9 G/DL (ref 3.4–5)
ALBUMIN/GLOB SERPL: 0.7 {RATIO} (ref 0.8–1.7)
ALP SERPL-CCNC: 310 U/L (ref 45–117)
ALT SERPL-CCNC: 13 U/L (ref 16–61)
ANION GAP SERPL CALC-SCNC: 3 MMOL/L (ref 3–18)
AST SERPL-CCNC: 27 U/L (ref 10–38)
BASOPHILS # BLD: 0 K/UL (ref 0–0.1)
BASOPHILS NFR BLD: 1 % (ref 0–2)
BILIRUB SERPL-MCNC: 1.1 MG/DL (ref 0.2–1)
BUN SERPL-MCNC: 16 MG/DL (ref 7–18)
BUN/CREAT SERPL: 5 (ref 12–20)
CALCIUM SERPL-MCNC: 8.9 MG/DL (ref 8.5–10.1)
CHLORIDE SERPL-SCNC: 104 MMOL/L (ref 100–111)
CO2 SERPL-SCNC: 30 MMOL/L (ref 21–32)
CREAT SERPL-MCNC: 3.27 MG/DL (ref 0.6–1.3)
DIFFERENTIAL METHOD BLD: ABNORMAL
EOSINOPHIL # BLD: 0.1 K/UL (ref 0–0.4)
EOSINOPHIL NFR BLD: 2 % (ref 0–5)
ERYTHROCYTE [DISTWIDTH] IN BLOOD BY AUTOMATED COUNT: 15.8 % (ref 11.6–14.5)
GLOBULIN SER CALC-MCNC: 4.2 G/DL (ref 2–4)
GLUCOSE SERPL-MCNC: 95 MG/DL (ref 74–99)
HCT VFR BLD AUTO: 18.3 % (ref 36–48)
HEMOCCULT STL QL: NEGATIVE
HGB BLD-MCNC: 5.6 G/DL (ref 13–16)
HISTORY CHECKED?,CKHIST: NORMAL
IMM GRANULOCYTES # BLD AUTO: 0 K/UL (ref 0–0.04)
IMM GRANULOCYTES NFR BLD AUTO: 0 % (ref 0–0.5)
LYMPHOCYTES # BLD: 1.5 K/UL (ref 0.9–3.6)
LYMPHOCYTES NFR BLD: 33 % (ref 21–52)
MCH RBC QN AUTO: 30.9 PG (ref 24–34)
MCHC RBC AUTO-ENTMCNC: 30.6 G/DL (ref 31–37)
MCV RBC AUTO: 101.1 FL (ref 78–100)
MONOCYTES # BLD: 0.3 K/UL (ref 0.05–1.2)
MONOCYTES NFR BLD: 7 % (ref 3–10)
NEUTS SEG # BLD: 2.7 K/UL (ref 1.8–8)
NEUTS SEG NFR BLD: 57 % (ref 40–73)
NRBC # BLD: 0 K/UL (ref 0–0.01)
NRBC BLD-RTO: 0 PER 100 WBC
PLATELET # BLD AUTO: 205 K/UL (ref 135–420)
PLATELET COMMENTS,PCOM: ABNORMAL
PMV BLD AUTO: 10.4 FL (ref 9.2–11.8)
POTASSIUM SERPL-SCNC: 4.2 MMOL/L (ref 3.5–5.5)
PROT SERPL-MCNC: 7.1 G/DL (ref 6.4–8.2)
RBC # BLD AUTO: 1.81 M/UL (ref 4.35–5.65)
RBC MORPH BLD: ABNORMAL
SODIUM SERPL-SCNC: 137 MMOL/L (ref 136–145)
WBC # BLD AUTO: 4.6 K/UL (ref 4.6–13.2)

## 2022-09-01 PROCEDURE — 86900 BLOOD TYPING SEROLOGIC ABO: CPT

## 2022-09-01 PROCEDURE — 82272 OCCULT BLD FECES 1-3 TESTS: CPT

## 2022-09-01 PROCEDURE — C9113 INJ PANTOPRAZOLE SODIUM, VIA: HCPCS | Performed by: HOSPITALIST

## 2022-09-01 PROCEDURE — 36430 TRANSFUSION BLD/BLD COMPNT: CPT

## 2022-09-01 PROCEDURE — 80053 COMPREHEN METABOLIC PANEL: CPT

## 2022-09-01 PROCEDURE — 93005 ELECTROCARDIOGRAM TRACING: CPT

## 2022-09-01 PROCEDURE — 83540 ASSAY OF IRON: CPT

## 2022-09-01 PROCEDURE — 86923 COMPATIBILITY TEST ELECTRIC: CPT

## 2022-09-01 PROCEDURE — 82607 VITAMIN B-12: CPT

## 2022-09-01 PROCEDURE — 65270000046 HC RM TELEMETRY

## 2022-09-01 PROCEDURE — 74011000250 HC RX REV CODE- 250: Performed by: HOSPITALIST

## 2022-09-01 PROCEDURE — 85025 COMPLETE CBC W/AUTO DIFF WBC: CPT

## 2022-09-01 PROCEDURE — 99285 EMERGENCY DEPT VISIT HI MDM: CPT

## 2022-09-01 PROCEDURE — P9016 RBC LEUKOCYTES REDUCED: HCPCS

## 2022-09-01 PROCEDURE — 74011250636 HC RX REV CODE- 250/636: Performed by: HOSPITALIST

## 2022-09-01 PROCEDURE — 94762 N-INVAS EAR/PLS OXIMTRY CONT: CPT

## 2022-09-01 RX ORDER — DRONABINOL 2.5 MG/1
10 CAPSULE ORAL 2 TIMES DAILY
Status: DISCONTINUED | OUTPATIENT
Start: 2022-09-01 | End: 2022-09-03 | Stop reason: HOSPADM

## 2022-09-01 RX ORDER — ONDANSETRON 2 MG/ML
4 INJECTION INTRAMUSCULAR; INTRAVENOUS
Status: DISCONTINUED | OUTPATIENT
Start: 2022-09-01 | End: 2022-09-03 | Stop reason: HOSPADM

## 2022-09-01 RX ORDER — PROMETHAZINE HYDROCHLORIDE 25 MG/1
12.5 TABLET ORAL
Status: DISCONTINUED | OUTPATIENT
Start: 2022-09-01 | End: 2022-09-03 | Stop reason: HOSPADM

## 2022-09-01 RX ORDER — URSODIOL 300 MG/1
300 CAPSULE ORAL 2 TIMES DAILY
Status: DISCONTINUED | OUTPATIENT
Start: 2022-09-01 | End: 2022-09-03 | Stop reason: HOSPADM

## 2022-09-01 RX ORDER — SODIUM CHLORIDE 9 MG/ML
250 INJECTION, SOLUTION INTRAVENOUS AS NEEDED
Status: DISCONTINUED | OUTPATIENT
Start: 2022-09-01 | End: 2022-09-03 | Stop reason: HOSPADM

## 2022-09-01 RX ORDER — TACROLIMUS 1 MG/1
1 CAPSULE ORAL EVERY 12 HOURS
Status: DISCONTINUED | OUTPATIENT
Start: 2022-09-01 | End: 2022-09-03 | Stop reason: HOSPADM

## 2022-09-01 RX ORDER — ACETAMINOPHEN 650 MG/1
650 SUPPOSITORY RECTAL
Status: DISCONTINUED | OUTPATIENT
Start: 2022-09-01 | End: 2022-09-01

## 2022-09-01 RX ORDER — ACETAMINOPHEN 325 MG/1
650 TABLET ORAL
Status: DISCONTINUED | OUTPATIENT
Start: 2022-09-01 | End: 2022-09-01

## 2022-09-01 RX ORDER — SODIUM CHLORIDE 0.9 % (FLUSH) 0.9 %
5-40 SYRINGE (ML) INJECTION AS NEEDED
Status: DISCONTINUED | OUTPATIENT
Start: 2022-09-01 | End: 2022-09-03 | Stop reason: HOSPADM

## 2022-09-01 RX ORDER — FACIAL-BODY WIPES
10 EACH TOPICAL DAILY PRN
Status: DISCONTINUED | OUTPATIENT
Start: 2022-09-01 | End: 2022-09-03 | Stop reason: HOSPADM

## 2022-09-01 RX ORDER — SODIUM CHLORIDE 0.9 % (FLUSH) 0.9 %
5-40 SYRINGE (ML) INJECTION EVERY 8 HOURS
Status: DISCONTINUED | OUTPATIENT
Start: 2022-09-01 | End: 2022-09-03 | Stop reason: HOSPADM

## 2022-09-01 RX ORDER — METOCLOPRAMIDE 5 MG/1
10 TABLET ORAL 2 TIMES DAILY
Status: DISCONTINUED | OUTPATIENT
Start: 2022-09-01 | End: 2022-09-03 | Stop reason: HOSPADM

## 2022-09-01 RX ORDER — DAPSONE 25 MG/1
50 TABLET ORAL DAILY
Status: DISCONTINUED | OUTPATIENT
Start: 2022-09-02 | End: 2022-09-03 | Stop reason: HOSPADM

## 2022-09-01 RX ORDER — MIRTAZAPINE 15 MG/1
7.5 TABLET, FILM COATED ORAL
Status: DISCONTINUED | OUTPATIENT
Start: 2022-09-02 | End: 2022-09-03 | Stop reason: HOSPADM

## 2022-09-01 RX ORDER — MELATONIN
5000 DAILY
Status: DISCONTINUED | OUTPATIENT
Start: 2022-09-02 | End: 2022-09-03 | Stop reason: HOSPADM

## 2022-09-01 RX ORDER — PANTOPRAZOLE SODIUM 40 MG/1
40 TABLET, DELAYED RELEASE ORAL
Status: DISCONTINUED | OUTPATIENT
Start: 2022-09-02 | End: 2022-09-01

## 2022-09-01 RX ADMIN — SODIUM CHLORIDE 40 MG: 9 INJECTION, SOLUTION INTRAMUSCULAR; INTRAVENOUS; SUBCUTANEOUS at 22:22

## 2022-09-01 NOTE — Clinical Note
Status[de-identified] INPATIENT [101]   Type of Bed: Telemetry [19]   Cardiac Monitoring Required?: Yes   Inpatient Hospitalization Certified Necessary for the Following Reasons: 3.  Patient receiving treatment that can only be provided in an inpatient setting (further clarification in H&P documentation)   Admitting Diagnosis: Severe anemia [[id number]]   Admitting Diagnosis: ESRD (end stage renal disease) [facility] [[id number]]   Admitting Physician: Gerhardt Lamy [[id number]]   Attending Physician: Gerhardt Lamy [[id number]]   Estimated Length of Stay: 3-4 Midnights   Discharge Plan[de-identified] Home with Office Follow-up

## 2022-09-01 NOTE — CONSENT
Informed Consent for Blood Component Transfusion Note    I have discussed with the patient the rationale for blood component transfusion; its benefits in treating or preventing fatigue, organ damage, or death; and its risk which includes mild transfusion reactions, rare risk of blood borne infection, or more serious but rare reactions. I have discussed the alternatives to transfusion, including the risk and consequences of not receiving transfusion. The patient had an opportunity to ask questions and had agreed to proceed with transfusion of blood components.     Electronically signed by Tree Solis PA-C on 9/1/22 at 6:35 PM

## 2022-09-01 NOTE — ED PROVIDER NOTES
EMERGENCY DEPARTMENT HISTORY & PHYSICAL EXAM    THE [facility] EMERGENCY DEPT  9/1/2022, 6:18 PM    Clinical Impression:  1. Severe anemia    2. ESRD (end stage renal disease) ([facility]    3. Liver transplant status ([facility]    4. Immunocompromised ([facility]        Assessment/Differential Diagnosis:     Ddx anemia, renal disease, liver transplant, immunocompromised, dialysis patient all considered    ED Course:   Initial assessment performed. The patients presenting problems have been discussed, and they are in agreement with the care plan formulated and outlined with them. I have encouraged them to ask questions as they arise throughout their visit. Patient was contacted by Dr. Ibis Hernández, his hepatologist, to come to the ED tonight for blood transfusion. Outpatient labs showed a hemoglobin at 6.1. Patient states he has felt fatigued but no other significant symptoms. He did have his dialysis today. He denies shortness of breath, chest pain, extremity swelling, black or bloody stools or any other areas of bleeding. Exam with chronically ill, very pale thin male lying on stretcher. He is alert and oriented. Labs here with hgb 5.6  And discussed blood transfusion with patient. Informed consent was obtained. Patient had no questions. Orders placed for blood transfusion, discussed with Dr. Patrick Haskins, no special orders needed. Discussed with Dr. Jaime Moore, hospitalist, who would like a hemoccult before accepting pt. Requesting Dr. Yuni Bartlett consult as well. Pt refusing rectal exam but will give stool sample. Guaic negative  Spoke to Dr. Yuni Bartlett, hepatologist, who can consult as needed    8:23 PM  Discussed with Dr. Niraj Anaya, hospitalist, who will admit. Requesting tele. Medical Chart Review:  I have reviewed triage nursing documentation.   Review of old medical records with the following pertinent information:       Disposition:  admit       Chief Complaint   Patient presents with    Abnormal Lab Results     Hgb 6     HPI:    The history is provided by patient and wife. No  used. Leelee Armstrong is a 52 y.o. male presenting to the Emergency Department with complaints of anemia requiring blood transfusion. Patient states he had blood work done in Dr. Lucinda Delarosa office yesterday. He was called by their office today and told that his hemoglobin was too low at 6.1 and needed to return to the ED for a blood transfusion. He did complete his dialysis today. He is having no significant shortness of breath, chest pain, although is not very active. States he has had increased fatigue. States that he has had blood transfusions in the past due to his renal disease. He denies any areas of bleeding such as his nose, mouth, excessive bruising, hematuria or black or bloody stools. Patient does have liver transplant completed earlier this year. During that process he did have renal injury requiring dialysis. He does dialysis Tuesday, Thursday, Saturday. He is on antirejection medication. No other concerns. I have reviewed all PMHX, FMHX and Social Hx as entered into the medical record in the chart below using the Epic Template. Review of Systems:  Constitutional: neg for fever, chills  ENT:  neg for URI symptoms  Respiratory:  neg for cough, neg for shortness of breath  Cardiovascular:  negative for chest pain. Neg for pedal/LE edema. GI:  neg for abdominal pain. No n/v/d.  :  No urinary symptoms. No Flank pain. MSK: neg for back pain. Integumentary: no rashes, or skin trauma  Neurological: neg for headaches  All other systems reviewed negative with exception of positives in ROS and HPI.     Past Medical History:  Past Medical History:   Diagnosis Date    Crohn disease ([facility]     Liver disease        Past Surgical History:  Past Surgical History:   Procedure Laterality Date    HX OTHER SURGICAL      bowel obstruction and ostomy    HX OTHER SURGICAL  2001 bowel resection secondary to tear in bowel and abscess       Family History:  No family history on file. Social History:  Social History     Tobacco Use    Smoking status: Former    Smokeless tobacco: Former   Substance Use Topics    Alcohol use: Not Currently    Drug use: Not Currently       Allergies: Allergies   Allergen Reactions    Naprosyn [Naproxen] Itching    Other Medication Other (comments)     Some Crohns disease medication       Vital Signs:  Vitals:    09/01/22 1726 09/01/22 1749 09/01/22 1809 09/01/22 1816   BP: 112/72 114/80  121/80   Pulse: (!) 117 100  91   Resp: 18 18  16   Temp: 99.5 °F (37.5 °C)      SpO2: 99% 100% 99% 100%   Weight: 81.6 kg (180 lb)      Height: 6' (1.829 m)        Physical Exam:  Vital Signs Reviewed. Nursing Notes Reviewed. Constitutional:, Pale, chronically ill appearing male. Alert and oriented and answering all my questions appropriately. Head: Normocephalic, Atraumatic  Eyes: Conjunctiva clear, lids normal. Sclera anicteric. ENT:hearing grossly intact, throat clear   Neck:  supple, FROM, no meningismus, nontender  Lungs: No respiratory distress. Lungs CTAB . No cough. no pain with inspiration  CV:  RR&R without murmur  Thorax: no chest wall tenderness. No signs of trauma. Skin without rash. Abdomen: no tenderness with palpation. BS normal throughout. No organomegaly appreciated. No mass. Extremities:  no tenderness with palpation to UE or LE. No pedal edema. Normal cap refill, bilat equal.   Neuro:  A&O x 3. CN II-XII grossly intact. No gross neuro deficits. Skin:  Warm, dry, no rash. Skin intact.      Diagnostics:    Labs -     Recent Results (from the past 12 hour(s))   EKG, 12 LEAD, INITIAL    Collection Time: 09/01/22  5:34 PM   Result Value Ref Range    Ventricular Rate 97 BPM    Atrial Rate 97 BPM    P-R Interval 140 ms    QRS Duration 86 ms    Q-T Interval 374 ms    QTC Calculation (Bezet) 474 ms    Calculated P Axis 49 degrees    Calculated R Axis 32 degrees    Calculated T Axis 46 degrees    Diagnosis       Normal sinus rhythm  Normal ECG  No previous ECGs available     CBC WITH AUTOMATED DIFF    Collection Time: 09/01/22  5:50 PM   Result Value Ref Range    WBC 4.6 4.6 - 13.2 K/uL    RBC 1.81 (L) 4.35 - 5.65 M/uL    HGB 5.6 (LL) 13.0 - 16.0 g/dL    HCT 18.3 (L) 36.0 - 48.0 %    .1 (H) 78.0 - 100.0 FL    MCH 30.9 24.0 - 34.0 PG    MCHC 30.6 (L) 31.0 - 37.0 g/dL    RDW 15.8 (H) 11.6 - 14.5 %    PLATELET 571 996 - 238 K/uL    MPV 10.4 9.2 - 11.8 FL    NRBC 0.0 0  WBC    ABSOLUTE NRBC 0.00 0.00 - 0.01 K/uL    NEUTROPHILS 57 40 - 73 %    LYMPHOCYTES 33 21 - 52 %    MONOCYTES 7 3 - 10 %    EOSINOPHILS 2 0 - 5 %    BASOPHILS 1 0 - 2 %    IMMATURE GRANULOCYTES 0 0.0 - 0.5 %    ABS. NEUTROPHILS 2.7 1.8 - 8.0 K/UL    ABS. LYMPHOCYTES 1.5 0.9 - 3.6 K/UL    ABS. MONOCYTES 0.3 0.05 - 1.2 K/UL    ABS. EOSINOPHILS 0.1 0.0 - 0.4 K/UL    ABS. BASOPHILS 0.0 0.0 - 0.1 K/UL    ABS. IMM. GRANS. 0.0 0.00 - 0.04 K/UL    DF AUTOMATED      PLATELET COMMENTS ADEQUATE PLATELETS      RBC COMMENTS MACROCYTOSIS  1+        RBC COMMENTS HYPOCHROMIA  1+        RBC COMMENTS POLYCHROMASIA  1+        RBC COMMENTS ANISOCYTOSIS  1+        RBC COMMENTS POIKILOCYTOSIS  1+        RBC COMMENTS BASOPHILIC STIPPLING  SLIGHT       METABOLIC PANEL, COMPREHENSIVE    Collection Time: 09/01/22  5:50 PM   Result Value Ref Range    Sodium 137 136 - 145 mmol/L    Potassium 4.2 3.5 - 5.5 mmol/L    Chloride 104 100 - 111 mmol/L    CO2 30 21 - 32 mmol/L    Anion gap 3 3.0 - 18 mmol/L    Glucose 95 74 - 99 mg/dL    BUN 16 7.0 - 18 MG/DL    Creatinine 3.27 (H) 0.6 - 1.3 MG/DL    BUN/Creatinine ratio 5 (L) 12 - 20      GFR est AA 25 (L) >60 ml/min/1.73m2    GFR est non-AA 20 (L) >60 ml/min/1.73m2    Calcium 8.9 8.5 - 10.1 MG/DL    Bilirubin, total 1.1 (H) 0.2 - 1.0 MG/DL    ALT (SGPT) 13 (L) 16 - 61 U/L    AST (SGOT) 27 10 - 38 U/L    Alk.  phosphatase 310 (H) 45 - 117 U/L    Protein, total 7.1 6.4 - 8.2 g/dL    Albumin 2.9 (L) 3.4 - 5.0 g/dL    Globulin 4.2 (H) 2.0 - 4.0 g/dL    A-G Ratio 0.7 (L) 0.8 - 1.7     TYPE & SCREEN    Collection Time: 09/01/22  5:50 PM   Result Value Ref Range    Crossmatch Expiration 09/04/2022,2359     ABO/Rh(D) O NEGATIVE     Antibody screen NEG     Unit number [id number]     Blood component type RC LR     Unit division 00     Status of unit ALLOCATED     Crossmatch result Compatible    RBC, ALLOCATE    Collection Time: 09/01/22  6:45 PM   Result Value Ref Range    HISTORY CHECKED? Historical check performed    OCCULT BLOOD, STOOL    Collection Time: 09/01/22  7:43 PM   Result Value Ref Range    Occult blood, stool Negative NEG         Radiologic Studies -   No orders to display     CT Results  (Last 48 hours)      None          CXR Results  (Last 48 hours)      None            Medications given in the ED-  Medications   0.9% sodium chloride infusion 250 mL (has no administration in time range)       Please note that this dictation was completed with [employer], the [employer] voice recognition software. Quite often unanticipated grammatical, syntax, homophones, and other interpretive errors are inadvertently transcribed by the computer software. Please disregard these errors. Please excuse any errors that have escaped final proofreading.

## 2022-09-01 NOTE — TELEPHONE ENCOUNTER
9/2/22:  Patient admitted to [facility] yesterday for hemoglobin 5.7 and given 1 unit PRBCs. Will likely infuse another one today prior to discharge home. Spoke to patients wife who stated patient is feeling less fatique, stating \"he said he didn't feel that bad to begin with. \" Wife reported the physicians did not find a GI bleed. 9/1/22:  LVM on patients phone to call regarding his recent labs. Did not hear back from patient so called patients wife, Theodis Mcburney. Informed Daniela patients hemoglobin has been steadily declining since 8/1/22 and now is at 6.1. Laura Fuller to have patient go to the ED after his hemodialysis today to be checked out. Daniela verbally confirmed understanding, stating she will take him to [facility] ED this evening around 5-5:30 pm.   [facility] ED, spoke to Dilip Galicia [facility], regarding patients low hemoglobin. Informed Evelina of patients liver transplant status and we would like patient checked out. Dilip Galicia verbally confirmed understanding that he would be coming in around 5-5:30 pm and would pass it on to next shift.

## 2022-09-02 ENCOUNTER — APPOINTMENT (OUTPATIENT)
Dept: CT IMAGING | Age: 47
DRG: 682 | End: 2022-09-02
Attending: HOSPITALIST
Payer: COMMERCIAL

## 2022-09-02 LAB
ALBUMIN SERPL-MCNC: 2.5 G/DL (ref 3.4–5)
ALBUMIN/GLOB SERPL: 0.7 {RATIO} (ref 0.8–1.7)
ALP SERPL-CCNC: 273 U/L (ref 45–117)
ALT SERPL-CCNC: 11 U/L (ref 16–61)
ANION GAP SERPL CALC-SCNC: 4 MMOL/L (ref 3–18)
AST SERPL-CCNC: 19 U/L (ref 10–38)
BASOPHILS # BLD: 0 K/UL (ref 0–0.1)
BASOPHILS NFR BLD: 1 % (ref 0–2)
BILIRUB SERPL-MCNC: 1.3 MG/DL (ref 0.2–1)
BUN SERPL-MCNC: 23 MG/DL (ref 7–18)
BUN/CREAT SERPL: 6 (ref 12–20)
CALCIUM SERPL-MCNC: 8.4 MG/DL (ref 8.5–10.1)
CHLORIDE SERPL-SCNC: 106 MMOL/L (ref 100–111)
CO2 SERPL-SCNC: 28 MMOL/L (ref 21–32)
CREAT SERPL-MCNC: 3.97 MG/DL (ref 0.6–1.3)
DIFFERENTIAL METHOD BLD: ABNORMAL
EOSINOPHIL # BLD: 0.1 K/UL (ref 0–0.4)
EOSINOPHIL NFR BLD: 2 % (ref 0–5)
ERYTHROCYTE [DISTWIDTH] IN BLOOD BY AUTOMATED COUNT: 17.2 % (ref 11.6–14.5)
FOLATE SERPL-MCNC: 18.7 NG/ML (ref 3.1–17.5)
GLOBULIN SER CALC-MCNC: 3.7 G/DL (ref 2–4)
GLUCOSE SERPL-MCNC: 87 MG/DL (ref 74–99)
HCT VFR BLD AUTO: 19.9 % (ref 36–48)
HCT VFR BLD AUTO: 25.4 % (ref 36–48)
HGB BLD-MCNC: 6.2 G/DL (ref 13–16)
HGB BLD-MCNC: 8 G/DL (ref 13–16)
HISTORY CHECKED?,CKHIST: NORMAL
IMM GRANULOCYTES # BLD AUTO: 0 K/UL (ref 0–0.04)
IMM GRANULOCYTES NFR BLD AUTO: 1 % (ref 0–0.5)
IRON SATN MFR SERPL: 22 % (ref 20–50)
IRON SERPL-MCNC: 47 UG/DL (ref 50–175)
LDH SERPL L TO P-CCNC: 234 U/L (ref 81–234)
LYMPHOCYTES # BLD: 1.6 K/UL (ref 0.9–3.6)
LYMPHOCYTES NFR BLD: 35 % (ref 21–52)
MAGNESIUM SERPL-MCNC: 1.8 MG/DL (ref 1.6–2.6)
MCH RBC QN AUTO: 31.2 PG (ref 24–34)
MCHC RBC AUTO-ENTMCNC: 31.2 G/DL (ref 31–37)
MCV RBC AUTO: 100 FL (ref 78–100)
MONOCYTES # BLD: 0.3 K/UL (ref 0.05–1.2)
MONOCYTES NFR BLD: 7 % (ref 3–10)
NEUTS SEG # BLD: 2.5 K/UL (ref 1.8–8)
NEUTS SEG NFR BLD: 55 % (ref 40–73)
NRBC # BLD: 0 K/UL (ref 0–0.01)
NRBC BLD-RTO: 0 PER 100 WBC
PLATELET # BLD AUTO: 191 K/UL (ref 135–420)
PMV BLD AUTO: 9.4 FL (ref 9.2–11.8)
POTASSIUM SERPL-SCNC: 4.7 MMOL/L (ref 3.5–5.5)
PROT SERPL-MCNC: 6.2 G/DL (ref 6.4–8.2)
RBC # BLD AUTO: 1.99 M/UL (ref 4.35–5.65)
RETICS/RBC NFR AUTO: 5.4 % (ref 0.5–2.5)
SODIUM SERPL-SCNC: 138 MMOL/L (ref 136–145)
TIBC SERPL-MCNC: 217 UG/DL (ref 250–450)
VIT B12 SERPL-MCNC: 481 PG/ML (ref 211–911)
WBC # BLD AUTO: 4.5 K/UL (ref 4.6–13.2)

## 2022-09-02 PROCEDURE — 85018 HEMOGLOBIN: CPT

## 2022-09-02 PROCEDURE — 85045 AUTOMATED RETICULOCYTE COUNT: CPT

## 2022-09-02 PROCEDURE — 85025 COMPLETE CBC W/AUTO DIFF WBC: CPT

## 2022-09-02 PROCEDURE — 74177 CT ABD & PELVIS W/CONTRAST: CPT

## 2022-09-02 PROCEDURE — 74011250637 HC RX REV CODE- 250/637: Performed by: HOSPITALIST

## 2022-09-02 PROCEDURE — 97161 PT EVAL LOW COMPLEX 20 MIN: CPT

## 2022-09-02 PROCEDURE — 77030040393 HC DRSG OPTIFOAM GENT MDII -B

## 2022-09-02 PROCEDURE — 74011000636 HC RX REV CODE- 636: Performed by: INTERNAL MEDICINE

## 2022-09-02 PROCEDURE — 83735 ASSAY OF MAGNESIUM: CPT

## 2022-09-02 PROCEDURE — C9113 INJ PANTOPRAZOLE SODIUM, VIA: HCPCS | Performed by: HOSPITALIST

## 2022-09-02 PROCEDURE — P9040 RBC LEUKOREDUCED IRRADIATED: HCPCS

## 2022-09-02 PROCEDURE — 83615 LACTATE (LD) (LDH) ENZYME: CPT

## 2022-09-02 PROCEDURE — 74011250636 HC RX REV CODE- 250/636: Performed by: HOSPITALIST

## 2022-09-02 PROCEDURE — 74011000250 HC RX REV CODE- 250: Performed by: HOSPITALIST

## 2022-09-02 PROCEDURE — 36415 COLL VENOUS BLD VENIPUNCTURE: CPT

## 2022-09-02 PROCEDURE — 80053 COMPREHEN METABOLIC PANEL: CPT

## 2022-09-02 PROCEDURE — 65270000046 HC RM TELEMETRY

## 2022-09-02 PROCEDURE — 36430 TRANSFUSION BLD/BLD COMPNT: CPT

## 2022-09-02 RX ORDER — MYCOPHENOLIC ACID 180 MG/1
180 TABLET, DELAYED RELEASE ORAL 4 TIMES DAILY
COMMUNITY

## 2022-09-02 RX ORDER — VANCOMYCIN/0.9 % SOD CHLORIDE 1.5G/250ML
1500 PLASTIC BAG, INJECTION (ML) INTRAVENOUS ONCE
Status: DISPENSED | OUTPATIENT
Start: 2022-09-02 | End: 2022-09-03

## 2022-09-02 RX ORDER — LEVOFLOXACIN 5 MG/ML
500 INJECTION, SOLUTION INTRAVENOUS
Status: DISCONTINUED | OUTPATIENT
Start: 2022-09-02 | End: 2022-09-03 | Stop reason: HOSPADM

## 2022-09-02 RX ORDER — MYCOPHENOLIC ACID 180 MG/1
180 TABLET, DELAYED RELEASE ORAL 4 TIMES DAILY
Status: DISCONTINUED | OUTPATIENT
Start: 2022-09-02 | End: 2022-09-03 | Stop reason: HOSPADM

## 2022-09-02 RX ORDER — SODIUM CHLORIDE 9 MG/ML
250 INJECTION, SOLUTION INTRAVENOUS AS NEEDED
Status: DISCONTINUED | OUTPATIENT
Start: 2022-09-02 | End: 2022-09-03 | Stop reason: HOSPADM

## 2022-09-02 RX ADMIN — MYCOPHENOLIC ACID 180 MG: 180 TABLET, DELAYED RELEASE ORAL at 21:00

## 2022-09-02 RX ADMIN — URSODIOL 300 MG: 300 CAPSULE ORAL at 21:19

## 2022-09-02 RX ADMIN — TACROLIMUS 1 MG: 1 CAPSULE ORAL at 21:20

## 2022-09-02 RX ADMIN — MIRTAZAPINE 7.5 MG: 15 TABLET, FILM COATED ORAL at 21:20

## 2022-09-02 RX ADMIN — IOPAMIDOL 100 ML: 612 INJECTION, SOLUTION INTRAVENOUS at 08:32

## 2022-09-02 RX ADMIN — SODIUM CHLORIDE, PRESERVATIVE FREE 10 ML: 5 INJECTION INTRAVENOUS at 14:31

## 2022-09-02 RX ADMIN — SODIUM CHLORIDE, PRESERVATIVE FREE 10 ML: 5 INJECTION INTRAVENOUS at 21:23

## 2022-09-02 RX ADMIN — IRON SUCROSE 100 MG: 20 INJECTION, SOLUTION INTRAVENOUS at 14:30

## 2022-09-02 RX ADMIN — DRONABINOL 10 MG: 2.5 CAPSULE ORAL at 21:00

## 2022-09-02 RX ADMIN — METOCLOPRAMIDE 10 MG: 5 TABLET ORAL at 21:19

## 2022-09-02 RX ADMIN — MYCOPHENOLIC ACID 180 MG: 180 TABLET, DELAYED RELEASE ORAL at 18:00

## 2022-09-02 RX ADMIN — SODIUM CHLORIDE 40 MG: 9 INJECTION, SOLUTION INTRAMUSCULAR; INTRAVENOUS; SUBCUTANEOUS at 21:21

## 2022-09-02 NOTE — H&P
History & Physical    Patient: Alek Mckeon MRN: [id number]  CSN: [id number]    YOB: 1975  Age: 52 y.o. Sex: male      DOA: 9/1/2022  Primary Care Provider:  Kris Minaya MD      Assessment/Plan     Hospital Problems  Date Reviewed: 8/27/2022            Codes Class Noted POA    ESRD on hemodialysis [facility]) ICD-10-CM: N18.6, Z99.2  ICD-9-CM: 585.6, V45.11  9/1/2022 Unknown        * (Principal) Severe anemia ICD-10-CM: D64.9  ICD-9-CM: 285.9  9/1/2022 Unknown        Severe protein-calorie malnutrition ([address] ICD-10-CM: E43  ICD-9-CM: 262  9/1/2022 Unknown        Liver transplant recipient [facility]) ICD-10-CM: Z94.4  ICD-9-CM: V42.7  8/27/2022 Yes        Long-term use of immunosuppressant medication ICD-10-CM: Z79.899  ICD-9-CM: V58.69  8/27/2022 Yes             Admit to tele     Severe anemia -like due to esrd and post surgery   Occult stool negative   Will receive transfuse   Continue h/h monitoring     Liver transplant recipient - March 2022   Continue home medication   Consult dr. Derrick Davis monitoring liver function   Wound consult     Long term use immnosuppressant medication     Malnutrition severe   Continue home meds to increase appetite      Esrd on hd   Received hd today     Full code     Please note that this dictation was completed with LiveLoop, the [employer] voice recognition software. Quite often unanticipated grammatical, syntax, homophones, and other interpretive errors are inadvertently transcribed by the computer software. Please disregard these errors. Please excuse any errors that have escaped final proofreading    Estimate  length of stay : 2-3 day    DVT : scd  ppi proph  CC: abnormal lab        HPI:     Verna Croft III is a 52 y.o. male with history of end-stage renal disease on HD, liver transplant, cirrhosis presented to ER due to abnormal lab.   He follow-up with Dr. Kenan Bennett for post liver transplant, he was informed his hemoglobin was low 6.1 need to go to ER ER for further evaluation. Hemoglobin was 5.8 in ER, patient denies any black stool, no bleeding. He reported his colonoscopy to 3 years ago. Occult school was negative ER. He received  hemodialysis today. Denies any chest pain no shortness of breath  Denies any slurred speech/headache/cp/n/v/blurred vission/d/c/palpitation/gait change/bleeding. Denies smoking/ any alcohol or drug use. Visit Vitals  /89 (BP 1 Location: Right upper arm, BP Patient Position: At rest)   Pulse 96   Temp 98.5 °F (36.9 °C)   Resp 18   Ht 6' (1.829 m)   Wt 81.6 kg (180 lb)   SpO2 100%   BMI 24.41 kg/m²      O2 Device: None (Room air)      Past Medical History:   Diagnosis Date    Crohn disease ([facility] [address]     Liver disease        Past Surgical History:   Procedure Laterality Date    HX OTHER SURGICAL      bowel obstruction and ostomy    HX OTHER SURGICAL  2001    bowel resection secondary to tear in bowel and abscess     Fhx htn father     Social History     Socioeconomic History    Marital status:    Tobacco Use    Smoking status: Former    Smokeless tobacco: Former   Substance and Sexual Activity    Alcohol use: Not Currently    Drug use: Not Currently       Prior to Admission medications    Medication Sig Start Date End Date Taking? Authorizing Provider   pantoprazole (PROTONIX) 40 mg tablet Take 1 Tablet by mouth Daily (before breakfast). Indications: gastroesophageal reflux disease 8/4/22 8/4/23  Savage Gardner MD   mycophenolate sodium (Myfortic) 360 mg delayed release tablet Take 3 Tablets by mouth four (4) times daily for 90 days. 8/4/22 11/2/22  Savage Gardner MD   ursodioL (ACTIGALL) 300 mg capsule Take 300 mg by mouth two (2) times a day. Provider, Historical   tacrolimus (PROGRAF) 1 mg capsule Take 1 mg by mouth every twelve (12) hours. 4mg BID    Provider, Historical   dapsone 25 mg tablet Take 50 mg by mouth in the morning.     Provider, Historical   aspirin delayed-release 81 mg tablet Take  by mouth daily. Provider, Historical   dronabinoL (MARINOL) 5 mg capsule Take 10 mg by mouth two (2) times a day. Provider, Historical   metoclopramide HCl (REGLAN) 10 mg tablet Take 10 mg by mouth two (2) times a day. Provider, Historical   cholecalciferol (VITAMIN D3) 25 mcg (1,000 unit) cap Take 5,000 Units by mouth in the morning. Provider, Historical   ondansetron hcl (ZOFRAN) 4 mg tablet Take 4 mg by mouth every eight (8) hours as needed for Nausea or Vomiting. Provider, Historical   mirtazapine (REMERON) 30 mg tablet Take  by mouth nightly. Provider, Historical       Allergies   Allergen Reactions    Naprosyn [Naproxen] Itching    Other Medication Other (comments)     Some Crohns disease medication       Review of Systems  Gen: No fever, chills, malaise, weight loss/gain. Heent: No headache, rhinorrhea, epistaxis, ear pain, hearing loss, sinus pain, neck pain/stiffness, sore throat. Heart: No chest pain, palpitations, PEDERSON, pnd, or orthopnea. Tdc catheter   Resp: No cough, hemoptysis, wheezing and shortness of breath. GI: No nausea, vomiting, diarrhea, constipation, melena or hematochezia. : No urinary obstruction, dysuria or hematuria. Derm: healing wound   Musc/skeletal: no bone or joint complains. Vasc: No edema, cyanosis or claudication. Endo: No heat/cold intolerance, no polyuria,polydipsia or polyphagia. Neuro: No unilateral weakness, numbness, tingling. No seizures. Heme: No easy bruising or bleeding. Physical Exam:     Physical Exam:  Visit Vitals  /89 (BP 1 Location: Right upper arm, BP Patient Position: At rest)   Pulse 96   Temp 98.5 °F (36.9 °C)   Resp 18   Ht 6' (1.829 m)   Wt 81.6 kg (180 lb)   SpO2 100%   BMI 24.41 kg/m²      O2 Device: None (Room air)    Temp (24hrs), Av.8 °F (37.1 °C), Min:98.4 °F (36.9 °C), Max:99.5 °F (37.5 °C)    No intake/output data recorded. No intake/output data recorded.     General:  Awake, cooperative, no distress. Pale, malnutrition    Head:  Normocephalic, without obvious abnormality, atraumatic. Eyes:  Conjunctivae/corneas clear, sclera anicteric, PERRL, EOMs intact. Nose: Nares normal. No drainage or sinus tenderness. Throat: Lips, mucosa, and tongue normal. .   Neck: Supple, symmetrical, trachea midline, no adenopathy. Lungs:   Clear to auscultation bilaterally. Heart:  Regular rate and rhythm, S1, S2 normal, no murmur, click, rub or gallop. Abdomen: Soft, non-tender. Bowel sounds normal. No masses,  No organomegaly. Surgical scar noted, healing wound with discharge    Extremities: Extremities normal, atraumatic, no cyanosis or edema. Pulses: 2+ and symmetric all extremities. Skin: Skin color-pink, texture, turgor normal. No rashes or lesions. Capillary refill normal    Neurologic: CNII-XII intact. No focal motor or sensory deficit.        Labs Reviewed:    BMP:   Lab Results   Component Value Date/Time     09/01/2022 05:50 PM    K 4.2 09/01/2022 05:50 PM     09/01/2022 05:50 PM    CO2 30 09/01/2022 05:50 PM    AGAP 3 09/01/2022 05:50 PM    GLU 95 09/01/2022 05:50 PM    BUN 16 09/01/2022 05:50 PM    CREA 3.27 (H) 09/01/2022 05:50 PM    GFRAA 25 (L) 09/01/2022 05:50 PM    GFRNA 20 (L) 09/01/2022 05:50 PM     CMP:   Lab Results   Component Value Date/Time     09/01/2022 05:50 PM    K 4.2 09/01/2022 05:50 PM     09/01/2022 05:50 PM    CO2 30 09/01/2022 05:50 PM    AGAP 3 09/01/2022 05:50 PM    GLU 95 09/01/2022 05:50 PM    BUN 16 09/01/2022 05:50 PM    CREA 3.27 (H) 09/01/2022 05:50 PM    GFRAA 25 (L) 09/01/2022 05:50 PM    GFRNA 20 (L) 09/01/2022 05:50 PM    CA 8.9 09/01/2022 05:50 PM    ALB 2.9 (L) 09/01/2022 05:50 PM    TP 7.1 09/01/2022 05:50 PM    GLOB 4.2 (H) 09/01/2022 05:50 PM    AGRAT 0.7 (L) 09/01/2022 05:50 PM    ALT 13 (L) 09/01/2022 05:50 PM     CBC:   Lab Results   Component Value Date/Time    WBC 4.6 09/01/2022 05:50 PM    HGB 5.6 (LL) 09/01/2022 05:50 PM    HCT 18.3 (L) 09/01/2022 05:50 PM     09/01/2022 05:50 PM     All Cardiac Markers in the last 24 hours: No results found for: CPK, CK, CKMMB, CKMB, RCK3, CKMBT, CKNDX, CKND1, MONICA, TROPT, TROIQ, RUIZ, TROPT, TNIPOC, BNP, BNPP  Recent Glucose Results:   Lab Results   Component Value Date/Time    GLU 95 09/01/2022 05:50 PM     ABG: No results found for: PH, PHI, PCO2, PCO2I, PO2, PO2I, HCO3, HCO3I, FIO2, FIO2I  COAGS: No results found for: APTT, PTP, INR, INREXT, INREXT  Liver Panel:   Lab Results   Component Value Date/Time    ALB 2.9 (L) 09/01/2022 05:50 PM    TP 7.1 09/01/2022 05:50 PM    GLOB 4.2 (H) 09/01/2022 05:50 PM    AGRAT 0.7 (L) 09/01/2022 05:50 PM    ALT 13 (L) 09/01/2022 05:50 PM     (H) 09/01/2022 05:50 PM     Pancreatic Markers: No results found for: AMYLPOCT, AML, LIPPOCT, LPSE    No results found. Procedures/imaging: see electronic medical records for all procedures/Xrays and details which were not copied into this note but were reviewed prior to creation of Laila Dominguez MD, Internal Medicine     CC:  Colten Kearney MD

## 2022-09-02 NOTE — PROGRESS NOTES
Made patient aware of new medications that MD ordered. Received order for IV antibiotics. Wife stated that patient has a history of VRE so therefore he is not going to take the Vanc. Wife also stated that even though the MD consulted the liver MD he is still not supposed to take any new medications. Nurse will contact MD again for clarification.

## 2022-09-02 NOTE — WOUND CARE
09/02/22 0909   Wound Abdomen Medial;Mid Scot sized open area with drainage 09/01/22   Date First Assessed/Time First Assessed: 09/01/22 1800   Present on Hospital Admission: Yes  Primary Wound Type: Incision  Location: Abdomen  Wound Location Orientation: Medial;Mid  Wound Description: Scot sized open area with drainage  Date of Firs. .. Wound Image    Wound Etiology Non-Healing Surgical   Dressing Status Breakthrough drainage noted   Cleansed Wound cleanser   Dressing/Treatment Zinc paste;Collagen;Dry dressing;Silicone border;Tape/Soft cloth adhesive tape   Dressing Change Due   (PRN to keep wound dry and clean)   Wound Length (cm) 1 cm   Wound Width (cm) 1.5 cm   Wound Depth (cm) 0.1 cm   Wound Surface Area (cm^2) 1.5 cm^2   Wound Volume (cm^3) 0.15 cm^3   Wound Assessment Hyper granulation tissue   Drainage Amount Moderate   Drainage Description Serous; Yellow   Wound Odor Mild   Ирина-Wound/Incision Assessment Excoriated   Edges Defined edges   Wound Thickness Description Full thickness               This patient is status post long hospital stay at [facility] for liver transplant. Patient states this wound is a result of non healing surgical area when his ostomy was and reanastomosised during his surgical procedure. There is a single black nylon stitch that appears to not be connected to anything that should be probably removed as it seems to be causing some discomfort to this patient and irritation to the skin. Discussed with this patient about coming into wound clinic following his discharge to work on his wound healing. Patient stated he had to discuss with his wife.

## 2022-09-02 NOTE — PROGRESS NOTES
Reason for Admission:    Severe anemia                   RUR Score:    13 %                 Plan for utilizing home health:          PCP: First and Last name:  Kings Salinas MD       Are you a current patient: Yes/No:  Yes                        Current Advanced Directive/Advance Care Plan: Full Code      Healthcare Decision Maker:   Click here to complete [address] including selection of the Healthcare Decision Maker Relationship (ie \"Primary\")             Primary Decision MakerDonnella November - [phone]                  Transition of Care Plan:     Met with patient at bedside. Lives with wife and daughter in a single story [facility]. Has cane and walker in the home. Independent in [address] [facility]. Dialysis T-Th-Sa from 1pm to 4pm at [facility] [facility] [employer] on [address]. Family to transport to home upon discharge. Care management will continue to follow for transition of care needs. Care Management Interventions  PCP Verified by CM: Yes  Mode of Transport at Discharge:  Other (see comment) (Family to transport )  Support Systems: Spouse/Significant Other, Child(bakari)  Confirm Follow Up Transport: Family  Discharge Location  Patient Expects to be Discharged to[de-identified] Home    Charlie Mott, MSN, RN, [address] [employer]  [phone]

## 2022-09-02 NOTE — PROGRESS NOTES
Hospitalist Progress Note-critical care note     Patient: Urmila Ojeda MRN: [id number]  CSN: [id number]    YOB: 1975  Age: 52 y.o. Sex: male    DOA: 9/1/2022 LOS:  LOS: 1 day            Chief complaint: esrd on hd . Severe anemia , severe protein-calorie malnutrition. Liver transplant     Assessment/Plan         Hospital Problems  Date Reviewed: 8/27/2022            Codes Class Noted POA    ESRD on hemodialysis [facility]) ICD-10-CM: N18.6, Z99.2  ICD-9-CM: 585.6, V45.11  9/1/2022 Unknown        * (Principal) Severe anemia ICD-10-CM: D64.9  ICD-9-CM: 285.9  9/1/2022 Unknown        Severe protein-calorie malnutrition ([facility] [address] ICD-10-CM: E43  ICD-9-CM: 262  9/1/2022 Unknown        Liver transplant recipient [facility]) ICD-10-CM: Z94.4  ICD-9-CM: V42.7  8/27/2022 Yes        Long-term use of immunosuppressant medication ICD-10-CM: Z79.899  ICD-9-CM: V58.69  8/27/2022 Yes           Anna Cárdenas III is a 52 y.o. male with history of end-stage renal disease on HD, liver transplant-03/22 at [facility], cirrhosis was sent to ER per dr. Palmer Ortiz office due to anemia. Occult stool negative.        Severe anemia -like due to esrd and post surgery   H/h 5.6-6.2   Occult stool negative   Will give another units   Continue h/h monitoring   Ct abdomen no hematoma   Send retic and ldh r/o hemolysis    Iron low -tibc low , give iron          Moderate right pleural effusion with thickened, enhancing pleural lining  suggesting possible superimposed infection     Liver transplant recipient - March 2022   Continue home medication   Consult dr. Charleen Rincon monitoring liver function   Wound consult   Ct abdomen Wedge-shaped area of low attenuation change in the posterior right hepatic  lobe possibly reflecting geographic enhancement, fatty infiltration or area of  infarction.-asking Dr. Palmer Ortiz recommendation per messaging system     Moderate right pleural effusion with thickened, enhancing pleural lining  suggesting possible superimposed infection  Planning start vanc and levaquin-will check with dr. Vernon Max to make sure it is ok     Long term use immnosuppressant medication      Malnutrition severe   Continue home meds to increase appetite       Esrd on hd   Received hd yesterday, renal consulted         Subjective: I feel better,   Wife: any new medication needs to ask dr. Vrenon Max. Rn pt took his own medication am  Educate pt     Wife was at the bedside All questions have been answered. 35 total min's spent on patient care including >50% on counseling/coordinating care. Discussed the above assessments. also discussed labs, medications and hospital course    Addendum discussed with Ramesh ceja for vanc and levaquin  Disposition :tbd,   Review of systems:    General: No fevers or chills. Cardiovascular: No chest pain or pressure. No palpitations. Pulmonary: No shortness of breath. Gastrointestinal: No nausea, vomiting. Vital signs/Intake and Output:  Visit Vitals  /84   Pulse 84   Temp 98 °F (36.7 °C)   Resp 16   Ht 6' (1.829 m)   Wt 81.6 kg (180 lb)   SpO2 98%   BMI 24.41 kg/m²     Current Shift:  09/02 0701 - 09/02 1900  In: 228.8   Out: -   Last three shifts:  08/31 1901 - 09/02 0700  In: 446.3   Out: -     Physical Exam:  General: WD, WN. Alert, cooperative, no acute distress    HEENT: NC, Atraumatic. PERRLA, anicteric sclerae. Lungs: CTA Bilaterally. No Wheezing/Rhonchi/Rales. Heart:  Regular  rhythm,  No murmur, No Rubs, No Gallops  Abdomen: Soft, Non distended, Non tender. +Bowel sounds, surgical site -unhealing wound noted covered with gauze   Extremities: No c/c/e  Psych:   Not anxious or agitated. Neurologic:  No acute neurological deficit.              Labs: Results:       Chemistry Recent Labs     09/02/22  0200 09/01/22  1750 08/31/22  0952   GLU 87 95 91    137 138   K 4.7 4.2 4.9    104 100   CO2 28 30 23   BUN 23* 16 28*   CREA 3.97* 3.27* 4.8*   CA 8.4* 8.9 9.2   AGAP 4 3 15.0 BUCR 6* 5*  --    * 310* 372*   TP 6.2* 7.1 7.1   ALB 2.5* 2.9* 3.7   GLOB 3.7 4.2* 3.4   AGRAT 0.7* 0.7* 1.1      CBC w/Diff Recent Labs     09/02/22  0200 09/01/22  1750 08/31/22  0952   WBC 4.5* 4.6 6.6   RBC 1.99* 1.81* 2.04*   HGB 6.2* 5.6* 6.1*   HCT 19.9* 18.3* 20.4*    205 251   GRANS 55 57 52   LYMPH 35 33  --    EOS 2 2 2      Cardiac Enzymes No results for input(s): CPK, CKND1, MONICA in the last 72 hours. No lab exists for component: CKRMB, TROIP   Coagulation No results for input(s): PTP, INR, APTT, INREXT in the last 72 hours. Lipid Panel No results found for: CHOL, CHOLPOCT, CHOLX, CHLST, CHOLV, 787270, HDL, HDLP, LDL, LDLC, DLDLP, 223596, VLDLC, VLDL, TGLX, TRIGL, TRIGP, TGLPOCT, CHHD, CHHDX   BNP No results for input(s): BNPP in the last 72 hours. Liver Enzymes Recent Labs     09/02/22  0200   TP 6.2*   ALB 2.5*   *      Thyroid Studies No results found for: T4, T3U, TSH, TSHEXT     Procedures/imaging: see electronic medical records for all procedures/Xrays and details which were not copied into this note but were reviewed prior to creation of Plan    CT ABD PELV W CONT    Result Date: 9/2/2022  EXAM: CT of the abdomen and pelvis INDICATION: Pain. COMPARISON: None. TECHNIQUE: Axial CT imaging of the abdomen and pelvis was performed with intravenous contrast. Multiplanar reformats were generated. One or more dose reduction techniques were used on this CT: automated exposure control, adjustment of the mAs and/or kVp according to patient size, and iterative reconstruction techniques. The specific techniques used on this CT exam have been documented in the patient's electronic medical record. Digital Imaging and Communications in Medicine (DICOM) format image data are available to nonaffiliated external healthcare facilities or entities on a secure, media free, reciprocally searchable basis with patient authorization for at least a 12-month period after this study. _______________ FINDINGS: LOWER CHEST: Moderate right pleural effusion with thickened, enhancing pleural lining suggesting possible superimposed infection. Right basilar atelectasis. LIVER, BILIARY: Postsurgical changes of liver transplant. Wedge-shaped area of low attenuation change in the posterior right hepatic lobe possibly reflecting geographic enhancement, fatty infiltration or area of infarction. No biliary dilation. Gallbladder is surgically absent. PANCREAS: Normal. SPLEEN: Normal. ADRENALS: Normal. KIDNEYS/URETERS/BLADDER: Punctate nonobstructing bilateral renal calculi. No hydronephrosis. PELVIC ORGANS: Unremarkable. VASCULATURE: Scattered vascular calcifications LYMPH NODES: No enlarged lymph nodes. GASTROINTESTINAL TRACT: No bowel dilation or wall thickening. BONES: No acute or aggressive osseous abnormalities identified. OTHER: Anasarca _______________     Postsurgical changes of liver transplant.   > Wedge-shaped area of low attenuation change in the posterior right hepatic lobe possibly reflecting geographic enhancement, fatty infiltration or area of infarction. Moderate right pleural effusion with thickened, enhancing pleural lining suggesting possible superimposed infection.        Simona Aponte MD

## 2022-09-02 NOTE — PROGRESS NOTES
Problem: Anemia Care Plan (Adult and Pediatric)  Goal: *Labs within defined limits  Outcome: Progressing Towards Goal  Goal: *Tolerates increased activity  Outcome: Progressing Towards Goal     Problem: Patient Education: Go to Patient Education Activity  Goal: Patient/Family Education  Outcome: Progressing Towards Goal     Problem: Pain  Goal: *Control of Pain  Outcome: Progressing Towards Goal  Goal: *PALLIATIVE CARE:  Alleviation of Pain  Outcome: Progressing Towards Goal     Problem: Patient Education: Go to Patient Education Activity  Goal: Patient/Family Education  Outcome: Progressing Towards Goal     Problem: Falls - Risk of  Goal: *Absence of Falls  Description: Document Davey Fall Risk and appropriate interventions in the flowsheet.   Outcome: Progressing Towards Goal  Note: Fall Risk Interventions:                                Problem: Patient Education: Go to Patient Education Activity  Goal: Patient/Family Education  Outcome: Progressing Towards Goal

## 2022-09-02 NOTE — PROGRESS NOTES
Problem: Mobility Impaired (Adult and Pediatric)  Goal: *Acute Goals and Plan of Care (Insert Text)  Note:   PHYSICAL THERAPY EVALUATION AND DISCHARGE    Patient: Summer Ortega III (87 y.o. male)  Date: 9/2/2022  Primary Diagnosis: Severe anemia [D64.9]  ESRD (end stage renal disease) ([facility] [address] [address] [N18.6]  Precautions:   Fall    ASSESSMENT :  Based on the objective data described below, the patient presents with good strength and endurance. Pt ambulated without AD with supervision; no LOB or unsteadiness noted session. Pt we already receiving home health services prior to hospital admission. Pt appears near baseline level and and has no further acute skilled PT needs. Recommend resuming home health PT services. PLAN :  Discharge Recommendations: resume home health PT services  Further Equipment Recommendations for Discharge: N/A    AMPAC: Based on an AM-PAC score of 20/24 and their current functional mobility deficits, it is recommended that the patient have 2-3 sessions per week of Physical Therapy at d/c to increase the patient's independence. This AMPAC score should be considered in conjunction with interdisciplinary team recommendations to determine the most appropriate discharge setting. Patient's social support, diagnosis, medical stability, and prior level of function should also be taken into consideration. SUBJECTIVE:   Patient stated I feel pretty good, I'm hoping to go home today.     OBJECTIVE DATA SUMMARY:     Past Medical History:   Diagnosis Date    Crohn disease ([facility] [address]     Liver disease      Past Surgical History:   Procedure Laterality Date    HX OTHER SURGICAL      bowel obstruction and ostomy    HX OTHER SURGICAL  2001    bowel resection secondary to tear in bowel and abscess     Barriers to Learning/Limitations: None  Compensate with: Visual Cues and Verbal Cues  Home Situation:   Home Situation  Home Environment: Private residence  # Steps to Enter: 0  One/Two Story Residence: One story  Living Alone: No  Support Systems: Spouse/Significant Other  Patient Expects to be Discharged to[de-identified] Home  Current DME Used/Available at Home: Walker, rolling, Walker, rollator, Cane, straight  Strength:    Strength: Generally decreased, functional  Tone & Sensation:   Tone: Normal  Sensation: Impaired  Range Of Motion:  AROM: Generally decreased, functional  Functional Mobility:  Bed Mobility:   Supine to Sit: Independent  Sit to Supine: Independent   Transfers:  Sit to Stand: Supervision  Stand to Sit: Supervision  Balance:   Sitting: Intact  Standing: Intact; Without support  Ambulation/Gait Training:  Distance (ft): 100 Feet (ft)  Assistive Device: Gait belt  Ambulation - Level of Assistance: Stand-by assistance   Gait Description (WDL): Exceptions to WDL  Gait Abnormalities: Decreased step clearance  Base of Support: Narrowed   Speed/Yamila: Slow  Step Length: Right shortened;Left shortened  Pain:  Pain level pre-treatment: 0/10   Pain level post-treatment: 0/10  Pain Intervention(s): Medication (see MAR); Rest, Ice, Repositioning   Response to intervention: Nurse notified, See doc flow    Activity Tolerance:   Good  Please refer to the flowsheet for vital signs taken during this treatment. After treatment:   []         Patient left in no apparent distress sitting up in chair  [x]         Patient left in no apparent distress in bed  [x]         Call bell left within reach  [x]         Nursing notified  []         Caregiver present  []         Bed alarm activated  []         SCDs applied    COMMUNICATION/EDUCATION:   [x]         Role of Physical Therapy in the acute care setting. [x]         Fall prevention education was provided and the patient/caregiver indicated understanding. []         Patient/family have participated as able in goal setting and plan of care. []         Patient/family agree to work toward stated goals and plan of care.   []         Patient understands intent and goals of therapy, but is neutral about his/her participation. []         Patient is unable to participate in goal setting/plan of care: ongoing with therapy staff.  []         Other: Thank you for this referral.  Binh Ortez   Time Calculation: 10 mins      Eval Complexity: History: MEDIUM  Complexity : 1-2 comorbidities / personal factors will impact the outcome/ POC Exam:LOW Complexity : 1-2 Standardized tests and measures addressing body structure, function, activity limitation and / or participation in recreation  Presentation: LOW Complexity : Stable, uncomplicated  Clinical Decision Making:Low Complexity    Overall Complexity:LOW     [facility]® Basic Mobility Inpatient Short Form (6-Clicks) Version 2    How much HELP from another person does the patient currently need    (If the patient hasn't done an activity recently, how much help from another person do you think he/she would need if he/she tried?)   Total (Total A or Dep)   A Lot  (Mod to Max A)   A Little (Sup or Min A)   None (Mod I to I)   Turning from your back to your side while in a flat bed without using bedrails? [] 1 [] 2 [] 3 [x] 4   2. Moving from lying on your back to sitting on the side of a flat bed without using bedrails? [] 1 [] 2 [] 3 [x] 4   3. Moving to and from a bed to a chair (including a wheelchair)? [] 1 [] 2 [x] 3 [] 4   4. Standing up from a chair using your arms (e.g., wheelchair, or bedside chair)? [] 1 [] 2 [x] 3 [] 4   5. Walking in hospital room? [] 1 [] 2 [x] 3 [] 4   6. Climbing 3-5 steps with a railing?+   [] 1 [] 2 [x] 3 [] 4   +If stair climbing cannot be assessed, skip item #6. Sum responses from items 1-5.

## 2022-09-02 NOTE — PROGRESS NOTES
Vancomycin - Pharmacy to Dose ( CAP - 7 day tx )    Will dose initially with Vancomycin 1500 mg. Random Level ordered for 14:00 9/3/2022. Goal Trough ~ 15 mg/l  Pharmacy will continue to follow and adjust  CrCl 25.2 ml/min  HD Patient - HD not ordered yet  Pharmacy will continue to follow and adjust.    Karen Ceballos [facility]

## 2022-09-02 NOTE — PROGRESS NOTES
2114 The pt arrived via stretcher from the ED to 359. He is alert, oriented and denies any pain. Oriented him to the room, call bell and unit routines. Tele box #28 on reading SR. The blood transfusion is infusing as ordered. He denies any needs at this time. 0300 Follow up [facility] after the blood transfusion is 6.2. Called Dr Dee Barrios to notify. She will place an order for another blood transfusion. 6880 Blood transfusion is infusing. All [facility] protocols are being followed including a two nurse verification and consent. Will remain with the pt for the next 15 minutes. 0435 After the first 15 minutes the pt is tolerating the infusion well. [id number] Shift Summary: The pt rested poorly overnight but had no complaints. No new clinical concerns noted except as written above.     Nightshift Chart Audit Completed

## 2022-09-02 NOTE — PROGRESS NOTES
Received OT eval and treat orders. Screened patient to identify level of assistance needed for ADLs and functional mobility/transfers. Patient presents to be at baseline and is independent in performing ADLs and functional mobility/transfers. Pt is not appropriate for skilled OT interventions at this time. Current OT orders weill be discontinued.     Thank you for this referral.    Todd Dominguez, OTR/L

## 2022-09-02 NOTE — ROUTINE PROCESS
Bedside and Verbal shift change report given to [address]  (oncoming nurse) by Archie Laboy RN  (offgoing nurse). Report given with SBAR, Kardex, Intake/Output and Recent Results.

## 2022-09-02 NOTE — CONSULTS
Nephrology Consult    Patient: Javier Vviar  Requesting physician: Dr. Griffin Orellana  Reason for consult: ESRD management    CC: Weakness    History of Present Illness:  Catina Singh III is a 52 y.o. white male with a past medical history significant for alcoholic liver dz, s/p liver tx followed by Dr Wilmer Da Silva, anemia, ESRD on HD TTS with [address]. Pt admitted for weakness and found to have severe anemia. Has been on tacrolimus and myfortic for his liver tx immunosuppression. Pt did not miss dialysis this week. CT abd showed no acute bleed. Pt was then admitted to medicine for transfusion and Nephrology was consulted for further evaluation and management of his ESRD. Past Medical History:  Patient Active Problem List   Diagnosis Code    Alcoholic liver disease ([facility] [address] K70.9    Crohn disease ([facility] [address] K50.90    Liver transplant recipient [facility]) Z94.4    Long-term use of immunosuppressant medication Z79.899    Liver transplant complication ([facility] [address] V06.04    ESRD on hemodialysis ([facility] [address] N18.6, Z99.2    Severe anemia D64.9    Severe protein-calorie malnutrition ([facility] [address] E43       Social History:  Social History     Socioeconomic History    Marital status:    Tobacco Use    Smoking status: Former    Smokeless tobacco: Former   Substance and Sexual Activity    Alcohol use: Not Currently    Drug use: Not Currently       Family History:  No family history on file.   No kidney dz in family    Allergy:  Allergies   Allergen Reactions    Naprosyn [Naproxen] Itching    Other Medication Other (comments)     Some Crohns disease medication        Medication:  Home meds: reviewed    Inpatient meds:  Current Facility-Administered Medications   Medication Dose Route Frequency    0.9% sodium chloride infusion 250 mL  250 mL IntraVENous PRN    0.9% sodium chloride infusion 250 mL  250 mL IntraVENous PRN    sodium chloride (NS) flush 5-40 mL  5-40 mL IntraVENous Q8H    sodium chloride (NS) flush 5-40 mL  5-40 mL IntraVENous PRN    bisacodyL (DULCOLAX) suppository 10 mg  10 mg Rectal DAILY PRN    promethazine (PHENERGAN) tablet 12.5 mg  12.5 mg Oral Q6H PRN    Or    ondansetron (ZOFRAN) injection 4 mg  4 mg IntraVENous Q6H PRN    dapsone tablet 50 mg  50 mg Oral DAILY    dronabinoL (MARINOL) capsule 10 mg  10 mg Oral BID    metoclopramide HCl (REGLAN) tablet 10 mg  10 mg Oral BID    mirtazapine (REMERON) tablet 7.5 mg  7.5 mg Oral QHS    . PHARMACY TO SUBSTITUTE PER PROTOCOL (Reordered from: mycophenolate sodium (Myfortic) 360 mg delayed release tablet)    Per Protocol    tacrolimus (PROGRAF) capsule 1 mg  1 mg Oral Q12H    ursodioL (ACTIGALL) capsule 300 mg  300 mg Oral BID    cholecalciferol (VITAMIN D3) (1000 Units /25 mcg) tablet 5,000 Units  5,000 Units Oral DAILY    pantoprazole (PROTONIX) 40 mg in 0.9% sodium chloride 10 mL injection  40 mg IntraVENous Q12H                        Review of Systems:  Gen: no fever or weakness  Head: no headache or trauma  Eyes: no change in vision  Throat/Neck: no sore throat or dysphagia  CV: no chest pain or palpitation  Pulm: no cough or hemoptysis  GI: no nausea, vomiting or diarrhea  : no dysuria or hematuria  Skin: no new rash or lesions  Endocrine: no hot or cold intolerance  Psych: no anxiety or depression    Vital signs:   Visit Vitals  /84   Pulse 84 Comment: 83   Temp 98 °F (36.7 °C)   Resp 16   Ht 6' (1.829 m)   Wt 81.6 kg (180 lb)   SpO2 98%   BMI 24.41 kg/m²       Intake/Output Summary (Last 24 hours) at 9/2/2022 0196  Last data filed at 9/2/2022 9716  Gross per 24 hour   Intake 675 ml   Output --   Net 675 ml       Physical Exam:    General: No acute distress   HENT: Atraumatic and normocephalic   Eyes: Normal conjunctiva, EOMI   Neck: Supple with no mass   Cardiovascular: Normal S1 & S2, no m/r/g   Pulmonary/Chest Wall: Clear to auscultation bilaterally, no w/c   Abdominal: Soft and non-tender, normal active bowel sound   Musculoskeletal: Trace edema lower ext bilaterraly   Skin: No lesions Neurological: No focal neurological deficits       Data Review:  Latest labs reviewed    Radiology:   Reviewed         Impression:     ESRD on HD TTS  Anemia, getting pRBC  Liver tx, on IS  Malnutrition    Plan:     No indication for HD today  Next HD on Saturday  pRBC and recheck h/h  AM renal panel  D/w pt and wife    Thanks for inviting us to participate in this patient's medical care.   We'll follow the patient closely with the medical team.    Armond Marr MD  [facility]  [phone]

## 2022-09-02 NOTE — ED NOTES
Pt given his scheduled night time medications. They were his home medications for his transplant. Medication administration was permitted by Emergency Room physician. Medications given were:  Myfortic 360mg  Reglan 10mg  Remeron 30mg  Tacrolimus 4mg  Urosodiol 300mg    Pt has time sensitive morning medications for tomorrow morning in his possession.

## 2022-09-02 NOTE — PROGRESS NOTES
1000 Nurse entered patient's room to give patient his medications this am as ordered. Patient stated he has already took his medications he stated he brought his medications from home. Nurse educated patient and made patient aware of things that could happen when patients take their own meds from home without the staff being aware.  Patient verbalized an understanding and stated he will take medications from hospital.

## 2022-09-03 VITALS
DIASTOLIC BLOOD PRESSURE: 65 MMHG | RESPIRATION RATE: 16 BRPM | WEIGHT: 182.1 LBS | HEIGHT: 72 IN | OXYGEN SATURATION: 98 % | BODY MASS INDEX: 24.66 KG/M2 | HEART RATE: 72 BPM | TEMPERATURE: 98 F | SYSTOLIC BLOOD PRESSURE: 108 MMHG

## 2022-09-03 LAB
ALBUMIN SERPL-MCNC: 2.5 G/DL (ref 3.4–5)
ALBUMIN/GLOB SERPL: 0.7 {RATIO} (ref 0.8–1.7)
ALP SERPL-CCNC: 265 U/L (ref 45–117)
ALT SERPL-CCNC: 11 U/L (ref 16–61)
ANION GAP SERPL CALC-SCNC: 7 MMOL/L (ref 3–18)
AST SERPL-CCNC: 19 U/L (ref 10–38)
BASOPHILS # BLD: 0 K/UL (ref 0–0.1)
BASOPHILS NFR BLD: 1 % (ref 0–2)
BILIRUB SERPL-MCNC: 1.1 MG/DL (ref 0.2–1)
BUN SERPL-MCNC: 33 MG/DL (ref 7–18)
BUN/CREAT SERPL: 6 (ref 12–20)
CALCIUM SERPL-MCNC: 8.6 MG/DL (ref 8.5–10.1)
CHLORIDE SERPL-SCNC: 105 MMOL/L (ref 100–111)
CO2 SERPL-SCNC: 24 MMOL/L (ref 21–32)
CREAT SERPL-MCNC: 5.2 MG/DL (ref 0.6–1.3)
DIFFERENTIAL METHOD BLD: ABNORMAL
EOSINOPHIL # BLD: 0.1 K/UL (ref 0–0.4)
EOSINOPHIL NFR BLD: 2 % (ref 0–5)
ERYTHROCYTE [DISTWIDTH] IN BLOOD BY AUTOMATED COUNT: 18.9 % (ref 11.6–14.5)
GLOBULIN SER CALC-MCNC: 3.8 G/DL (ref 2–4)
GLUCOSE SERPL-MCNC: 84 MG/DL (ref 74–99)
HBV SURFACE AG SER QL: <0.1 INDEX
HBV SURFACE AG SER QL: NEGATIVE
HCT VFR BLD AUTO: 22.6 % (ref 36–48)
HGB BLD-MCNC: 7.1 G/DL (ref 13–16)
IMM GRANULOCYTES # BLD AUTO: 0 K/UL (ref 0–0.04)
IMM GRANULOCYTES NFR BLD AUTO: 1 % (ref 0–0.5)
LYMPHOCYTES # BLD: 1.8 K/UL (ref 0.9–3.6)
LYMPHOCYTES NFR BLD: 33 % (ref 21–52)
MAGNESIUM SERPL-MCNC: 1.6 MG/DL (ref 1.6–2.6)
MCH RBC QN AUTO: 30.3 PG (ref 24–34)
MCHC RBC AUTO-ENTMCNC: 31.4 G/DL (ref 31–37)
MCV RBC AUTO: 96.6 FL (ref 78–100)
MONOCYTES # BLD: 0.4 K/UL (ref 0.05–1.2)
MONOCYTES NFR BLD: 7 % (ref 3–10)
NEUTS SEG # BLD: 3.1 K/UL (ref 1.8–8)
NEUTS SEG NFR BLD: 57 % (ref 40–73)
NRBC # BLD: 0 K/UL (ref 0–0.01)
NRBC BLD-RTO: 0 PER 100 WBC
PLATELET # BLD AUTO: 189 K/UL (ref 135–420)
PMV BLD AUTO: 9.2 FL (ref 9.2–11.8)
POTASSIUM SERPL-SCNC: 4.9 MMOL/L (ref 3.5–5.5)
PROT SERPL-MCNC: 6.3 G/DL (ref 6.4–8.2)
RBC # BLD AUTO: 2.34 M/UL (ref 4.35–5.65)
SODIUM SERPL-SCNC: 136 MMOL/L (ref 136–145)
WBC # BLD AUTO: 5.5 K/UL (ref 4.6–13.2)

## 2022-09-03 PROCEDURE — 85025 COMPLETE CBC W/AUTO DIFF WBC: CPT

## 2022-09-03 PROCEDURE — 80053 COMPREHEN METABOLIC PANEL: CPT

## 2022-09-03 PROCEDURE — 86706 HEP B SURFACE ANTIBODY: CPT

## 2022-09-03 PROCEDURE — 83735 ASSAY OF MAGNESIUM: CPT

## 2022-09-03 PROCEDURE — 87340 HEPATITIS B SURFACE AG IA: CPT

## 2022-09-03 PROCEDURE — 74011000250 HC RX REV CODE- 250: Performed by: HOSPITALIST

## 2022-09-03 PROCEDURE — 36415 COLL VENOUS BLD VENIPUNCTURE: CPT

## 2022-09-03 RX ADMIN — SODIUM CHLORIDE, PRESERVATIVE FREE 10 ML: 5 INJECTION INTRAVENOUS at 06:23

## 2022-09-03 NOTE — PROGRESS NOTES
1930 - Bedside report received from Erickson Santana, [address] [facility]. Patient in bed. Pain 0/10.     2000 - Patient in bed at this time. IV to L AC intact and patent. Dressing to [facility] site CDI. + CMS. Pt A & O x 4. LS clear, on RA. Abdomen soft, NT and ND. + BS to all 4 quadrants. Denies nausea. Pain 0/10. Call light within reach. 2126-Pt already took own meds. Says he is a liver transplant, jthat he just wants to make sure he takes all correctly. Pt educated on the dangers of self med administration. Verba Canavan he is going home this AM anyways so it may not be an issue any more. Other than taking own meds at , Pt had an uneventful shift. No issues/concerns at this time.  Call bell within reach Lab Results   Component Value Date    HGBA1C 7 4 02/06/2019       A1c above goal-discussed dietary modifications-will refer for medical nutrition therapy    Will continue current regimen for now and reassess next visit

## 2022-09-03 NOTE — DISCHARGE SUMMARY
Pt states he is signing out AMA \" No matter what\"  risks for leaving AMA reviewed including death. He signed AMA form. He states he will go to his 1 pm HD today. I advise him to fu with his pcp and Dr. Stephy Franklin in 3-4 days. Also advise him to return to ER with any symptoms      Discharge Summary    Patient: Dory Anaya MRN: [id number]  CSN: [id number]    YOB: 1975  Age: 52 y.o.   Sex: male    DOA: 9/1/2022 LOS:  LOS: 2 days   Discharge Date:      Primary Care Provider:  Kerrie Worrell MD    Admission Diagnoses: Severe anemia [D64.9]  ESRD (end stage renal disease) ([facility] [N18.6]    Discharge Diagnoses:    Problem List as of 9/3/2022 Date Reviewed: 8/27/2022            Codes Class Noted - Resolved    ESRD on hemodialysis [facility]) ICD-10-CM: N18.6, Z99.2  ICD-9-CM: 585.6, V45.11  9/1/2022 - Present        * (Principal) Severe anemia ICD-10-CM: D64.9  ICD-9-CM: 285.9  9/1/2022 - Present        Severe protein-calorie malnutrition ([facility] ICD-10-CM: E43  ICD-9-CM: 262  9/1/2022 - Present        Liver transplant recipient [facility]) ICD-10-CM: Z94.4  ICD-9-CM: V42.7  8/27/2022 - Present        Long-term use of immunosuppressant medication ICD-10-CM: Z79.899  ICD-9-CM: V58.69  8/27/2022 - Present        Liver transplant complication ([facility] [id number]: T86.40  ICD-9-CM: 996.82  8/27/2022 - Present        Alcoholic liver disease ([facility] ICD-10-CM: K70.9  ICD-9-CM: 571.3  3/29/2022 - Present        Crohn disease ([facility] ICD-10-CM: K50.90  ICD-9-CM: 555.9  Unknown - Present        RESOLVED: Ascites ICD-10-CM: R18.8  ICD-9-CM: 789.59  3/29/2022 - 8/27/2022        RESOLVED: Anasarca ICD-10-CM: R60.1  ICD-9-CM: 782.3  3/29/2022 - 8/27/2022        RESOLVED: Alcoholic hepatitis [facility]-GE: K70.10  ICD-9-CM: 571.1  3/29/2022 - 8/27/2022        RESOLVED: Hyperbilirubinemia ICD-10-CM: E80.6  ICD-9-CM: 782.4  2/10/2022 - 8/27/2022        RESOLVED: Cir[facility] liver [facility] ICD-10-CM: K74.60  ICD-9-CM: 571.5  2/10/2022 - 8/27/2022           Discharge Medications:     Current Discharge Medication List        CONTINUE these medications which have NOT CHANGED    Details   pantoprazole (PROTONIX) 40 mg tablet Take 1 Tablet by mouth Daily (before breakfast). Indications: gastroesophageal reflux disease  Qty: 90 Tablet, Refills: 3    Associated Diagnoses: Alcoholic liver disease (HCC)      ursodioL (ACTIGALL) 300 mg capsule Take 300 mg by mouth two (2) times a day. tacrolimus (PROGRAF) 1 mg capsule Take 1 mg by mouth every twelve (12) hours. 4mg BID      dapsone 25 mg tablet Take 50 mg by mouth in the morning. dronabinoL (MARINOL) 5 mg capsule Take 10 mg by mouth two (2) times a day. metoclopramide HCl (REGLAN) 10 mg tablet Take 10 mg by mouth two (2) times a day. cholecalciferol (VITAMIN D3) 25 mcg (1,000 unit) cap Take 5,000 Units by mouth in the morning. ondansetron hcl (ZOFRAN) 4 mg tablet Take 4 mg by mouth every eight (8) hours as needed for Nausea or Vomiting.      mirtazapine (REMERON) 30 mg tablet Take  by mouth nightly. Visit Vitals  /80   Pulse 98   Temp 97.5 °F (36.4 °C)   Resp 16   Ht 6' (1.829 m)   Wt 82.6 kg (182 lb 1.6 oz)   SpO2 100%   BMI 24.70 kg/m²     General: Awake, cooperative, no acute distress    HEENT: NC, Atraumatic. PERRLA, EOMI. Anicteric sclerae. Lungs:  CTA Bilaterally. No Wheezing/Rhonchi/Rales. Heart:  Regular  rhythm,  No murmur, No Rubs, No Gallops  Abdomen: Soft, Non distended, Non tender. +Bowel sounds,   Extremities: No c/c/e  Psych:   Not anxious or agitated. Neurologic:  No acute neurological deficits.                                      Labs: Results:       Chemistry Recent Labs     09/03/22  0149 09/02/22  0200 09/01/22  1750   GLU 84 87 95    138 137   K 4.9 4.7 4.2    106 104   CO2 24 28 30   BUN 33* 23* 16   CREA 5.20* 3.97* 3.27*   CA 8.6 8.4* 8.9   AGAP 7 4 3   BUCR 6* 6* 5*   * 273* 310*   TP 6.3* 6.2* 7.1   ALB 2.5* 2.5* 2.9* GLOB 3.8 3.7 4.2*   AGRAT 0.7* 0.7* 0.7*      CBC w/Diff Recent Labs     09/03/22  0149 09/02/22  1047 09/02/22  0200 09/01/22  1750   WBC 5.5  --  4.5* 4.6   RBC 2.34*  --  1.99* 1.81*   HGB 7.1* 8.0* 6.2* 5.6*   HCT 22.6* 25.4* 19.9* 18.3*     --  191 205   GRANS 57  --  55 57   LYMPH 33  --  35 33   EOS 2  --  2 2      Cardiac Enzymes No results for input(s): CPK, CKND1, MONICA in the last 72 hours. No lab exists for component: CKRMB, TROIP   Coagulation No results for input(s): PTP, INR, APTT, INREXT in the last 72 hours. Lipid Panel No results found for: CHOL, CHOLPOCT, CHOLX, CHLST, CHOLV, 460317, HDL, HDLP, LDL, LDLC, DLDLP, 952984, VLDLC, VLDL, TGLX, TRIGL, TRIGP, TGLPOCT, CHHD, CHHDX   BNP No results for input(s): BNPP in the last 72 hours. Liver Enzymes Recent Labs     09/03/22  0149   TP 6.3*   ALB 2.5*   *      Thyroid Studies No results found for: T4, T3U, TSH, TSHEXT         Significant Diagnostic Studies: CT ABD PELV W CONT    Result Date: 9/2/2022  EXAM: CT of the abdomen and pelvis INDICATION: Pain. COMPARISON: None. TECHNIQUE: Axial CT imaging of the abdomen and pelvis was performed with intravenous contrast. Multiplanar reformats were generated. One or more dose reduction techniques were used on this CT: automated exposure control, adjustment of the mAs and/or kVp according to patient size, and iterative reconstruction techniques. The specific techniques used on this CT exam have been documented in the patient's electronic medical record. Digital Imaging and Communications in Medicine (DICOM) format image data are available to nonaffiliated external healthcare facilities or entities on a secure, media free, reciprocally searchable basis with patient authorization for at least a 12-month period after this study. _______________ FINDINGS: LOWER CHEST: Moderate right pleural effusion with thickened, enhancing pleural lining suggesting possible superimposed infection.  Right basilar atelectasis. LIVER, BILIARY: Postsurgical changes of liver transplant. Wedge-shaped area of low attenuation change in the posterior right hepatic lobe possibly reflecting geographic enhancement, fatty infiltration or area of infarction. No biliary dilation. Gallbladder is surgically absent. PANCREAS: Normal. SPLEEN: Normal. ADRENALS: Normal. KIDNEYS/URETERS/BLADDER: Punctate nonobstructing bilateral renal calculi. No hydronephrosis. PELVIC ORGANS: Unremarkable. VASCULATURE: Scattered vascular calcifications LYMPH NODES: No enlarged lymph nodes. GASTROINTESTINAL TRACT: No bowel dilation or wall thickening. BONES: No acute or aggressive osseous abnormalities identified. OTHER: Anasarca _______________     Postsurgical changes of liver transplant.   > Wedge-shaped area of low attenuation change in the posterior right hepatic lobe possibly reflecting geographic enhancement, fatty infiltration or area of infarction. Moderate right pleural effusion with thickened, enhancing pleural lining suggesting possible superimposed infection. No results found for this or any previous visit.            CC: Erasmo Damon MD

## 2022-09-03 NOTE — ROUTINE PROCESS
Bedside and Verbal shift change report given to Marleen Cano RN by Yair Cheema. Report included the following information SBAR, Kardex, OR Summary, Intake/Output and MAR.

## 2022-09-03 NOTE — PROGRESS NOTES
0820- Pt refusing hospital medications, taking own home medications because he states there is a discrepancy in medication and it is crucial he stays on his medication schedule d/t being a transplant patient. States if he is not released today he will be leaving AMA.     46- Reviewed AMA paperwork, signed AMA paperwork

## 2022-09-05 LAB
ATRIAL RATE: 97 BPM
CALCULATED P AXIS, ECG09: 49 DEGREES
CALCULATED R AXIS, ECG10: 32 DEGREES
CALCULATED T AXIS, ECG11: 46 DEGREES
DIAGNOSIS, 93000: NORMAL
HBV SURFACE AB SER QL IA: POSITIVE
HBV SURFACE AB SERPL IA-ACNC: 38.72 MIU/ML
HEP BS AB COMMENT,HBSAC: NORMAL
P-R INTERVAL, ECG05: 140 MS
Q-T INTERVAL, ECG07: 374 MS
QRS DURATION, ECG06: 86 MS
QTC CALCULATION (BEZET), ECG08: 474 MS
VENTRICULAR RATE, ECG03: 97 BPM

## 2022-09-06 ENCOUNTER — TELEPHONE (OUTPATIENT)
Dept: HEMATOLOGY | Age: 47
End: 2022-09-06

## 2022-09-06 LAB
ABO + RH BLD: NORMAL
BLD PROD TYP BPU: NORMAL
BLOOD GROUP ANTIBODIES SERPL: NORMAL
BPU ID: NORMAL
CROSSMATCH RESULT,%XM: NORMAL
SPECIMEN EXP DATE BLD: NORMAL
STATUS OF UNIT,%ST: NORMAL
UNIT DIVISION, %UDIV: 0

## 2022-09-06 NOTE — TELEPHONE ENCOUNTER
Patients wife, Diane De La Torre, called to inform me patient left the hospital AMA on Saturday, 9/2/22. Diane De La Torre reported the hospital physician did not want him to leave 2/2 a \"superimposed infection,\" seen on his CT scan. Patient was concerned since they \"had not done any studies to explain the infection or if there truly was an infection. \"   Informed Dr. Yuin Bartlett who requested patient be placed on his schedule to see him tomorrow. Appointment scheduled for 9/7/22 @ 1015. Diane De La Torre verbally confirmed understanding.

## 2022-09-07 ENCOUNTER — HOSPITAL ENCOUNTER (OUTPATIENT)
Dept: LAB | Age: 47
Discharge: HOME | End: 2022-09-07
Payer: COMMERCIAL

## 2022-09-07 ENCOUNTER — OFFICE VISIT (OUTPATIENT)
Dept: HEMATOLOGY | Age: 47
End: 2022-09-07
Payer: COMMERCIAL

## 2022-09-07 VITALS
BODY MASS INDEX: 24.95 KG/M2 | SYSTOLIC BLOOD PRESSURE: 109 MMHG | DIASTOLIC BLOOD PRESSURE: 71 MMHG | WEIGHT: 184 LBS | OXYGEN SATURATION: 96 % | HEART RATE: 107 BPM | TEMPERATURE: 97.8 F

## 2022-09-07 DIAGNOSIS — Z79.60 LONG-TERM USE OF IMMUNOSUPPRESSANT MEDICATION: ICD-10-CM

## 2022-09-07 DIAGNOSIS — Z79.60 LONG-TERM USE OF IMMUNOSUPPRESSANT MEDICATION: Primary | ICD-10-CM

## 2022-09-07 DIAGNOSIS — Z94.4 LIVER TRANSPLANT RECIPIENT (HCC): Primary | ICD-10-CM

## 2022-09-07 DIAGNOSIS — Z94.4 LIVER TRANSPLANT RECIPIENT (HCC): ICD-10-CM

## 2022-09-07 LAB
ALBUMIN SERPL-MCNC: 3 G/DL (ref 3.4–5)
ALBUMIN/GLOB SERPL: 0.8 {RATIO} (ref 0.8–1.7)
ALP SERPL-CCNC: 314 U/L (ref 45–117)
ALT SERPL-CCNC: 12 U/L (ref 16–61)
ANION GAP SERPL CALC-SCNC: 9 MMOL/L (ref 3–18)
AST SERPL-CCNC: 20 U/L (ref 10–38)
BASOPHILS # BLD: 0 K/UL (ref 0–0.1)
BASOPHILS NFR BLD: 1 % (ref 0–2)
BILIRUB SERPL-MCNC: 0.8 MG/DL (ref 0.2–1)
BUN SERPL-MCNC: 32 MG/DL (ref 7–18)
BUN/CREAT SERPL: 6 (ref 12–20)
CALCIUM SERPL-MCNC: 8.8 MG/DL (ref 8.5–10.1)
CHLORIDE SERPL-SCNC: 104 MMOL/L (ref 100–111)
CO2 SERPL-SCNC: 25 MMOL/L (ref 21–32)
CREAT SERPL-MCNC: 5.11 MG/DL (ref 0.6–1.3)
DIFFERENTIAL METHOD BLD: ABNORMAL
EOSINOPHIL # BLD: 0.1 K/UL (ref 0–0.4)
EOSINOPHIL NFR BLD: 2 % (ref 0–5)
ERYTHROCYTE [DISTWIDTH] IN BLOOD BY AUTOMATED COUNT: 15.9 % (ref 11.6–14.5)
GLOBULIN SER CALC-MCNC: 4 G/DL (ref 2–4)
GLUCOSE SERPL-MCNC: 95 MG/DL (ref 74–99)
HCT VFR BLD AUTO: 25.5 % (ref 36–48)
HGB BLD-MCNC: 7.7 G/DL (ref 13–16)
IMM GRANULOCYTES # BLD AUTO: 0 K/UL (ref 0–0.04)
IMM GRANULOCYTES NFR BLD AUTO: 0 % (ref 0–0.5)
IRON SATN MFR SERPL: 29 % (ref 20–50)
IRON SERPL-MCNC: 62 UG/DL (ref 50–175)
LYMPHOCYTES # BLD: 2 K/UL (ref 0.9–3.6)
LYMPHOCYTES NFR BLD: 33 % (ref 21–52)
MAGNESIUM SERPL-MCNC: 1.8 MG/DL (ref 1.6–2.6)
MCH RBC QN AUTO: 30.8 PG (ref 24–34)
MCHC RBC AUTO-ENTMCNC: 30.2 G/DL (ref 31–37)
MCV RBC AUTO: 102 FL (ref 78–100)
MONOCYTES # BLD: 0.5 K/UL (ref 0.05–1.2)
MONOCYTES NFR BLD: 8 % (ref 3–10)
NEUTS SEG # BLD: 3.4 K/UL (ref 1.8–8)
NEUTS SEG NFR BLD: 57 % (ref 40–73)
NRBC # BLD: 0 K/UL (ref 0–0.01)
NRBC BLD-RTO: 0 PER 100 WBC
PLATELET # BLD AUTO: 113 K/UL (ref 135–420)
PMV BLD AUTO: 10.4 FL (ref 9.2–11.8)
POTASSIUM SERPL-SCNC: 4.3 MMOL/L (ref 3.5–5.5)
PROT SERPL-MCNC: 7 G/DL (ref 6.4–8.2)
RBC # BLD AUTO: 2.5 M/UL (ref 4.35–5.65)
SODIUM SERPL-SCNC: 138 MMOL/L (ref 136–145)
TIBC SERPL-MCNC: 214 UG/DL (ref 250–450)
WBC # BLD AUTO: 6.1 K/UL (ref 4.6–13.2)

## 2022-09-07 PROCEDURE — 80197 ASSAY OF TACROLIMUS: CPT

## 2022-09-07 PROCEDURE — 85025 COMPLETE CBC W/AUTO DIFF WBC: CPT

## 2022-09-07 PROCEDURE — 80053 COMPREHEN METABOLIC PANEL: CPT

## 2022-09-07 PROCEDURE — 99215 OFFICE O/P EST HI 40 MIN: CPT | Performed by: INTERNAL MEDICINE

## 2022-09-07 PROCEDURE — 36415 COLL VENOUS BLD VENIPUNCTURE: CPT

## 2022-09-07 PROCEDURE — 83540 ASSAY OF IRON: CPT

## 2022-09-07 PROCEDURE — 83735 ASSAY OF MAGNESIUM: CPT

## 2022-09-07 NOTE — PROGRESS NOTES
[address]40 [facility], [place] WEBB, Erickson Ibrahim, [place]      Shyann Lowery, [address], [facility]     Joann Lott, [facility]   Scooter Poster, FNP-C [place], [facility]-BC       Nohelia Meehan Zay De [address] 136    at [address], [facility] [place] Severiano Lowe  22.    [phone]    FAX: [address] [place] [address]    at [address], [address], [address]    [phone]    FAX: [phone]     Patient Care Team:  Carlos Rocha MD as PCP - General (Family Medicine)  Gigi Stephenson RN as Nurse Navigator (Hepatology)      Problem List  Date Reviewed: 10/2/2022            Codes Class Noted    Open wound of abdomen ICD-10-CM: S31.109A  ICD-9-CM: 879.2  9/29/2022        ESRD on hemodialysis [facility]) ICD-10-CM: N18.6, Z99.2  ICD-9-CM: 585.6, V45.11  9/1/2022        Anemia ICD-10-CM: D64.9  ICD-9-CM: 285.9  9/1/2022        Liver transplant recipient [facility]) ICD-10-CM: Z94.4  ICD-9-CM: V42.7  8/27/2022        Long-term use of immunosuppressant medication ICD-10-CM: Z79.60  ICD-9-CM: V58.69  8/27/2022        Liver transplant complication ([facility] OAT-34-EU: T86.40  ICD-9-CM: 996.82  4/79/3499        Alcoholic liver disease ([facility] ICD-10-CM: K70.9  ICD-9-CM: 571.3  3/29/2022        [facility] ICD-10-CM: K50.90  ICD-9-CM: 555.9  Unknown           Burnetta Scale III returns to the [address] for management of liver transplant graft function, to monitor and adjust immune suppression and to assess for recurrence of the primary liver disease. The active problem list, all pertinent past medical history, medications, liver histology, endoscopic studies, radiologic findings and laboratory findings related to the liver disorder were reviewed with the patient.       The patient underwent a liver transplant at [facility] [place] in 3/2022. The patient is taking the following for immune suppression: Tacrolimus, Myfortic    The patient has the following symptoms which could be attributed to the liver disorder:    Appetite has greatly improved. He is walking more and no longer using the rolator. 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 Liver transaminases are normal.  ALP is elevated. Liver function is normal.  The platelet count is normal.      Immune Suppression  Tacrolimus Is being taken at a dose of 1 mg BID. This is causing   No significant adverse effects. The immune suppression blood level is in the proper range. Will continue the current dose. Myphortic Is being taken at a dose of 1080 mg QID. This is causing no significant side effects. Will continue the current dose     Prevention of infections  The patient is taking dapsone to prevent PCP pneumonia. This will continue for 6 months post-LT. Open wound  There is still an open wound at the site of the previous colostomy. Will have him seen by wound clinic    Chronic kidney injury   The Screat is in the 4-5 mg range. HE is making more urine. The patient remains on HD TIW. Aspirin and NSAIDs should be avoided since these agents can worsen renal insufficiency. Hypercholesterolemia   This can be caused by immune suppression.     Serum cholesterol is normal and does not need to be treated at this time. Hypertension   This is a common side effect of immune suppression. Blood pressure is normal and does not need to be treated at this time. Low serum magnesium   This is a common side effect of immune suppression. The patient has a normal or near normal magnesium level and does not need supplementation. Osteoporosis   Osteoporosis is common in patients with cirhrosis prior to liver transplant. The patient was too ill to undergo DEXA scan prior to LT. Monitoring for skin Cancer  The patient was counseled regarding increased risk of skin cancer in transplant recipients and need to have any new skin lesions evaluated by dermatology and removed if suspicious. The patient was instructed to see Dermatology annually examination. Vaccinations  Routine vaccinations against other bacterial and viral agents can be performed as long as this is with attentuatted virus. Live virus vaccines should not be administered. Annual flu vaccination should be administered. The patient has received 2 doses of COVID-19 vaccine. Allergies   Allergen Reactions    Naprosyn [Naproxen] Itching    Other Medication Other (comments)     Some Crohns disease medication       Current Outpatient Medications   Medication Sig    mycophenolic acid (MYFORTIC) 288 mg delayed release tablet Take 180 mg by mouth four (4) times daily. tacrolimus (PROGRAF) 1 mg capsule Take 1 mg by mouth every twelve (12) hours. 4mg BID    dapsone 25 mg tablet Take 50 mg by mouth in the morning. aspirin delayed-release 81 mg tablet Take  by mouth daily. dronabinoL (MARINOL) 5 mg capsule Take 10 mg by mouth two (2) times a day. metoclopramide HCl (REGLAN) 10 mg tablet Take 10 mg by mouth two (2) times a day. cholecalciferol (VITAMIN D3) 25 mcg (1,000 unit) cap Take 5,000 Units by mouth in the morning.     ondansetron hcl (ZOFRAN) 4 mg tablet Take 4 mg by mouth every eight (8) hours as needed for Nausea or Vomiting. pantoprazole (PROTONIX) 40 mg tablet Take 1 Tablet by mouth Daily (before breakfast) for 6 days. Indications: gastroesophageal reflux disease     No current facility-administered medications for this visit. SYSTEM REVIEW NOT RELATED TO LIVER DISEASE OR REVIEWED ABOVE:  Constitution systems: Negative for fever, chills, weight gain, weight loss. Eyes: Negative for visual changes. ENT: Negative for sore throat, painful swallowing. Respiratory: Negative for cough, hemoptysis, SOB. Cardiology: Negative for chest pain, palpitations. GI:  Negative for constipation or diarrhea. : Negative for urinary frequency, dysuria, hematuria, nocturia. Skin: Negative for rash. Hematology: Negative for easy bruising, blood clots. Musculo-skelatal: Negative for back pain, muscle pain, weakness. Neurologic: Negative for headaches, dizziness, vertigo, memory problems not related to HE. Psychology: Negative for anxiety, depression. FAMILY HISTORY:  The father  Has/had the following following chronic disease(s): complications of Agent Organge. The mother  of DM. There is no family history of liver disease. SOCIAL HISTORY:  The patient is . The patient has 1 child. The patient has never used tobacco products. The patient admits to consuming 80-37 alcoholic beverages per day. The patient currently works full time for [employer]. PHYSICAL EXAMINATION:  Visit Vitals  /71   Pulse (!) 107   Temp 97.8 °F (36.6 °C) (Tympanic)   Wt 184 lb (83.5 kg)   SpO2 96%   BMI 24.95 kg/m²     General: No acute distress. Eyes: Sclera anicteric. ENT: No oral lesions. Thyroid normal.  Nodes: No adenopathy. Skin: No spider angiomata. No jaundice. No palmar erythema. Respiratory: Lungs clear to auscultation. Cardiovascular: Regular heart rate. No murmurs. No JVD. Abdomen: Normal liver transplant incision that is well healed. Soft non-tender. Liver size normal to percussion/palpation. Spleen not palpable. No obvious ascites. Extremities: No edema. Muscle wasting. Neurologic: Alert and oriented. Cranial nerves grossly intact. LABORATORY STUDIES:  From 8/2022  AST/ALT/ALP/T Bili/ALB:  35/15/658/1.2/3.9  WBC/HB/PLT/INR:  8.4/10.4/218  NA/BUN/CREAT:  137/19/5.7  MG. 1.6  TAC:  7.8    SEROLOGIES:  Not available or performed. Testing was performed today. LIVER HISTOLOGY:  Not available or performed    ENDOSCOPIC PROCEDURES:  Not available or performed    RADIOLOGY:  Not available or performed    OTHER TESTING:  Not available or performed    FOLLOW-UP:  All of the issues listed above in the Assessment and Plan were discussed with the patient. All questions were answered. The patient expressed a clear understanding of the above. Laboratory studies should be performed twice weekly to assess liver graft function and blood levels of immune suppression.     The patient has a follow-up appointment at the liver transplant center in 11/14/2022     Follow-up Kings Ivan 32 4 weeks       Hannah Reaves MD  [address] [address]  [address] [place] [address], [address]  [address] [place] [address]

## 2022-09-10 LAB — TACROLIMUS BLD-MCNC: 4.2 NG/ML (ref 2–20)

## 2022-09-14 LAB
FE % SATURATION,PSAT: 38 % (ref 20–50)
IRON,IRN: 81 MCG/DL (ref 45–160)
TIBC,TIBC: 216 MCG/DL (ref 228–428)
UIBC SERPL-MCNC: 135 MCG/DL (ref 110–370)

## 2022-09-16 ENCOUNTER — TELEPHONE (OUTPATIENT)
Dept: HEMATOLOGY | Age: 47
End: 2022-09-16

## 2022-09-16 NOTE — TELEPHONE ENCOUNTER
9/16/22 @ 1043: Called back patients wife, Adelia Tamez, to inquire if paperwork for patient to go back to work, 3 days/week, needed to be filled out. Adelia Tamez stated he is going to take it slow and will check with his work to find out about any paperwork needed to return to work. Called patients wife, Adelia Tamez, to discuss patients medications and if she spoke to anyone at dialysis yesterday regarding the medications patient receives during dialysis. Adelia Joi stated they gave her a list of medications he receives at dialysis. Asked her to bring those with her to patients next appointment with Dr. Gonzalo Ibarra on 9/28/22. Adelia Tamez verbally confirmed understanding. Adelia Tamez stated she called [facility] to get a refill on Remeron this week and has heard nothing back. Checked the chart and [facility] has received the Rx and should be getting that out to her asap. Adelia Tamez further stated Rashard wanted her to received her Ursodial and Pantoprazole from the [facility] from now on. Adelia Tamez would prefer to keep all meds at [facility] but understands she needs to follow Rashard's requests. Per Adelia Tamez, the [facility] pharmacy to transfer the ursodial and pantoprazole prescriptions to [facility].  Informed Adelia Tamez she can contact me to have medications refilled, no matter which pharmacy the meds are distributed since patient is Dr. Елена Beckford patient now. Adelia Tamez verbally confirmed understanding.

## 2022-09-21 LAB
FE % SATURATION,PSAT: 35 % (ref 20–50)
IRON,IRN: 78 MCG/DL (ref 45–160)
TIBC,TIBC: 221 MCG/DL (ref 228–428)
UIBC SERPL-MCNC: 143 MCG/DL (ref 110–370)

## 2022-09-22 ENCOUNTER — TELEPHONE (OUTPATIENT)
Dept: HEMATOLOGY | Age: 47
End: 2022-09-22

## 2022-09-22 DIAGNOSIS — Z79.60 LONG-TERM USE OF IMMUNOSUPPRESSANT MEDICATION: ICD-10-CM

## 2022-09-22 DIAGNOSIS — G47.00 INSOMNIA, UNSPECIFIED TYPE: Primary | ICD-10-CM

## 2022-09-22 DIAGNOSIS — Z94.4 LIVER TRANSPLANT RECIPIENT (HCC): ICD-10-CM

## 2022-09-22 DIAGNOSIS — K50.819 CROHN'S DISEASE OF SMALL AND LARGE INTESTINES WITH COMPLICATION (HCC): ICD-10-CM

## 2022-09-22 DIAGNOSIS — K70.9 ALCOHOLIC LIVER DISEASE (HCC): ICD-10-CM

## 2022-09-22 RX ORDER — URSODIOL 300 MG/1
300 CAPSULE ORAL 2 TIMES DAILY
Qty: 12 CAPSULE | Refills: 0 | Status: SHIPPED | OUTPATIENT
Start: 2022-09-22 | End: 2022-09-28

## 2022-09-22 RX ORDER — PANTOPRAZOLE SODIUM 40 MG/1
40 TABLET, DELAYED RELEASE ORAL
Qty: 6 TABLET | Refills: 0 | Status: SHIPPED | OUTPATIENT
Start: 2022-09-22 | End: 2022-09-28

## 2022-09-22 RX ORDER — MIRTAZAPINE 30 MG/1
30 TABLET, FILM COATED ORAL
Qty: 6 TABLET | Refills: 0 | Status: SHIPPED | OUTPATIENT
Start: 2022-09-22 | End: 2022-09-28

## 2022-09-22 NOTE — TELEPHONE ENCOUNTER
Received call from patients wife, Andrea Huizar, regarding patient unable to get his meds from [address] any longer per insurance. They must go through [facility] Huizar having them sent via mail order but they will not get them until 9/26/22. The medications are: remeron, pantoprazole, ursodial. Called local [facility] and was told they would not be able to provide them with any meds without them paying out of pocket. Will send Rx's to the pharmacy in case they want to pay out of pocket. Will inform wife.

## 2022-09-26 LAB
FE % SATURATION,PSAT: 31 % (ref 20–50)
IRON,IRN: 71 MCG/DL (ref 45–160)
TIBC,TIBC: 227 MCG/DL (ref 228–428)
UIBC SERPL-MCNC: 156 MCG/DL (ref 110–370)

## 2022-09-28 ENCOUNTER — HOSPITAL ENCOUNTER (OUTPATIENT)
Dept: WOUND CARE | Age: 47
Discharge: HOME | End: 2022-09-28
Attending: HOSPITALIST
Payer: COMMERCIAL

## 2022-09-28 ENCOUNTER — OFFICE VISIT (OUTPATIENT)
Dept: HEMATOLOGY | Age: 47
End: 2022-09-28
Payer: COMMERCIAL

## 2022-09-28 VITALS
DIASTOLIC BLOOD PRESSURE: 74 MMHG | WEIGHT: 186.38 LBS | SYSTOLIC BLOOD PRESSURE: 104 MMHG | TEMPERATURE: 97.8 F | BODY MASS INDEX: 25.28 KG/M2 | HEART RATE: 101 BPM | OXYGEN SATURATION: 94 %

## 2022-09-28 VITALS
OXYGEN SATURATION: 100 % | HEART RATE: 105 BPM | RESPIRATION RATE: 16 BRPM | DIASTOLIC BLOOD PRESSURE: 69 MMHG | SYSTOLIC BLOOD PRESSURE: 105 MMHG | TEMPERATURE: 97.1 F

## 2022-09-28 DIAGNOSIS — R74.8 ELEVATED SERUM ALKALINE PHOSPHATASE LEVEL: Primary | ICD-10-CM

## 2022-09-28 PROCEDURE — 99215 OFFICE O/P EST HI 40 MIN: CPT | Performed by: INTERNAL MEDICINE

## 2022-09-28 PROCEDURE — 99203 OFFICE O/P NEW LOW 30 MIN: CPT

## 2022-09-28 NOTE — PROGRESS NOTES
3340 [facility], [place], [address], [place], [place]      REMI Presley, [facility]     Joann Lott, [facility]   Bharathi Solorzano, KARELP-SONIA Sosa, [facility]       Nohelia Meehan Zay De Loomis 136    at [address], [address] Severiano Lowe  22.    [phone]    FAX: [address] [place] [address] Avenue    at [address], [address]    [phone]    FAX: [phone]     Patient Care Team:  Sheryl Brooks MD as PCP - General (Family Medicine)  Keyon Gaytan RN as Nurse Navigator (Hepatology)      Problem List  Date Reviewed: 10/2/2022            Codes Class Noted    Open wound of abdomen ICD-10-CM: S31.109A  ICD-9-CM: 879.2  9/29/2022        ESRD on hemodialysis [facility]) ICD-10-CM: N18.6, Z99.2  ICD-9-CM: 585.6, V45.11  9/1/2022        Anemia ICD-10-CM: D64.9  ICD-9-CM: 285.9  9/1/2022        Liver transplant recipient [facility]) ICD-10-CM: Z94.4  ICD-9-CM: V42.7  8/27/2022        Long-term use of immunosuppressant medication ICD-10-CM: Z79.60  ICD-9-CM: V58.69  8/27/2022        Liver transplant complication ([facility] SGP-11-NP: T86.40  ICD-9-CM: 996.82  1/88/3977        Alcoholic liver disease ([facility] ICD-10-CM: K70.9  ICD-9-CM: 571.3  3/29/2022        Crohn disease ([facility] ICD-10-CM: K50.90  ICD-9-CM: 555.9  Unknown           Royalmaico Wilian ANDERSEN returns to the [facility] for management of liver transplant graft function, to monitor and adjust immune suppression and to assess for recurrence of the primary liver disease. The active problem list, all pertinent past medical history, medications, liver histology, endoscopic studies, radiologic findings and laboratory findings related to the liver disorder were reviewed with the patient.       The patient underwent a liver transplant at [facility] [place] in 3/2022. The patient is taking the following for immune suppression: Tacrolimus, Myfortic    The patient has the following symptoms which could be attributed to the liver disorder:    Appetite and eating are excellent. He is starting to get muscle mass back in arms, shoulders and legs. 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 Liver transaminases are normal.  ALP is elevated. Liver function is normal.  The platelet count is normal.      Immune Suppression  Tacrolimus Is being taken at a dose of 1 mg BID. This is causing   No significant adverse effects. The immune suppression blood level is in the proper range. Will continue the current dose. Myphortic Is being taken at a dose of 1080 mg QID. This is causing no significant side effects. Will continue the current dose     Prevention of infections  The patient is taking dapsone to prevent PCP pneumonia. This will continue for 6 months post-LT. Open wound  There is still an open wound at the site of the previous colostomy. CT scan in 9/2022 shows fluid collection in SUB-Q tissue that may be communicating with small bowel. Will need for Transplant surgery to review. Will push CT to [facility]    Persistent elevation in ALP  Will obtain MRI to evaluate biliary tree.     Chronic kidney injury   The Screat is in the 4-5 mg range. HE is making more urine. The patient remains on HD TIW. Aspirin and NSAIDs should be avoided since these agents can worsen renal insufficiency. Hypercholesterolemia   This can be caused by immune suppression. Serum cholesterol is normal and does not need to be treated at this time. Hypertension   This is a common side effect of immune suppression. Blood pressure is normal and does not need to be treated at this time. Low serum magnesium   This is a common side effect of immune suppression. The patient has a normal or near normal magnesium level and does not need supplementation. Osteoporosis   Osteoporosis is common in patients with cirhrosis prior to liver transplant. The patient was too ill to undergo DEXA scan prior to LT. Monitoring for skin Cancer  The patient was counseled regarding increased risk of skin cancer in transplant recipients and need to have any new skin lesions evaluated by dermatology and removed if suspicious. The patient was instructed to see Dermatology annually examination. Vaccinations  Routine vaccinations against other bacterial and viral agents can be performed as long as this is with attentuatted virus. Live virus vaccines should not be administered. Annual flu vaccination should be administered. The patient has received 2 doses of COVID-19 vaccine. Allergies   Allergen Reactions    Naprosyn [Naproxen] Itching    Other Medication Other (comments)     Some Crohns disease medication       Current Outpatient Medications   Medication Sig    pantoprazole (PROTONIX) 40 mg tablet Take 1 Tablet by mouth Daily (before breakfast) for 6 days. Indications: gastroesophageal reflux disease    mycophenolic acid (MYFORTIC) 594 mg delayed release tablet Take 180 mg by mouth four (4) times daily. tacrolimus (PROGRAF) 1 mg capsule Take 1 mg by mouth every twelve (12) hours.  4mg BID    dapsone 25 mg tablet Take 50 mg by mouth in the morning. aspirin delayed-release 81 mg tablet Take  by mouth daily. dronabinoL (MARINOL) 5 mg capsule Take 10 mg by mouth two (2) times a day. metoclopramide HCl (REGLAN) 10 mg tablet Take 10 mg by mouth two (2) times a day. cholecalciferol (VITAMIN D3) 25 mcg (1,000 unit) cap Take 5,000 Units by mouth in the morning. ondansetron hcl (ZOFRAN) 4 mg tablet Take 4 mg by mouth every eight (8) hours as needed for Nausea or Vomiting. No current facility-administered medications for this visit. SYSTEM REVIEW NOT RELATED TO LIVER DISEASE OR REVIEWED ABOVE:  Constitution systems: Negative for fever, chills, weight gain, weight loss. Eyes: Negative for visual changes. ENT: Negative for sore throat, painful swallowing. Respiratory: Negative for cough, hemoptysis, SOB. Cardiology: Negative for chest pain, palpitations. GI:  Negative for constipation or diarrhea. : Negative for urinary frequency, dysuria, hematuria, nocturia. Skin: Negative for rash. Hematology: Negative for easy bruising, blood clots. Musculo-skelatal: Negative for back pain, muscle pain, weakness. Neurologic: Negative for headaches, dizziness, vertigo, memory problems not related to HE. Psychology: Negative for anxiety, depression. FAMILY HISTORY:  The father  Has/had the following following chronic disease(s): complications of Agent Organge. The mother  of DM. There is no family history of liver disease. SOCIAL HISTORY:  The patient is . The patient has 1 child. The patient has never used tobacco products. The patient admits to consuming 43-41 alcoholic beverages per day. The patient currently works full time for [employer]. PHYSICAL EXAMINATION:  Visit Vitals  /74   Pulse (!) 101   Temp 97.8 °F (36.6 °C) (Tympanic)   Wt 186 lb 6 oz (84.5 kg)   SpO2 94%   BMI 25.28 kg/m²     General: No acute distress. Eyes: Sclera anicteric.    ENT: No oral lesions. Thyroid normal.  Nodes: No adenopathy. Skin: No spider angiomata. No jaundice. No palmar erythema. Respiratory: Lungs clear to auscultation. Cardiovascular: Regular heart rate. No murmurs. No JVD. Abdomen: Normal liver transplant incision that is well healed. Soft non-tender. Liver size normal to percussion/palpation. Spleen not palpable. No obvious ascites. Extremities: No edema. Muscle wasting. Neurologic: Alert and oriented. Cranial nerves grossly intact. LABORATORY STUDIES:  Liver [place] [address] Units 9/7/2022 9/3/2022   WBC 4.6 - 13.2 K/uL 6.1 5.5   ANC 1.8 - 8.0 K/UL 3.4 3.1   HGB 13.0 - 16.0 g/dL 7.7 (L) 7.1 (L)    - 420 K/uL 113 (L) 189   INR 0.8 - 1.2       AST 10 - 38 U/L 20 19   ALT 16 - 61 U/L 12 (L) 11 (L)   Alk Phos 45 - 117 U/L 314 (H) 265 (H)   Bili, Total 0.2 - 1.0 MG/DL 0.8 1.1 (H)   Bili, Direct 0.0 - 0.2 MG/DL     Albumin 3.4 - 5.0 g/dL 3.0 (L) 2.5 (L)   BUN 7.0 - 18 MG/DL 32 (H) 33 (H)   Creat 0.6 - 1.3 MG/DL 5.11 (H) 5.20 (H)   Na 136 - 145 mmol/L 138 136   K 3.5 - 5.5 mmol/L 4.3 4.9   Cl 100 - 111 mmol/L 104 105   CO2 21 - 32 mmol/L 25 24   Glucose 74 - 99 mg/dL 95 84   Magnesium 1.6 - 2.6 mg/dL 1.8 1.6     Liver Virology and Transplant Immune Suppression Latest Ref Rng & Units 9/7/2022   Tacrolimus Level 2.0 - 20.0 ng/mL 4.2       SEROLOGIES:  Not available or performed. Testing was performed today. LIVER HISTOLOGY:  Not available or performed    ENDOSCOPIC PROCEDURES:  Not available or performed    RADIOLOGY:  Not available or performed    OTHER TESTING:  Not available or performed    FOLLOW-UP:  All of the issues listed above in the Assessment and Plan were discussed with the patient. All questions were answered. The patient expressed a clear understanding of the above. Laboratory studies should be performed twice weekly to assess liver graft function and blood levels of immune suppression.     The patient has a follow-up appointment at the liver transplant center in 11/14/2022    FOLLOW-UP:  All of the issues listed above in the Assessment and Plan were discussed with the patient. All questions were answered. The patient expressed a clear understanding of the above. Laboratory studies should be performed every 1 week to assess liver graft function and blood levels of immune suppression.     The patient has a follow-up appointment at the liver transplant center in 11/14/2022     Follow-up [address] 32 4 weeks         Verna Talbot MD  [address] [address], [address]  [address] [place] [address]

## 2022-09-29 PROBLEM — S31.109A OPEN WOUND OF ABDOMEN: Status: ACTIVE | Noted: 2022-09-29

## 2022-09-29 RX ORDER — SILVER SULFADIAZINE 10 G/1000G
CREAM TOPICAL ONCE
OUTPATIENT
Start: 2022-09-29 | End: 2022-09-29

## 2022-09-29 RX ORDER — LIDOCAINE HYDROCHLORIDE 20 MG/ML
JELLY TOPICAL ONCE
OUTPATIENT
Start: 2022-09-29 | End: 2022-09-29

## 2022-09-29 RX ORDER — LIDOCAINE HYDROCHLORIDE 40 MG/ML
SOLUTION TOPICAL ONCE
OUTPATIENT
Start: 2022-09-29 | End: 2022-09-29

## 2022-09-29 RX ORDER — LIDOCAINE 40 MG/G
CREAM TOPICAL ONCE
OUTPATIENT
Start: 2022-09-29 | End: 2022-09-29

## 2022-09-29 RX ORDER — LIDOCAINE 50 MG/G
OINTMENT TOPICAL ONCE
OUTPATIENT
Start: 2022-09-29 | End: 2022-09-29

## 2022-09-29 RX ORDER — CLOBETASOL PROPIONATE 0.5 MG/G
OINTMENT TOPICAL ONCE
OUTPATIENT
Start: 2022-09-29 | End: 2022-09-29

## 2022-09-29 RX ORDER — MUPIROCIN 20 MG/G
OINTMENT TOPICAL ONCE
OUTPATIENT
Start: 2022-09-29 | End: 2022-09-29

## 2022-09-29 RX ORDER — BACITRACIN ZINC AND POLYMYXIN B SULFATE 500; 1000 [USP'U]/G; [USP'U]/G
OINTMENT TOPICAL ONCE
OUTPATIENT
Start: 2022-09-29 | End: 2022-09-29

## 2022-09-29 NOTE — WOUND CARE
Allan [address] [place], [place] [address]e S [address]  p: [phone] f: [phone]    Ordering Center:     [facility] OP WOUND CARE  [address]  [address] [facility] [address] [phone]  WOUND CARE [unfilled]   FAX NUMBER [unfilled]    Patient Information:      Parmjit Ramos  [address] [place] [address]  Len Lora [address]   [unfilled]   : 1975  AGE: 52 y.o. GENDER: male   TODAYS DATE:  2022    Insurance:      PRIMARY INSURANCE:  [id number] - ([employer])    Payer/Plan Subscr  Sex Relation Sub. Ins. ID Effective Group Num   1. BLUE CROSS - * LEI GOYAL III 1975 Male Self [id number]                                   [address] [address]   2. BLUE CROSS - * Dina Iglesias III 1975 Male Self [id number]                                   [address] [address]       Patient Wound Information:      Problem List Items Addressed This Visit    None      WOUNDS REQUIRING DRESSING SUPPLIES:     Wound Abdomen Medial;Mid Scot sized open area with drainage 22 (Active)   Number of days: 28       Wound Abdomen Right;Superior (Active)   Wound Image   22   Wound Etiology Non-Healing Surgical 22   Dressing Status Breakthrough drainage noted 22   Cleansed Wound cleanser 22   Dressing/Treatment Alginate with Ag;Gauze dressing/dressing sponge;Silicone border 926   Wound Length (cm) 1 cm 22   Wound Width (cm) 1 cm 22   Wound Depth (cm) 0.3 cm 22   Wound Surface Area (cm^2) 1 cm^2 22   Wound Volume (cm^3) 0.3 cm^3 22   Wound Assessment Subcutaneous 22   Drainage Amount Moderate 22   Drainage Description Yellow;Green; Thin 22   Wound Odor Mild 22   Ирина-Wound/Incision Assessment Intact 22   Edges Attached edges 22   Wound Thickness Description Full thickness 09/29/22 0926   Number of days: 1        Supplies Requested :      WOUND #:1   PRIMARY DRESSING:  Alginate pad and Silicone border   Gauze pad     FREQUENCY OF DRESSING CHANGES:  Every other day             ADDITIONAL ITEMS:  [] Gloves Small  [x] Gloves Medium [] Gloves Large [] Gloves Elwyn Toño  [] Tape 1\" [x] Tape 2\" [] Tape 3\"  [x] Medipore Tape  [x] Saline  [x] Skin Prep   [] Adhesive Remover   [] Cotton Tip Applicators   [] Other:    Patient Wound(s) Debrided: No X      Patient currently being seen by Home Health: [] Yes   [x] No    Duration for needed supplies:  []15  [x]30  [x]60  []90 Days    Electronically signed by Umm Carlisle RN on 9/29/2022 at 2:08 PM    Provider Information:      PROVIDER'S NAME: Dr La Feliciano    NPI: [id number]

## 2022-09-29 NOTE — WOUND CARE
09/29/22 0926   Wound Abdomen Right;Superior   Date First Assessed/Time First Assessed: 09/28/22 1500   Present on Hospital Admission: Yes  Primary Wound Type: Open incision/surgical site  Location: Abdomen  Wound Location Orientation: Right;Superior   Wound Image    Wound Etiology Non-Healing Surgical   Dressing Status Breakthrough drainage noted   Cleansed Wound cleanser   Dressing/Treatment Alginate with Ag;Gauze dressing/dressing sponge;Silicone border   Dressing Change Due   (PRN home supplies)   Wound Length (cm) 1 cm   Wound Width (cm) 1 cm   Wound Depth (cm) 0.3 cm   Wound Surface Area (cm^2) 1 cm^2   Wound Volume (cm^3) 0.3 cm^3   Wound Assessment Subcutaneous   Drainage Amount Moderate   Drainage Description Yellow;Green; Thin   Wound Odor Mild   Ирина-Wound/Incision Assessment Intact   Edges Attached edges   Wound Thickness Description Full thickness

## 2022-09-29 NOTE — WOUND CARE
[address]  Initial Consult note         Chief Complaint:  Bettye Mazariegos III is a 52 y.o.  male who presents with open wound on abdomen along the incision site         HPI:   He has history of crohn's disease past ostomy, end-stage renal disease on HD, cirrhosis, s/p liver transplant-03/22 at [facility] 5 months hospitalization. He had reversal of ostomy along the liver transplant. He has open wound along the incision site. Small dome shaped open wound with serosanguinous drainage. He reports that he has this wound post surgery. He also has a suture along the incision site which he reports is present since his surgery in 03/2022. He was admitted at [facility] on 09/02/2022 for anemia, he received blood transfusion. He had CT scan of abdomen done which showed Wedge-shaped area of low attenuation change in the posterior right hepatic  lobe possibly reflecting geographic enhancement, fatty infiltration or area of  infarction. There is concern of fistula, and also it is unclear why the suture is still there. Wound caused by: non-healed surgical wound   Current wound care: local wound care  Offloading wound: n/a  Appetite: good  Wound associated pain: none  Diabetic: No  Smoker: No      Past Medical History:   Diagnosis Date    Crohn disease ([address]     Liver disease       Past Surgical History:   Procedure Laterality Date    HX OTHER SURGICAL      bowel obstruction and ostomy    HX OTHER SURGICAL  2001    bowel resection secondary to tear in bowel and abscess     History reviewed. No pertinent family history. Social History     Tobacco Use    Smoking status: Former    Smokeless tobacco: Former   Substance Use Topics    Alcohol use: Not Currently       Prior to Admission medications    Medication Sig Start Date End Date Taking? Authorizing Provider   ursodioL (ACTIGALL) 300 mg capsule Take 1 Capsule by mouth two (2) times a day for 6 days.  9/22/22 9/28/22  Isidro Knight MD   pantoprazole (PROTONIX) 40 mg tablet Take 1 Tablet by mouth Daily (before breakfast) for 6 days. Indications: gastroesophageal reflux disease 9/22/22 9/28/22  Marcin Chin MD   mirtazapine (REMERON) 30 mg tablet Take 1 Tablet by mouth nightly for 6 days. 9/22/22 9/28/22  Marcin Chin MD   mycophenolic acid (MYFORTIC) 650 mg delayed release tablet Take 180 mg by mouth four (4) times daily. Provider, Historical   tacrolimus (PROGRAF) 1 mg capsule Take 1 mg by mouth every twelve (12) hours. 4mg BID    Provider, Historical   dapsone 25 mg tablet Take 50 mg by mouth in the morning. Provider, Historical   aspirin delayed-release 81 mg tablet Take  by mouth daily. Provider, Historical   dronabinoL (MARINOL) 5 mg capsule Take 10 mg by mouth two (2) times a day. Provider, Historical   metoclopramide HCl (REGLAN) 10 mg tablet Take 10 mg by mouth two (2) times a day. Provider, Historical   cholecalciferol (VITAMIN D3) 25 mcg (1,000 unit) cap Take 5,000 Units by mouth in the morning. Provider, Historical   ondansetron hcl (ZOFRAN) 4 mg tablet Take 4 mg by mouth every eight (8) hours as needed for Nausea or Vomiting. Provider, Historical     Allergies   Allergen Reactions    Naprosyn [Naproxen] Itching    Other Medication Other (comments)     Some Crohns disease medication        Review of Systems  Gen: No fever, chills, malaise. Heent: No headache, sore throat. Heart: No chest pain, palpitations. Resp: No cough, wheezing and shortness of breath. GI: see above. : No urinary obstruction, dysuria or hematuria. Derm: see above   Musc/skeletal: no bone or joint complains. Vasc: No edema. Neuro: No unilateral weakness, numbness, tingling. No seizures. Heme: see above.       Objective:     Physical Exam:     Visit Vitals  /69 (BP 1 Location: Left upper arm, BP Patient Position: At rest;Sitting)   Pulse (!) 105   Temp 97.1 °F (36.2 °C)   Resp 16   SpO2 100%     General: well developed, well nourished, pleasant , NAD. Psych: cooperative. Pleasant. No anxiety or depression. Normal mood and affect. Neuro: alert and oriented to person/place/situation. Otherwise nonfocal.  Derm: Skin turgor for age, dry skin  HEENT: Normocephalic, atraumatic. EOMI. Conjunctiva clear. No scleral icterus. Neck: Normal range of motion. Chest: Respirations nonlabored  Abdomen: Soft, nontender,  Lower extremities: color normal; temperature normal. Calves are supple, nontender. Capillary refill <3 sec      Wound Description:   Wound Length:      Wound Width :     Wound Depth :   Wound Abdomen Right;Superior   Date First Assessed/Time First Assessed: 09/28/22 1500   Present on Hospital Admission: Yes  Primary Wound Type: Open incision/surgical site  Location: Abdomen  Wound Location Orientation: Right;Superior   Wound Image    Wound Etiology Non-Healing Surgical   Dressing Status Breakthrough drainage noted   Cleansed Wound cleanser   Dressing/Treatment Alginate with Ag;Gauze dressing/dressing sponge;Silicone border   Dressing Change Due    (PRN home supplies)   Wound Length (cm) 1 cm   Wound Width (cm) 1 cm   Wound Depth (cm) 0.3 cm   Wound Surface Area (cm^2) 1 cm^2   Wound Volume (cm^3) 0.3 cm^3   Wound Assessment Subcutaneous   Drainage Amount Moderate   Drainage Description Yellow;Green; Thin   Wound Odor Mild   Ирина-Wound/Incision Assessment Intact   Edges Attached edges   Wound Thickness Description Full thickness          Data Review:   No results found for this or any previous visit (from the past 24 hour(s)).     Assessment:     Patient Active Problem List   Diagnosis Code    Alcoholic liver disease ([facility] K70.9    Crohn disease ([facility] K50.90    Liver transplant recipient [facility]) Z94.4    Long-term use of immunosuppressant medication Z79.899    Liver transplant complication ([facility] S72.97    ESRD on hemodialysis ([facility] N18.6, Z99.2    Severe anemia D64.9    Severe protein-calorie malnutrition ([facility] E43    Open wound of abdomen S31.109A     47 y.o. male with open wound of abdomen along the incision site. Thre is quite some amount of drainage from the wound where there is small opening  Concern for fistula however CT scan from 09/02/2022 reviewed. As mentioned above. I had long discussion with patient and wife. Discussed concern for fistula and also left over suture. Recommend follow up at [facility]. Plan:     In wound care clinic today:  Cleanse wound with NS or soap and water or commercial wound cleanser  Apply the following topically to wound bed: silver  Apply the following to guru-wound: NA  Apply the following dressings: Absorptive dressing    For Home Care/Self Care:  Cleanse wound with NS or soap and water or commercial wound cleanser  Keep dressing dry and intact when bathing  Apply the following to wound bed: silver  Apply the following to skin around wound: NA  Apply the following dressings: Absorptive dressing        PLEASE CONTACT OFFICE AS SOON AS POSSIBLE IF UNABLE TO MAKE THIS APPOINTMENT. Inspect your wounds, looking for signs of infection which may include the following:  Increase in redness  Red \"streaks\" from wound  Increase in swelling  Fever  Unusual odor  Change in the amount of wound drainage. Should you experience any significant changes in your wound(s) or have any questions regarding your home care instructions please contact the wound center or your home health company. If after regular business hours, please call your family doctor or local emergency room.     Signed By: Jeny Stubbs MD     September 29, 2022

## 2022-10-02 PROBLEM — E43 SEVERE PROTEIN-CALORIE MALNUTRITION (HCC): Status: RESOLVED | Noted: 2022-09-01 | Resolved: 2022-10-02

## 2022-10-02 PROBLEM — D64.9 ANEMIA: Status: ACTIVE | Noted: 2022-09-01

## 2022-10-03 LAB
A-G RATIO,AGRAT: 1.2 RATIO (ref 1.1–2.6)
ABSOLUTE LYMPHOCYTE COUNT, 10803: 2.5 K/UL (ref 1–4.8)
ALBUMIN SERPL-MCNC: 3.8 G/DL (ref 3.5–5)
ALP SERPL-CCNC: 327 U/L (ref 25–115)
ALT SERPL-CCNC: 8 U/L (ref 5–40)
ANION GAP SERPL CALC-SCNC: 10 MMOL/L (ref 3–15)
AST SERPL W P-5'-P-CCNC: 17 U/L (ref 10–37)
BASOPHILS # BLD: 0 K/UL (ref 0–0.2)
BASOPHILS NFR BLD: 0 % (ref 0–2)
BILIRUB SERPL-MCNC: 0.9 MG/DL (ref 0.2–1.2)
BUN SERPL-MCNC: 51 MG/DL (ref 6–22)
CALCIUM SERPL-MCNC: 9.3 MG/DL (ref 8.4–10.5)
CHLORIDE SERPL-SCNC: 102 MMOL/L (ref 98–110)
CO2 SERPL-SCNC: 23 MMOL/L (ref 20–32)
CREAT SERPL-MCNC: 5.9 MG/DL (ref 0.5–1.2)
EOSINOPHIL # BLD: 0.1 K/UL (ref 0–0.5)
EOSINOPHIL NFR BLD: 2 % (ref 0–6)
ERYTHROCYTE [DISTWIDTH] IN BLOOD BY AUTOMATED COUNT: 13.5 % (ref 10–15.5)
FE % SATURATION,PSAT: 41 % (ref 20–50)
GLOBULIN,GLOB: 3.1 G/DL (ref 2–4)
GLOMERULAR FILTRATION RATE: 11.3 ML/MIN/1.73 SQ.M.
GLUCOSE SERPL-MCNC: 94 MG/DL (ref 70–99)
GRANULOCYTES,GRANS: 57 % (ref 40–75)
HCT VFR BLD AUTO: 21.1 % (ref 39.3–51.6)
HGB BLD-MCNC: 6.2 G/DL (ref 13.1–17.2)
IRON,IRN: 90 MCG/DL (ref 45–160)
LYMPHOCYTES, LYMLT: 33 % (ref 20–45)
MACROCYTOSIS,MACRO: ABNORMAL
MAGNESIUM SERPL-MCNC: 1.5 MG/DL (ref 1.6–2.5)
MCH RBC QN AUTO: 30 PG (ref 26–34)
MCHC RBC AUTO-ENTMCNC: 29 G/DL (ref 31–36)
MCV RBC AUTO: 103 FL (ref 80–95)
MONOCYTES # BLD: 0.6 K/UL (ref 0.1–1)
MONOCYTES NFR BLD: 8 % (ref 3–12)
NEUTROPHILS # BLD AUTO: 4.4 K/UL (ref 1.8–7.7)
PLATELET # BLD AUTO: 257 K/UL (ref 140–440)
PMV BLD AUTO: 9.5 FL (ref 9–13)
POLYCHROMASIA,POLYCM: ABNORMAL
POTASSIUM SERPL-SCNC: 5.7 MMOL/L (ref 3.5–5.5)
PROGRAF LEVEL (FK506), 10002: 6.8 NG/ML (ref 2–20)
PROT SERPL-MCNC: 6.9 G/DL (ref 6.4–8.3)
RBC # BLD AUTO: 2.04 M/UL (ref 3.8–5.8)
SMEAR EVAL, 1131: ABNORMAL
SODIUM SERPL-SCNC: 135 MMOL/L (ref 133–145)
TIBC,TIBC: 217 MCG/DL (ref 228–428)
UIBC SERPL-MCNC: 127 MCG/DL (ref 110–370)
WBC # BLD AUTO: 7.7 K/UL (ref 4–11)

## 2022-10-05 ENCOUNTER — TELEPHONE (OUTPATIENT)
Dept: HEMATOLOGY | Age: 47
End: 2022-10-05

## 2022-10-05 NOTE — TELEPHONE ENCOUNTER
Called patients wife, Stevenson, regarding patients new upcoming appointments:    MRI @ [facility] CENTER: 10/13/22 @ 0900 (arrive @ 0830)    [facility] Nephrologist on 10/14/22    Dr. iFsher Pickup: 11/2/22 @ 3:15    Stevenson verbally confirmed understanding.

## 2022-10-10 LAB
FE % SATURATION,PSAT: 23 % (ref 20–50)
IRON,IRN: 54 MCG/DL (ref 45–160)
TIBC,TIBC: 240 MCG/DL (ref 228–428)
UIBC SERPL-MCNC: 186 MCG/DL (ref 110–370)

## 2022-10-17 LAB
A-G RATIO,AGRAT: 1.2 RATIO (ref 1.1–2.6)
ABSOLUTE LYMPHOCYTE COUNT, 10803: 2.3 K/UL (ref 1–4.8)
ALBUMIN SERPL-MCNC: 3.9 G/DL (ref 3.5–5)
ALP SERPL-CCNC: 266 U/L (ref 25–115)
ALT SERPL-CCNC: 9 U/L (ref 5–40)
ANION GAP SERPL CALC-SCNC: 16 MMOL/L (ref 3–15)
AST SERPL W P-5'-P-CCNC: 16 U/L (ref 10–37)
BASOPHILS # BLD: 0.1 K/UL (ref 0–0.2)
BASOPHILS NFR BLD: 1 % (ref 0–2)
BILIRUB SERPL-MCNC: 0.8 MG/DL (ref 0.2–1.2)
BUN SERPL-MCNC: 51 MG/DL (ref 6–22)
CALCIUM SERPL-MCNC: 9.1 MG/DL (ref 8.4–10.5)
CHLORIDE SERPL-SCNC: 101 MMOL/L (ref 98–110)
CO2 SERPL-SCNC: 22 MMOL/L (ref 20–32)
CREAT SERPL-MCNC: 6.5 MG/DL (ref 0.5–1.2)
EOSINOPHIL # BLD: 0.2 K/UL (ref 0–0.5)
EOSINOPHIL NFR BLD: 2 % (ref 0–6)
ERYTHROCYTE [DISTWIDTH] IN BLOOD BY AUTOMATED COUNT: 14.4 % (ref 10–15.5)
FE % SATURATION,PSAT: 41 % (ref 20–50)
GLOBULIN,GLOB: 3.3 G/DL (ref 2–4)
GLOMERULAR FILTRATION RATE: 10.1 ML/MIN/1.73 SQ.M.
GLUCOSE SERPL-MCNC: 89 MG/DL (ref 70–99)
GRANULOCYTES,GRANS: 63 % (ref 40–75)
HCT VFR BLD AUTO: 32.5 % (ref 39.3–51.6)
HGB BLD-MCNC: 9.7 G/DL (ref 13.1–17.2)
IRON,IRN: 91 MCG/DL (ref 45–160)
LYMPHOCYTES, LYMLT: 27 % (ref 20–45)
MAGNESIUM SERPL-MCNC: 1.5 MG/DL (ref 1.6–2.5)
MCH RBC QN AUTO: 29 PG (ref 26–34)
MCHC RBC AUTO-ENTMCNC: 30 G/DL (ref 31–36)
MCV RBC AUTO: 97 FL (ref 80–95)
MONOCYTES # BLD: 0.6 K/UL (ref 0.1–1)
MONOCYTES NFR BLD: 7 % (ref 3–12)
NEUTROPHILS # BLD AUTO: 5.4 K/UL (ref 1.8–7.7)
PLATELET # BLD AUTO: 189 K/UL (ref 140–440)
PMV BLD AUTO: 9.8 FL (ref 9–13)
POTASSIUM SERPL-SCNC: 4.5 MMOL/L (ref 3.5–5.5)
PROGRAF LEVEL (FK506), 10002: 8.9 NG/ML (ref 2–20)
PROT SERPL-MCNC: 7.2 G/DL (ref 6.4–8.3)
RBC # BLD AUTO: 3.34 M/UL (ref 3.8–5.8)
SODIUM SERPL-SCNC: 139 MMOL/L (ref 133–145)
TIBC,TIBC: 222 MCG/DL (ref 228–428)
UIBC SERPL-MCNC: 131 MCG/DL (ref 110–370)
WBC # BLD AUTO: 8.6 K/UL (ref 4–11)

## 2022-10-20 ENCOUNTER — TELEPHONE (OUTPATIENT)
Dept: HEMATOLOGY | Age: 47
End: 2022-10-20

## 2022-10-20 DIAGNOSIS — D64.9 ANEMIA, UNSPECIFIED TYPE: Primary | ICD-10-CM

## 2022-10-20 NOTE — TELEPHONE ENCOUNTER
Called patient and spoke to his wife, Mary Walls to review recent lab results and inform him I am emailing a copy of a new lab order to patients email: \"[email or web address]\" for him to take with him when he has his labs drawn on Monday. Copy of the Ferritin lab was also faxed to [facility],  Lab. Mary Titi verbally confirmed understanding and will have daughter print out the lab order and have him take it to [facility] lab draw.

## 2022-10-24 LAB
A-G RATIO,AGRAT: 1.1 RATIO (ref 1.1–2.6)
ABSOLUTE LYMPHOCYTE COUNT, 10803: 2.2 K/UL (ref 1–4.8)
ALBUMIN SERPL-MCNC: 3.5 G/DL (ref 3.5–5)
ALP SERPL-CCNC: 282 U/L (ref 25–115)
ALT SERPL-CCNC: 11 U/L (ref 5–40)
ANION GAP SERPL CALC-SCNC: 12 MMOL/L (ref 3–15)
ANISOCYTOSIS: ABNORMAL
AST SERPL W P-5'-P-CCNC: 23 U/L (ref 10–37)
BASOPHILS # BLD: 0.1 K/UL (ref 0–0.2)
BASOPHILS NFR BLD: 1 % (ref 0–2)
BILIRUB SERPL-MCNC: 0.8 MG/DL (ref 0.2–1.2)
BUN SERPL-MCNC: 36 MG/DL (ref 6–22)
CALCIUM SERPL-MCNC: 9.2 MG/DL (ref 8.4–10.5)
CHLORIDE SERPL-SCNC: 99 MMOL/L (ref 98–110)
CO2 SERPL-SCNC: 27 MMOL/L (ref 20–32)
CREAT SERPL-MCNC: 5.9 MG/DL (ref 0.5–1.2)
EOSINOPHIL # BLD: 0.2 K/UL (ref 0–0.5)
EOSINOPHIL NFR BLD: 2 % (ref 0–6)
ERYTHROCYTE [DISTWIDTH] IN BLOOD BY AUTOMATED COUNT: 14.9 % (ref 10–15.5)
FE % SATURATION,PSAT: 30 % (ref 20–50)
GLOBULIN,GLOB: 3.2 G/DL (ref 2–4)
GLOMERULAR FILTRATION RATE: 11.3 ML/MIN/1.73 SQ.M.
GLUCOSE SERPL-MCNC: 79 MG/DL (ref 70–99)
GRANULOCYTES,GRANS: 59 % (ref 40–75)
HCT VFR BLD AUTO: 32.5 % (ref 39.3–51.6)
HGB BLD-MCNC: 9.5 G/DL (ref 13.1–17.2)
HYPOCHROMIA, 12007: ABNORMAL
IRON,IRN: 55 MCG/DL (ref 45–160)
LYMPHOCYTES, LYMLT: 30 % (ref 20–45)
MACROCYTOSIS,MACRO: ABNORMAL
MAGNESIUM SERPL-MCNC: 1.6 MG/DL (ref 1.6–2.5)
MCH RBC QN AUTO: 30 PG (ref 26–34)
MCHC RBC AUTO-ENTMCNC: 29 G/DL (ref 31–36)
MCV RBC AUTO: 101 FL (ref 80–95)
MONOCYTES # BLD: 0.6 K/UL (ref 0.1–1)
MONOCYTES NFR BLD: 8 % (ref 3–12)
NEUTROPHILS # BLD AUTO: 4.2 K/UL (ref 1.8–7.7)
OVALOCYTES, 1119: ABNORMAL
PLATELET # BLD AUTO: 126 K/UL (ref 140–440)
PMV BLD AUTO: 10.4 FL (ref 9–13)
POLYCHROMASIA,POLYCM: ABNORMAL
POTASSIUM SERPL-SCNC: 4.6 MMOL/L (ref 3.5–5.5)
PROGRAF LEVEL (FK506), 10002: 7.8 NG/ML (ref 2–20)
PROT SERPL-MCNC: 6.7 G/DL (ref 6.4–8.3)
RBC # BLD AUTO: 3.21 M/UL (ref 3.8–5.8)
SMEAR EVAL, 1131: ABNORMAL
SODIUM SERPL-SCNC: 138 MMOL/L (ref 133–145)
TIBC,TIBC: 185 MCG/DL (ref 228–428)
UIBC SERPL-MCNC: 130 MCG/DL (ref 110–370)
WBC # BLD AUTO: 7.2 K/UL (ref 4–11)

## 2022-10-25 ENCOUNTER — TELEPHONE (OUTPATIENT)
Dept: HEMATOLOGY | Age: 47
End: 2022-10-25

## 2022-10-25 NOTE — TELEPHONE ENCOUNTER
Called patients hemodialysis center and spoke to [facility] regarding drawing a Ferritin lab. Diamond stated she will see if they are able to do it today, if not, they will do on Thursday. Diamond will fax results to me.

## 2022-10-28 DIAGNOSIS — Z94.4 LIVER TRANSPLANT RECIPIENT (HCC): ICD-10-CM

## 2022-10-28 DIAGNOSIS — E61.1 LOW IRON: Primary | ICD-10-CM

## 2022-10-28 DIAGNOSIS — Z79.60 LONG-TERM USE OF IMMUNOSUPPRESSANT MEDICATION: Primary | ICD-10-CM

## 2022-11-02 LAB
A-G RATIO,AGRAT: 1 RATIO (ref 1.1–2.6)
ABSOLUTE LYMPHOCYTE COUNT, 10803: 2.2 K/UL (ref 1–4.8)
ALBUMIN SERPL-MCNC: 3.7 G/DL (ref 3.5–5)
ALP SERPL-CCNC: 309 U/L (ref 25–115)
ALT SERPL-CCNC: 10 U/L (ref 5–40)
ANION GAP SERPL CALC-SCNC: 13 MMOL/L (ref 3–15)
AST SERPL W P-5'-P-CCNC: 21 U/L (ref 10–37)
BASOPHILS # BLD: 0 K/UL (ref 0–0.2)
BASOPHILS NFR BLD: 1 % (ref 0–2)
BILIRUB SERPL-MCNC: 0.8 MG/DL (ref 0.2–1.2)
BUN SERPL-MCNC: 35 MG/DL (ref 6–22)
CALCIUM SERPL-MCNC: 9.4 MG/DL (ref 8.4–10.5)
CHLORIDE SERPL-SCNC: 99 MMOL/L (ref 98–110)
CO2 SERPL-SCNC: 24 MMOL/L (ref 20–32)
CREAT SERPL-MCNC: 6.1 MG/DL (ref 0.5–1.2)
EOSINOPHIL # BLD: 0.1 K/UL (ref 0–0.5)
EOSINOPHIL NFR BLD: 2 % (ref 0–6)
ERYTHROCYTE [DISTWIDTH] IN BLOOD BY AUTOMATED COUNT: 14.2 % (ref 10–15.5)
FE % SATURATION,PSAT: 28 % (ref 20–50)
FERRITIN SERPL-MCNC: 2185 NG/ML (ref 22–322)
GLOBULIN,GLOB: 3.6 G/DL (ref 2–4)
GLOMERULAR FILTRATION RATE: 10.9 ML/MIN/1.73 SQ.M.
GLUCOSE SERPL-MCNC: 89 MG/DL (ref 70–99)
GRANULOCYTES,GRANS: 54 % (ref 40–75)
HCT VFR BLD AUTO: 31.2 % (ref 39.3–51.6)
HGB BLD-MCNC: 9.3 G/DL (ref 13.1–17.2)
IRON,IRN: 62 MCG/DL (ref 45–160)
LYMPHOCYTES, LYMLT: 34 % (ref 20–45)
MAGNESIUM SERPL-MCNC: 1.6 MG/DL (ref 1.6–2.5)
MCH RBC QN AUTO: 29 PG (ref 26–34)
MCHC RBC AUTO-ENTMCNC: 30 G/DL (ref 31–36)
MCV RBC AUTO: 96 FL (ref 80–95)
MONOCYTES # BLD: 0.7 K/UL (ref 0.1–1)
MONOCYTES NFR BLD: 10 % (ref 3–12)
NEUTROPHILS # BLD AUTO: 3.6 K/UL (ref 1.8–7.7)
PLATELET # BLD AUTO: 230 K/UL (ref 140–440)
PMV BLD AUTO: 9.5 FL (ref 9–13)
POTASSIUM SERPL-SCNC: 4.8 MMOL/L (ref 3.5–5.5)
PROGRAF LEVEL (FK506), 10002: 7.1 NG/ML (ref 2–20)
PROT SERPL-MCNC: 7.3 G/DL (ref 6.4–8.3)
RBC # BLD AUTO: 3.25 M/UL (ref 3.8–5.8)
SODIUM SERPL-SCNC: 136 MMOL/L (ref 133–145)
TIBC,TIBC: 223 MCG/DL (ref 228–428)
UIBC SERPL-MCNC: 161 MCG/DL (ref 110–370)
WBC # BLD AUTO: 6.6 K/UL (ref 4–11)

## 2022-11-11 LAB
FE % SATURATION,PSAT: 27 % (ref 20–50)
FERRITIN SERPL-MCNC: 2352 NG/ML (ref 22–322)
IRON,IRN: 64 MCG/DL (ref 45–160)
TIBC,TIBC: 238 MCG/DL (ref 228–428)
UIBC SERPL-MCNC: 174 MCG/DL (ref 110–370)

## 2022-11-16 LAB
A-G RATIO,AGRAT: 1.3 RATIO (ref 1.1–2.6)
ABSOLUTE LYMPHOCYTE COUNT, 10803: 2.2 K/UL (ref 1–4.8)
ALBUMIN SERPL-MCNC: 3.8 G/DL (ref 3.5–5)
ALP SERPL-CCNC: 261 U/L (ref 25–115)
ALT SERPL-CCNC: 11 U/L (ref 5–40)
ANION GAP SERPL CALC-SCNC: 13 MMOL/L (ref 3–15)
AST SERPL W P-5'-P-CCNC: 20 U/L (ref 10–37)
BASOPHILS # BLD: 0 K/UL (ref 0–0.2)
BASOPHILS NFR BLD: 1 % (ref 0–2)
BILIRUB SERPL-MCNC: 0.7 MG/DL (ref 0.2–1.2)
BUN SERPL-MCNC: 40 MG/DL (ref 6–22)
CALCIUM SERPL-MCNC: 9.3 MG/DL (ref 8.4–10.5)
CHLORIDE SERPL-SCNC: 100 MMOL/L (ref 98–110)
CO2 SERPL-SCNC: 26 MMOL/L (ref 20–32)
CREAT SERPL-MCNC: 6.1 MG/DL (ref 0.5–1.2)
EOSINOPHIL # BLD: 0.1 K/UL (ref 0–0.5)
EOSINOPHIL NFR BLD: 2 % (ref 0–6)
ERYTHROCYTE [DISTWIDTH] IN BLOOD BY AUTOMATED COUNT: 14.6 % (ref 10–15.5)
FE % SATURATION,PSAT: 33 % (ref 20–50)
GLOBULIN,GLOB: 3 G/DL (ref 2–4)
GLOMERULAR FILTRATION RATE: 10.9 ML/MIN/1.73 SQ.M.
GLUCOSE SERPL-MCNC: 79 MG/DL (ref 70–99)
GRANULOCYTES,GRANS: 38 % (ref 40–75)
HCT VFR BLD AUTO: 31.7 % (ref 39.3–51.6)
HGB BLD-MCNC: 9.4 G/DL (ref 13.1–17.2)
IRON,IRN: 71 MCG/DL (ref 45–160)
LYMPHOCYTES, LYMLT: 48 % (ref 20–45)
MAGNESIUM SERPL-MCNC: 1.6 MG/DL (ref 1.6–2.5)
MCH RBC QN AUTO: 29 PG (ref 26–34)
MCHC RBC AUTO-ENTMCNC: 30 G/DL (ref 31–36)
MCV RBC AUTO: 97 FL (ref 80–95)
MONOCYTES # BLD: 0.5 K/UL (ref 0.1–1)
MONOCYTES NFR BLD: 12 % (ref 3–12)
NEUTROPHILS # BLD AUTO: 1.8 K/UL (ref 1.8–7.7)
PLATELET # BLD AUTO: 138 K/UL (ref 140–440)
PMV BLD AUTO: 10.9 FL (ref 9–13)
POTASSIUM SERPL-SCNC: 5.2 MMOL/L (ref 3.5–5.5)
PROGRAF LEVEL (FK506), 10002: 7.8 NG/ML (ref 2–20)
PROT SERPL-MCNC: 6.8 G/DL (ref 6.4–8.3)
RBC # BLD AUTO: 3.26 M/UL (ref 3.8–5.8)
SODIUM SERPL-SCNC: 139 MMOL/L (ref 133–145)
TIBC,TIBC: 217 MCG/DL (ref 228–428)
UIBC SERPL-MCNC: 146 MCG/DL (ref 110–370)
WBC # BLD AUTO: 4.6 K/UL (ref 4–11)

## 2022-11-17 ENCOUNTER — TELEPHONE (OUTPATIENT)
Dept: HEMATOLOGY | Age: 47
End: 2022-11-17

## 2022-11-17 NOTE — TELEPHONE ENCOUNTER
Called patient to inform him his ferritin level was over 2,000. Inquired if patient was taking any medications which contained iron or iron supplements. Patient stated he was taking a multi-vitamin with iron but, for the last week has not been able to afford it so has not taken it. Recommended patient not take a multivitamin with iron only a multivitamin, and to check his medications to make sure he is not taking anything with iron in it. Patient verbally confirmed understanding. Inquired of patient why he cancelled his MRI Abdomen appointment and his appointment with Dr. Yousuf Stapleton on 11/2/22 on the day of the appointment. Patients reasons for not attending his appointments on Mon/Wed is d/t him working and attempting to pay bills. I informed him Dr. Yousuf Stapleton is only in the office on Monday/Wednesday but, is in the office on Tues/Thursday in [address], if he would prefer to go there for his follow up appointments. Patient then stated \"as long as I have a months notice I should be able to get off work. \" I verbally confirmed understanding and provided his upcoming appointments with Dr. Yousuf Stapleton in Dec 2022/Jan/March 2023. Patient informed me he would call to schedule another MRI Abd appointment after explaining his Alk Phos level continues to rise and Dr. Yousuf Stapleton wanted him to have the MRI to check for a bile duct stricture. Patient confirmed he would let me know the date of the MRI. Inquired why patient has not had his labs drawn since 11/2/22, he stated \"I did have my labs drawn the week of 11/7/22, just later in the week. \" I explained to him that only one out of 5 of his transplant labs were drawn which did not allow for appropriate monitoring of his lab levels.

## 2022-11-21 LAB
A-G RATIO,AGRAT: 1.2 RATIO (ref 1.1–2.6)
ABSOLUTE LYMPHOCYTE COUNT, 10803: 1.8 K/UL (ref 1–4.8)
ALBUMIN SERPL-MCNC: 3.7 G/DL (ref 3.5–5)
ALP SERPL-CCNC: 223 U/L (ref 25–115)
ALT SERPL-CCNC: 8 U/L (ref 5–40)
ANION GAP SERPL CALC-SCNC: 15 MMOL/L (ref 3–15)
AST SERPL W P-5'-P-CCNC: 18 U/L (ref 10–37)
BASOPHILS # BLD: 0 K/UL (ref 0–0.2)
BASOPHILS NFR BLD: 1 % (ref 0–2)
BILIRUB SERPL-MCNC: 1 MG/DL (ref 0.2–1.2)
BUN SERPL-MCNC: 32 MG/DL (ref 6–22)
CALCIUM SERPL-MCNC: 9.5 MG/DL (ref 8.4–10.5)
CHLORIDE SERPL-SCNC: 99 MMOL/L (ref 98–110)
CO2 SERPL-SCNC: 26 MMOL/L (ref 20–32)
CREAT SERPL-MCNC: 6 MG/DL (ref 0.5–1.2)
EOSINOPHIL # BLD: 0.1 K/UL (ref 0–0.5)
EOSINOPHIL NFR BLD: 2 % (ref 0–6)
ERYTHROCYTE [DISTWIDTH] IN BLOOD BY AUTOMATED COUNT: 15.9 % (ref 10–15.5)
FE % SATURATION,PSAT: 21 % (ref 20–50)
FERRITIN SERPL-MCNC: 1771 NG/ML (ref 22–322)
GLOBULIN,GLOB: 3.2 G/DL (ref 2–4)
GLOMERULAR FILTRATION RATE: 11.1 ML/MIN/1.73 SQ.M.
GLUCOSE SERPL-MCNC: 78 MG/DL (ref 70–99)
GRANULOCYTES,GRANS: 34 % (ref 40–75)
HCT VFR BLD AUTO: 30.9 % (ref 39.3–51.6)
HGB BLD-MCNC: 9.1 G/DL (ref 13.1–17.2)
IRON,IRN: 44 MCG/DL (ref 45–160)
LYMPHOCYTES, LYMLT: 51 % (ref 20–45)
MAGNESIUM SERPL-MCNC: 1.7 MG/DL (ref 1.6–2.5)
MCH RBC QN AUTO: 29 PG (ref 26–34)
MCHC RBC AUTO-ENTMCNC: 29 G/DL (ref 31–36)
MCV RBC AUTO: 98 FL (ref 80–95)
MONOCYTES # BLD: 0.4 K/UL (ref 0.1–1)
MONOCYTES NFR BLD: 12 % (ref 3–12)
NEUTROPHILS # BLD AUTO: 1.2 K/UL (ref 1.8–7.7)
PLATELET # BLD AUTO: 198 K/UL (ref 140–440)
PMV BLD AUTO: 10 FL (ref 9–13)
POTASSIUM SERPL-SCNC: 5 MMOL/L (ref 3.5–5.5)
PROT SERPL-MCNC: 6.9 G/DL (ref 6.4–8.3)
RBC # BLD AUTO: 3.15 M/UL (ref 3.8–5.8)
SODIUM SERPL-SCNC: 140 MMOL/L (ref 133–145)
TIBC,TIBC: 214 MCG/DL (ref 228–428)
UIBC SERPL-MCNC: 170 MCG/DL (ref 110–370)
WBC # BLD AUTO: 3.5 K/UL (ref 4–11)

## 2022-11-22 ENCOUNTER — TELEPHONE (OUTPATIENT)
Dept: HEMATOLOGY | Age: 47
End: 2022-11-22

## 2022-11-22 LAB — PROGRAF LEVEL (FK506), 10002: 7.6 NG/ML (ref 2–20)

## 2022-11-22 NOTE — TELEPHONE ENCOUNTER
Called patient to review recent lab results from 11/21/22. Informed patient his ferritin has come down to 1773 and his Alk Phos continues to trend down. Patient informed me he stopped taking the multivitamin with iron and just taking a multivitamin. Informed patient his tacrolimus is not back yet and I will call him if anything unusual. Patient verbally confirmed understanding.

## 2022-11-28 LAB
A-G RATIO,AGRAT: 1.3 RATIO (ref 1.1–2.6)
ALBUMIN SERPL-MCNC: 3.8 G/DL (ref 3.5–5)
ALP SERPL-CCNC: 221 U/L (ref 25–115)
ALT SERPL-CCNC: 8 U/L (ref 5–40)
ANION GAP SERPL CALC-SCNC: 14 MMOL/L (ref 3–15)
AST SERPL W P-5'-P-CCNC: 18 U/L (ref 10–37)
BANDS%-DIF,2015: 3 % (ref 0–5)
BILIRUB SERPL-MCNC: 0.7 MG/DL (ref 0.2–1.2)
BUN SERPL-MCNC: 57 MG/DL (ref 6–22)
CALCIUM SERPL-MCNC: 9.3 MG/DL (ref 8.4–10.5)
CHLORIDE SERPL-SCNC: 100 MMOL/L (ref 98–110)
CO2 SERPL-SCNC: 23 MMOL/L (ref 20–32)
CREAT SERPL-MCNC: 7.1 MG/DL (ref 0.5–1.2)
EOS ABS-DIF,2069: 0.1 K/UL (ref 0–0.5)
EOSINOPHILS%-DIF,2072: 2 % (ref 0–6)
ERYTHROCYTE [DISTWIDTH] IN BLOOD BY AUTOMATED COUNT: 14.9 % (ref 10–15.5)
FE % SATURATION,PSAT: 43 % (ref 20–50)
FERRITIN SERPL-MCNC: 1987 NG/ML (ref 22–322)
GLOBULIN,GLOB: 3 G/DL (ref 2–4)
GLOMERULAR FILTRATION RATE: 9.1 ML/MIN/1.73 SQ.M.
GLUCOSE SERPL-MCNC: 98 MG/DL (ref 70–99)
HCT VFR BLD AUTO: 29.6 % (ref 39.3–51.6)
HGB BLD-MCNC: 8.7 G/DL (ref 13.1–17.2)
IRON,IRN: 94 MCG/DL (ref 45–160)
LYMPHOCYTES%-DIF,2108: 54 % (ref 20–45)
LYMPHS ABS-DIF,2105: 1.6 K/UL (ref 1–4.8)
MAGNESIUM SERPL-MCNC: 1.6 MG/DL (ref 1.6–2.5)
MCH RBC QN AUTO: 28 PG (ref 26–34)
MCHC RBC AUTO-ENTMCNC: 29 G/DL (ref 31–36)
MCV RBC AUTO: 95 FL (ref 80–95)
MONOCYTES ABS-DIF,2141: 0.3 K/UL (ref 0.1–1)
MONOCYTES%-DIF,2144: 11 % (ref 3–12)
NEUTROPHILS ABS,2156: 1 K/UL (ref 1.8–7.7)
NEUTROPHILS%-DIF,2159: 30 % (ref 40–75)
NORMACHROMIC RBC, 868: ABNORMAL
NORMACYTIC RBC, 869: ABNORMAL
PLATELET # BLD AUTO: 202 K/UL (ref 140–440)
PMV BLD AUTO: 9.9 FL (ref 9–13)
POTASSIUM SERPL-SCNC: 5.2 MMOL/L (ref 3.5–5.5)
PROGRAF LEVEL (FK506), 10002: 7.3 NG/ML (ref 2–20)
PROT SERPL-MCNC: 6.8 G/DL (ref 6.4–8.3)
RBC # BLD AUTO: 3.11 M/UL (ref 3.8–5.8)
SMEAR EVAL, 1131: ABNORMAL
SODIUM SERPL-SCNC: 137 MMOL/L (ref 133–145)
TIBC,TIBC: 219 MCG/DL (ref 228–428)
TOTAL CELLS COUNTED, 12021: 100
UIBC SERPL-MCNC: 125 MCG/DL (ref 110–370)
WBC # BLD AUTO: 2.9 K/UL (ref 4–11)

## 2022-11-29 ENCOUNTER — TELEPHONE (OUTPATIENT)
Dept: HEMATOLOGY | Age: 47
End: 2022-11-29

## 2022-11-29 NOTE — TELEPHONE ENCOUNTER
Called patient to review recent lab results of 11/21/22. Informed patient his labs are stable, still an elevation in AlkPhos but, is decreasing nicely. Patient verbally confirmed understanding.

## 2022-12-05 LAB
A-G RATIO,AGRAT: 1.2 RATIO (ref 1.1–2.6)
ALBUMIN SERPL-MCNC: 3.7 G/DL (ref 3.5–5)
ALP SERPL-CCNC: 199 U/L (ref 25–115)
ALT SERPL-CCNC: 8 U/L (ref 5–40)
ANION GAP SERPL CALC-SCNC: 11 MMOL/L (ref 3–15)
AST SERPL W P-5'-P-CCNC: 21 U/L (ref 10–37)
BANDS%-DIF,2015: 4 % (ref 0–5)
BILIRUB SERPL-MCNC: 0.8 MG/DL (ref 0.2–1.2)
BUN SERPL-MCNC: 36 MG/DL (ref 6–22)
CALCIUM SERPL-MCNC: 9 MG/DL (ref 8.4–10.5)
CHLORIDE SERPL-SCNC: 99 MMOL/L (ref 98–110)
CO2 SERPL-SCNC: 27 MMOL/L (ref 20–32)
CREAT SERPL-MCNC: 6.2 MG/DL (ref 0.5–1.2)
EOS ABS-DIF,2069: 0 K/UL (ref 0–0.5)
EOSINOPHILS%-DIF,2072: 1 % (ref 0–6)
ERYTHROCYTE [DISTWIDTH] IN BLOOD BY AUTOMATED COUNT: 16.7 % (ref 10–15.5)
FE % SATURATION,PSAT: 26 % (ref 20–50)
FERRITIN SERPL-MCNC: 2401 NG/ML (ref 22–322)
GLOBULIN,GLOB: 3 G/DL (ref 2–4)
GLOMERULAR FILTRATION RATE: 10.7 ML/MIN/1.73 SQ.M.
GLUCOSE SERPL-MCNC: 99 MG/DL (ref 70–99)
HCT VFR BLD AUTO: 28.5 % (ref 39.3–51.6)
HGB BLD-MCNC: 8.3 G/DL (ref 13.1–17.2)
HYPOCHROMIA, 12007: ABNORMAL
IRON,IRN: 54 MCG/DL (ref 45–160)
LYMPHOCYTES%-DIF,2108: 58 % (ref 20–45)
LYMPHS ABS-DIF,2105: 1.8 K/UL (ref 1–4.8)
MAGNESIUM SERPL-MCNC: 1.6 MG/DL (ref 1.6–2.5)
MCH RBC QN AUTO: 28 PG (ref 26–34)
MCHC RBC AUTO-ENTMCNC: 29 G/DL (ref 31–36)
MCV RBC AUTO: 96 FL (ref 80–95)
MONOCYTES ABS-DIF,2141: 0.2 K/UL (ref 0.1–1)
MONOCYTES%-DIF,2144: 5 % (ref 3–12)
NEUTROPHILS ABS,2156: 1.1 K/UL (ref 1.8–7.7)
NEUTROPHILS%-DIF,2159: 32 % (ref 40–75)
PLATELET # BLD AUTO: 201 K/UL (ref 140–440)
PMV BLD AUTO: 10.1 FL (ref 9–13)
POTASSIUM SERPL-SCNC: 4.2 MMOL/L (ref 3.5–5.5)
PROGRAF LEVEL (FK506), 10002: 9.3 NG/ML (ref 2–20)
PROT SERPL-MCNC: 6.7 G/DL (ref 6.4–8.3)
RBC # BLD AUTO: 2.98 M/UL (ref 3.8–5.8)
SMEAR EVAL, 1131: ABNORMAL
SODIUM SERPL-SCNC: 137 MMOL/L (ref 133–145)
TIBC,TIBC: 206 MCG/DL (ref 228–428)
TOTAL CELLS COUNTED, 12021: 100
UIBC SERPL-MCNC: 152 MCG/DL (ref 110–370)
WBC # BLD AUTO: 3.1 K/UL (ref 4–11)

## 2022-12-06 ENCOUNTER — TELEPHONE (OUTPATIENT)
Dept: HEMATOLOGY | Age: 47
End: 2022-12-06

## 2022-12-06 NOTE — TELEPHONE ENCOUNTER
Called patient to review recent lab results from 12/5/22. Informed patient his labs are stable except for the tacrolimus level of 9.3 which is slightly elevated. Inquired if patient is able to keep up with the 12 hour trough requested for an accurate level with tacrolimus. Patient stated, \"I believe I do. \" Informed patient that I will continue to monitor the tacrolimus level and if it continues to be in the 9's we may need to decrease the tacrolimus dose. Patient verbally confirmed understanding.

## 2022-12-19 LAB
A-G RATIO,AGRAT: 1.2 RATIO (ref 1.1–2.6)
ALBUMIN SERPL-MCNC: 3.7 G/DL (ref 3.5–5)
ALP SERPL-CCNC: 232 U/L (ref 25–115)
ALT SERPL-CCNC: 9 U/L (ref 5–40)
ANION GAP SERPL CALC-SCNC: 13 MMOL/L (ref 3–15)
ANISOCYTOSIS: ABNORMAL
AST SERPL W P-5'-P-CCNC: 21 U/L (ref 10–37)
BILIRUB SERPL-MCNC: 0.6 MG/DL (ref 0.2–1.2)
BUN SERPL-MCNC: 42 MG/DL (ref 6–22)
CALCIUM SERPL-MCNC: 9.5 MG/DL (ref 8.4–10.5)
CHLORIDE SERPL-SCNC: 100 MMOL/L (ref 98–110)
CO2 SERPL-SCNC: 26 MMOL/L (ref 20–32)
CREAT SERPL-MCNC: 7 MG/DL (ref 0.5–1.2)
EOS ABS-DIF,2069: 0.1 K/UL (ref 0–0.5)
EOSINOPHILS%-DIF,2072: 4 % (ref 0–6)
ERYTHROCYTE [DISTWIDTH] IN BLOOD BY AUTOMATED COUNT: 17.1 % (ref 10–15.5)
FE % SATURATION,PSAT: 26 % (ref 20–50)
FERRITIN SERPL-MCNC: 2583 NG/ML (ref 22–322)
GLOBULIN,GLOB: 3.2 G/DL (ref 2–4)
GLOMERULAR FILTRATION RATE: 9.2 ML/MIN/1.73 SQ.M.
GLUCOSE SERPL-MCNC: 101 MG/DL (ref 70–99)
HCT VFR BLD AUTO: 29 % (ref 39.3–51.6)
HGB BLD-MCNC: 8.5 G/DL (ref 13.1–17.2)
HYPOCHROMIA, 12007: ABNORMAL
IRON,IRN: 59 MCG/DL (ref 45–160)
LYMPHOCYTES%-DIF,2108: 60 % (ref 20–45)
LYMPHS ABS-DIF,2105: 1.9 K/UL (ref 1–4.8)
MAGNESIUM SERPL-MCNC: 1.8 MG/DL (ref 1.6–2.5)
MCH RBC QN AUTO: 28 PG (ref 26–34)
MCHC RBC AUTO-ENTMCNC: 29 G/DL (ref 31–36)
MCV RBC AUTO: 97 FL (ref 80–95)
MONOCYTES ABS-DIF,2141: 0.2 K/UL (ref 0.1–1)
MONOCYTES%-DIF,2144: 6 % (ref 3–12)
NEUTROPHILS ABS,2156: 0.9 K/UL (ref 1.8–7.7)
NEUTROPHILS%-DIF,2159: 30 % (ref 40–75)
OVALOCYTES, 1119: ABNORMAL
PLATELET # BLD AUTO: 234 K/UL (ref 140–440)
PMV BLD AUTO: 9.3 FL (ref 9–13)
POLYCHROMASIA,POLYCM: ABNORMAL
POTASSIUM SERPL-SCNC: 5 MMOL/L (ref 3.5–5.5)
PROGRAF LEVEL (FK506), 10002: 16.1 NG/ML (ref 2–20)
PROT SERPL-MCNC: 6.9 G/DL (ref 6.4–8.3)
RBC # BLD AUTO: 3 M/UL (ref 3.8–5.8)
SMEAR EVAL, 1131: ABNORMAL
SODIUM SERPL-SCNC: 139 MMOL/L (ref 133–145)
TIBC,TIBC: 229 MCG/DL (ref 228–428)
TOTAL CELLS COUNTED, 12021: 100
UIBC SERPL-MCNC: 170 MCG/DL (ref 110–370)
WBC # BLD AUTO: 3.1 K/UL (ref 4–11)

## 2022-12-20 ENCOUNTER — HOSPITAL ENCOUNTER (OUTPATIENT)
Dept: LAB | Age: 47
Discharge: HOME | End: 2022-12-20
Payer: COMMERCIAL

## 2022-12-20 ENCOUNTER — OFFICE VISIT (OUTPATIENT)
Dept: HEMATOLOGY | Age: 47
End: 2022-12-20
Payer: COMMERCIAL

## 2022-12-20 VITALS
BODY MASS INDEX: 26.92 KG/M2 | TEMPERATURE: 97.8 F | SYSTOLIC BLOOD PRESSURE: 130 MMHG | DIASTOLIC BLOOD PRESSURE: 82 MMHG | HEART RATE: 93 BPM | OXYGEN SATURATION: 96 % | WEIGHT: 198.5 LBS

## 2022-12-20 DIAGNOSIS — Z94.4 LIVER TRANSPLANT RECIPIENT (HCC): Primary | ICD-10-CM

## 2022-12-20 DIAGNOSIS — Z94.4 LIVER TRANSPLANT RECIPIENT (HCC): ICD-10-CM

## 2022-12-20 DIAGNOSIS — Z79.60 LONG-TERM USE OF IMMUNOSUPPRESSANT MEDICATION: ICD-10-CM

## 2022-12-20 PROCEDURE — 36415 COLL VENOUS BLD VENIPUNCTURE: CPT

## 2022-12-20 PROCEDURE — 99215 OFFICE O/P EST HI 40 MIN: CPT | Performed by: INTERNAL MEDICINE

## 2022-12-20 PROCEDURE — 80197 ASSAY OF TACROLIMUS: CPT

## 2022-12-20 NOTE — Clinical Note
1/2/2023    Patient: Love Kay III   YOB: 1975   Date of Visit: 12/20/2022     Varsha Chew MD  [address] [facility] [address]  Via Fax: [phone]    Dear Varsha Chew MD,      Thank you for referring Mr. Love Kay to [address] [place] [address],[address] for evaluation. My notes for this consultation are attached. If you have questions, please do not hesitate to call me. I look forward to following your patient along with you.       Sincerely,    Luis Fernando Ojeda MD

## 2022-12-20 NOTE — PROGRESS NOTES
[facility] \[facility] [place] [facility]\"", MD, FACP, [place], [place]      Kathleen Sauceda PA-SONIA Lott, AGPCNP-BC   Guzmansurinder Neves, ACNPC-AG   Atul Jones, FNP-C  Yani Sanchez, FNP-C   Worth Goodpasture, AGPCNP-BC      [address]   at [address], [address] [address]  22.   [phone]   FAX: [phone]   at [address], [address] [place] [address], [address]   [phone]   FAX: [phone]         Patient Care Team:  Ifrah Plascencia MD as PCP - General (Family Medicine)  Deacon Acevedo RN as Nurse Navigator (Hepatology)      Problem List  Date Reviewed: 1/2/2023            Codes Class Noted    Alcohol use disorder in remission ICD-10-CM: F10.91  ICD-9-CM: 305.03  1/2/2023        Open wound of abdomen ICD-10-CM: S31.109A  ICD-9-CM: 879.2  9/29/2022        ESRD on hemodialysis [facility]) ICD-10-CM: N18.6, Z99.2  ICD-9-CM: 585.6, V45.11  9/1/2022        Anemia ICD-10-CM: D64.9  ICD-9-CM: 285.9  9/1/2022        Liver transplant recipient [facility]) ICD-10-CM: Z94.4  ICD-9-CM: V42.7  8/27/2022        Long-term use of immunosuppressant medication ICD-10-CM: Z79.60  ICD-9-CM: V58.69  8/27/2022        Liver transplant complication ([facility] [id number]: T86.40  ICD-9-CM: 996.82  2/37/7205        Alcoholic liver disease ([facility] ICD-10-CM: K70.9  ICD-9-CM: 571.3  3/29/2022        Crohn disease ([facility] ICD-10-CM: K50.90  ICD-9-CM: 555.9  Unknown           Denece Mt III returns to the [address] for management of liver transplant graft function, to monitor and adjust immune suppression and to assess for recurrence of the primary liver disease.   The active problem list, all pertinent past medical history, medications, liver histology, endoscopic studies, radiologic findings and laboratory findings related to the liver disorder were reviewed with the patient. The patient underwent a liver transplant at [facility] [place] in 3/2022. The patient is taking the following for immune suppression: Tacrolimus, Myfortic    The patient has the following symptoms which could be attributed to the liver disorder:    Appetite and eating are excellent. He is starting to get muscle mass back in arms, shoulders and legs. He was seen in wound clinic and they believe the abdominal wound is a fistula communicating with the previous ostomy site and related to Crohns disease    The patient is not experiencing the following symptoms which are commonly seen in this liver disorder:   fevers,   chills,   diffuse abdominal pain,   swelling of the abdomen,   swelling of the lower extremities,     The patient has mild limitations in functional activities which can be attributed to the liver disease   He is back to working from home 3 days per week. ASSESSMENT AND PLAN:  Liver transplant   This was for cirrhosis secondary to severe alcoholic hepatitis  The date of the LT was 3/2022. Liver transaminases are normal.  ALP is elevated. Liver function is normal.  The platelet count is normal.      Immune Suppression  Tacrolimus Is being taken at a dose of 1 mg BID. This is causing   No significant adverse effects. The immune suppression blood level is in the proper range. Will continue the current dose. Myphortic Is being taken at a dose of 1080 mg QID. This is causing no significant side effects. Will continue the current dose     Open wound  There is still an open wound at the site of the previous colostomy. CT scan in 9/2022 shows fluid collection in SUB-Q tissue that may be communicating with small bowel. Would clinic believe this is fistula to small bowel related to Crohns disease    Persistent elevation in ALP  Will obtain MRI to evaluate biliary tree.     Chronic kidney injury   The Screat is in the 4-5 mg range. HE is making more urine. The patient remains on HD TIW. He is the safety net and will be listed for kidney transplant 1 year after LT in 3/2023  Aspirin and NSAIDs should be avoided since these agents can worsen renal insufficiency. Hypercholesterolemia   This can be caused by immune suppression. Serum cholesterol is normal and does not need to be treated at this time. Hypertension   This is a common side effect of immune suppression. Blood pressure is normal and does not need to be treated at this time. Low serum magnesium   This is a common side effect of immune suppression. The patient has a normal or near normal magnesium level and does not need supplementation. Osteoporosis   Osteoporosis is common in patients with cirhrosis prior to liver transplant. The patient was too ill to undergo DEXA scan prior to LT. Monitoring for skin Cancer  The patient was counseled regarding increased risk of skin cancer in transplant recipients and need to have any new skin lesions evaluated by dermatology and removed if suspicious. The patient was instructed to see Dermatology annually examination. Vaccinations  Routine vaccinations against other bacterial and viral agents can be performed as long as this is with attentuatted virus. Live virus vaccines should not be administered. Annual flu vaccination should be administered. The patient has received 2 doses of COVID-19 vaccine. Allergies   Allergen Reactions    Naprosyn [Naproxen] Itching    Other Medication Other (comments)     Some Crohns disease medication       Current Outpatient Medications   Medication Sig    pantoprazole (PROTONIX) 40 mg tablet Take 1 Tablet by mouth Daily (before breakfast) for 6 days. Indications: gastroesophageal reflux disease    mycophenolic acid (MYFORTIC) 664 mg delayed release tablet Take 180 mg by mouth four (4) times daily.     tacrolimus (PROGRAF) 1 mg capsule Take 4 mg by mouth every twelve (12) hours. 4mg BID    dapsone 25 mg tablet Take 50 mg by mouth in the morning. aspirin delayed-release 81 mg tablet Take  by mouth daily. metoclopramide HCl (REGLAN) 10 mg tablet Take 10 mg by mouth two (2) times a day. cholecalciferol (VITAMIN D3) 25 mcg (1,000 unit) cap Take 5,000 Units by mouth in the morning. ondansetron hcl (ZOFRAN) 4 mg tablet Take 4 mg by mouth every eight (8) hours as needed for Nausea or Vomiting. No current facility-administered medications for this visit. SYSTEM REVIEW NOT RELATED TO LIVER DISEASE OR REVIEWED ABOVE:  Constitution systems: Negative for fever, chills, weight gain, weight loss. Eyes: Negative for visual changes. ENT: Negative for sore throat, painful swallowing. Respiratory: Negative for cough, hemoptysis, SOB. Cardiology: Negative for chest pain, palpitations. GI:  Negative for constipation or diarrhea. : Negative for urinary frequency, dysuria, hematuria, nocturia. Skin: Negative for rash. Hematology: Negative for easy bruising, blood clots. Musculo-skelatal: Negative for back pain, muscle pain, weakness. Neurologic: Negative for headaches, dizziness, vertigo, memory problems not related to HE. Psychology: Negative for anxiety, depression. FAMILY HISTORY:  The father  Has/had the following following chronic disease(s): complications of Agent Organge. The mother  of DM. There is no family history of liver disease. SOCIAL HISTORY:  The patient is . The patient has 1 child. The patient has never used tobacco products. The patient admits to consuming 35-61 alcoholic beverages per day. The patient currently works full time for [employer]. PHYSICAL EXAMINATION:  Visit Vitals  /82   Pulse 93   Temp 97.8 °F (36.6 °C) (Tympanic)   Wt 198 lb 8 oz (90 kg)   SpO2 96%   BMI 26.92 kg/m²     General: No acute distress. Eyes: Sclera anicteric.    ENT: No oral lesions. Thyroid normal.  Nodes: No adenopathy. Skin: No spider angiomata. No jaundice. No palmar erythema. Respiratory: Lungs clear to auscultation. Cardiovascular: Regular heart rate. No murmurs. No JVD. Abdomen: Normal liver transplant incision that is well healed. Soft non-tender. Liver size normal to percussion/palpation. Spleen not palpable. No obvious ascites. Extremities: No edema. Muscle wasting. Neurologic: Alert and oriented. Cranial nerves grossly intact. LABORATORY STUDIES:  Liver [place] [address] 9/7/2022 9/3/2022   WBC 4.6 - 13.2 K/uL 6.1 5.5   ANC 1.8 - 8.0 K/UL 3.4 3.1   HGB 13.0 - 16.0 g/dL 7.7 (L) 7.1 (L)    - 420 K/uL 113 (L) 189   INR 0.8 - 1.2       AST 10 - 38 U/L 20 19   ALT 16 - 61 U/L 12 (L) 11 (L)   Alk Phos 45 - 117 U/L 314 (H) 265 (H)   Bili, Total 0.2 - 1.0 MG/DL 0.8 1.1 (H)   Bili, Direct 0.0 - 0.2 MG/DL     Albumin 3.4 - 5.0 g/dL 3.0 (L) 2.5 (L)   BUN 7.0 - 18 MG/DL 32 (H) 33 (H)   Creat 0.6 - 1.3 MG/DL 5.11 (H) 5.20 (H)   Na 136 - 145 mmol/L 138 136   K 3.5 - 5.5 mmol/L 4.3 4.9   Cl 100 - 111 mmol/L 104 105   CO2 21 - 32 mmol/L 25 24   Glucose 74 - 99 mg/dL 95 84   Magnesium 1.6 - 2.6 mg/dL 1.8 1.6     Liver Virology and Transplant Immune Suppression Latest Ref Rng & Units 9/7/2022   Tacrolimus Level 2.0 - 20.0 ng/mL 4.2       SEROLOGIES:  Not available or performed. Testing was performed today. LIVER HISTOLOGY:  Not available or performed    ENDOSCOPIC PROCEDURES:  Not available or performed    RADIOLOGY:  Not available or performed    OTHER TESTING:  Not available or performed    FOLLOW-UP:  All of the issues listed above in the Assessment and Plan were discussed with the patient. All questions were answered. The patient expressed a clear understanding of the above. Laboratory studies should be performed twice weekly to assess liver graft function and blood levels of immune suppression.     The patient has a follow-up appointment at the liver transplant center in 2/2023 which will be just a few weeks before he can be listed for renal transplant in the safety net program    FOLLOW-UP:  All of the issues listed above in the Assessment and Plan were discussed with the patient. All questions were answered. The patient expressed a clear understanding of the above. Laboratory studies can be reduced to every 2 weeks to assess liver graft function and blood levels of immune suppression.     The patient has a follow-up appointment at the liver transplant center in 11/14/2022     Follow-up [address] 4 weeks       Claudia Mckeon MD  [address]  [address] [place] [address] [facility], [facility] [address]  [address] [place] [address]

## 2022-12-23 LAB — TACROLIMUS BLD-MCNC: 7 NG/ML (ref 2–20)

## 2023-01-02 PROBLEM — F10.91 ALCOHOL USE DISORDER IN REMISSION: Status: ACTIVE | Noted: 2023-01-02

## 2023-01-04 LAB
A-G RATIO,AGRAT: 1.2 RATIO (ref 1.1–2.6)
ALBUMIN SERPL-MCNC: 4.1 G/DL (ref 3.5–5)
ALP SERPL-CCNC: 272 U/L (ref 25–115)
ALT SERPL-CCNC: 16 U/L (ref 5–40)
ANION GAP SERPL CALC-SCNC: 15 MMOL/L (ref 3–15)
ANISOCYTOSIS: ABNORMAL
AST SERPL W P-5'-P-CCNC: 25 U/L (ref 10–37)
BASOPHILS ABS-DIF,2030: 0 K/UL (ref 0–0.2)
BASOPHILS%-DIF,2033: 1 % (ref 0–2)
BILIRUB SERPL-MCNC: 0.6 MG/DL (ref 0.2–1.2)
BUN SERPL-MCNC: 46 MG/DL (ref 6–22)
CALCIUM SERPL-MCNC: 9.3 MG/DL (ref 8.4–10.5)
CHLORIDE SERPL-SCNC: 96 MMOL/L (ref 98–110)
CO2 SERPL-SCNC: 27 MMOL/L (ref 20–32)
CREAT SERPL-MCNC: 6.5 MG/DL (ref 0.5–1.2)
EOS ABS-DIF,2069: 0.1 K/UL (ref 0–0.5)
EOSINOPHILS%-DIF,2072: 3 % (ref 0–6)
ERYTHROCYTE [DISTWIDTH] IN BLOOD BY AUTOMATED COUNT: 15.3 % (ref 10–15.5)
FE % SATURATION,PSAT: 27 % (ref 20–50)
FERRITIN SERPL-MCNC: 3281 NG/ML (ref 22–322)
GLOBULIN,GLOB: 3.3 G/DL (ref 2–4)
GLOMERULAR FILTRATION RATE: 10.1 ML/MIN/1.73 SQ.M.
GLUCOSE SERPL-MCNC: 82 MG/DL (ref 70–99)
HCT VFR BLD AUTO: 33.4 % (ref 39.3–51.6)
HGB BLD-MCNC: 10 G/DL (ref 13.1–17.2)
HYPOCHROMIA, 12007: ABNORMAL
IRON,IRN: 70 MCG/DL (ref 45–160)
LYMPHOCYTES%-DIF,2108: 65 % (ref 20–45)
LYMPHS ABS-DIF,2105: 2.4 K/UL (ref 1–4.8)
MAGNESIUM SERPL-MCNC: 1.7 MG/DL (ref 1.6–2.5)
MCH RBC QN AUTO: 29 PG (ref 26–34)
MCHC RBC AUTO-ENTMCNC: 30 G/DL (ref 31–36)
MCV RBC AUTO: 96 FL (ref 80–95)
MONOCYTES ABS-DIF,2141: 0.4 K/UL (ref 0.1–1)
MONOCYTES%-DIF,2144: 10 % (ref 3–12)
NEUTROPHILS ABS,2156: 0.8 K/UL (ref 1.8–7.7)
NEUTROPHILS%-DIF,2159: 21 % (ref 40–75)
OVALOCYTES, 1119: ABNORMAL
PLATELET # BLD AUTO: 203 K/UL (ref 140–440)
PMV BLD AUTO: 9.6 FL (ref 9–13)
POLYCHROMASIA,POLYCM: ABNORMAL
POTASSIUM SERPL-SCNC: 4.8 MMOL/L (ref 3.5–5.5)
PROGRAF LEVEL (FK506), 10002: 12.6 NG/ML (ref 2–20)
PROT SERPL-MCNC: 7.4 G/DL (ref 6.4–8.3)
RBC # BLD AUTO: 3.47 M/UL (ref 3.8–5.8)
SMEAR EVAL, 1131: ABNORMAL
SODIUM SERPL-SCNC: 138 MMOL/L (ref 133–145)
TIBC,TIBC: 264 MCG/DL (ref 228–428)
TOTAL CELLS COUNTED, 12021: 100
UIBC SERPL-MCNC: 194 MCG/DL (ref 110–370)
WBC # BLD AUTO: 3.7 K/UL (ref 4–11)

## 2023-01-15 DIAGNOSIS — K70.9 ALCOHOLIC LIVER DISEASE (HCC): ICD-10-CM

## 2023-01-15 DIAGNOSIS — Z79.60 LONG-TERM USE OF IMMUNOSUPPRESSANT MEDICATION: ICD-10-CM

## 2023-01-15 DIAGNOSIS — K50.819 CROHN'S DISEASE OF SMALL AND LARGE INTESTINES WITH COMPLICATION (HCC): ICD-10-CM

## 2023-01-15 DIAGNOSIS — G47.00 INSOMNIA, UNSPECIFIED TYPE: ICD-10-CM

## 2023-01-15 DIAGNOSIS — Z94.4 LIVER TRANSPLANT RECIPIENT (HCC): ICD-10-CM

## 2023-01-17 RX ORDER — PANTOPRAZOLE SODIUM 40 MG/1
TABLET, DELAYED RELEASE ORAL
Qty: 90 TABLET | Refills: 3 | Status: SHIPPED | OUTPATIENT
Start: 2023-01-17

## 2023-01-18 LAB
A-G RATIO,AGRAT: 1.3 RATIO (ref 1.1–2.6)
ALBUMIN SERPL-MCNC: 3.9 G/DL (ref 3.5–5)
ALP SERPL-CCNC: 245 U/L (ref 25–115)
ALT SERPL-CCNC: 12 U/L (ref 5–40)
ANION GAP SERPL CALC-SCNC: 13 MMOL/L (ref 3–15)
ANISOCYTOSIS: ABNORMAL
AST SERPL W P-5'-P-CCNC: 28 U/L (ref 10–37)
BASOPHILS ABS-DIF,2030: 0.1 K/UL (ref 0–0.2)
BASOPHILS%-DIF,2033: 2 % (ref 0–2)
BILIRUB SERPL-MCNC: 0.6 MG/DL (ref 0.2–1.2)
BUN SERPL-MCNC: 46 MG/DL (ref 6–22)
CALCIUM SERPL-MCNC: 9.1 MG/DL (ref 8.4–10.5)
CHLORIDE SERPL-SCNC: 102 MMOL/L (ref 98–110)
CO2 SERPL-SCNC: 25 MMOL/L (ref 20–32)
CREAT SERPL-MCNC: 6.5 MG/DL (ref 0.5–1.2)
EOS ABS-DIF,2069: 0.2 K/UL (ref 0–0.5)
EOSINOPHILS%-DIF,2072: 7 % (ref 0–6)
ERYTHROCYTE [DISTWIDTH] IN BLOOD BY AUTOMATED COUNT: 15 % (ref 10–15.5)
GLOBULIN,GLOB: 2.9 G/DL (ref 2–4)
GLOMERULAR FILTRATION RATE: 10.1 ML/MIN/1.73 SQ.M.
GLUCOSE SERPL-MCNC: 103 MG/DL (ref 70–99)
HCT VFR BLD AUTO: 30.2 % (ref 39.3–51.6)
HGB BLD-MCNC: 8.9 G/DL (ref 13.1–17.2)
HYPOCHROMIA, 12007: ABNORMAL
LYMPHOCYTES%-DIF,2108: 66 % (ref 20–45)
LYMPHS ABS-DIF,2105: 2.2 K/UL (ref 1–4.8)
MAGNESIUM SERPL-MCNC: 1.6 MG/DL (ref 1.6–2.5)
MCH RBC QN AUTO: 29 PG (ref 26–34)
MCHC RBC AUTO-ENTMCNC: 30 G/DL (ref 31–36)
MCV RBC AUTO: 98 FL (ref 80–95)
MONOCYTES ABS-DIF,2141: 0.4 K/UL (ref 0.1–1)
MONOCYTES%-DIF,2144: 11 % (ref 3–12)
NEUTROPHILS ABS,2156: 0.5 K/UL (ref 1.8–7.7)
NEUTROPHILS%-DIF,2159: 14 % (ref 40–75)
OVALOCYTES, 1119: ABNORMAL
PLATELET # BLD AUTO: 196 K/UL (ref 140–440)
PMV BLD AUTO: 9.9 FL (ref 9–13)
POTASSIUM SERPL-SCNC: 4.8 MMOL/L (ref 3.5–5.5)
PROGRAF LEVEL (FK506), 10002: 8.3 NG/ML (ref 2–20)
PROT SERPL-MCNC: 6.8 G/DL (ref 6.4–8.3)
RBC # BLD AUTO: 3.08 M/UL (ref 3.8–5.8)
SMEAR EVAL, 1131: ABNORMAL
SODIUM SERPL-SCNC: 140 MMOL/L (ref 133–145)
TOTAL CELLS COUNTED, 12021: 100
WBC # BLD AUTO: 3.3 K/UL (ref 4–11)

## 2023-01-31 ENCOUNTER — TELEPHONE (OUTPATIENT)
Dept: HEMATOLOGY | Age: 48
End: 2023-01-31

## 2023-01-31 DIAGNOSIS — Z79.60 LONG-TERM USE OF IMMUNOSUPPRESSANT MEDICATION: Primary | ICD-10-CM

## 2023-01-31 DIAGNOSIS — Z94.4 LIVER TRANSPLANT RECIPIENT (HCC): ICD-10-CM

## 2023-01-31 LAB
ANION GAP SERPL CALC-SCNC: 16 MMOL/L (ref 3–15)
ANISOCYTOSIS: ABNORMAL
BUN SERPL-MCNC: 71 MG/DL (ref 6–22)
CALCIUM SERPL-MCNC: 9.2 MG/DL (ref 8.4–10.5)
CHLORIDE SERPL-SCNC: 98 MMOL/L (ref 98–110)
CO2 SERPL-SCNC: 22 MMOL/L (ref 20–32)
CREAT SERPL-MCNC: 9.3 MG/DL (ref 0.5–1.2)
EOS ABS-DIF,2069: 0.2 K/UL (ref 0–0.5)
EOSINOPHILS%-DIF,2072: 6 % (ref 0–6)
ERYTHROCYTE [DISTWIDTH] IN BLOOD BY AUTOMATED COUNT: 15.1 % (ref 10–15.5)
FE % SATURATION,PSAT: 39 % (ref 20–50)
GLOMERULAR FILTRATION RATE: 6.6 ML/MIN/1.73 SQ.M.
GLUCOSE SERPL-MCNC: 96 MG/DL (ref 70–99)
HCT VFR BLD AUTO: 27 % (ref 39.3–51.6)
HGB BLD-MCNC: 8.3 G/DL (ref 13.1–17.2)
HYPOCHROMIA, 12007: ABNORMAL
IRON,IRN: 82 MCG/DL (ref 45–160)
LYMPHOCYTES%-DIF,2108: 47 % (ref 20–45)
LYMPHS ABS-DIF,2105: 1.5 K/UL (ref 1–4.8)
MAGNESIUM SERPL-MCNC: 1.6 MG/DL (ref 1.6–2.5)
MCH RBC QN AUTO: 29 PG (ref 26–34)
MCHC RBC AUTO-ENTMCNC: 31 G/DL (ref 31–36)
MCV RBC AUTO: 95 FL (ref 80–95)
MONOCYTES ABS-DIF,2141: 0.5 K/UL (ref 0.1–1)
MONOCYTES%-DIF,2144: 15 % (ref 3–12)
NEUTROPHILS ABS,2156: 1 K/UL (ref 1.8–7.7)
NEUTROPHILS%-DIF,2159: 32 % (ref 40–75)
PLATELET # BLD AUTO: 200 K/UL (ref 140–440)
PMV BLD AUTO: 9.7 FL (ref 9–13)
POLYCHROMASIA,POLYCM: ABNORMAL
POTASSIUM SERPL-SCNC: 4.3 MMOL/L (ref 3.5–5.5)
RBC # BLD AUTO: 2.84 M/UL (ref 3.8–5.8)
SMEAR EVAL, 1131: ABNORMAL
SODIUM SERPL-SCNC: 136 MMOL/L (ref 133–145)
TIBC,TIBC: 212 MCG/DL (ref 228–428)
TOTAL CELLS COUNTED, 12021: 100
UIBC SERPL-MCNC: 130 MCG/DL (ref 110–370)
WBC # BLD AUTO: 3.2 K/UL (ref 4–11)

## 2023-01-31 NOTE — TELEPHONE ENCOUNTER
Faxed an ADD ON order for a hepatic function panel to [facility], [phone], which was not drawn today.

## 2023-02-01 LAB
A-G RATIO,AGRAT: 1.9 RATIO (ref 1.1–2.6)
ALBUMIN SERPL-MCNC: 4.1 G/DL (ref 3.5–5)
ALP SERPL-CCNC: 274 U/L (ref 25–115)
ALT SERPL-CCNC: 12 U/L (ref 5–40)
AST SERPL W P-5'-P-CCNC: 23 U/L (ref 10–37)
BILIRUB SERPL-MCNC: 0.5 MG/DL (ref 0.2–1.2)
BILIRUBIN, DIRECT,CBIL: 0.3 MG/DL (ref 0–0.3)
FERRITIN SERPL-MCNC: 3405 NG/ML (ref 22–322)
GLOBULIN,GLOB: 2.2 G/DL (ref 2–4)
PROGRAF LEVEL (FK506), 10002: 18 NG/ML (ref 2–20)
PROT SERPL-MCNC: 6.3 G/DL (ref 6.4–8.3)

## 2023-02-09 LAB
A-G RATIO,AGRAT: 1.3 RATIO (ref 1.1–2.6)
ALBUMIN SERPL-MCNC: 3.5 G/DL (ref 3.5–5)
ALP SERPL-CCNC: 209 U/L (ref 25–115)
ALT SERPL-CCNC: 9 U/L (ref 5–40)
ANION GAP SERPL CALC-SCNC: 15 MMOL/L (ref 3–15)
ANISOCYTOSIS: ABNORMAL
AST SERPL W P-5'-P-CCNC: 20 U/L (ref 10–37)
BASOPHILS ABS-DIF,2030: 0 K/UL (ref 0–0.2)
BASOPHILS%-DIF,2033: 1 % (ref 0–2)
BILIRUB SERPL-MCNC: 0.6 MG/DL (ref 0.2–1.2)
BUN SERPL-MCNC: 55 MG/DL (ref 6–22)
CALCIUM SERPL-MCNC: 8.9 MG/DL (ref 8.4–10.5)
CHLORIDE SERPL-SCNC: 100 MMOL/L (ref 98–110)
CO2 SERPL-SCNC: 24 MMOL/L (ref 20–32)
CREAT SERPL-MCNC: 9.7 MG/DL (ref 0.5–1.2)
EOS ABS-DIF,2069: 0.1 K/UL (ref 0–0.5)
EOSINOPHILS%-DIF,2072: 3 % (ref 0–6)
ERYTHROCYTE [DISTWIDTH] IN BLOOD BY AUTOMATED COUNT: 14.6 % (ref 10–15.5)
FE % SATURATION,PSAT: 46 % (ref 20–50)
FERRITIN SERPL-MCNC: 2877 NG/ML (ref 22–322)
GLOBULIN,GLOB: 2.8 G/DL (ref 2–4)
GLOMERULAR FILTRATION RATE: 6.3 ML/MIN/1.73 SQ.M.
GLUCOSE SERPL-MCNC: 95 MG/DL (ref 70–99)
HCT VFR BLD AUTO: 28.6 % (ref 39.3–51.6)
HGB BLD-MCNC: 8.7 G/DL (ref 13.1–17.2)
IRON,IRN: 98 MCG/DL (ref 45–160)
LARGE PLATELET, 870: ABNORMAL
LYMPHOCYTES%-DIF,2108: 56 % (ref 20–45)
LYMPHS ABS-DIF,2105: 1.9 K/UL (ref 1–4.8)
MAGNESIUM SERPL-MCNC: 1.6 MG/DL (ref 1.6–2.5)
MCH RBC QN AUTO: 30 PG (ref 26–34)
MCHC RBC AUTO-ENTMCNC: 30 G/DL (ref 31–36)
MCV RBC AUTO: 98 FL (ref 80–95)
MONOCYTES ABS-DIF,2141: 0.4 K/UL (ref 0.1–1)
MONOCYTES%-DIF,2144: 11 % (ref 3–12)
NEUTROPHILS ABS,2156: 1 K/UL (ref 1.8–7.7)
NEUTROPHILS%-DIF,2159: 29 % (ref 40–75)
OVALOCYTES, 1119: ABNORMAL
PLATELET # BLD AUTO: 222 K/UL (ref 140–440)
PMV BLD AUTO: 9.3 FL (ref 9–13)
POLYCHROMASIA,POLYCM: ABNORMAL
POTASSIUM SERPL-SCNC: 4.5 MMOL/L (ref 3.5–5.5)
PROT SERPL-MCNC: 6.3 G/DL (ref 6.4–8.3)
RBC # BLD AUTO: 2.92 M/UL (ref 3.8–5.8)
SMEAR EVAL, 1131: ABNORMAL
SODIUM SERPL-SCNC: 139 MMOL/L (ref 133–145)
TIBC,TIBC: 212 MCG/DL (ref 228–428)
TOTAL CELLS COUNTED, 12021: 100
UIBC SERPL-MCNC: 114 MCG/DL (ref 110–370)
WBC # BLD AUTO: 3.4 K/UL (ref 4–11)

## 2023-02-10 ENCOUNTER — TELEPHONE (OUTPATIENT)
Dept: HEMATOLOGY | Age: 48
End: 2023-02-10

## 2023-02-10 LAB — PROGRAF LEVEL (FK506), 10002: 15 NG/ML (ref 2–20)

## 2023-02-10 NOTE — TELEPHONE ENCOUNTER
Called patient to review recent tacrolimus lab draw of 2/9/23. Informed patient his Tacrolimus level was 15.0 and his 2 previous levels were 12.2 and 18.0. Requested patient lower his tacrolimus dose from 4/4 to 3/3. Patient verbally confirmed understanding stating, \"I will start it tomorrow. \"

## 2023-02-28 LAB
A/G RATIO: 1.3 RATIO (ref 1.1–2.6)
ALBUMIN SERPL-MCNC: 3.8 G/DL (ref 3.5–5)
ALP BLD-CCNC: 198 U/L (ref 25–115)
ALT SERPL-CCNC: 9 U/L (ref 5–40)
ANION GAP SERPL CALCULATED.3IONS-SCNC: 17 MMOL/L (ref 3–15)
AST SERPL-CCNC: 22 U/L (ref 10–37)
BASOPHILS # BLD: 1 % (ref 0–2)
BASOPHILS ABSOLUTE: 0 K/UL (ref 0–0.2)
BILIRUB SERPL-MCNC: 0.5 MG/DL (ref 0.2–1.2)
BUN BLDV-MCNC: 65 MG/DL (ref 6–22)
CALCIUM SERPL-MCNC: 9.2 MG/DL (ref 8.4–10.5)
CHLORIDE BLD-SCNC: 99 MMOL/L (ref 98–110)
CO2: 21 MMOL/L (ref 20–32)
CREAT SERPL-MCNC: 8.8 MG/DL (ref 0.5–1.2)
EOSINOPHIL # BLD: 4 % (ref 0–6)
EOSINOPHILS ABSOLUTE: 0.2 K/UL (ref 0–0.5)
FERRITIN: 2949 NG/ML (ref 22–322)
GLOBULIN: 3 G/DL (ref 2–4)
GLOMERULAR FILTRATION RATE: 7 ML/MIN/1.73 SQ.M.
GLUCOSE: 81 MG/DL (ref 70–99)
HCT VFR BLD CALC: 31 % (ref 39.3–51.6)
HEMOGLOBIN: 9.1 G/DL (ref 13.1–17.2)
IRON % SATURATION: 29 % (ref 20–50)
IRON: 64 MCG/DL (ref 45–160)
LYMPHOCYTES # BLD: 53 % (ref 20–45)
LYMPHOCYTES ABSOLUTE: 2.1 K/UL (ref 1–4.8)
MCH RBC QN AUTO: 29 PG (ref 26–34)
MCHC RBC AUTO-ENTMCNC: 29 G/DL (ref 31–36)
MCV RBC AUTO: 98 FL (ref 80–95)
MONOCYTES ABSOLUTE: 0.4 K/UL (ref 0.1–1)
MONOCYTES: 11 % (ref 3–12)
NEUTROPHILS ABSOLUTE: 1.2 K/UL (ref 1.8–7.7)
NEUTROPHILS: 30 % (ref 40–75)
PDW BLD-RTO: 15.4 % (ref 10–15.5)
PLATELET # BLD: 202 K/UL (ref 140–440)
PMV BLD AUTO: 9.6 FL (ref 9–13)
POTASSIUM SERPL-SCNC: 5.2 MMOL/L (ref 3.5–5.5)
PROGRAF: 7.9 NG/ML (ref 2–20)
RBC: 3.15 M/UL (ref 3.8–5.8)
SODIUM BLD-SCNC: 137 MMOL/L (ref 133–145)
TOTAL IRON BINDING CAPACITY: 224 MCG/DL (ref 228–428)
TOTAL PROTEIN: 6.8 G/DL (ref 6.4–8.3)
UIBC: 160 MCG/DL (ref 110–370)
WBC: 3.9 K/UL (ref 4–11)

## 2023-03-08 ENCOUNTER — OFFICE VISIT (OUTPATIENT)
Age: 48
End: 2023-03-08
Payer: COMMERCIAL

## 2023-03-08 ENCOUNTER — HOSPITAL ENCOUNTER (OUTPATIENT)
Facility: HOSPITAL | Age: 48
Setting detail: SPECIMEN
Discharge: HOME | End: 2023-03-11
Payer: COMMERCIAL

## 2023-03-08 VITALS
WEIGHT: 199 LBS | HEART RATE: 113 BPM | DIASTOLIC BLOOD PRESSURE: 67 MMHG | BODY MASS INDEX: 26.99 KG/M2 | OXYGEN SATURATION: 97 % | SYSTOLIC BLOOD PRESSURE: 101 MMHG | TEMPERATURE: 98.1 F

## 2023-03-08 DIAGNOSIS — R79.89 HIGH SERUM FERRITIN: Primary | ICD-10-CM

## 2023-03-08 DIAGNOSIS — R79.89 HIGH SERUM FERRITIN: ICD-10-CM

## 2023-03-08 DIAGNOSIS — Z94.4 LIVER TRANSPLANT RECIPIENT (HCC): ICD-10-CM

## 2023-03-08 LAB
ALBUMIN SERPL-MCNC: 3.4 G/DL (ref 3.4–5)
ALBUMIN/GLOB SERPL: 1 (ref 0.8–1.7)
ALP SERPL-CCNC: 215 U/L (ref 45–117)
ALT SERPL-CCNC: 19 U/L (ref 16–61)
ANION GAP SERPL CALC-SCNC: 9 MMOL/L (ref 3–18)
AST SERPL-CCNC: 23 U/L (ref 10–38)
BASOPHILS # BLD: 0.1 K/UL (ref 0–0.1)
BASOPHILS NFR BLD: 2 % (ref 0–2)
BILIRUB SERPL-MCNC: 0.7 MG/DL (ref 0.2–1)
BUN SERPL-MCNC: 47 MG/DL (ref 7–18)
BUN/CREAT SERPL: 6 (ref 12–20)
CALCIUM SERPL-MCNC: 8.8 MG/DL (ref 8.5–10.1)
CHLORIDE SERPL-SCNC: 102 MMOL/L (ref 100–111)
CO2 SERPL-SCNC: 28 MMOL/L (ref 21–32)
CREAT SERPL-MCNC: 7.35 MG/DL (ref 0.6–1.3)
DIFFERENTIAL METHOD BLD: ABNORMAL
EOSINOPHIL # BLD: 0.1 K/UL (ref 0–0.4)
EOSINOPHIL NFR BLD: 4 % (ref 0–5)
ERYTHROCYTE [DISTWIDTH] IN BLOOD BY AUTOMATED COUNT: 14.7 % (ref 11.6–14.5)
GLOBULIN SER CALC-MCNC: 3.4 G/DL (ref 2–4)
GLUCOSE SERPL-MCNC: 121 MG/DL (ref 74–99)
HCT VFR BLD AUTO: 31 % (ref 36–48)
HGB BLD-MCNC: 9.5 G/DL (ref 13–16)
IMM GRANULOCYTES # BLD AUTO: 0 K/UL (ref 0–0.04)
IMM GRANULOCYTES NFR BLD AUTO: 1 % (ref 0–0.5)
IRON SATN MFR SERPL: 46 % (ref 20–50)
IRON SERPL-MCNC: 109 UG/DL (ref 50–175)
LYMPHOCYTES # BLD: 1.7 K/UL (ref 0.9–3.6)
LYMPHOCYTES NFR BLD: 56 % (ref 21–52)
MAGNESIUM SERPL-MCNC: 1.7 MG/DL (ref 1.6–2.6)
MCH RBC QN AUTO: 29.9 PG (ref 24–34)
MCHC RBC AUTO-ENTMCNC: 30.6 G/DL (ref 31–37)
MCV RBC AUTO: 97.5 FL (ref 78–100)
MONOCYTES # BLD: 0.3 K/UL (ref 0.05–1.2)
MONOCYTES NFR BLD: 11 % (ref 3–10)
NEUTS SEG # BLD: 0.8 K/UL (ref 1.8–8)
NEUTS SEG NFR BLD: 27 % (ref 40–73)
NRBC # BLD: 0 K/UL (ref 0–0.01)
NRBC BLD-RTO: 0 PER 100 WBC
PLATELET # BLD AUTO: 205 K/UL (ref 135–420)
PMV BLD AUTO: 10.5 FL (ref 9.2–11.8)
POTASSIUM SERPL-SCNC: 4.2 MMOL/L (ref 3.5–5.5)
PROT SERPL-MCNC: 6.8 G/DL (ref 6.4–8.2)
RBC # BLD AUTO: 3.18 M/UL (ref 4.35–5.65)
SODIUM SERPL-SCNC: 139 MMOL/L (ref 136–145)
TIBC SERPL-MCNC: 239 UG/DL (ref 250–450)
WBC # BLD AUTO: 3.1 K/UL (ref 4.6–13.2)

## 2023-03-08 PROCEDURE — 87517 HEPATITIS B DNA QUANT: CPT

## 2023-03-08 PROCEDURE — 85025 COMPLETE CBC W/AUTO DIFF WBC: CPT

## 2023-03-08 PROCEDURE — 80053 COMPREHEN METABOLIC PANEL: CPT

## 2023-03-08 PROCEDURE — 83540 ASSAY OF IRON: CPT

## 2023-03-08 PROCEDURE — 99215 OFFICE O/P EST HI 40 MIN: CPT | Performed by: INTERNAL MEDICINE

## 2023-03-08 PROCEDURE — 36415 COLL VENOUS BLD VENIPUNCTURE: CPT

## 2023-03-08 PROCEDURE — 81256 HFE GENE: CPT

## 2023-03-08 PROCEDURE — 83735 ASSAY OF MAGNESIUM: CPT

## 2023-03-08 PROCEDURE — 80197 ASSAY OF TACROLIMUS: CPT

## 2023-03-08 RX ORDER — URSODIOL 300 MG/1
300 CAPSULE ORAL 2 TIMES DAILY
COMMUNITY
End: 2023-03-08

## 2023-03-08 RX ORDER — MIRTAZAPINE 15 MG/1
15 TABLET, FILM COATED ORAL NIGHTLY
COMMUNITY

## 2023-03-09 LAB
HBV DNA SERPL NAA+PROBE-ACNC: NORMAL IU/ML
HBV DNA SERPL NAA+PROBE-LOG IU: NORMAL LOG10 IU/ML
TEST INFORMATION: NORMAL

## 2023-03-10 LAB — TACROLIMUS BLD-MCNC: 4.4 NG/ML (ref 2–20)

## 2023-03-15 ENCOUNTER — TELEPHONE (OUTPATIENT)
Age: 48
End: 2023-03-15

## 2023-03-15 NOTE — TELEPHONE ENCOUNTER
Called patient to inform him we spoke with [facility] transplant team regarding his possible kidney transplant. Dr. Amparo Sr stated they are waiting for his Dialysis fistula to narrow more before placing him on the active list. Once patient is on the active list, patient should receive new kidney with in the month. Patient verbally confirmed understanding.

## 2023-03-24 DIAGNOSIS — Z79.60 LONG-TERM USE OF IMMUNOSUPPRESSANT MEDICATION: Primary | ICD-10-CM

## 2023-03-24 DIAGNOSIS — Z94.4 LIVER TRANSPLANT RECIPIENT (HCC): ICD-10-CM

## 2023-03-25 PROBLEM — S31.109A OPEN WOUND OF ABDOMEN: Status: RESOLVED | Noted: 2022-09-29 | Resolved: 2023-03-25

## 2023-03-25 NOTE — PROGRESS NOTES
[address] [facility] [address], MD, FACP, Cite Gisselle Nugent, [place]      [facility], PA-C    April S Lazara, AGPCNP-BC   Sandor Philippe, [facility]   Angie Cueto, FNP-C  Regine Madison, FNP-C   Hank García, AGPCNP-BC      [address]   at [address], [address]   [phone]   FAX: [address]   at [address], [address] [place] [address], [address]   [phone]   FAX: [phone]       Patient Care Team:  Rancho Cortes MD as PCP - General (Family Medicine)  Siena Downing RN as Nurse Navigator (Hepatology)      Patient Active Problem List   Diagnosis    Alcoholic liver disease ([facility])    Crohn disease [facility])    Liver transplant recipient [facility])    Long-term use of immunosuppressant medication    Liver transplant complication ([facility])    ESRD on hemodialysis ([facility])    Anemia    Alcohol use disorder in remission       Valentino Relic III returns to the [address] for management of liver transplant graft function, to monitor and adjust immune suppression and to assess for recurrence of the primary liver disease. The active problem list, all pertinent past medical history, medications, liver histology, endoscopic studies, radiologic findings and laboratory findings related to the liver disorder were reviewed with the patient. The patient underwent a liver transplant at [facility] in 3/2022. The patient is taking the following for immune suppression: Tacrolimus, Myfortic    The abdominal would has healed. He remains on dialysis and will soon be listed to undergo renal transplant. The patient has the following symptoms which could be attributed to the liver disorder:    Appetite and eating are excellent.     He is starting to get muscle mass

## 2023-04-05 LAB
ALBUMIN SERPL-MCNC: 3.9 G/DL (ref 4–5)
ALBUMIN/GLOB SERPL: 1.8 {RATIO} (ref 1.2–2.2)
ALP SERPL-CCNC: 170 IU/L (ref 44–121)
ALT SERPL-CCNC: 11 IU/L (ref 0–44)
AST SERPL-CCNC: 21 IU/L (ref 0–40)
BASOPHILS # BLD AUTO: 0.1 X10E3/UL (ref 0–0.2)
BASOPHILS NFR BLD AUTO: 2 %
BILIRUB SERPL-MCNC: 0.4 MG/DL (ref 0–1.2)
BUN SERPL-MCNC: 61 MG/DL (ref 6–24)
BUN/CREAT SERPL: 7 (ref 9–20)
CALCIUM SERPL-MCNC: 8.7 MG/DL (ref 8.7–10.2)
CHLORIDE SERPL-SCNC: 97 MMOL/L (ref 96–106)
CO2 SERPL-SCNC: 20 MMOL/L (ref 20–29)
CREAT SERPL-MCNC: 8.87 MG/DL (ref 0.76–1.27)
EGFRCR SERPLBLD CKD-EPI 2021: 7 ML/MIN/1.73
EOSINOPHIL # BLD AUTO: 0.1 X10E3/UL (ref 0–0.4)
EOSINOPHIL NFR BLD AUTO: 5 %
ERYTHROCYTE [DISTWIDTH] IN BLOOD BY AUTOMATED COUNT: 14.3 % (ref 11.6–15.4)
GLOBULIN SER CALC-MCNC: 2.2 G/DL (ref 1.5–4.5)
GLUCOSE SERPL-MCNC: 82 MG/DL (ref 70–99)
HCT VFR BLD AUTO: 30.2 % (ref 37.5–51)
HGB BLD-MCNC: 9.6 G/DL (ref 13–17.7)
IMM GRANULOCYTES # BLD AUTO: 0 X10E3/UL (ref 0–0.1)
IMM GRANULOCYTES NFR BLD AUTO: 1 %
IRON SATN MFR SERPL: 47 % (ref 15–55)
IRON SERPL-MCNC: 103 UG/DL (ref 38–169)
LYMPHOCYTES # BLD AUTO: 1.4 X10E3/UL (ref 0.7–3.1)
LYMPHOCYTES NFR BLD AUTO: 50 %
MAGNESIUM SERPL-MCNC: 1.6 MG/DL (ref 1.6–2.3)
MCH RBC QN AUTO: 29.3 PG (ref 26.6–33)
MCHC RBC AUTO-ENTMCNC: 31.8 G/DL (ref 31.5–35.7)
MCV RBC AUTO: 92 FL (ref 79–97)
MONOCYTES # BLD AUTO: 0.3 X10E3/UL (ref 0.1–0.9)
MONOCYTES NFR BLD AUTO: 10 %
MORPHOLOGY BLD-IMP: ABNORMAL
NEUTROPHILS # BLD AUTO: 0.9 X10E3/UL (ref 1.4–7)
NEUTROPHILS NFR BLD AUTO: 32 %
PLATELET # BLD AUTO: 206 X10E3/UL (ref 150–450)
POTASSIUM SERPL-SCNC: 4 MMOL/L (ref 3.5–5.2)
PROT SERPL-MCNC: 6.1 G/DL (ref 6–8.5)
RBC # BLD AUTO: 3.28 X10E6/UL (ref 4.14–5.8)
SODIUM SERPL-SCNC: 139 MMOL/L (ref 134–144)
SPECIMEN STATUS REPORT: NORMAL
TIBC SERPL-MCNC: 221 UG/DL (ref 250–450)
UIBC SERPL-MCNC: 118 UG/DL (ref 111–343)
WBC # BLD AUTO: 2.9 X10E3/UL (ref 3.4–10.8)

## 2023-04-06 LAB — TACROLIMUS BLD LC/MS/MS-MCNC: 5.3 NG/ML (ref 2–20)

## 2023-05-05 ENCOUNTER — TELEPHONE (OUTPATIENT)
Age: 48
End: 2023-05-05

## 2023-05-05 NOTE — TELEPHONE ENCOUNTER
Called patient to remind him to have his labs drawn this month and to inform him of his follow up appointment with Dr. Vinayak Carrera on [email or web address]. Requested patient have his labs drawn 1 week prior to his 6/12/23 appointment. Patient verbally confirmed understanding of all above.

## 2023-05-11 ENCOUNTER — TELEPHONE (OUTPATIENT)
Age: 48
End: 2023-05-11

## 2023-05-11 DIAGNOSIS — Z13.820 SCREENING FOR OSTEOPOROSIS: Primary | ICD-10-CM

## 2023-05-11 NOTE — TELEPHONE ENCOUNTER
Scheduled patient for Dexa Scan at [facility] on Tuesday, 6/6/23 @ 0820, to arrive at 0750. Informed patient of above appointment, requesting he not have any calcium or vitamin supplements with calcium in them. Patient verbally confirmed understanding. Called patient to review recent lab results from 5/9/23. Informed patient his labs are stable. Inquired about his kidney transplant which [facility] stated is waiting on his abdominal wound to fully heal. Patient stated the healing process has been very slow. Went to wound care clinic, received supplies and instructions which, \"I am following. \"  Asked patient if he thought there was anything else he needed from us to improve his wound healing, patient stated, \"Not that he can think of.\" Patient further stated the wound care clinic seemed to think the wound healing would be a \"very slow process. \"

## 2023-05-17 DIAGNOSIS — L98.8 FISTULA: ICD-10-CM

## 2023-05-17 DIAGNOSIS — Z94.4 LIVER TRANSPLANT RECIPIENT (HCC): Primary | ICD-10-CM

## 2023-06-08 LAB
A/G RATIO: 1.3 RATIO (ref 1.1–2.6)
ALBUMIN SERPL-MCNC: 4.2 G/DL (ref 3.5–5)
ALP BLD-CCNC: 203 U/L (ref 25–115)
ALT SERPL-CCNC: 12 U/L (ref 5–40)
ANION GAP SERPL CALCULATED.3IONS-SCNC: 17 MMOL/L (ref 3–15)
AST SERPL-CCNC: 21 U/L (ref 10–37)
BASOPHILS # BLD: 1 % (ref 0–2)
BASOPHILS ABSOLUTE: 0 K/UL (ref 0–0.2)
BILIRUB SERPL-MCNC: 0.5 MG/DL (ref 0.2–1.2)
BUN BLDV-MCNC: 42 MG/DL (ref 6–22)
CALCIUM SERPL-MCNC: 9.4 MG/DL (ref 8.4–10.5)
CHLORIDE BLD-SCNC: 96 MMOL/L (ref 98–110)
CO2: 25 MMOL/L (ref 20–32)
CREAT SERPL-MCNC: 10.4 MG/DL (ref 0.5–1.2)
EOSINOPHIL # BLD: 4 % (ref 0–6)
EOSINOPHILS ABSOLUTE: 0.2 K/UL (ref 0–0.5)
GLOBULIN: 3.2 G/DL (ref 2–4)
GLOMERULAR FILTRATION RATE: 5.7 ML/MIN/1.73 SQ.M.
GLUCOSE: 80 MG/DL (ref 70–99)
HCT VFR BLD CALC: 35.6 % (ref 39.3–51.6)
HEMOGLOBIN: 11.2 G/DL (ref 13.1–17.2)
IRON % SATURATION: 38 % (ref 20–50)
IRON: 88 MCG/DL (ref 45–160)
LYMPHOCYTES # BLD: 49 % (ref 20–45)
LYMPHOCYTES ABSOLUTE: 1.8 K/UL (ref 1–4.8)
MCH RBC QN AUTO: 28 PG (ref 26–34)
MCHC RBC AUTO-ENTMCNC: 32 G/DL (ref 31–36)
MCV RBC AUTO: 89 FL (ref 80–95)
MONOCYTES ABSOLUTE: 0.4 K/UL (ref 0.1–1)
MONOCYTES: 11 % (ref 3–12)
NEUTROPHILS ABSOLUTE: 1.2 K/UL (ref 1.8–7.7)
NEUTROPHILS: 34 % (ref 40–75)
PDW BLD-RTO: 13.6 % (ref 10–15.5)
PLATELET # BLD: 192 K/UL (ref 140–440)
PMV BLD AUTO: 9.9 FL (ref 9–13)
POTASSIUM SERPL-SCNC: 4 MMOL/L (ref 3.5–5.5)
PROGRAF: 7 NG/ML (ref 2–20)
RBC: 4.01 M/UL (ref 3.8–5.8)
SODIUM BLD-SCNC: 138 MMOL/L (ref 133–145)
TOTAL IRON BINDING CAPACITY: 233 MCG/DL (ref 228–428)
TOTAL PROTEIN: 7.4 G/DL (ref 6.4–8.3)
UIBC: 145 MCG/DL (ref 110–370)
WBC: 3.6 K/UL (ref 4–11)

## 2023-06-12 ENCOUNTER — OFFICE VISIT (OUTPATIENT)
Age: 48
End: 2023-06-12

## 2023-06-12 VITALS
SYSTOLIC BLOOD PRESSURE: 101 MMHG | BODY MASS INDEX: 25.73 KG/M2 | OXYGEN SATURATION: 98 % | DIASTOLIC BLOOD PRESSURE: 65 MMHG | HEART RATE: 89 BPM | HEIGHT: 72 IN | WEIGHT: 190 LBS

## 2023-06-12 DIAGNOSIS — Z94.4 LIVER TRANSPLANT RECIPIENT (HCC): Primary | ICD-10-CM

## 2023-06-12 ASSESSMENT — PATIENT HEALTH QUESTIONNAIRE - PHQ9
SUM OF ALL RESPONSES TO PHQ QUESTIONS 1-9: 0
SUM OF ALL RESPONSES TO PHQ QUESTIONS 1-9: 0
1. LITTLE INTEREST OR PLEASURE IN DOING THINGS: 0
2. FEELING DOWN, DEPRESSED OR HOPELESS: 0
SUM OF ALL RESPONSES TO PHQ9 QUESTIONS 1 & 2: 0
SUM OF ALL RESPONSES TO PHQ QUESTIONS 1-9: 0
SUM OF ALL RESPONSES TO PHQ QUESTIONS 1-9: 0

## 2023-08-02 ENCOUNTER — TELEPHONE (OUTPATIENT)
Age: 48
End: 2023-08-02

## 2023-08-02 DIAGNOSIS — Z79.60 LONG-TERM USE OF IMMUNOSUPPRESSANT MEDICATION: Primary | ICD-10-CM

## 2023-08-02 DIAGNOSIS — Z94.4 LIVER TRANSPLANT RECIPIENT (HCC): ICD-10-CM

## 2023-08-02 NOTE — TELEPHONE ENCOUNTER
Called patient requesting he have his monthly labs drawn. Patient stated he will have them drawn tomorrow. Reminded him to follow the 12 trough between taking medication the night before and having his labs drawn. Patient verbally confirmed understanding. Inquired about the kidney transplant, patient stated they want him to have a few more tests prior to [facility], possible kidney transplant end of August/September.

## 2023-08-03 LAB
A/G RATIO: 1.3 RATIO (ref 1.1–2.6)
ALBUMIN SERPL-MCNC: 3.7 G/DL (ref 3.5–5)
ALP BLD-CCNC: 163 U/L (ref 25–115)
ALT SERPL-CCNC: 7 U/L (ref 5–40)
ANION GAP SERPL CALCULATED.3IONS-SCNC: 15 MMOL/L (ref 3–15)
AST SERPL-CCNC: 19 U/L (ref 10–37)
BASOPHILS # BLD: 1 % (ref 0–2)
BASOPHILS ABSOLUTE: 0 K/UL (ref 0–0.2)
BILIRUB SERPL-MCNC: 0.5 MG/DL (ref 0.2–1.2)
BILIRUBIN DIRECT: 0.3 MG/DL (ref 0–0.3)
BUN BLDV-MCNC: 48 MG/DL (ref 6–22)
CALCIUM SERPL-MCNC: 9.2 MG/DL (ref 8.4–10.5)
CHLORIDE BLD-SCNC: 104 MMOL/L (ref 98–110)
CO2: 21 MMOL/L (ref 20–32)
CREAT SERPL-MCNC: 12.5 MG/DL (ref 0.5–1.2)
EOSINOPHIL # BLD: 4 % (ref 0–6)
EOSINOPHILS ABSOLUTE: 0.1 K/UL (ref 0–0.5)
FERRITIN: 1757 NG/ML (ref 22–322)
GLOBULIN: 2.8 G/DL (ref 2–4)
GLOMERULAR FILTRATION RATE: 4.6 ML/MIN/1.73 SQ.M.
GLUCOSE: 87 MG/DL (ref 70–99)
HCT VFR BLD CALC: 37.9 % (ref 39.3–51.6)
HEMOGLOBIN: 11.4 G/DL (ref 13.1–17.2)
INR BLD: 1.07 (ref 0.89–1.29)
IRON % SATURATION: 20 % (ref 20–50)
IRON: 36 MCG/DL (ref 45–160)
LYMPHOCYTES # BLD: 42 % (ref 20–45)
LYMPHOCYTES ABSOLUTE: 1.4 K/UL (ref 1–4.8)
MAGNESIUM: 1.8 MG/DL (ref 1.6–2.5)
MCH RBC QN AUTO: 28 PG (ref 26–34)
MCHC RBC AUTO-ENTMCNC: 30 G/DL (ref 31–36)
MCV RBC AUTO: 92 FL (ref 80–95)
MONOCYTES ABSOLUTE: 0.5 K/UL (ref 0.1–1)
MONOCYTES: 15 % (ref 3–12)
NEUTROPHILS ABSOLUTE: 1.3 K/UL (ref 1.8–7.7)
NEUTROPHILS: 38 % (ref 40–75)
PDW BLD-RTO: 14 % (ref 10–15.5)
PLATELET # BLD: 178 K/UL (ref 140–440)
PMV BLD AUTO: 10.5 FL (ref 9–13)
POTASSIUM SERPL-SCNC: 4.8 MMOL/L (ref 3.5–5.5)
PROGRAF: 5.7 NG/ML (ref 2–20)
PROTHROMBIN TIME: 11.6 SEC (ref 9–13)
RBC: 4.12 M/UL (ref 3.8–5.8)
SODIUM BLD-SCNC: 140 MMOL/L (ref 133–145)
TOTAL IRON BINDING CAPACITY: 179 MCG/DL (ref 228–428)
TOTAL PROTEIN: 6.5 G/DL (ref 6.4–8.3)
UIBC: 143 MCG/DL (ref 110–370)
WBC: 3.3 K/UL (ref 4–11)

## 2023-08-24 ENCOUNTER — TELEPHONE (OUTPATIENT)
Age: 48
End: 2023-08-24

## 2023-09-05 DIAGNOSIS — Z79.60 LONG-TERM USE OF IMMUNOSUPPRESSANT MEDICATION: Primary | ICD-10-CM

## 2023-09-05 DIAGNOSIS — Z94.4 LIVER TRANSPLANT RECIPIENT (HCC): ICD-10-CM

## 2023-09-05 RX ORDER — MYCOPHENOLIC ACID 180 MG/1
180 TABLET, DELAYED RELEASE ORAL 4 TIMES DAILY
Qty: 360 TABLET | Refills: 3 | Status: SHIPPED | OUTPATIENT
Start: 2023-09-05 | End: 2024-08-30

## 2023-09-15 PROBLEM — D50.9 IRON DEFICIENCY ANEMIA, UNSPECIFIED: Status: ACTIVE | Noted: 2022-07-26

## 2023-09-15 PROBLEM — R16.0 LIVER MASS: Status: ACTIVE | Noted: 2022-01-09

## 2023-09-15 PROBLEM — R06.02 SHORTNESS OF BREATH: Status: ACTIVE | Noted: 2022-07-26

## 2023-09-15 PROBLEM — D53.9 MACROCYTIC ANEMIA: Status: ACTIVE | Noted: 2022-01-10

## 2023-09-15 PROBLEM — G93.41 ACUTE METABOLIC ENCEPHALOPATHY: Status: ACTIVE | Noted: 2022-01-09

## 2023-09-15 PROBLEM — K72.10 CHRONIC LIVER FAILURE WITHOUT HEPATIC COMA (HCC): Status: ACTIVE | Noted: 2022-02-10

## 2023-09-15 PROBLEM — N18.6 END STAGE RENAL DISEASE (HCC): Status: ACTIVE | Noted: 2022-10-31

## 2023-09-15 PROBLEM — N17.0 SEPSIS WITH ACUTE RENAL FAILURE, TUBULAR NECROSIS, AND SEPTIC SHOCK (HCC): Status: ACTIVE | Noted: 2022-01-10

## 2023-09-15 PROBLEM — N17.9 AKI (ACUTE KIDNEY INJURY) (HCC): Status: ACTIVE | Noted: 2022-03-14

## 2023-09-15 PROBLEM — R74.8 ELEVATED LIVER ENZYMES: Status: ACTIVE | Noted: 2022-07-02

## 2023-09-15 PROBLEM — R62.7 FAILURE TO THRIVE IN ADULT: Status: ACTIVE | Noted: 2022-07-03

## 2023-09-15 PROBLEM — R31.9 HEMATURIA: Status: ACTIVE | Noted: 2022-03-16

## 2023-09-15 PROBLEM — J90 PLEURAL EFFUSION: Status: ACTIVE | Noted: 2022-05-05

## 2023-09-15 PROBLEM — Z99.2 DIALYSIS PATIENT (HCC): Status: ACTIVE | Noted: 2022-11-10

## 2023-09-15 PROBLEM — E87.1 HYPONATREMIA: Status: ACTIVE | Noted: 2022-01-10

## 2023-09-15 PROBLEM — I95.9 HYPOTENSION, UNSPECIFIED: Status: ACTIVE | Noted: 2023-03-04

## 2023-09-15 PROBLEM — K70.30 ALCOHOLIC CIRRHOSIS OF LIVER WITHOUT ASCITES (HCC): Status: ACTIVE | Noted: 2022-02-03

## 2023-09-15 PROBLEM — R60.1 GENERALIZED EDEMA: Status: ACTIVE | Noted: 2022-03-29

## 2023-09-15 PROBLEM — E88.09 HYPOALBUMINEMIA: Status: ACTIVE | Noted: 2022-02-03

## 2023-09-15 PROBLEM — E87.20 METABOLIC ACIDOSIS: Status: ACTIVE | Noted: 2022-01-10

## 2023-09-15 PROBLEM — D68.9 COAGULATION DEFECT, UNSPECIFIED (HCC): Status: ACTIVE | Noted: 2022-07-26

## 2023-09-15 PROBLEM — N25.81 SECONDARY HYPERPARATHYROIDISM OF RENAL ORIGIN (HCC): Status: ACTIVE | Noted: 2022-05-30

## 2023-09-15 PROBLEM — K56.609 SBO (SMALL BOWEL OBSTRUCTION) (HCC): Status: ACTIVE | Noted: 2021-06-22

## 2023-09-15 PROBLEM — R65.21 SEPSIS WITH ACUTE RENAL FAILURE, TUBULAR NECROSIS, AND SEPTIC SHOCK (HCC): Status: ACTIVE | Noted: 2022-01-10

## 2023-09-15 PROBLEM — K50.90 REGIONAL ENTERITIS (HCC): Status: ACTIVE | Noted: 2022-03-14

## 2023-09-15 PROBLEM — Z94.4 LIVER TRANSPLANT STATUS (HCC): Status: ACTIVE | Noted: 2022-03-14

## 2023-09-15 PROBLEM — E80.6 HYPERBILIRUBINEMIA: Status: ACTIVE | Noted: 2022-01-28

## 2023-09-15 PROBLEM — N19 RENAL FAILURE: Status: ACTIVE | Noted: 2022-01-09

## 2023-09-15 PROBLEM — K70.10 ALCOHOLIC HEPATITIS WITHOUT ASCITES: Status: ACTIVE | Noted: 2022-02-03

## 2023-09-15 PROBLEM — R11.2 NAUSEA AND VOMITING: Status: ACTIVE | Noted: 2022-06-29

## 2023-09-15 PROBLEM — K50.019 CROHN'S DISEASE OF SMALL INTESTINE WITH COMPLICATION (HCC): Status: ACTIVE | Noted: 2019-01-18

## 2023-09-15 PROBLEM — R00.0 TACHYCARDIA: Status: ACTIVE | Noted: 2022-05-13

## 2023-09-15 PROBLEM — E44.0 MODERATE MALNUTRITION (HCC): Status: ACTIVE | Noted: 2022-01-17

## 2023-09-15 PROBLEM — A41.9 SEPSIS WITH ACUTE RENAL FAILURE, TUBULAR NECROSIS, AND SEPTIC SHOCK (HCC): Status: ACTIVE | Noted: 2022-01-10

## 2023-09-15 RX ORDER — URSODIOL 300 MG/1
CAPSULE ORAL
COMMUNITY
Start: 2022-07-26

## 2023-09-15 RX ORDER — MULTIVIT-MIN/IRON FUM/FOLIC AC 7.5 MG-4
1 TABLET ORAL DAILY
COMMUNITY
Start: 2022-07-23

## 2023-09-15 RX ORDER — METOCLOPRAMIDE 10 MG/1
TABLET ORAL
COMMUNITY
Start: 2022-07-26

## 2023-09-15 RX ORDER — LANOLIN ALCOHOL/MO/W.PET/CERES
100 CREAM (GRAM) TOPICAL DAILY
COMMUNITY
Start: 2022-02-02

## 2023-09-15 RX ORDER — DRONABINOL 5 MG/1
10 CAPSULE ORAL 2 TIMES DAILY
COMMUNITY

## 2023-09-15 RX ORDER — LANOLIN ALCOHOL/MO/W.PET/CERES
3 CREAM (GRAM) TOPICAL NIGHTLY
COMMUNITY
Start: 2022-07-22

## 2023-09-15 RX ORDER — DAPSONE 25 MG/1
TABLET ORAL
COMMUNITY
Start: 2022-07-26

## 2023-09-15 RX ORDER — MIDODRINE HYDROCHLORIDE 10 MG/1
10 TABLET ORAL 3 TIMES DAILY
COMMUNITY
Start: 2022-01-20

## 2023-09-15 RX ORDER — LOPERAMIDE HYDROCHLORIDE 2 MG/1
CAPSULE ORAL
COMMUNITY
Start: 2022-07-26

## 2023-09-15 RX ORDER — DIPHENOXYLATE HYDROCHLORIDE AND ATROPINE SULFATE 2.5; .025 MG/1; MG/1
1 TABLET ORAL EVERY 6 HOURS PRN
COMMUNITY
Start: 2022-01-20

## 2023-09-15 RX ORDER — ACETAMINOPHEN 325 MG/1
650 TABLET ORAL EVERY 8 HOURS PRN
COMMUNITY
Start: 2022-07-22

## 2023-09-15 RX ORDER — CHOLESTYRAMINE LIGHT 4 G/5.7G
1 POWDER, FOR SUSPENSION ORAL 2 TIMES DAILY
COMMUNITY
Start: 2022-01-20

## 2023-09-15 RX ORDER — MIRTAZAPINE 30 MG/1
TABLET, FILM COATED ORAL
COMMUNITY
Start: 2023-06-10

## 2023-09-15 RX ORDER — OMEPRAZOLE 20 MG/1
20 CAPSULE, DELAYED RELEASE ORAL EVERY MORNING
COMMUNITY

## 2023-09-15 RX ORDER — FOLIC ACID 1 MG/1
1 TABLET ORAL DAILY
COMMUNITY
Start: 2022-02-02

## 2023-09-20 ENCOUNTER — TELEPHONE (OUTPATIENT)
Age: 48
End: 2023-09-20

## 2023-09-20 ENCOUNTER — OFFICE VISIT (OUTPATIENT)
Age: 48
End: 2023-09-20

## 2023-09-20 VITALS
TEMPERATURE: 97.8 F | WEIGHT: 196 LBS | OXYGEN SATURATION: 98 % | SYSTOLIC BLOOD PRESSURE: 102 MMHG | HEIGHT: 72 IN | BODY MASS INDEX: 26.55 KG/M2 | HEART RATE: 92 BPM | DIASTOLIC BLOOD PRESSURE: 68 MMHG

## 2023-09-20 DIAGNOSIS — Z94.4 LIVER TRANSPLANT RECIPIENT (HCC): Primary | ICD-10-CM

## 2023-09-20 NOTE — TELEPHONE ENCOUNTER
Emailing patient an appointment reminder for his follow up with Alejandro Brian NP, on  December 13, 2023 @ 2:30 pm.

## 2023-09-20 NOTE — PROGRESS NOTES
MD [facility], [address] [facility], [place], [place]      Damián Lance, EV Haile, AGPCNP-BC   Jacinto Matt, ACNPC-AG   Johny Earl, FNP-C  Chester Ospina, FNP-C   Roosevelt Maxwell, AGPCNP-BC   Antolin Avis, [facility]      [address]   at [facility]   [facility], [address] [facility], [address] [facility]   [phone]   FAX: [phone]   at [facility], [facility], [address]   [phone]   FAX: [phone]       Patient Care Team:  Rayna Stover MD as PCP - General (Family Medicine)  Alvaro Shah RN as Nurse Navigator (Hepatology)      Patient Active Problem List   Diagnosis    Liver transplant status [facility])    Long-term use of immunosuppressant medication    Liver transplant complication [facility])    Alcohol use disorder in remission    ESRD on dialysis [facility])    Crohn's disease ([facility])         Karon Robertson returns to the [address] [place] [address],[address] [facility] for management of liver transplant graft function, to monitor and adjust immune suppression and to assess for recurrence of the primary liver disease. The active problem list, all pertinent past medical history, medications, liver histology, endoscopic studies, radiologic findings and laboratory findings related to the liver disorder were reviewed with the patient. The patient underwent a liver transplant at [facility] [place] in 3/2022. The patient is taking the following for immune suppression: Tacrolimus, Myfortic      He remains on dialysis and is in the safety net to undergo renal transplant. The patient has the following symptoms which could be attributed to the liver disorder:    Appetite and eating are excellent. He is starting to get muscle mass back in arms, shoulders and legs.       The enteric

## 2023-10-05 DIAGNOSIS — Z94.4 LIVER TRANSPLANT RECIPIENT (HCC): ICD-10-CM

## 2023-10-05 DIAGNOSIS — K21.9 GASTROESOPHAGEAL REFLUX DISEASE WITHOUT ESOPHAGITIS: Primary | ICD-10-CM

## 2023-10-05 DIAGNOSIS — Z79.60 LONG-TERM USE OF IMMUNOSUPPRESSANT MEDICATION: ICD-10-CM

## 2023-10-05 RX ORDER — PANTOPRAZOLE SODIUM 40 MG/1
40 TABLET, DELAYED RELEASE ORAL
Qty: 90 TABLET | Refills: 3 | Status: SHIPPED | OUTPATIENT
Start: 2023-10-05 | End: 2024-09-29

## 2023-10-05 RX ORDER — MYCOPHENOLIC ACID 180 MG/1
180 TABLET, DELAYED RELEASE ORAL 4 TIMES DAILY
Qty: 360 TABLET | Refills: 3 | Status: SHIPPED | OUTPATIENT
Start: 2023-10-05 | End: 2024-09-29

## 2023-10-14 PROBLEM — E87.1 HYPONATREMIA: Status: RESOLVED | Noted: 2022-01-10 | Resolved: 2023-10-14

## 2023-10-14 PROBLEM — R60.1 GENERALIZED EDEMA: Status: RESOLVED | Noted: 2022-03-29 | Resolved: 2023-10-14

## 2023-10-14 PROBLEM — A41.9 SEPSIS WITH ACUTE RENAL FAILURE, TUBULAR NECROSIS, AND SEPTIC SHOCK (HCC): Status: RESOLVED | Noted: 2022-01-10 | Resolved: 2023-10-14

## 2023-10-14 PROBLEM — Z99.2 DIALYSIS PATIENT (HCC): Status: RESOLVED | Noted: 2022-11-10 | Resolved: 2023-10-14

## 2023-10-14 PROBLEM — K70.10 ALCOHOLIC HEPATITIS WITHOUT ASCITES: Status: RESOLVED | Noted: 2022-02-03 | Resolved: 2023-10-14

## 2023-10-14 PROBLEM — K50.90 CROHN'S DISEASE (HCC): Status: ACTIVE | Noted: 2023-10-14

## 2023-10-14 PROBLEM — D53.9 MACROCYTIC ANEMIA: Status: RESOLVED | Noted: 2022-01-10 | Resolved: 2023-10-14

## 2023-10-14 PROBLEM — Z99.2 ESRD ON DIALYSIS (HCC): Status: ACTIVE | Noted: 2022-10-31

## 2023-10-14 PROBLEM — R06.02 SHORTNESS OF BREATH: Status: RESOLVED | Noted: 2022-07-26 | Resolved: 2023-10-14

## 2023-10-14 PROBLEM — K50.019 CROHN'S DISEASE OF SMALL INTESTINE WITH COMPLICATION (HCC): Status: RESOLVED | Noted: 2019-01-18 | Resolved: 2023-10-14

## 2023-10-14 PROBLEM — R74.8 ELEVATED LIVER ENZYMES: Status: RESOLVED | Noted: 2022-07-02 | Resolved: 2023-10-14

## 2023-10-14 PROBLEM — R16.0 LIVER MASS: Status: RESOLVED | Noted: 2022-01-09 | Resolved: 2023-10-14

## 2023-10-14 PROBLEM — J90 PLEURAL EFFUSION: Status: RESOLVED | Noted: 2022-05-05 | Resolved: 2023-10-14

## 2023-10-14 PROBLEM — Z99.2 ESRD ON HEMODIALYSIS (HCC): Status: RESOLVED | Noted: 2022-09-01 | Resolved: 2023-10-14

## 2023-10-14 PROBLEM — K74.60 CIRRHOSIS (HCC): Status: RESOLVED | Noted: 2022-02-03 | Resolved: 2023-10-14

## 2023-10-14 PROBLEM — E80.6 HYPERBILIRUBINEMIA: Status: RESOLVED | Noted: 2022-01-28 | Resolved: 2023-10-14

## 2023-10-14 PROBLEM — K72.10 CHRONIC LIVER FAILURE WITHOUT HEPATIC COMA (HCC): Status: RESOLVED | Noted: 2022-02-10 | Resolved: 2023-10-14

## 2023-10-14 PROBLEM — N17.9 AKI (ACUTE KIDNEY INJURY) (HCC): Status: RESOLVED | Noted: 2022-03-14 | Resolved: 2023-10-14

## 2023-10-14 PROBLEM — E87.20 METABOLIC ACIDOSIS: Status: RESOLVED | Noted: 2022-01-10 | Resolved: 2023-10-14

## 2023-10-14 PROBLEM — I95.9 HYPOTENSION, UNSPECIFIED: Status: RESOLVED | Noted: 2023-03-04 | Resolved: 2023-10-14

## 2023-10-14 PROBLEM — K50.90 REGIONAL ENTERITIS (HCC): Status: RESOLVED | Noted: 2022-03-14 | Resolved: 2023-10-14

## 2023-10-14 PROBLEM — K56.609 SBO (SMALL BOWEL OBSTRUCTION) (HCC): Status: RESOLVED | Noted: 2021-06-22 | Resolved: 2023-10-14

## 2023-10-14 PROBLEM — N19 RENAL FAILURE: Status: RESOLVED | Noted: 2022-01-09 | Resolved: 2023-10-14

## 2023-10-14 PROBLEM — E88.09 HYPOALBUMINEMIA: Status: RESOLVED | Noted: 2022-02-03 | Resolved: 2023-10-14

## 2023-10-14 PROBLEM — K74.60 CIRRHOSIS (HCC): Status: ACTIVE | Noted: 2022-02-03

## 2023-10-14 PROBLEM — R62.7 FAILURE TO THRIVE IN ADULT: Status: RESOLVED | Noted: 2022-07-03 | Resolved: 2023-10-14

## 2023-10-14 PROBLEM — D68.9 COAGULATION DEFECT, UNSPECIFIED (HCC): Status: RESOLVED | Noted: 2022-07-26 | Resolved: 2023-10-14

## 2023-10-14 PROBLEM — R00.0 TACHYCARDIA: Status: RESOLVED | Noted: 2022-05-13 | Resolved: 2023-10-14

## 2023-10-14 PROBLEM — G93.41 ACUTE METABOLIC ENCEPHALOPATHY: Status: RESOLVED | Noted: 2022-01-09 | Resolved: 2023-10-14

## 2023-10-14 PROBLEM — R11.2 NAUSEA AND VOMITING: Status: RESOLVED | Noted: 2022-06-29 | Resolved: 2023-10-14

## 2023-10-14 PROBLEM — R31.9 HEMATURIA: Status: RESOLVED | Noted: 2022-03-16 | Resolved: 2023-10-14

## 2023-10-14 PROBLEM — D50.9 IRON DEFICIENCY ANEMIA, UNSPECIFIED: Status: RESOLVED | Noted: 2022-07-26 | Resolved: 2023-10-14

## 2023-10-14 PROBLEM — N25.81 SECONDARY HYPERPARATHYROIDISM OF RENAL ORIGIN (HCC): Status: RESOLVED | Noted: 2022-05-30 | Resolved: 2023-10-14

## 2023-10-14 PROBLEM — N18.6 ESRD ON HEMODIALYSIS (HCC): Status: RESOLVED | Noted: 2022-09-01 | Resolved: 2023-10-14

## 2023-10-14 PROBLEM — N17.0 SEPSIS WITH ACUTE RENAL FAILURE, TUBULAR NECROSIS, AND SEPTIC SHOCK (HCC): Status: RESOLVED | Noted: 2022-01-10 | Resolved: 2023-10-14

## 2023-10-14 PROBLEM — E44.0 MODERATE MALNUTRITION (HCC): Status: RESOLVED | Noted: 2022-01-17 | Resolved: 2023-10-14

## 2023-10-14 PROBLEM — R65.21 SEPSIS WITH ACUTE RENAL FAILURE, TUBULAR NECROSIS, AND SEPTIC SHOCK (HCC): Status: RESOLVED | Noted: 2022-01-10 | Resolved: 2023-10-14

## 2023-10-26 ENCOUNTER — TELEPHONE (OUTPATIENT)
Age: 48
End: 2023-10-26

## 2023-10-26 NOTE — TELEPHONE ENCOUNTER
Received an email from patients [facility] Transplant Coordinator, Gabriele Shafer stating, \"Pt wife called in stating that Abril Mendez received his Covid booster and flu shot on friday at [facility] and hasnt been feeling well since then. She states he has had a fever of 104 on Monday and now it will not go above 96.0. she is asking for a callback as soon as possible. \"  Called patient who informed me, \"I no longer have a fever but still feeling very fatigued and will probably take the day off today and possibly tomorrow. \" Recommended patient drink a lot of fluids d/t risk of becoming dehydrated. Patient stated, \"I will drink more fluids. \" Informed patient I had been hearing other people experiencing these symptoms after receiving the Moderna vaccine. Requested patient call me if he continues to experience illness. Will check in with patient mid to late week.

## 2023-10-27 ENCOUNTER — TELEPHONE (OUTPATIENT)
Age: 48
End: 2023-10-27

## 2023-10-27 NOTE — TELEPHONE ENCOUNTER
Called patient today to inquire how he is feeling s/p fever/weakness of this past week. Patient stated he is feeling much better today, no fever, less fatigue and weakness. Patient working from home today. Recommended patient drink plenty of fluids. Patient stated, \"I am.\" Will call again next week to check in. Patient verbally confirmed understanding.

## 2023-12-13 ENCOUNTER — OFFICE VISIT (OUTPATIENT)
Age: 48
End: 2023-12-13
Payer: COMMERCIAL

## 2023-12-13 ENCOUNTER — HOSPITAL ENCOUNTER (OUTPATIENT)
Facility: HOSPITAL | Age: 48
Setting detail: SPECIMEN
Discharge: HOME | End: 2023-12-16

## 2023-12-13 VITALS
BODY MASS INDEX: 26.95 KG/M2 | HEIGHT: 72 IN | SYSTOLIC BLOOD PRESSURE: 95 MMHG | WEIGHT: 199 LBS | TEMPERATURE: 100 F | HEART RATE: 108 BPM | OXYGEN SATURATION: 99 % | DIASTOLIC BLOOD PRESSURE: 53 MMHG

## 2023-12-13 DIAGNOSIS — Z94.4 LIVER TRANSPLANT RECIPIENT (HCC): Primary | ICD-10-CM

## 2023-12-13 LAB — SENTARA SPECIMEN COLLECTION: NORMAL

## 2023-12-13 PROCEDURE — 99215 OFFICE O/P EST HI 40 MIN: CPT | Performed by: NURSE PRACTITIONER

## 2023-12-13 RX ORDER — TACROLIMUS 1 MG/1
2 CAPSULE ORAL 2 TIMES DAILY
Qty: 360 CAPSULE | Refills: 3 | Status: SHIPPED | OUTPATIENT
Start: 2023-12-13 | End: 2024-12-07

## 2023-12-13 NOTE — PROGRESS NOTES
was performed today. LIVER HISTOLOGY:  Not available or performed    ENDOSCOPIC PROCEDURES:  Not available or performed    RADIOLOGY:  Not available or performed    OTHER TESTING:  Not available or performed    FOLLOW-UP:  All of the issues listed above in the Assessment and Plan were discussed with the patient. All questions were answered. The patient expressed a clear understanding of the above. Laboratory studies should be performed twice weekly to assess liver graft function and blood levels of immune suppression. The patient has a follow-up appointment at the liver transplant center in 2/2023 which will be just a few weeks before he can be listed for renal transplant in the safety net program    FOLLOW-UP:  All of the issues listed above in the Assessment and Plan were discussed with the patient. All questions were answered. The patient expressed a clear understanding of the above. 45 minutes total time spent with this patient with more than 50% of this time spent counseling and coordinating care as described above. Laboratory studies every 4 weeks for now. Follow-up [address] [place] [address] 3 months.       KIM Flores  [facility] [place] [facility]  [address], [address] [place] [address] [place] , [address] [place] [address]   [phone]

## 2023-12-14 LAB
A/G RATIO: 1.5 RATIO (ref 1.1–2.6)
ALBUMIN SERPL-MCNC: 4 G/DL (ref 3.5–5)
ALP BLD-CCNC: 178 U/L (ref 25–115)
ALT SERPL-CCNC: 5 U/L (ref 5–40)
ANION GAP SERPL CALCULATED.3IONS-SCNC: 17 MMOL/L (ref 3–15)
AST SERPL-CCNC: 14 U/L (ref 10–37)
BASOPHILS # BLD: 1 % (ref 0–2)
BASOPHILS ABSOLUTE: 0 K/UL (ref 0–0.2)
BILIRUB SERPL-MCNC: 0.6 MG/DL (ref 0.2–1.2)
BILIRUBIN DIRECT: 0.2 MG/DL (ref 0–0.3)
BUN BLDV-MCNC: 30 MG/DL (ref 6–22)
CALCIUM SERPL-MCNC: 9 MG/DL (ref 8.4–10.5)
CHLORIDE BLD-SCNC: 95 MMOL/L (ref 98–110)
CO2: 26 MMOL/L (ref 20–32)
CREAT SERPL-MCNC: 9.2 MG/DL (ref 0.5–1.2)
EOSINOPHIL # BLD: 2 % (ref 0–6)
EOSINOPHILS ABSOLUTE: 0.1 K/UL (ref 0–0.5)
FERRITIN: 2212 NG/ML (ref 22–322)
GLOBULIN: 2.7 G/DL (ref 2–4)
GLOMERULAR FILTRATION RATE: 6.6 ML/MIN/1.73 SQ.M.
GLUCOSE: 81 MG/DL (ref 70–99)
HCT VFR BLD CALC: 20.2 % (ref 39.3–51.6)
HEMOGLOBIN: 6.2 G/DL (ref 13.1–17.2)
INR BLD: 1.03 (ref 0.89–1.29)
IRON % SATURATION: 14 % (ref 20–50)
IRON: 39 MCG/DL (ref 45–160)
LYMPHOCYTES # BLD: 31 % (ref 20–45)
LYMPHOCYTES ABSOLUTE: 1 K/UL (ref 1–4.8)
MAGNESIUM: 1.8 MG/DL (ref 1.6–2.5)
MCH RBC QN AUTO: 29 PG (ref 26–34)
MCHC RBC AUTO-ENTMCNC: 31 G/DL (ref 31–36)
MCV RBC AUTO: 95 FL (ref 80–95)
MONOCYTES ABSOLUTE: 0.3 K/UL (ref 0.1–1)
MONOCYTES: 10 % (ref 3–12)
NEUTROPHILS ABSOLUTE: 1.8 K/UL (ref 1.8–7.7)
NEUTROPHILS: 56 % (ref 40–75)
PDW BLD-RTO: 14.2 % (ref 10–15.5)
PLATELET # BLD: 211 K/UL (ref 140–440)
PMV BLD AUTO: 10.3 FL (ref 9–13)
POTASSIUM SERPL-SCNC: 3.1 MMOL/L (ref 3.5–5.5)
PROTHROMBIN TIME: 11.4 SEC (ref 9–13)
RBC: 2.12 M/UL (ref 3.8–5.8)
SODIUM BLD-SCNC: 138 MMOL/L (ref 133–145)
TACROLIMUS BLOOD: 6.9 NG/ML (ref 2–20)
TOTAL IRON BINDING CAPACITY: 269 MCG/DL (ref 228–428)
TOTAL PROTEIN: 6.7 G/DL (ref 6.4–8.3)
UIBC: 230 MCG/DL (ref 110–370)
WBC: 3.2 K/UL (ref 4–11)

## 2023-12-20 ENCOUNTER — HOSPITAL ENCOUNTER (OUTPATIENT)
Facility: HOSPITAL | Age: 48
Discharge: HOME | End: 2023-12-20
Payer: COMMERCIAL

## 2023-12-20 LAB — HISTORY CHECK: NORMAL

## 2023-12-20 PROCEDURE — 36415 COLL VENOUS BLD VENIPUNCTURE: CPT

## 2023-12-20 PROCEDURE — P9040 RBC LEUKOREDUCED IRRADIATED: HCPCS

## 2023-12-20 PROCEDURE — 86900 BLOOD TYPING SEROLOGIC ABO: CPT

## 2023-12-20 PROCEDURE — 86901 BLOOD TYPING SEROLOGIC RH(D): CPT

## 2023-12-20 PROCEDURE — 86850 RBC ANTIBODY SCREEN: CPT

## 2023-12-20 PROCEDURE — 86923 COMPATIBILITY TEST ELECTRIC: CPT

## 2023-12-21 ENCOUNTER — HOSPITAL ENCOUNTER (OUTPATIENT)
Facility: HOSPITAL | Age: 48
Setting detail: INFUSION SERIES
Discharge: HOME | End: 2023-12-21
Payer: COMMERCIAL

## 2023-12-21 VITALS
TEMPERATURE: 98.1 F | RESPIRATION RATE: 16 BRPM | DIASTOLIC BLOOD PRESSURE: 78 MMHG | HEART RATE: 67 BPM | SYSTOLIC BLOOD PRESSURE: 111 MMHG | OXYGEN SATURATION: 100 %

## 2023-12-21 PROCEDURE — 36430 TRANSFUSION BLD/BLD COMPNT: CPT

## 2023-12-21 PROCEDURE — P9040 RBC LEUKOREDUCED IRRADIATED: HCPCS

## 2023-12-21 RX ORDER — SODIUM CHLORIDE 9 MG/ML
INJECTION, SOLUTION INTRAVENOUS PRN
Status: DISCONTINUED | OUTPATIENT
Start: 2023-12-21 | End: 2023-12-22 | Stop reason: HOSPADM

## 2023-12-22 LAB
ABO + RH BLD: NORMAL
BLD PROD TYP BPU: NORMAL
BLD PROD TYP BPU: NORMAL
BLOOD BANK DISPENSE STATUS: NORMAL
BLOOD BANK DISPENSE STATUS: NORMAL
BLOOD GROUP ANTIBODIES SERPL: NORMAL
BPU ID: NORMAL
BPU ID: NORMAL
CROSSMATCH RESULT: NORMAL
CROSSMATCH RESULT: NORMAL
SPECIMEN EXP DATE BLD: NORMAL
UNIT DIVISION: 0
UNIT DIVISION: 0

## 2024-01-04 ENCOUNTER — HOSPITAL ENCOUNTER (INPATIENT)
Facility: HOSPITAL | Age: 49
LOS: 2 days | Discharge: HOME | DRG: 070 | End: 2024-01-06
Attending: EMERGENCY MEDICINE | Admitting: FAMILY MEDICINE
Payer: COMMERCIAL

## 2024-01-04 ENCOUNTER — APPOINTMENT (OUTPATIENT)
Facility: HOSPITAL | Age: 49
DRG: 070 | End: 2024-01-04
Payer: COMMERCIAL

## 2024-01-04 DIAGNOSIS — N17.9 ACUTE RENAL FAILURE, UNSPECIFIED ACUTE RENAL FAILURE TYPE (HCC): Primary | ICD-10-CM

## 2024-01-04 PROBLEM — G93.40 ACUTE ENCEPHALOPATHY: Status: ACTIVE | Noted: 2024-01-04

## 2024-01-04 LAB
ALBUMIN SERPL-MCNC: 3.5 G/DL (ref 3.4–5)
ALBUMIN SERPL-MCNC: 3.8 G/DL (ref 3.4–5)
ALBUMIN/GLOB SERPL: 1.2 (ref 0.8–1.7)
ALBUMIN/GLOB SERPL: 1.2 (ref 0.8–1.7)
ALP SERPL-CCNC: 205 U/L (ref 45–117)
ALP SERPL-CCNC: 209 U/L (ref 45–117)
ALT SERPL-CCNC: 25 U/L (ref 16–61)
ALT SERPL-CCNC: 27 U/L (ref 16–61)
ANION GAP SERPL CALC-SCNC: 22 MMOL/L (ref 3–18)
ANION GAP SERPL CALC-SCNC: 25 MMOL/L (ref 3–18)
AST SERPL-CCNC: 21 U/L (ref 10–38)
AST SERPL-CCNC: 27 U/L (ref 10–38)
BASOPHILS # BLD: 0 K/UL (ref 0–0.1)
BASOPHILS # BLD: 0 K/UL (ref 0–0.1)
BASOPHILS NFR BLD: 0 % (ref 0–2)
BASOPHILS NFR BLD: 0 % (ref 0–2)
BILIRUB DIRECT SERPL-MCNC: 0.4 MG/DL (ref 0–0.2)
BILIRUB SERPL-MCNC: 0.6 MG/DL (ref 0.2–1)
BILIRUB SERPL-MCNC: 0.6 MG/DL (ref 0.2–1)
BUN SERPL-MCNC: 141 MG/DL (ref 7–18)
BUN SERPL-MCNC: 143 MG/DL (ref 7–18)
BUN/CREAT SERPL: 5 (ref 12–20)
BUN/CREAT SERPL: 5 (ref 12–20)
CALCIUM SERPL-MCNC: 7.3 MG/DL (ref 8.5–10.1)
CALCIUM SERPL-MCNC: 7.6 MG/DL (ref 8.5–10.1)
CHLORIDE SERPL-SCNC: 107 MMOL/L (ref 100–111)
CHLORIDE SERPL-SCNC: 110 MMOL/L (ref 100–111)
CO2 SERPL-SCNC: 6 MMOL/L (ref 21–32)
CO2 SERPL-SCNC: 7 MMOL/L (ref 21–32)
CREAT SERPL-MCNC: 28.5 MG/DL (ref 0.6–1.3)
CREAT SERPL-MCNC: 30 MG/DL (ref 0.6–1.3)
DIFFERENTIAL METHOD BLD: ABNORMAL
DIFFERENTIAL METHOD BLD: ABNORMAL
EKG ATRIAL RATE: 88 BPM
EKG DIAGNOSIS: NORMAL
EKG P AXIS: 100 DEGREES
EKG P-R INTERVAL: 150 MS
EKG Q-T INTERVAL: 434 MS
EKG QRS DURATION: 94 MS
EKG QTC CALCULATION (BAZETT): 525 MS
EKG R AXIS: 55 DEGREES
EKG T AXIS: 96 DEGREES
EKG VENTRICULAR RATE: 88 BPM
EOSINOPHIL # BLD: 0.1 K/UL (ref 0–0.4)
EOSINOPHIL # BLD: 0.1 K/UL (ref 0–0.4)
EOSINOPHIL NFR BLD: 1 % (ref 0–5)
EOSINOPHIL NFR BLD: 2 % (ref 0–5)
ERYTHROCYTE [DISTWIDTH] IN BLOOD BY AUTOMATED COUNT: 14.7 % (ref 11.6–14.5)
ERYTHROCYTE [DISTWIDTH] IN BLOOD BY AUTOMATED COUNT: 14.8 % (ref 11.6–14.5)
FERRITIN SERPL-MCNC: 2118 NG/ML (ref 8–388)
FLUAV RNA SPEC QL NAA+PROBE: NOT DETECTED
FLUBV RNA SPEC QL NAA+PROBE: NOT DETECTED
GLOBULIN SER CALC-MCNC: 3 G/DL (ref 2–4)
GLOBULIN SER CALC-MCNC: 3.1 G/DL (ref 2–4)
GLUCOSE SERPL-MCNC: 84 MG/DL (ref 74–99)
GLUCOSE SERPL-MCNC: 84 MG/DL (ref 74–99)
HCT VFR BLD AUTO: 28.8 % (ref 36–48)
HCT VFR BLD AUTO: 29.9 % (ref 36–48)
HGB BLD-MCNC: 9.2 G/DL (ref 13–16)
HGB BLD-MCNC: 9.4 G/DL (ref 13–16)
IMM GRANULOCYTES # BLD AUTO: 0 K/UL
IMM GRANULOCYTES # BLD AUTO: 0.1 K/UL (ref 0–0.04)
IMM GRANULOCYTES NFR BLD AUTO: 0 %
IMM GRANULOCYTES NFR BLD AUTO: 3 % (ref 0–0.5)
IRON SERPL-MCNC: 197 UG/DL (ref 50–175)
LACTATE BLD-SCNC: <0.4 MMOL/L (ref 0.4–2)
LYMPHOCYTES # BLD: 0.8 K/UL (ref 0.9–3.6)
LYMPHOCYTES # BLD: 1.3 K/UL (ref 0.9–3.6)
LYMPHOCYTES NFR BLD: 20 % (ref 21–52)
LYMPHOCYTES NFR BLD: 26 % (ref 21–52)
MAGNESIUM SERPL-MCNC: 1.4 MG/DL (ref 1.6–2.6)
MCH RBC QN AUTO: 27.6 PG (ref 24–34)
MCH RBC QN AUTO: 28.7 PG (ref 24–34)
MCHC RBC AUTO-ENTMCNC: 30.8 G/DL (ref 31–37)
MCHC RBC AUTO-ENTMCNC: 32.6 G/DL (ref 31–37)
MCV RBC AUTO: 87.8 FL (ref 78–100)
MCV RBC AUTO: 89.8 FL (ref 78–100)
MONOCYTES # BLD: 0.4 K/UL (ref 0.05–1.2)
MONOCYTES # BLD: 0.4 K/UL (ref 0.05–1.2)
MONOCYTES NFR BLD: 10 % (ref 3–10)
MONOCYTES NFR BLD: 8 % (ref 3–10)
NEUTS SEG # BLD: 2.9 K/UL (ref 1.8–8)
NEUTS SEG # BLD: 3.2 K/UL (ref 1.8–8)
NEUTS SEG NFR BLD: 62 % (ref 40–73)
NEUTS SEG NFR BLD: 68 % (ref 40–73)
NRBC # BLD: 0 K/UL (ref 0–0.01)
NRBC # BLD: 0 K/UL (ref 0–0.01)
NRBC BLD-RTO: 0 PER 100 WBC
NRBC BLD-RTO: 0 PER 100 WBC
PLATELET # BLD AUTO: 135 K/UL (ref 135–420)
PLATELET # BLD AUTO: 158 K/UL (ref 135–420)
PMV BLD AUTO: 10.1 FL (ref 9.2–11.8)
PMV BLD AUTO: 9.9 FL (ref 9.2–11.8)
POTASSIUM SERPL-SCNC: 4.3 MMOL/L (ref 3.5–5.5)
POTASSIUM SERPL-SCNC: 4.6 MMOL/L (ref 3.5–5.5)
PROCALCITONIN SERPL-MCNC: 4.86 NG/ML
PROT SERPL-MCNC: 6.5 G/DL (ref 6.4–8.2)
PROT SERPL-MCNC: 6.9 G/DL (ref 6.4–8.2)
RBC # BLD AUTO: 3.28 M/UL (ref 4.35–5.65)
RBC # BLD AUTO: 3.33 M/UL (ref 4.35–5.65)
RBC MORPH BLD: ABNORMAL
SARS-COV-2 RNA RESP QL NAA+PROBE: NOT DETECTED
SODIUM SERPL-SCNC: 138 MMOL/L (ref 136–145)
SODIUM SERPL-SCNC: 139 MMOL/L (ref 136–145)
TROPONIN I SERPL HS-MCNC: 7 NG/L (ref 0–78)
WBC # BLD AUTO: 4.2 K/UL (ref 4.6–13.2)
WBC # BLD AUTO: 5.2 K/UL (ref 4.6–13.2)
WBC MORPH BLD: ABNORMAL

## 2024-01-04 PROCEDURE — 87040 BLOOD CULTURE FOR BACTERIA: CPT

## 2024-01-04 PROCEDURE — 36415 COLL VENOUS BLD VENIPUNCTURE: CPT

## 2024-01-04 PROCEDURE — 80053 COMPREHEN METABOLIC PANEL: CPT

## 2024-01-04 PROCEDURE — 87340 HEPATITIS B SURFACE AG IA: CPT

## 2024-01-04 PROCEDURE — 83735 ASSAY OF MAGNESIUM: CPT

## 2024-01-04 PROCEDURE — 82728 ASSAY OF FERRITIN: CPT

## 2024-01-04 PROCEDURE — 70450 CT HEAD/BRAIN W/O DYE: CPT

## 2024-01-04 PROCEDURE — 80197 ASSAY OF TACROLIMUS: CPT

## 2024-01-04 PROCEDURE — 6370000000 HC RX 637 (ALT 250 FOR IP)

## 2024-01-04 PROCEDURE — 86706 HEP B SURFACE ANTIBODY: CPT

## 2024-01-04 PROCEDURE — 83605 ASSAY OF LACTIC ACID: CPT

## 2024-01-04 PROCEDURE — 99285 EMERGENCY DEPT VISIT HI MDM: CPT

## 2024-01-04 PROCEDURE — 80076 HEPATIC FUNCTION PANEL: CPT

## 2024-01-04 PROCEDURE — 90935 HEMODIALYSIS ONE EVALUATION: CPT

## 2024-01-04 PROCEDURE — 6360000002 HC RX W HCPCS: Performed by: INTERNAL MEDICINE

## 2024-01-04 PROCEDURE — 6360000002 HC RX W HCPCS: Performed by: FAMILY MEDICINE

## 2024-01-04 PROCEDURE — 84145 PROCALCITONIN (PCT): CPT

## 2024-01-04 PROCEDURE — 2580000003 HC RX 258: Performed by: FAMILY MEDICINE

## 2024-01-04 PROCEDURE — 87636 SARSCOV2 & INF A&B AMP PRB: CPT

## 2024-01-04 PROCEDURE — 1100000000 HC RM PRIVATE

## 2024-01-04 PROCEDURE — 83540 ASSAY OF IRON: CPT

## 2024-01-04 PROCEDURE — 6370000000 HC RX 637 (ALT 250 FOR IP): Performed by: FAMILY MEDICINE

## 2024-01-04 PROCEDURE — 71045 X-RAY EXAM CHEST 1 VIEW: CPT

## 2024-01-04 PROCEDURE — P9047 ALBUMIN (HUMAN), 25%, 50ML: HCPCS | Performed by: INTERNAL MEDICINE

## 2024-01-04 PROCEDURE — 5A1D70Z PERFORMANCE OF URINARY FILTRATION, INTERMITTENT, LESS THAN 6 HOURS PER DAY: ICD-10-PCS | Performed by: INTERNAL MEDICINE

## 2024-01-04 PROCEDURE — 85025 COMPLETE CBC W/AUTO DIFF WBC: CPT

## 2024-01-04 PROCEDURE — 6360000002 HC RX W HCPCS

## 2024-01-04 PROCEDURE — 84484 ASSAY OF TROPONIN QUANT: CPT

## 2024-01-04 RX ORDER — ALBUMIN (HUMAN) 12.5 G/50ML
25 SOLUTION INTRAVENOUS
Status: COMPLETED | OUTPATIENT
Start: 2024-01-04 | End: 2024-01-04

## 2024-01-04 RX ORDER — FAMOTIDINE 20 MG/1
10 TABLET, FILM COATED ORAL DAILY
Status: DISCONTINUED | OUTPATIENT
Start: 2024-01-04 | End: 2024-01-05

## 2024-01-04 RX ORDER — SODIUM CHLORIDE 9 MG/ML
INJECTION, SOLUTION INTRAVENOUS PRN
Status: DISCONTINUED | OUTPATIENT
Start: 2024-01-04 | End: 2024-01-06 | Stop reason: HOSPADM

## 2024-01-04 RX ORDER — ACETAMINOPHEN 325 MG/1
650 TABLET ORAL EVERY 6 HOURS PRN
Status: DISCONTINUED | OUTPATIENT
Start: 2024-01-04 | End: 2024-01-06 | Stop reason: HOSPADM

## 2024-01-04 RX ORDER — ACETAMINOPHEN 650 MG/1
650 SUPPOSITORY RECTAL EVERY 6 HOURS PRN
Status: DISCONTINUED | OUTPATIENT
Start: 2024-01-04 | End: 2024-01-06 | Stop reason: HOSPADM

## 2024-01-04 RX ORDER — 0.9 % SODIUM CHLORIDE 0.9 %
100 INTRAVENOUS SOLUTION INTRAVENOUS PRN
Status: DISCONTINUED | OUTPATIENT
Start: 2024-01-04 | End: 2024-01-06 | Stop reason: HOSPADM

## 2024-01-04 RX ORDER — FAMOTIDINE 20 MG/1
TABLET, FILM COATED ORAL
Status: COMPLETED
Start: 2024-01-04 | End: 2024-01-04

## 2024-01-04 RX ORDER — ONDANSETRON 4 MG/1
4 TABLET, ORALLY DISINTEGRATING ORAL EVERY 8 HOURS PRN
Status: DISCONTINUED | OUTPATIENT
Start: 2024-01-04 | End: 2024-01-06 | Stop reason: HOSPADM

## 2024-01-04 RX ORDER — POLYETHYLENE GLYCOL 3350 17 G/17G
17 POWDER, FOR SOLUTION ORAL DAILY PRN
Status: DISCONTINUED | OUTPATIENT
Start: 2024-01-04 | End: 2024-01-06 | Stop reason: HOSPADM

## 2024-01-04 RX ORDER — SODIUM CHLORIDE 0.9 % (FLUSH) 0.9 %
5-40 SYRINGE (ML) INJECTION PRN
Status: DISCONTINUED | OUTPATIENT
Start: 2024-01-04 | End: 2024-01-06 | Stop reason: HOSPADM

## 2024-01-04 RX ORDER — ONDANSETRON 2 MG/ML
4 INJECTION INTRAMUSCULAR; INTRAVENOUS EVERY 6 HOURS PRN
Status: DISCONTINUED | OUTPATIENT
Start: 2024-01-04 | End: 2024-01-06 | Stop reason: HOSPADM

## 2024-01-04 RX ORDER — MIDODRINE HYDROCHLORIDE 10 MG/1
10 TABLET ORAL ONCE
Status: COMPLETED | OUTPATIENT
Start: 2024-01-04 | End: 2024-01-04

## 2024-01-04 RX ORDER — SODIUM CHLORIDE, SODIUM LACTATE, POTASSIUM CHLORIDE, CALCIUM CHLORIDE 600; 310; 30; 20 MG/100ML; MG/100ML; MG/100ML; MG/100ML
INJECTION, SOLUTION INTRAVENOUS CONTINUOUS
Status: DISCONTINUED | OUTPATIENT
Start: 2024-01-04 | End: 2024-01-06 | Stop reason: HOSPADM

## 2024-01-04 RX ORDER — HEPARIN SODIUM 5000 [USP'U]/ML
5000 INJECTION, SOLUTION INTRAVENOUS; SUBCUTANEOUS EVERY 8 HOURS SCHEDULED
Status: DISCONTINUED | OUTPATIENT
Start: 2024-01-04 | End: 2024-01-06 | Stop reason: HOSPADM

## 2024-01-04 RX ORDER — THIAMINE HYDROCHLORIDE 100 MG/ML
100 INJECTION, SOLUTION INTRAMUSCULAR; INTRAVENOUS DAILY
Status: DISCONTINUED | OUTPATIENT
Start: 2024-01-04 | End: 2024-01-06 | Stop reason: HOSPADM

## 2024-01-04 RX ORDER — THIAMINE HYDROCHLORIDE 100 MG/ML
INJECTION, SOLUTION INTRAMUSCULAR; INTRAVENOUS
Status: COMPLETED
Start: 2024-01-04 | End: 2024-01-04

## 2024-01-04 RX ORDER — SODIUM CHLORIDE 0.9 % (FLUSH) 0.9 %
5-40 SYRINGE (ML) INJECTION EVERY 12 HOURS SCHEDULED
Status: DISCONTINUED | OUTPATIENT
Start: 2024-01-04 | End: 2024-01-06 | Stop reason: HOSPADM

## 2024-01-04 RX ADMIN — HEPARIN SODIUM 5000 UNITS: 5000 INJECTION INTRAVENOUS; SUBCUTANEOUS at 23:44

## 2024-01-04 RX ADMIN — FAMOTIDINE 10 MG: 20 TABLET, FILM COATED ORAL at 17:47

## 2024-01-04 RX ADMIN — ALBUMIN (HUMAN) 25 G: 0.25 INJECTION, SOLUTION INTRAVENOUS at 21:22

## 2024-01-04 RX ADMIN — THIAMINE HYDROCHLORIDE 100 MG: 100 INJECTION, SOLUTION INTRAMUSCULAR; INTRAVENOUS at 17:47

## 2024-01-04 RX ADMIN — SODIUM CHLORIDE, PRESERVATIVE FREE 10 ML: 5 INJECTION INTRAVENOUS at 23:46

## 2024-01-04 RX ADMIN — ONDANSETRON 4 MG: 2 INJECTION INTRAMUSCULAR; INTRAVENOUS at 19:11

## 2024-01-04 RX ADMIN — SODIUM CHLORIDE, POTASSIUM CHLORIDE, SODIUM LACTATE AND CALCIUM CHLORIDE: 600; 310; 30; 20 INJECTION, SOLUTION INTRAVENOUS at 23:46

## 2024-01-04 RX ADMIN — VANCOMYCIN HYDROCHLORIDE 1250 MG: 1.25 INJECTION, POWDER, LYOPHILIZED, FOR SOLUTION INTRAVENOUS at 23:46

## 2024-01-04 RX ADMIN — MIDODRINE HYDROCHLORIDE 10 MG: 10 TABLET ORAL at 23:45

## 2024-01-04 ASSESSMENT — PAIN SCALES - GENERAL: PAINLEVEL_OUTOF10: 8

## 2024-01-04 ASSESSMENT — PAIN - FUNCTIONAL ASSESSMENT: PAIN_FUNCTIONAL_ASSESSMENT: 0-10

## 2024-01-04 ASSESSMENT — LIFESTYLE VARIABLES
HOW MANY STANDARD DRINKS CONTAINING ALCOHOL DO YOU HAVE ON A TYPICAL DAY: PATIENT DOES NOT DRINK
HOW OFTEN DO YOU HAVE A DRINK CONTAINING ALCOHOL: NEVER

## 2024-01-04 NOTE — ED NOTES
Repeat and additional labs drawn and sent, transport taking to CT. Attempted to call report, nurse to call back in ten minutes.

## 2024-01-04 NOTE — H&P
History & Physical    Patient: Valeriano Fong III MRN: [id number]  CSN: [id number]    YOB: 1975  Age: 48 y.o.  Sex: male      DOA: 1/4/2024  Primary Care Provider:  Geovani Reeves MD      Assessment/Plan   End-stage renal disease, missed hemodialysis for almost 2 weeks  Uremia  Nausea/diarrhea/ill-appearing  Acute metabolic encephalopathy  Status post liver transplant 3/22 at [facility] [place]-  Metabolic acidosis  Anemia of chronic disease/chronic kidney disease  History of Crohn's disease    Admit to [facility] department  Emergency department consulted nephrology for management of hemodialysis  Appreciate Dr. Han disease expertise, ordered hemodialysis for today and repeat tomorrow  Follow a.m. BMP and CBC  Nephrology order April for anemia    History of liver transplant-  Stable  Supportive care  Consult hepatologist, Dr. Mascorro ordered  Resume home medications     Malaise with nausea diarrhea-  Possible viral illness  Influenza AMB unremarkable  COVID-19 unremarkable  Chest x-ray concerning for possible developing pneumonia  Follow blood cultures  Follow line culture     Possible developing pneumonia-  Versus edema   Will recheck CXR in am after HD  Monitor off antibiotics for now     DVT and GI prophylaxis   Active Hospital Problems    Diagnosis Date Noted    Malaise and fatigue [R53.81, R53.83] 01/05/2024    Uremia [N19] 01/05/2024    Acute encephalopathy [G93.40] 01/04/2024    Crohn's disease (HCC) [K50.90] 10/14/2023    ESRD on dialysis (HCC) [N18.6, Z99.2] 10/31/2022    Long-term use of immunosuppressant medication [Z79.60] 08/27/2022    Liver transplant status (HCC) [Z94.4] 03/14/2022       Estimated length of stay :    CC: Malaise and ill-appearing, missed hemodialysis for 2 weeks       HPI:     Valeriano Fong III is a 48 y.o. male with a past medical history significant of Crohn's disease, liver failure status post liver transplant March 2022 at [facility] [place], end-stage renal disease maintained  on home  Rate 88 BPM    P-R Interval 150 ms    QRS Duration 94 ms    Q-T Interval 434 ms    QTc Calculation (Bazett) 525 ms    P Axis 100 degrees    R Axis 55 degrees    T Axis 96 degrees    Diagnosis       Sinus rhythm with occasional premature ventricular complexes and fusion   complexes  Low voltage QRS  Cannot rule out Anterior infarct , age undetermined  Prolonged QT  Abnormal ECG  When compared with ECG of 01-SEP-2022 17:34,  fusion complexes are now present  premature ventricular complexes are now present  Nonspecific T wave abnormality now evident in Lateral leads     COVID-19 & Influenza Combo    Collection Time: 01/04/24  2:02 PM    Specimen: Nasopharyngeal   Result Value Ref Range    SARS-CoV-2, PCR Not detected NOTD      Rapid Influenza A By PCR Not detected NOTD      Rapid Influenza B By PCR Not detected NOTD     CBC with Auto Differential    Collection Time: 01/04/24  5:53 PM   Result Value Ref Range    WBC 4.2 (L) 4.6 - 13.2 K/uL    RBC 3.28 (L) 4.35 - 5.65 M/uL    Hemoglobin 9.4 (L) 13.0 - 16.0 g/dL    Hematocrit 28.8 (L) 36.0 - 48.0 %    MCV 87.8 78.0 - 100.0 FL    MCH 28.7 24.0 - 34.0 PG    MCHC 32.6 31.0 - 37.0 g/dL    RDW 14.7 (H) 11.6 - 14.5 %    Platelets 135 135 - 420 K/uL    MPV 10.1 9.2 - 11.8 FL    Nucleated RBCs 0.0 0  WBC    nRBC 0.00 0.00 - 0.01 K/uL    Neutrophils % 68 40 - 73 %    Lymphocytes % 20 (L) 21 - 52 %    Monocytes % 10 3 - 10 %    Eosinophils % 2 0 - 5 %    Basophils % 0 0 - 2 %    Immature Granulocytes 0 %    Neutrophils Absolute 2.9 1.8 - 8.0 K/UL    Lymphocytes Absolute 0.8 (L) 0.9 - 3.6 K/UL    Monocytes Absolute 0.4 0.05 - 1.2 K/UL    Eosinophils Absolute 0.1 0.0 - 0.4 K/UL    Basophils Absolute 0.0 0.0 - 0.1 K/UL    Absolute Immature Granulocyte 0.0 K/UL    Differential Type MANUAL      RBC Comment OVALOCYTES  1+        WBC Comment REACTIVE LYMPHS     Comprehensive Metabolic Panel w/ Reflex to MG    Collection Time: 01/04/24  5:53 PM   Result Value Ref Range    Sodium 138

## 2024-01-04 NOTE — CONSULTS
Nephrology Consult    Patient: Valeriano Fong III  Requesting physician: Dr. Goode  Reason for consult: ESRD        History of Present Illness:  Valeriano Fong III is a 48 y.o. male with a past medical history significant for Crohn's disease, Liver failure s/p Liver Transplant March 2022 at [facility], ESRD maintained on Home HD 4 days/week though [facility] home program and followed by [facility] who presented today to the ED with c/o Malaise, Nausea, diarrhea, poor oral intake for about 10 days. He has not been performing his home HD treatment during that period  Labs in D revealed  Cr 28.5  Hgb 9.2. LFT's were normal.   COVID-19 and Flu tests were negative.       Past Medical History:  Patient Active Problem List   Diagnosis    Liver transplant status (HCC)    Long-term use of immunosuppressant medication    Liver transplant complication (HCC)    Alcohol use disorder in remission    ESRD on dialysis (HCC)    Crohn's disease (HCC)         Social History:  Social History     Socioeconomic History    Marital status:      Spouse name: Not on file    Number of children: Not on file    Years of education: Not on file    Highest education level: Not on file   Occupational History    Not on file   Tobacco Use    Smoking status: Former    Smokeless tobacco: Former   Substance and Sexual Activity    Alcohol use: Not Currently    Drug use: Not Currently    Sexual activity: Not on file   Other Topics Concern    Not on file   Social History Narrative    Not on file     Social Determinants of Health     Financial Resource Strain: Not on file   Food Insecurity: Not on file   Transportation Needs: Not on file   Physical Activity: Not on file   Stress: Not on file   Social Connections: Not on file   Intimate Partner Violence: Not on file   Housing Stability: Not on file       Family History:  No family history on file.    Allergy:  Allergies   Allergen Reactions    Azathioprine Rash, Itching and Other (See Comments)

## 2024-01-04 NOTE — ED TRIAGE NOTES
Last had dialysis on 12/25, has since developed increased SOB, diarrhea, fevers, fatigue worsening over last 2 days. Patient states he has not felt well enough to do his dialysis sessions

## 2024-01-05 ENCOUNTER — APPOINTMENT (OUTPATIENT)
Facility: HOSPITAL | Age: 49
DRG: 070 | End: 2024-01-05
Payer: COMMERCIAL

## 2024-01-05 PROBLEM — R53.83 MALAISE AND FATIGUE: Status: ACTIVE | Noted: 2024-01-05

## 2024-01-05 PROBLEM — R53.81 MALAISE AND FATIGUE: Status: ACTIVE | Noted: 2024-01-05

## 2024-01-05 PROBLEM — N19 UREMIA: Status: ACTIVE | Noted: 2024-01-05

## 2024-01-05 PROBLEM — Z79.60 LONG-TERM USE OF IMMUNOSUPPRESSANT MEDICATION: Status: ACTIVE | Noted: 2022-08-27

## 2024-01-05 LAB
ALBUMIN SERPL-MCNC: 3.2 G/DL (ref 3.4–5)
ANION GAP SERPL CALC-SCNC: 17 MMOL/L (ref 3–18)
BUN SERPL-MCNC: 78 MG/DL (ref 7–18)
BUN/CREAT SERPL: 4 (ref 12–20)
CALCIUM SERPL-MCNC: 7.4 MG/DL (ref 8.5–10.1)
CHLORIDE SERPL-SCNC: 107 MMOL/L (ref 100–111)
CO2 SERPL-SCNC: 13 MMOL/L (ref 21–32)
CREAT SERPL-MCNC: 17.5 MG/DL (ref 0.6–1.3)
FLUAV RNA SPEC QL NAA+PROBE: NOT DETECTED
FLUBV RNA SPEC QL NAA+PROBE: NOT DETECTED
GLUCOSE SERPL-MCNC: 68 MG/DL (ref 74–99)
HBV SURFACE AB SER QL IA: NEGATIVE
HBV SURFACE AB SERPL IA-ACNC: 4.05 MIU/ML
HBV SURFACE AG SER QL: <0.1 INDEX
HBV SURFACE AG SER QL: NEGATIVE
HEP BS AB COMMENT: ABNORMAL
PHOSPHATE SERPL-MCNC: 1.4 MG/DL (ref 2.5–4.9)
POTASSIUM SERPL-SCNC: 2.3 MMOL/L (ref 3.5–5.5)
SARS-COV-2 RNA RESP QL NAA+PROBE: NOT DETECTED
SODIUM SERPL-SCNC: 137 MMOL/L (ref 136–145)

## 2024-01-05 PROCEDURE — 2580000003 HC RX 258: Performed by: FAMILY MEDICINE

## 2024-01-05 PROCEDURE — 90935 HEMODIALYSIS ONE EVALUATION: CPT

## 2024-01-05 PROCEDURE — P9047 ALBUMIN (HUMAN), 25%, 50ML: HCPCS | Performed by: FAMILY MEDICINE

## 2024-01-05 PROCEDURE — 6360000002 HC RX W HCPCS: Performed by: FAMILY MEDICINE

## 2024-01-05 PROCEDURE — 36415 COLL VENOUS BLD VENIPUNCTURE: CPT

## 2024-01-05 PROCEDURE — 87506 IADNA-DNA/RNA PROBE TQ 6-11: CPT

## 2024-01-05 PROCEDURE — 71045 X-RAY EXAM CHEST 1 VIEW: CPT

## 2024-01-05 PROCEDURE — 6370000000 HC RX 637 (ALT 250 FOR IP): Performed by: INTERNAL MEDICINE

## 2024-01-05 PROCEDURE — 80197 ASSAY OF TACROLIMUS: CPT

## 2024-01-05 PROCEDURE — 87449 NOS EACH ORGANISM AG IA: CPT

## 2024-01-05 PROCEDURE — 2500000003 HC RX 250 WO HCPCS: Performed by: FAMILY MEDICINE

## 2024-01-05 PROCEDURE — 2700000000 HC OXYGEN THERAPY PER DAY

## 2024-01-05 PROCEDURE — 6370000000 HC RX 637 (ALT 250 FOR IP): Performed by: FAMILY MEDICINE

## 2024-01-05 PROCEDURE — 1100000000 HC RM PRIVATE

## 2024-01-05 PROCEDURE — 80069 RENAL FUNCTION PANEL: CPT

## 2024-01-05 PROCEDURE — 87636 SARSCOV2 & INF A&B AMP PRB: CPT

## 2024-01-05 PROCEDURE — 2580000003 HC RX 258: Performed by: INTERNAL MEDICINE

## 2024-01-05 PROCEDURE — 6360000002 HC RX W HCPCS: Performed by: INTERNAL MEDICINE

## 2024-01-05 PROCEDURE — 87324 CLOSTRIDIUM AG IA: CPT

## 2024-01-05 RX ORDER — MAGNESIUM SULFATE HEPTAHYDRATE 40 MG/ML
2000 INJECTION, SOLUTION INTRAVENOUS ONCE
Status: COMPLETED | OUTPATIENT
Start: 2024-01-05 | End: 2024-01-05

## 2024-01-05 RX ORDER — TACROLIMUS 1 MG/1
4 CAPSULE ORAL 2 TIMES DAILY
Status: DISCONTINUED | OUTPATIENT
Start: 2024-01-05 | End: 2024-01-05

## 2024-01-05 RX ORDER — MIDODRINE HYDROCHLORIDE 10 MG/1
10 TABLET ORAL ONCE
Status: COMPLETED | OUTPATIENT
Start: 2024-01-05 | End: 2024-01-05

## 2024-01-05 RX ORDER — SODIUM CHLORIDE 9 MG/ML
INJECTION, SOLUTION INTRAVENOUS ONCE
Status: COMPLETED | OUTPATIENT
Start: 2024-01-05 | End: 2024-01-05

## 2024-01-05 RX ORDER — MIDODRINE HYDROCHLORIDE 10 MG/1
10 TABLET ORAL
Status: DISCONTINUED | OUTPATIENT
Start: 2024-01-05 | End: 2024-01-06 | Stop reason: HOSPADM

## 2024-01-05 RX ORDER — VITAMIN B COMPLEX
5000 TABLET ORAL DAILY
Status: DISCONTINUED | OUTPATIENT
Start: 2024-01-05 | End: 2024-01-06 | Stop reason: HOSPADM

## 2024-01-05 RX ORDER — PANTOPRAZOLE SODIUM 40 MG/1
40 TABLET, DELAYED RELEASE ORAL
Status: DISCONTINUED | OUTPATIENT
Start: 2024-01-06 | End: 2024-01-06 | Stop reason: HOSPADM

## 2024-01-05 RX ORDER — MIDODRINE HYDROCHLORIDE 10 MG/1
10 TABLET ORAL
Status: COMPLETED | OUTPATIENT
Start: 2024-01-05 | End: 2024-01-05

## 2024-01-05 RX ORDER — M-VIT,TX,IRON,MINS/CALC/FOLIC 27MG-0.4MG
1 TABLET ORAL DAILY
Status: DISCONTINUED | OUTPATIENT
Start: 2024-01-05 | End: 2024-01-06 | Stop reason: HOSPADM

## 2024-01-05 RX ORDER — ALBUMIN (HUMAN) 12.5 G/50ML
25 SOLUTION INTRAVENOUS EVERY 6 HOURS
Status: DISCONTINUED | OUTPATIENT
Start: 2024-01-05 | End: 2024-01-06 | Stop reason: HOSPADM

## 2024-01-05 RX ORDER — TACROLIMUS 1 MG/1
2 CAPSULE ORAL 2 TIMES DAILY
Status: DISCONTINUED | OUTPATIENT
Start: 2024-01-05 | End: 2024-01-06 | Stop reason: HOSPADM

## 2024-01-05 RX ORDER — MYCOPHENOLIC ACID 180 MG/1
180 TABLET, DELAYED RELEASE ORAL 4 TIMES DAILY
Status: DISCONTINUED | OUTPATIENT
Start: 2024-01-05 | End: 2024-01-06 | Stop reason: HOSPADM

## 2024-01-05 RX ADMIN — SODIUM CHLORIDE, PRESERVATIVE FREE 10 ML: 5 INJECTION INTRAVENOUS at 08:16

## 2024-01-05 RX ADMIN — ALBUMIN (HUMAN) 25 G: 0.25 INJECTION, SOLUTION INTRAVENOUS at 03:31

## 2024-01-05 RX ADMIN — MIDODRINE HYDROCHLORIDE 10 MG: 10 TABLET ORAL at 17:43

## 2024-01-05 RX ADMIN — MIDODRINE HYDROCHLORIDE 10 MG: 10 TABLET ORAL at 16:35

## 2024-01-05 RX ADMIN — MAGNESIUM SULFATE HEPTAHYDRATE 2000 MG: 40 INJECTION, SOLUTION INTRAVENOUS at 04:14

## 2024-01-05 RX ADMIN — MIDODRINE HYDROCHLORIDE 10 MG: 10 TABLET ORAL at 04:14

## 2024-01-05 RX ADMIN — HEPARIN SODIUM 5000 UNITS: 5000 INJECTION INTRAVENOUS; SUBCUTANEOUS at 08:16

## 2024-01-05 RX ADMIN — HEPARIN SODIUM 5000 UNITS: 5000 INJECTION INTRAVENOUS; SUBCUTANEOUS at 16:00

## 2024-01-05 RX ADMIN — SODIUM CHLORIDE 1500 MG: 900 INJECTION INTRAVENOUS at 16:30

## 2024-01-05 RX ADMIN — FAMOTIDINE 10 MG: 20 TABLET, FILM COATED ORAL at 08:16

## 2024-01-05 RX ADMIN — POTASSIUM BICARBONATE 40 MEQ: 782 TABLET, EFFERVESCENT ORAL at 04:14

## 2024-01-05 RX ADMIN — EPOETIN ALFA-EPBX 10000 UNITS: 10000 INJECTION, SOLUTION INTRAVENOUS; SUBCUTANEOUS at 21:22

## 2024-01-05 RX ADMIN — ALBUMIN (HUMAN) 25 G: 0.25 INJECTION, SOLUTION INTRAVENOUS at 21:22

## 2024-01-05 RX ADMIN — THIAMINE HYDROCHLORIDE 100 MG: 100 INJECTION, SOLUTION INTRAMUSCULAR; INTRAVENOUS at 08:16

## 2024-01-05 RX ADMIN — TACROLIMUS 2 MG: 1 CAPSULE ORAL at 21:22

## 2024-01-05 RX ADMIN — VANCOMYCIN HYDROCHLORIDE 750 MG: 750 INJECTION, POWDER, LYOPHILIZED, FOR SOLUTION INTRAVENOUS at 17:44

## 2024-01-05 RX ADMIN — SODIUM CHLORIDE, PRESERVATIVE FREE 10 ML: 5 INJECTION INTRAVENOUS at 21:14

## 2024-01-05 RX ADMIN — MYCOPHENOLIC ACID 180 MG: 180 TABLET, DELAYED RELEASE ORAL at 21:22

## 2024-01-05 RX ADMIN — ALBUMIN (HUMAN) 25 G: 0.25 INJECTION, SOLUTION INTRAVENOUS at 08:16

## 2024-01-05 RX ADMIN — ALBUMIN (HUMAN) 25 G: 0.25 INJECTION, SOLUTION INTRAVENOUS at 16:35

## 2024-01-05 RX ADMIN — SODIUM CHLORIDE: 900 INJECTION, SOLUTION INTRAVENOUS at 17:43

## 2024-01-05 NOTE — PROGRESS NOTES
PASTOR Rodriguez, dialysis nurse reported patient hypotensive at 88/51  as patient receiving dialysis.  Had bolus patient 300 of NS IV, then 200 ml, then 1000 ml per Dr. Charlton without improvement in BP.  RRT called with quick response from Dr. Agudelo and RRT team.     2346 As per Dr. Agudelo, LR at 75 ml/hr started, midodrine 10 mg po given, and albumin 25 gm IV given.  Will continue to monitor.  BP increased to 102/59, .  Will continue to monitor.    01/05/2024 -  0414 - BP down to 86/52, HR 97 with manual BP 92/52.  Potassium level 2.3.  Dr. Agudelo notified.  IVF rate increased to 100 ml/hr.  Albumin 25 gm IV given, additional dose of midodrine 10 mg po given.  For critical potassium level, patient given 40 meq EFFER-K po and magnesium 2 gm IV.    0730 BP up to 103/62 HR 97.    Bedside and Verbal shift change report given to PASTOR Cortez (oncoming nurse) by PASTOR Hutchins (offgoing nurse). Report included the following information Nurse Handoff Report, Intake/Output, and MAR.

## 2024-01-05 NOTE — PROGRESS NOTES
Nephrology Progress note    Subjective:     Valeriano Fong III is a 48 y.o. male with past medical history significant for Crohn's disease, Liver failure s/p Liver Transplant March 2022 at [facility], ESRD maintained on Home HD 4 days/week though [facility] home program and followed by [facility] who presented today to the ED with c/o Malaise, Nausea, diarrhea, poor oral intake for about 10 days. He has not been performing his home HD treatment during that period  Labs in D revealed  Cr 28.5  Hgb 9.2. LFT's were normal.   COVID-19 and Flu tests were negative.   S/p HD yest  Admit Date: 1/4/2024      Allergy:  Allergies   Allergen Reactions    Azathioprine Rash, Itching and Other (See Comments)    Naproxen Itching, Nausea And Vomiting and Other (See Comments)     heartburn          Objective:     /66   Pulse 96   Temp 97.8 °F (36.6 °C) (Oral)   Resp 18   Ht 1.829 m (6')   Wt 92.5 kg (203 lb 14.8 oz)   SpO2 97%   BMI 27.66 kg/m²       Intake/Output Summary (Last 24 hours) at 1/5/2024 1229  Last data filed at 1/5/2024 0855  Gross per 24 hour   Intake 2865 ml   Output 540 ml   Net 2325 ml       Physical Exam:       General: No acute distress   HENT: Atraumatic and normocephalic   Eyes: Normal conjunctiva   Neck: Supple    Cardiovascular: Normal S1 & S2, no m/r/g   Pulmonary/Chest Wall: Clear to auscultation bilaterally   Abdominal: Soft and non-tender   Musculoskeletal: + edema   Neurological: No focal deficits       Data Review:    @recentrenalfxn@  Lab Results   Component Value Date/Time    WBC 4.2 (L) 01/04/2024 05:53 PM    RBC 3.28 (L) 01/04/2024 05:53 PM    RBC 2.43 (L) 12/19/2023 10:49 AM    HCT 28.8 (L) 01/04/2024 05:53 PM    MCV 87.8 01/04/2024 05:53 PM    MCH 28.7 01/04/2024 05:53 PM    MCHC 32.6 01/04/2024 05:53 PM    RDW 14.7 (H) 01/04/2024 05:53 PM    MPV 10.1 01/04/2024 05:53 PM      Lab Results   Component Value Date    IRON 197 (H) 01/04/2024     Lab Results   Component Value Date

## 2024-01-05 NOTE — PROGRESS NOTES
Rapid response for hypotension    BP 70s/40s for the 30 minutes prior. 90s/50s since 2115. Dialysis nurse at bedside. He gave albumin and also gave back the 500 cc blood that he had taken off. I will add abx in case of sepsis due to history of immunosuppression and liver transplant. I have ordered unasyn/vanc. I will also give a dose of midodrine. His BP is sometimes this low during his normal dialysis session and he is not feeling dizzy and denies chest pain and shortness of breath. I will also check C diff, COVID, and enteric DNA panel.

## 2024-01-05 NOTE — PROGRESS NOTES
Pharmacy Dosing Services:     Pharmacist Renal Dosing  Note for Unasyn 3 grams  Physician Dr. Agudelo    Indication:   Previous Regimen Unasyn 3 grams Q6H   Serum Creatinine No results found for: \"ISAIAS\", \"CREA\", \"CREAPOC\"   Creatinine Clearance Estimated Creatinine Clearance: 3 mL/min (A) (based on SCr of 30 mg/dL (H)).   BUN Lab Results   Component Value Date/Time     01/04/2024 05:53 PM       New Regimen:   1.5 Grams Q24H on HD days after dialysis     Dr. Angel Thorpe , PharmD  [phone]

## 2024-01-05 NOTE — PROGRESS NOTES
[facility]   Pharmacy Pharmacokinetic Monitoring Service - Vancomycin    Consulting Provider: Dr. Agudelo   Indication: Sepsis of Unknown Origin x 7 days  Target Concentration: Pre-Dialysis Concentration 15-20 mg/L  Day of Therapy: 2  Additional Antimicrobials: unasyn    Pertinent Laboratory Values:   Wt Readings from Last 1 Encounters:   01/05/24 92.5 kg (203 lb 14.8 oz)     Temp Readings from Last 1 Encounters:   01/05/24 97.6 °F (36.4 °C)     Recent Labs     01/04/24  1127 01/04/24  1753 01/05/24  0228   CREATININE 28.50* 30.00* 17.50*   * 141* 78*   WBC 5.2 4.2*  --          Recent vancomycin administrations                     vancomycin (VANCOCIN) 1,250 mg in sodium chloride 0.9 % 250 mL (vial-mate) IVPB (mg) 1,250 mg New Bag 01/04/24 4988                      Plan:  Concentration-guided dosing due to renal impairment and intermittent hemodialysis  Vancomycin 1250mg IV x 1 dose administered on 1/4/24 at 23:46   3.   Vancomycin 750mg IV x 1 dose ordered to be given after HD session on 1/5/24  4.   Vancomycin concentration ordered for 1/6/24 @ 0600, prior to HD session   5.   Pharmacy will continue to monitor patient and adjust therapy as indicated    Thank you for the consult,VIJAY Rodrigues [facility]  1/5/2024 3:26 PM

## 2024-01-05 NOTE — DIALYSIS
01/05/24  Dialysis treatment  Code Status-full  Diagnosis-Acute encephalopathy    Treatment time-2.5  Fluid removed-1kg  BVP-55.2  Medications given-none  Hepatitis Status-1/4/24 Neg AG, Succ-AB    Access-  Rt cvc present. Dressing c/di. Ports cleaned, blood aspirated and lines flushed without any issues. Good blood flow noted    Treatment-  1312 Dialysis started  1545 Dialysis completed and blood rinsed back. New caps placed on each port, pt stable    Pre and Post Report given Argenis Arevalo RN

## 2024-01-05 NOTE — PROGRESS NOTES
Case Management Assessment  Initial Evaluation    Date/Time of Evaluation: 1/5/2024 1:52 PM  Assessment Completed by: Nitza Ugarte RN    If patient is discharged prior to next notation, then this note serves as note for discharge by case management.    Patient Name: Valeriano Fong III                   YOB: 1975  Diagnosis: Acute encephalopathy [G93.40]  Acute renal failure, unspecified acute renal failure type (HCC) [N17.9]                   Date / Time: 1/4/2024 11:49 AM    Patient Admission Status: Inpatient   Readmission Risk (Low < 19, Mod (19-27), High > 27): Readmission Risk Score: 18.6    Current PCP: Geovani Reeves MD  PCP verified by CM? Yes    Chart Reviewed: Yes      History Provided by: Patient, Spouse  Patient Orientation: Alert and Oriented    Patient Cognition: Alert    Hospitalization in the last 30 days (Readmission):  No    If yes, Readmission Assessment in CM Navigator will be completed.    Advance Directives:      Code Status: Full Code   Patient's Primary Decision Maker is: Legal Next of Kin    Primary Decision Maker: Lolis Fong - Spouse - [phone]    Discharge Planning:    Patient lives with: Children, Spouse/Significant Other Type of Home: House  Primary Care Giver: Self  Patient Support Systems include: Spouse/Significant Other, Other (Comment) ([place] on [address])   Current Financial resources: Other (Comment) ([employer])  Current community resources: Other (Comment)  Current services prior to admission: Durable Medical Equipment, Home Care            Current DME: Cane, Shower Chair, Walker, Wheelchair, Other (Comment) (home HD machine and centrifuge)            Type of Home Care services:  [facility] ([facility]    ADLS  Prior functional level: Independent in ADLs/IADLs  Current functional level: Independent in ADLs/IADLs    PT AM-PAC:   /24  OT AM-PAC:   /24    Family can provide assistance at DC: Yes  Would you like Case Management  [phone]. The patient's spouse will drive him home at discharge.     The Plan for Transition of Care is related to the following treatment goals of Acute encephalopathy [G93.40]  Acute renal failure, unspecified acute renal failure type (HCC) [N17.9]    IF APPLICABLE: The Patient and/or patient representative Valeriano and his family were provided with a choice of provider and agrees with the discharge plan. Freedom of choice list with basic dialogue that supports the patient's individualized plan of care/goals and shares the quality data associated with the providers was provided to: Patient   Patient Representative Name:       The Patient and/or Patient Representative Agree with the Discharge Plan? Yes    Nitza Ugarte RN  Case Management Department  Ph: [phone]

## 2024-01-05 NOTE — PROGRESS NOTES
Hospitalist Progress Note    Patient: Valeriano Fong III MRN: [id number]  CSN: [id number]    YOB: 1975  Age: 48 y.o.  Sex: male    DOA: 1/4/2024 LOS:  LOS: 1 day          Chief Complaint:    uremia      Assessment/Plan   End-stage renal disease, missed hemodialysis for almost 2 weeks  Uremia  Nausea/diarrhea/ill-appearing  Acute metabolic encephalopathy >better  Status post liver transplant 3/22 at [facility]  Metabolic acidosis  Anemia of chronic disease/chronic kidney disease  History of Crohn's disease    Hypotension after HD -  RRT overnight bc dropped to 70s/40s  Albumin, midodrine and blood return to pt  BP much better now  Resume pt's home midodrine dose of 10mg po tid     Admit to MedSurg department  Emergency department consulted nephrology for management of hemodialysis  Appreciate nephrology expertise, HD yest and today  Follow a.m. BMP and CBC  Epo for anemia     History of liver transplant-  Stable  Supportive care  Consult hepatologist, Dr. Mascorro ordered  Resume home medications      Malaise with nausea diarrhea-  Possible viral illness  Influenza AMB unremarkable  COVID-19 unremarkable  Chest x-ray concerning for possible developing pneumonia  Follow blood cultures  Follow line culture   Sick contact >daughter      Possible developing pneumonia-  Versus edema   Will recheck CXR in am after HD  Monitor off antibiotics for now      DVT and GI prophylaxis     Lolis Fong Spouse [phone]   Called and updated wife on the plan of care.   Active Hospital Problems    Diagnosis Date Noted    Malaise and fatigue [R53.81, R53.83] 01/05/2024    Uremia [N19] 01/05/2024    Acute encephalopathy [G93.40] 01/04/2024    Crohn's disease (HCC) [K50.90] 10/14/2023    ESRD on dialysis (HCC) [N18.6, Z99.2] 10/31/2022    Long-term use of immunosuppressant medication [Z79.60] 08/27/2022    Liver transplant status (HCC) [Z94.4] 03/14/2022       Disposition : TBD      Subjective:    Pt feeling  betters  Wants to go home  Getting HD now     Review of systems:    Constitutional: denies fevers, chills, myalgias  Respiratory: denies SOB, cough  Cardiovascular: denies chest pain, palpitations  Gastrointestinal: denies nausea, vomiting, diarrhea      Vital signs/Intake and Output:  Visit Vitals  /66   Pulse 96   Temp 97.8 °F (36.6 °C) (Oral)   Resp 18   Ht 1.829 m (6')   Wt 92.5 kg (203 lb 14.8 oz)   SpO2 97%   BMI 27.66 kg/m²     Current Shift:  01/05 0701 - 01/05 1900  In: 240 [P.O.:240]  Out: -   Last three shifts:  01/03 1901 - 01/05 0700  In: 2625 [P.O.:30; I.V.:595]  Out: 540     Exam:    General: Well developed, alert, NAD, OX3  Head/Neck: NCAT, supple, No masses, No lymphadenopathy  CVS:Regular rate and rhythm, no M/R/G, S1/S2 heard, no thrill  Lungs:Clear to auscultation bilaterally, no wheezes, rhonchi, or rales  Abdomen: Soft, Nontender, No distention, Normal Bowel sounds, No hepatomegaly  Extremities: No C/C/E, pulses palpable 2+  Skin:normal texture and turgor, no rashes, no lesions  Neuro:grossly normal , follows commands  Psych:appropriate    Labs: Results:       Chemistry Recent Labs     01/04/24 1127 01/04/24  1753 01/05/24  0228   GLUCOSE 84 84 68*    138 137   K 4.3 4.6 2.3*    110 107   CO2 7* 6* 13*   * 141* 78*   CREATININE 28.50* 30.00* 17.50*   CALCIUM 7.6* 7.3* 7.4*   BILITOT 0.6 0.6  --    AST 27 21  --    ALT 27 25  --    ALKPHOS 209* 205*  --    PROT 6.9 6.5  --    GLOB 3.1 3.0  --    LABGLOM 2* 2* 3*      CBC w/Diff Recent Labs     01/04/24 1127 01/04/24  1753   WBC 5.2 4.2*   RBC 3.33* 3.28*   HGB 9.2* 9.4*   HCT 29.9* 28.8*    135   LYMPHOPCT 26 20*      Cardiac Enzymes No results for input(s): \"CKTOTAL\", \"CKMB\", \"CKMBINDEX\", \"TROPONINI\", \"DANTE\" in the last 72 hours.   Coagulation No results for input(s): \"PROTIME\", \"INR\", \"APTT\" in the last 72 hours.    Lipid Panel No results found for: \"CHOL\", \"TRIG\", \"HDL\", \"LDLCHOLESTEROL\", \"LDLCALC\", \"LABVLDL\",

## 2024-01-05 NOTE — DIALYSIS
Treatment summary  Received report from Mookie TORRES, Rn    Dialyzed patient in  room 302  for 2.10 hours.     Received pt. In bed awake a/o x4, VSS. R subclavian TDC  Accessed without difficulty, treatment initiated.     Arteriole pressure increased after 20 min to 240's line reversed, tx. Continued.    Total Uf 540  ml  Net UF + gain 1500 ml     Treatment note:           2115- UF put on hold due to low B see noteP, Bolus Ns 300 ml given  2130- 200 ml bolus given with no effect. Dr. Charlton notified,  Gave order to given additional fluid up to 1 L max, Ns fluid    given with no effect.    1039- RRT initiated, team arrived and performed assessment,   patient verbalized having such episode in his previous treatments.   RRT Md. Ok  Patient to remain On the floor for observation.    2248- Treatment terminated. Patient remain in stable condition.       Vascular access visible at all times and line connected at all   times during treatment. Access R sub TDC Functioning well  De access without complications.       Report given to Mookie TORRES RN with all questions answered.

## 2024-01-05 NOTE — PLAN OF CARE
Problem: Chronic Conditions and Co-morbidities  Goal: Patient's chronic conditions and co-morbidity symptoms are monitored and maintained or improved  Outcome: Progressing   Receiving dialysis today

## 2024-01-06 VITALS
SYSTOLIC BLOOD PRESSURE: 90 MMHG | OXYGEN SATURATION: 97 % | DIASTOLIC BLOOD PRESSURE: 53 MMHG | HEIGHT: 72 IN | TEMPERATURE: 97.8 F | RESPIRATION RATE: 16 BRPM | HEART RATE: 56 BPM | WEIGHT: 198 LBS | BODY MASS INDEX: 26.82 KG/M2

## 2024-01-06 LAB
ALBUMIN SERPL-MCNC: 3.7 G/DL (ref 3.4–5)
ANION GAP SERPL CALC-SCNC: 14 MMOL/L (ref 3–18)
BUN SERPL-MCNC: 45 MG/DL (ref 7–18)
BUN/CREAT SERPL: 4 (ref 12–20)
C COLI+JEJUNI TUF STL QL NAA+PROBE: NEGATIVE
C DIFF GDH STL QL: NEGATIVE
C DIFF TOX A+B STL QL IA: NEGATIVE
C DIFF TOXIN INTERPRETATION: NORMAL
CALCIUM SERPL-MCNC: 7.4 MG/DL (ref 8.5–10.1)
CHLORIDE SERPL-SCNC: 102 MMOL/L (ref 100–111)
CO2 SERPL-SCNC: 20 MMOL/L (ref 21–32)
CREAT SERPL-MCNC: 11.8 MG/DL (ref 0.6–1.3)
EC STX1+STX2 GENES STL QL NAA+PROBE: NEGATIVE
ETEC ELTA+ESTB GENES STL QL NAA+PROBE: NEGATIVE
GLUCOSE SERPL-MCNC: 61 MG/DL (ref 74–99)
P SHIGELLOIDES DNA STL QL NAA+PROBE: NEGATIVE
PHOSPHATE SERPL-MCNC: 3.2 MG/DL (ref 2.5–4.9)
POTASSIUM SERPL-SCNC: 2.5 MMOL/L (ref 3.5–5.5)
SALMONELLA SP SPAO STL QL NAA+PROBE: NEGATIVE
SHIGELLA SP+EIEC IPAH STL QL NAA+PROBE: NEGATIVE
SODIUM SERPL-SCNC: 136 MMOL/L (ref 136–145)
V CHOL+PARA+VUL DNA STL QL NAA+NON-PROBE: NEGATIVE
VANCOMYCIN SERPL-MCNC: 23.5 UG/ML (ref 5–40)
Y ENTEROCOL DNA STL QL NAA+NON-PROBE: NEGATIVE

## 2024-01-06 PROCEDURE — 6360000002 HC RX W HCPCS: Performed by: FAMILY MEDICINE

## 2024-01-06 PROCEDURE — 6370000000 HC RX 637 (ALT 250 FOR IP): Performed by: FAMILY MEDICINE

## 2024-01-06 PROCEDURE — 80069 RENAL FUNCTION PANEL: CPT

## 2024-01-06 PROCEDURE — 87205 SMEAR GRAM STAIN: CPT

## 2024-01-06 PROCEDURE — 87077 CULTURE AEROBIC IDENTIFY: CPT

## 2024-01-06 PROCEDURE — 36415 COLL VENOUS BLD VENIPUNCTURE: CPT

## 2024-01-06 PROCEDURE — 80202 ASSAY OF VANCOMYCIN: CPT

## 2024-01-06 PROCEDURE — 2580000003 HC RX 258: Performed by: FAMILY MEDICINE

## 2024-01-06 PROCEDURE — P9047 ALBUMIN (HUMAN), 25%, 50ML: HCPCS | Performed by: FAMILY MEDICINE

## 2024-01-06 PROCEDURE — 87070 CULTURE OTHR SPECIMN AEROBIC: CPT

## 2024-01-06 RX ORDER — AMOXICILLIN AND CLAVULANATE POTASSIUM 875; 125 MG/1; MG/1
1 TABLET, FILM COATED ORAL 2 TIMES DAILY
Qty: 12 TABLET | Refills: 0 | Status: SHIPPED | OUTPATIENT
Start: 2024-01-06 | End: 2024-01-12

## 2024-01-06 RX ORDER — MAGNESIUM SULFATE HEPTAHYDRATE 40 MG/ML
2000 INJECTION, SOLUTION INTRAVENOUS ONCE
Status: DISCONTINUED | OUTPATIENT
Start: 2024-01-06 | End: 2024-01-06 | Stop reason: HOSPADM

## 2024-01-06 RX ADMIN — CHOLECALCIFEROL TAB 25 MCG (1000 UNIT) 5000 UNITS: 25 TAB at 09:13

## 2024-01-06 RX ADMIN — HEPARIN SODIUM 5000 UNITS: 5000 INJECTION INTRAVENOUS; SUBCUTANEOUS at 00:46

## 2024-01-06 RX ADMIN — HEPARIN SODIUM 5000 UNITS: 5000 INJECTION INTRAVENOUS; SUBCUTANEOUS at 09:19

## 2024-01-06 RX ADMIN — ALBUMIN (HUMAN) 25 G: 0.25 INJECTION, SOLUTION INTRAVENOUS at 03:39

## 2024-01-06 RX ADMIN — MULTIPLE VITAMINS W/ MINERALS TAB 1 TABLET: TAB at 09:16

## 2024-01-06 RX ADMIN — MYCOPHENOLIC ACID 180 MG: 180 TABLET, DELAYED RELEASE ORAL at 13:28

## 2024-01-06 RX ADMIN — POTASSIUM BICARBONATE 40 MEQ: 782 TABLET, EFFERVESCENT ORAL at 16:27

## 2024-01-06 RX ADMIN — TACROLIMUS 2 MG: 1 CAPSULE ORAL at 09:16

## 2024-01-06 RX ADMIN — POTASSIUM BICARBONATE 40 MEQ: 782 TABLET, EFFERVESCENT ORAL at 09:20

## 2024-01-06 RX ADMIN — MYCOPHENOLIC ACID 180 MG: 180 TABLET, DELAYED RELEASE ORAL at 09:17

## 2024-01-06 RX ADMIN — THIAMINE HYDROCHLORIDE 100 MG: 100 INJECTION, SOLUTION INTRAMUSCULAR; INTRAVENOUS at 09:21

## 2024-01-06 RX ADMIN — MIDODRINE HYDROCHLORIDE 10 MG: 10 TABLET ORAL at 08:58

## 2024-01-06 RX ADMIN — MIDODRINE HYDROCHLORIDE 10 MG: 10 TABLET ORAL at 13:28

## 2024-01-06 RX ADMIN — PANTOPRAZOLE SODIUM 40 MG: 40 TABLET, DELAYED RELEASE ORAL at 06:12

## 2024-01-06 RX ADMIN — SODIUM CHLORIDE, PRESERVATIVE FREE 10 ML: 5 INJECTION INTRAVENOUS at 09:21

## 2024-01-06 RX ADMIN — ALBUMIN (HUMAN) 25 G: 0.25 INJECTION, SOLUTION INTRAVENOUS at 08:58

## 2024-01-06 NOTE — PROGRESS NOTES
Spoke to Dr Agudelo in reference to patient hypotension trend, patient asymptomatic. No new orders at this time. Will continue to monitor patient.

## 2024-01-06 NOTE — PROGRESS NOTES
[facility]   Pharmacy Pharmacokinetic Monitoring Service - Vancomycin    Consulting Provider: Jammie   Indication: sepsis x 7 days  Target Concentration: Pre-Dialysis Concentration 15-20 mg/L  Day of Therapy: 3  Additional Antimicrobials: unasyn    Pertinent Laboratory Values:   Wt Readings from Last 1 Encounters:   01/06/24 89.8 kg (198 lb)     Temp Readings from Last 1 Encounters:   01/06/24 98.6 °F (37 °C) (Oral)     Recent Labs     01/04/24  1127 01/04/24  1753 01/05/24  0228 01/06/24  0525   CREATININE 28.50* 30.00* 17.50* 11.80*   * 141* 78* 45*   WBC 5.2 4.2*  --   --          Recent vancomycin administrations                     vancomycin (VANCOCIN) 750 mg in sodium chloride 0.9 % 250 mL IVPB (vial-mate) (mg) 750 mg New Bag 01/05/24 1744    vancomycin (VANCOCIN) 1,250 mg in sodium chloride 0.9 % 250 mL (vial-mate) IVPB (mg) 1,250 mg New Bag 01/04/24 2346                    Assessment:  Date/Time Current Dose Concentration Dialysis Session   01/06/24 0525 750 mg 23.5 1/5/24     Plan:  Concentration-guided dosing due to renal impairment and intermittent hemodialysis   Current dosing regimen of 750mg is therapeutic   Continue current dose of 750 mg on Monday with dialysis, but first draw another level  Vancomycin concentration ordered for 01/08/24 @ 0600, prior to HD session   Pharmacy will continue to monitor patient and adjust therapy as indicated    Thank you for the consult,VIJAY Hua RP  1/6/2024 11:31 AM

## 2024-01-06 NOTE — PROGRESS NOTES
Bedside and Verbal shift change report given to PASTOR Dillon (oncoming nurse) by Zenaida Moore RN (offgoing nurse). Report included the following information Intake/Output, MAR, and Recent Results.

## 2024-01-06 NOTE — PROGRESS NOTES
Patient resulted K 2.5 this am Patient alert oriented x4 no c/o of any distress at this time.Dr Agudelo notified awaiting on further orders. Patient made aware. Will continue to monitor patient

## 2024-01-06 NOTE — PROGRESS NOTES
Bedside and Verbal shift change report given to Zenaida MooreRN (oncoming nurse) by PASTOR Hadley (offgoing nurse). Report included the following information Intake/Output, MAR, and Recent Results      2002 1/5/24- Dr MELISSA Agudelo notified for clarification of patient Prograf, he states he takes Prograf 2mg po BID not Prograf 4mg ordered. Awaiting on orders. Patient made aware.

## 2024-01-06 NOTE — PROGRESS NOTES
Nephrology Progress note    Subjective:     Valeriano Fong III is a 48 y.o. male with past medical history significant for Crohn's disease, Liver failure s/p Liver Transplant March 2022 at [facility], ESRD maintained on Home HD 4 days[place] [facility] [place] and followed by [facility] who presented today to the ED with c/o Malaise, Nausea, diarrhea, poor oral intake for about 10 days. He has not been performing his home HD treatment during that period  Labs in D revealed  Cr 28.5  Hgb 9.2. LFT's were normal.   COVID-19 and Flu tests were negative.   S/p HD last 2 days, no SOB, edema improved  Feeling better. BP low- resumed midodrine  Admit Date: 1/4/2024      Allergy:  Allergies   Allergen Reactions    Azathioprine Rash, Itching and Other (See Comments)    Naproxen Itching, Nausea And Vomiting and Other (See Comments)     heartburn          Objective:     BP (!) 85/46   Pulse 76   Temp 98.6 °F (37 °C) (Oral)   Resp 16   Ht 1.829 m (6')   Wt 89.8 kg (198 lb)   SpO2 99%   BMI 26.85 kg/m²       Intake/Output Summary (Last 24 hours) at 1/6/2024 1116  Last data filed at 1/5/2024 1547  Gross per 24 hour   Intake 500 ml   Output 1500 ml   Net -1000 ml         Physical Exam:       General: No acute distress   HENT: Atraumatic and normocephalic   Eyes: Normal conjunctiva   Neck: Supple    Cardiovascular: Normal S1 & S2, no m/r/g   Pulmonary/Chest Wall: Clear to auscultation bilaterally   Abdominal: Soft and non-tender   Musculoskeletal: + edema   Neurological: No focal deficits       Data Review:    @recentrenalfxn@  Lab Results   Component Value Date/Time    WBC 4.2 (L) 01/04/2024 05:53 PM    RBC 3.28 (L) 01/04/2024 05:53 PM    RBC 2.43 (L) 12/19/2023 10:49 AM    HCT 28.8 (L) 01/04/2024 05:53 PM    MCV 87.8 01/04/2024 05:53 PM    MCH 28.7 01/04/2024 05:53 PM    MCHC 32.6 01/04/2024 05:53 PM    RDW 14.7 (H) 01/04/2024 05:53 PM    MPV 10.1 01/04/2024 05:53 PM      Lab Results   Component Value Date

## 2024-01-06 NOTE — DISCHARGE INSTRUCTIONS
Learning About Chronic Kidney Disease and Potassium  What is potassium?     Potassium is a mineral. It helps keep the right mix of fluids in your body. It also helps your nerves, muscles, and heart work properly.  How does chronic kidney disease affect potassium levels?  Healthy kidneys keep the right balance of minerals in your blood. This includes potassium.  If you have long-term (chronic) kidney disease, it is hard for your kidneys to control the amount of potassium in your blood. You may get too much potassium. This can be harmful.  In some cases, other medicines may make your body get rid of too much potassium. If this happens, you may need to take a potassium supplement.  How can you get the right amount of potassium in your diet?  You can learn how much potassium is in certain foods. Then you can control how much potassium you get in your diet.  Your doctor or dietitian can help you plan a diet that gives you the right amount of potassium. There is no diet that is right for everyone. Your diet will be based on how well your kidneys are working and whether you are on dialysis.  Your diet may change as your disease changes. See your doctor for regular testing. Testing helps you know when to change your diet. Your doctor or dietitian can help you do this.  Changing your diet can be hard. You may have to give up many foods you like. But it is very important to make the recommended changes. They will help you stay healthy for as long as possible.  What foods and products have potassium?  You can control the amount of potassium in your diet if you know which foods are low or high in potassium.  Foods low in potassium  Blueberries and raspberries  White or brown rice, pasta, and noodles  Cucumbers and radishes  Foods high in potassium  Apricots, oranges, prunes, and bananas  Broccoli, spinach, and potatoes  Milk and yogurt  Other products that may have potassium  Diet or protein drinks and diet bars often have

## 2024-01-06 NOTE — PROGRESS NOTES
As per Dr Graham, Nephrologist patient does not need to receive hemodialysis today. [facility] will be Monday 01/08/2024. Primary Nurse Wilma Corral RN made aware

## 2024-01-06 NOTE — DISCHARGE SUMMARY
Discharge Condition: Stable    Procedures : none    Consults: Nephrology      PHYSICAL EXAM   Visit Vitals  BP (!) 90/53   Pulse 56   Temp 97.8 °F (36.6 °C) (Oral)   Resp 16   Ht 1.829 m (6')   Wt 89.8 kg (198 lb)   SpO2 97%   BMI 26.85 kg/m²     General: Awake, cooperative, no acute distress    HEENT: NC, Atraumatic.  PERRLA, EOMI. Anicteric sclerae.  Lungs:  CTA Bilaterally. No Wheezing/Rhonchi/Rales.  Heart:  Regular  rhythm,  No murmur, No Rubs, No Gallops  Abdomen: Soft, Non distended, Non tender. +Bowel sounds,   Extremities: No c/c/e  Psych:   Not anxious or agitated.  Neurologic:  No acute neurological deficits.                                   Admission HPI : Valeriano Fong III is a 48 y.o. male with a past medical history significant of Crohn's disease, liver failure status post liver transplant March 2022 at [facility], end-stage renal disease maintained  [place] [facility] [place] followed by [facility].  Patient presented today to the emergency department with complaint of malaise, nausea, diarrhea, poor oral intake for the past almost 2 weeks.  Since patient has been feeling better he has not performed his home hemodialysis.  In emergency room patient was found to have a BUN of 143 serum creatinine greater than 28 hemoglobin 2.  His liver function test were unremarkable.  In emergency room patient was also lethargic and somewhat confused.     Hospital Course :     End-stage renal disease, missed hemodialysis for almost 2 weeks  Uremia  Nausea/diarrhea/ill-appearing  Acute metabolic encephalopathy >better  Status post liver transplant 3/22 at [facility]  Metabolic acidosis  Anemia of chronic disease/chronic kidney disease  History of Crohn's disease    Hypotension after HD -  Resolved   midodrine dose of 10mg po tid     nephrology following for management of hemodialysis  Appreciate nephrology expertise, HD yest and today  Follow a.m. BMP and CBC  Epo for anemia    last 72 hours.   Liver Enzymes Recent Labs     01/04/24  1127 01/04/24  1753   ALT 27 25   AST 27 21   ALKPHOS 209* 205*   BILITOT 0.6 0.6   BILIDIR 0.4*  --       Thyroid Studies No results found for: \"B5RTKYX\", \"K7LSNZB\", \"FT3\", \"T4FREE\", \"TSH\"         Significant Diagnostic Studies: XR CHEST PORTABLE    Result Date: 1/5/2024  EXAM:  XR CHEST PORTABLE INDICATION: Possible pneumonia COMPARISON: 1/4/2024 TECHNIQUE: Semiupright portable chest AP view FINDINGS: Right IJ central venous catheter stable. The cardiac silhouette is within normal limits. The pulmonary vasculature is within normal limits. Small right pleural effusion with slight interval increase. Right basilar airspace opacity. The visualized bones and upper abdomen are age-appropriate.     Increased right effusion. Basilar airspace opacity may represent pneumonia or volume loss.     CT head WO contrast    Result Date: 1/4/2024  EXAM: CT HEAD WO CONTRAST INDICATION: acute encephalopathy COMPARISON: None. CONTRAST: None. TECHNIQUE: Unenhanced CT of the head was performed using 5 mm images. Brain and bone windows were generated. Coronal and sagittal reformats. CT dose reduction was achieved through use of a standardized protocol tailored for this examination and automatic exposure control for dose modulation.  FINDINGS: The ventricles and sulci are normal in size, shape and configuration. There is no significant white matter disease. There is no intracranial hemorrhage, extra-axial collection, or mass effect. The basilar cisterns are open. No CT evidence of acute infarct. The bone windows demonstrate no abnormalities. The visualized portions of the paranasal sinuses and mastoid air cells are clear.     No acute intracranial abnormality.     XR CHEST PORTABLE    Result Date: 1/4/2024  INDICATION: Sepsis  EXAM:  AP CHEST RADIOGRAPH COMPARISON: February 10, 2022 FINDINGS: AP portable view of the chest demonstrates normal heart size. Elevation right

## 2024-01-07 LAB
BACTERIA SPEC CULT: NORMAL
GRAM STN SPEC: NORMAL
GRAM STN SPEC: NORMAL
SERVICE CMNT-IMP: NORMAL

## 2024-01-07 NOTE — PROGRESS NOTES
7530  Bedside shift change report given to Wilma RN (oncoming nurse) by Didi RN (offgoing nurse). Report included the following information Nurse Handoff Report.     0910  Assumed care at this time. Patient alert and oriented x4. Denies SOB, chest pain. Shows no signs of distress. Patient lungs clear L lobes, clear diminished R lobes. Cap refill < 3 sec to BLE. Patient has R chest tunnel port CDI and an IV to the L arm CDI. Stated pain 0/10. Dressing to abdomen of liver transplant from months ago has some green/yellow pus present. Granulated tissue present with scaring. Wound culture obtained via telephone order Dr. Handley. Per Dr. Handley, hold mag runs. Cleansed with microklens and applied new gauze dressing. Bowel sounds present. Skin intact. Patient call light and possessions within reach. Bed locked and in lowest position.     1600  Dr. Handley dcing patient.    1700  Notified Dr. Handley patient does not take midodrine at home but states baseline BP is in 80s-90s. Denies dizziness. Instructed patient to f/u with pcp and liver .  Forrest medications reviewed individually with patient. Opportunities for questions and concerns provided. Patient to be discharged via (car, transport, ambulance). Patient's armband appropriately discarded.

## 2024-01-08 LAB — TACROLIMUS BLD-MCNC: 19.4 NG/ML (ref 2–20)

## 2024-01-09 LAB — TACROLIMUS BLD-MCNC: 6.3 NG/ML (ref 2–20)

## 2024-01-10 ENCOUNTER — TELEPHONE (OUTPATIENT)
Age: 49
End: 2024-01-10

## 2024-01-10 LAB
BACTERIA SPEC CULT: ABNORMAL
BACTERIA SPEC CULT: ABNORMAL
BACTERIA SPEC CULT: NORMAL
BACTERIA SPEC CULT: NORMAL
GRAM STN SPEC: ABNORMAL
GRAM STN SPEC: ABNORMAL
SERVICE CMNT-IMP: ABNORMAL
SERVICE CMNT-IMP: NORMAL
SERVICE CMNT-IMP: NORMAL

## 2024-01-10 NOTE — TELEPHONE ENCOUNTER
Called patient to request he have new labs drawn since when he was discharged from the hospital his Potassium level prior to given PO potassium was 2.5 and his calcium was 7.4. Patient stated he will go tomorrow to have labs redrawn. Patient requested a work release letter be emailed to him for the days he was in the hospital. Informed patient I will get that out to him today. Patient verbally confirmed understanding.

## 2024-01-11 ENCOUNTER — TELEPHONE (OUTPATIENT)
Age: 49
End: 2024-01-11

## 2024-01-11 PROBLEM — E87.6 HYPOKALEMIA: Status: ACTIVE | Noted: 2024-01-11

## 2024-01-11 LAB
A/G RATIO: 1.6 RATIO (ref 1.1–2.6)
ALBUMIN SERPL-MCNC: 4.6 G/DL (ref 3.5–5)
ALP BLD-CCNC: 180 U/L (ref 25–115)
ALT SERPL-CCNC: 10 U/L (ref 5–40)
ANION GAP SERPL CALCULATED.3IONS-SCNC: 17 MMOL/L (ref 3–15)
AST SERPL-CCNC: 18 U/L (ref 10–37)
BASOPHILS # BLD: 1 % (ref 0–2)
BASOPHILS ABSOLUTE: 0 K/UL (ref 0–0.2)
BILIRUB SERPL-MCNC: 0.6 MG/DL (ref 0.2–1.2)
BILIRUBIN DIRECT: 0.3 MG/DL (ref 0–0.3)
BUN BLDV-MCNC: 20 MG/DL (ref 6–22)
CALCIUM SERPL-MCNC: 8.6 MG/DL (ref 8.4–10.5)
CHLORIDE BLD-SCNC: 94 MMOL/L (ref 98–110)
CO2: 25 MMOL/L (ref 20–32)
CREAT SERPL-MCNC: 8 MG/DL (ref 0.5–1.2)
EOSINOPHIL # BLD: 3 % (ref 0–6)
EOSINOPHILS ABSOLUTE: 0.1 K/UL (ref 0–0.5)
FERRITIN: 1598 NG/ML (ref 22–322)
GLOBULIN: 2.8 G/DL (ref 2–4)
GLOMERULAR FILTRATION RATE: 7.8 ML/MIN/1.73 SQ.M.
GLUCOSE: 94 MG/DL (ref 70–99)
HCT VFR BLD CALC: 27.5 % (ref 39.3–51.6)
HEMOGLOBIN: 9.2 G/DL (ref 13.1–17.2)
INR BLD: 1.12 (ref 0.89–1.29)
IRON % SATURATION: 13 % (ref 20–50)
IRON: 26 MCG/DL (ref 45–160)
LYMPHOCYTES # BLD: 35 % (ref 20–45)
LYMPHOCYTES ABSOLUTE: 1.3 K/UL (ref 1–4.8)
MAGNESIUM: 1.6 MG/DL (ref 1.6–2.5)
MCH RBC QN AUTO: 28 PG (ref 26–34)
MCHC RBC AUTO-ENTMCNC: 34 G/DL (ref 31–36)
MCV RBC AUTO: 85 FL (ref 80–95)
MONOCYTES ABSOLUTE: 0.4 K/UL (ref 0.1–1)
MONOCYTES: 10 % (ref 3–12)
NEUTROPHILS ABSOLUTE: 1.9 K/UL (ref 1.8–7.7)
NEUTROPHILS: 51 % (ref 40–75)
PDW BLD-RTO: 14.1 % (ref 10–15.5)
PLATELET # BLD: 132 K/UL (ref 140–440)
PMV BLD AUTO: 10.9 FL (ref 9–13)
POTASSIUM SERPL-SCNC: 2.7 MMOL/L (ref 3.5–5.5)
PROTHROMBIN TIME: 12.1 SEC (ref 9–13)
RBC: 3.24 M/UL (ref 3.8–5.8)
SODIUM BLD-SCNC: 136 MMOL/L (ref 133–145)
TACROLIMUS BLOOD: 7 NG/ML (ref 2–20)
TOTAL IRON BINDING CAPACITY: 208 MCG/DL (ref 228–428)
TOTAL PROTEIN: 7.4 G/DL (ref 6.4–8.3)
UIBC: 182 MCG/DL (ref 110–370)
WBC: 3.8 K/UL (ref 4–11)

## 2024-01-11 RX ORDER — EPINEPHRINE 1 MG/ML
0.3 INJECTION, SOLUTION, CONCENTRATE INTRAVENOUS PRN
OUTPATIENT
Start: 2024-01-12

## 2024-01-11 RX ORDER — ACETAMINOPHEN 325 MG/1
650 TABLET ORAL
OUTPATIENT
Start: 2024-01-12

## 2024-01-11 RX ORDER — ONDANSETRON 2 MG/ML
8 INJECTION INTRAMUSCULAR; INTRAVENOUS
OUTPATIENT
Start: 2024-01-12

## 2024-01-11 RX ORDER — SODIUM CHLORIDE 9 MG/ML
INJECTION, SOLUTION INTRAVENOUS CONTINUOUS
OUTPATIENT
Start: 2024-01-12

## 2024-01-11 RX ORDER — ALBUTEROL SULFATE 90 UG/1
4 AEROSOL, METERED RESPIRATORY (INHALATION) PRN
OUTPATIENT
Start: 2024-01-12

## 2024-01-11 RX ORDER — DIPHENHYDRAMINE HYDROCHLORIDE 50 MG/ML
50 INJECTION INTRAMUSCULAR; INTRAVENOUS
OUTPATIENT
Start: 2024-01-12

## 2024-01-11 NOTE — TELEPHONE ENCOUNTER
Received a critical lab for Potassium @ 2.7. Scheduled IV Potassium infusion at [facility] for tomorrow, 1/12/24 @ 1000. Informed patient who verbally confirmed understanding.

## 2024-01-12 ENCOUNTER — HOSPITAL ENCOUNTER (OUTPATIENT)
Facility: HOSPITAL | Age: 49
Setting detail: INFUSION SERIES
Discharge: HOME | End: 2024-01-12
Payer: COMMERCIAL

## 2024-01-12 VITALS
HEART RATE: 79 BPM | OXYGEN SATURATION: 100 % | DIASTOLIC BLOOD PRESSURE: 57 MMHG | SYSTOLIC BLOOD PRESSURE: 88 MMHG | RESPIRATION RATE: 16 BRPM | TEMPERATURE: 97.7 F

## 2024-01-12 DIAGNOSIS — E87.6 HYPOKALEMIA: Primary | ICD-10-CM

## 2024-01-12 LAB
EKG ATRIAL RATE: 88 BPM
EKG DIAGNOSIS: NORMAL
EKG P AXIS: 100 DEGREES
EKG P-R INTERVAL: 150 MS
EKG Q-T INTERVAL: 434 MS
EKG QRS DURATION: 94 MS
EKG QTC CALCULATION (BAZETT): 525 MS
EKG R AXIS: 55 DEGREES
EKG T AXIS: 96 DEGREES
EKG VENTRICULAR RATE: 88 BPM

## 2024-01-12 PROCEDURE — 96365 THER/PROPH/DIAG IV INF INIT: CPT

## 2024-01-12 PROCEDURE — 96366 THER/PROPH/DIAG IV INF ADDON: CPT

## 2024-01-12 PROCEDURE — 6360000002 HC RX W HCPCS: Performed by: NURSE PRACTITIONER

## 2024-01-12 RX ORDER — SODIUM CHLORIDE 9 MG/ML
5-250 INJECTION, SOLUTION INTRAVENOUS PRN
OUTPATIENT
Start: 2024-01-12

## 2024-01-12 RX ORDER — SODIUM CHLORIDE 0.9 % (FLUSH) 0.9 %
5-40 SYRINGE (ML) INJECTION PRN
Status: DISCONTINUED | OUTPATIENT
Start: 2024-01-12 | End: 2024-01-13 | Stop reason: HOSPADM

## 2024-01-12 RX ORDER — DIPHENHYDRAMINE HYDROCHLORIDE 50 MG/ML
50 INJECTION INTRAMUSCULAR; INTRAVENOUS
OUTPATIENT
Start: 2024-01-12

## 2024-01-12 RX ORDER — HEPARIN 100 UNIT/ML
500 SYRINGE INTRAVENOUS PRN
Status: DISCONTINUED | OUTPATIENT
Start: 2024-01-12 | End: 2024-01-13 | Stop reason: HOSPADM

## 2024-01-12 RX ORDER — SODIUM CHLORIDE 9 MG/ML
5-250 INJECTION, SOLUTION INTRAVENOUS PRN
Status: DISCONTINUED | OUTPATIENT
Start: 2024-01-12 | End: 2024-01-13 | Stop reason: HOSPADM

## 2024-01-12 RX ORDER — ALBUTEROL SULFATE 90 UG/1
4 AEROSOL, METERED RESPIRATORY (INHALATION) PRN
OUTPATIENT
Start: 2024-01-12

## 2024-01-12 RX ORDER — HEPARIN 100 UNIT/ML
500 SYRINGE INTRAVENOUS PRN
OUTPATIENT
Start: 2024-01-12

## 2024-01-12 RX ORDER — EPINEPHRINE 1 MG/ML
0.3 INJECTION, SOLUTION, CONCENTRATE INTRAVENOUS PRN
OUTPATIENT
Start: 2024-01-12

## 2024-01-12 RX ORDER — ONDANSETRON 2 MG/ML
8 INJECTION INTRAMUSCULAR; INTRAVENOUS
OUTPATIENT
Start: 2024-01-12

## 2024-01-12 RX ORDER — ACETAMINOPHEN 325 MG/1
650 TABLET ORAL
OUTPATIENT
Start: 2024-01-12

## 2024-01-12 RX ORDER — POTASSIUM CHLORIDE 7.45 MG/ML
10 INJECTION INTRAVENOUS
Status: COMPLETED | OUTPATIENT
Start: 2024-01-12 | End: 2024-01-12

## 2024-01-12 RX ORDER — SODIUM CHLORIDE 9 MG/ML
INJECTION, SOLUTION INTRAVENOUS CONTINUOUS
OUTPATIENT
Start: 2024-01-12

## 2024-01-12 RX ORDER — SODIUM CHLORIDE 0.9 % (FLUSH) 0.9 %
5-40 SYRINGE (ML) INJECTION PRN
OUTPATIENT
Start: 2024-01-12

## 2024-01-12 RX ORDER — POTASSIUM CHLORIDE 7.45 MG/ML
10 INJECTION INTRAVENOUS
Status: CANCELLED | OUTPATIENT
Start: 2024-01-12

## 2024-01-12 RX ADMIN — POTASSIUM CHLORIDE 10 MEQ: 10 INJECTION, SOLUTION INTRAVENOUS at 10:16

## 2024-01-12 RX ADMIN — POTASSIUM CHLORIDE 10 MEQ: 10 INJECTION, SOLUTION INTRAVENOUS at 11:15

## 2024-01-12 RX ADMIN — POTASSIUM CHLORIDE 10 MEQ: 10 INJECTION, SOLUTION INTRAVENOUS at 13:33

## 2024-01-12 RX ADMIN — POTASSIUM CHLORIDE 10 MEQ: 10 INJECTION, SOLUTION INTRAVENOUS at 12:25

## 2024-01-12 ASSESSMENT — PAIN DESCRIPTION - LOCATION: LOCATION: KNEE

## 2024-01-12 ASSESSMENT — PAIN SCALES - GENERAL: PAINLEVEL_OUTOF10: 1

## 2024-01-12 NOTE — PROGRESS NOTES
[facility]    Date: 2024    Name: Valeriano Fong III    MRN: [id number]         : 1975    Potassium    Mr. Fong was assessed and education was provided. Patient is unsure if he has had IV potassium previously. Teri-comp education reviewed and signed by patient.    Mr. Fong's vitals were reviewed and patient was observed for 5 minutes prior to treatment.   Vitals:    24 0945   BP: (!) 88/57   Pulse: 79   Resp: 16   Temp: 97.7 °F (36.5 °C)   SpO2: 100%     24 g PIV placed in right fore arm x 1 attempt. PIV flushed easily and had brisk blood return.    Potassium Chloride 40 mEq was infused over 4 hours as ordered.    Mr. Fong tolerated the infusion, and had no complaints.      Patient was offered snacks and drinks throughout visit.    Patient armband removed and shredded    [facility] at 1519. He is to return as needed.    Rosalinda Villa RN  2024  11:48 AM

## 2024-01-17 ENCOUNTER — TELEPHONE (OUTPATIENT)
Age: 49
End: 2024-01-17

## 2024-01-17 ENCOUNTER — TELEMEDICINE (OUTPATIENT)
Age: 49
End: 2024-01-17
Payer: COMMERCIAL

## 2024-01-17 DIAGNOSIS — E87.6 HYPOKALEMIA: Primary | ICD-10-CM

## 2024-01-17 PROCEDURE — 99214 OFFICE O/P EST MOD 30 MIN: CPT | Performed by: INTERNAL MEDICINE

## 2024-01-17 RX ORDER — POTASSIUM CHLORIDE 20 MEQ/1
20 TABLET, EXTENDED RELEASE ORAL 2 TIMES DAILY
Qty: 6 TABLET | Refills: 0 | Status: SHIPPED | OUTPATIENT
Start: 2024-01-17 | End: 2024-01-31

## 2024-01-17 NOTE — TELEPHONE ENCOUNTER
Patient's nurse  called clinic stating patient was admitted at the [facility]. Nurse  states there were no discharge notes and she'd like to notify Dr. Keita about patient's admission. Please advise

## 2024-01-18 ENCOUNTER — TELEPHONE (OUTPATIENT)
Age: 49
End: 2024-01-18

## 2024-01-18 NOTE — TELEPHONE ENCOUNTER
Patient had a virtual visit with Dr. Keita yesterday, s/p hospital stay. The following transpired:  Take potassium 20 meq BID PO x 3 days, Rx e-prescribed to [facility].  Have labs redrawn on Tues/Wed of next week

## 2024-01-24 LAB
A/G RATIO: 1.6 RATIO (ref 1.1–2.6)
ALBUMIN SERPL-MCNC: 4.2 G/DL (ref 3.5–5)
ALP BLD-CCNC: 201 U/L (ref 25–115)
ALT SERPL-CCNC: 14 U/L (ref 5–40)
ANION GAP SERPL CALCULATED.3IONS-SCNC: 16 MMOL/L (ref 3–15)
AST SERPL-CCNC: 27 U/L (ref 10–37)
BASOPHILS # BLD: 1 % (ref 0–2)
BASOPHILS ABSOLUTE: 0 K/UL (ref 0–0.2)
BILIRUB SERPL-MCNC: 0.5 MG/DL (ref 0.2–1.2)
BILIRUBIN DIRECT: 0.2 MG/DL (ref 0–0.3)
BUN BLDV-MCNC: 39 MG/DL (ref 6–22)
CALCIUM SERPL-MCNC: 8.7 MG/DL (ref 8.4–10.5)
CHLORIDE BLD-SCNC: 97 MMOL/L (ref 98–110)
CO2: 24 MMOL/L (ref 20–32)
CREAT SERPL-MCNC: 9.3 MG/DL (ref 0.5–1.2)
EOSINOPHIL # BLD: 2 % (ref 0–6)
EOSINOPHILS ABSOLUTE: 0.1 K/UL (ref 0–0.5)
FERRITIN: 1877 NG/ML (ref 22–322)
GLOBULIN: 2.7 G/DL (ref 2–4)
GLOMERULAR FILTRATION RATE: 6.6 ML/MIN/1.73 SQ.M.
GLUCOSE: 78 MG/DL (ref 70–99)
HCT VFR BLD CALC: 25.8 % (ref 39.3–51.6)
HEMOGLOBIN: 8.1 G/DL (ref 13.1–17.2)
INR BLD: 1.09 (ref 0.89–1.29)
IRON % SATURATION: 25 % (ref 20–50)
IRON: 54 MCG/DL (ref 45–160)
LYMPHOCYTES # BLD: 37 % (ref 20–45)
LYMPHOCYTES ABSOLUTE: 1.2 K/UL (ref 1–4.8)
MAGNESIUM: 1.5 MG/DL (ref 1.6–2.5)
MCH RBC QN AUTO: 28 PG (ref 26–34)
MCHC RBC AUTO-ENTMCNC: 31 G/DL (ref 31–36)
MCV RBC AUTO: 88 FL (ref 80–95)
MONOCYTES ABSOLUTE: 0.5 K/UL (ref 0.1–1)
MONOCYTES: 15 % (ref 3–12)
NEUTROPHILS ABSOLUTE: 1.5 K/UL (ref 1.8–7.7)
NEUTROPHILS: 46 % (ref 40–75)
PDW BLD-RTO: 13.1 % (ref 10–15.5)
PLATELET # BLD: 175 K/UL (ref 140–440)
PMV BLD AUTO: 9.9 FL (ref 9–13)
POTASSIUM SERPL-SCNC: 4 MMOL/L (ref 3.5–5.5)
PROTHROMBIN TIME: 11.8 SEC (ref 9–13)
RBC: 2.93 M/UL (ref 3.8–5.8)
SODIUM BLD-SCNC: 137 MMOL/L (ref 133–145)
TACROLIMUS BLOOD: 5 NG/ML (ref 2–20)
TOTAL IRON BINDING CAPACITY: 216 MCG/DL (ref 228–428)
TOTAL PROTEIN: 6.9 G/DL (ref 6.4–8.3)
UIBC: 162 MCG/DL (ref 110–370)
WBC: 3.2 K/UL (ref 4–11)

## 2024-01-25 ENCOUNTER — TELEPHONE (OUTPATIENT)
Age: 49
End: 2024-01-25

## 2024-01-25 NOTE — TELEPHONE ENCOUNTER
Called patient to review recent lab results from today, 1/25/24. Informed patient his labs are stable except for his hemoglobin level which is trending down @ 8.1 from 9.4. Recommended he see a hematologist to work on his iron deficient anemia and likely receive epoetin injections. Patient reported he has been getting what he thinks is epoetin, to increase his red blood cells with hemodialysis. Patient will see the nephrologist next week and discuss increasing the amount of epoetin in his dialysis. Patient stated he will let me know the outcome.

## 2024-01-30 NOTE — PROGRESS NOTES
Valeriano Fong III, was evaluated through a synchronous (real-time) audio-video encounter. The patient (or guardian if applicable) is aware that this is a billable service, which includes applicable co-pays. This Virtual Visit was conducted with patient's (and/or legal guardian's) consent. Patient identification was verified, and a caregiver was present when appropriate.   The patient was located at Home: [address] [facility] [address]  Provider was located at Facility (Appt Dept): [address] [facility]  [place] [address]       Total time spent for this encounter: Not billed by time    --Medhat Keita MD on 1/30/2024 at 7:51 AM    An electronic signature was used to authenticate this note.        [facility]      Medhat Keita MD, FACP, FACG, FAASLD      Imelda Singh, PA-C    Monet Haile, AGPCNP-BC   Zenobia Lora, [facility]-   Faith Bryan, FNP-C  Nando Moss, FNP-C   Lucinda Choudhury, AGNP-BC   Shantell Carbone, FNP-[facility]   at [facility]   [address] [place] [address], [address]   [place]  [address] [phone]   FAX: [phone]  [facility]   [address] [facility] [address], [address]   [place]  [address] [phone]   FAX: [phone]       Patient Care Team:  Geovani Reeves MD as PCP - General (Family Medicine)  Sabrina Hutson, PASTOR as Nurse Navigator (Hepatology)      Patient Active Problem List   Diagnosis    Liver transplant status (HCC)    Long-term use of immunosuppressant medication    Liver transplant complication (HCC)    Alcohol use disorder in remission    ESRD on dialysis (HCC)    Crohn's disease (HCC)    Acute encephalopathy    Malaise and fatigue    Uremia    Hypokalemia         Valeriano Fong III returns to the Liver [facility] for management of liver transplant graft function, to

## 2024-01-31 DIAGNOSIS — E87.6 HYPOKALEMIA: Primary | ICD-10-CM

## 2024-01-31 RX ORDER — POTASSIUM CHLORIDE 20 MEQ/1
TABLET, EXTENDED RELEASE ORAL
Qty: 130 TABLET | Refills: 0 | Status: SHIPPED | OUTPATIENT
Start: 2024-01-31 | End: 2024-04-30

## 2024-02-12 ENCOUNTER — TELEPHONE (OUTPATIENT)
Age: 49
End: 2024-02-12

## 2024-02-12 NOTE — TELEPHONE ENCOUNTER
Called patient regarding his potassium level on 1/31/24 and requested he have his labs drawn tomorrow to recheck potassium level. Patient started potassium chloride 40 meq BID x 10 days on 2/1/24 stopped on 2/10/24, started 20 meq daily today. Patient verbally confirmed understanding and will have labs drawn tomorrow or Wednesday.

## 2024-02-13 LAB
A/G RATIO: 1.4 RATIO (ref 1.1–2.6)
ALBUMIN SERPL-MCNC: 4.2 G/DL (ref 3.5–5)
ALP BLD-CCNC: 233 U/L (ref 25–115)
ALT SERPL-CCNC: 9 U/L (ref 5–40)
ANION GAP SERPL CALCULATED.3IONS-SCNC: 21 MMOL/L (ref 3–15)
AST SERPL-CCNC: 17 U/L (ref 10–37)
BASOPHILS # BLD: 1 % (ref 0–2)
BASOPHILS ABSOLUTE: 0 K/UL (ref 0–0.2)
BILIRUB SERPL-MCNC: 0.4 MG/DL (ref 0.2–1.2)
BILIRUBIN DIRECT: 0.3 MG/DL (ref 0–0.3)
BUN BLDV-MCNC: 70 MG/DL (ref 6–22)
CALCIUM SERPL-MCNC: 8.7 MG/DL (ref 8.4–10.5)
CHLORIDE BLD-SCNC: 104 MMOL/L (ref 98–110)
CO2: 17 MMOL/L (ref 20–32)
CREAT SERPL-MCNC: 15.2 MG/DL (ref 0.5–1.2)
EOSINOPHIL # BLD: 7 % (ref 0–6)
EOSINOPHILS ABSOLUTE: 0.3 K/UL (ref 0–0.5)
FERRITIN: 2207 NG/ML (ref 22–322)
GLOBULIN: 3.1 G/DL (ref 2–4)
GLOMERULAR FILTRATION RATE: 3.6 ML/MIN/1.73 SQ.M.
GLUCOSE: 110 MG/DL (ref 70–99)
HCT VFR BLD CALC: 26.1 % (ref 39.3–51.6)
HEMOGLOBIN: 8.3 G/DL (ref 13.1–17.2)
INR BLD: 1.01 (ref 0.89–1.29)
IRON % SATURATION: 76 % (ref 20–50)
IRON: 203 MCG/DL (ref 45–160)
LYMPHOCYTES # BLD: 37 % (ref 20–45)
LYMPHOCYTES ABSOLUTE: 1.6 K/UL (ref 1–4.8)
MAGNESIUM: 1.9 MG/DL (ref 1.6–2.5)
MCH RBC QN AUTO: 28 PG (ref 26–34)
MCHC RBC AUTO-ENTMCNC: 32 G/DL (ref 31–36)
MCV RBC AUTO: 87 FL (ref 80–95)
MONOCYTES ABSOLUTE: 0.3 K/UL (ref 0.1–1)
MONOCYTES: 8 % (ref 3–12)
NEUTROPHILS ABSOLUTE: 2 K/UL (ref 1.8–7.7)
NEUTROPHILS: 47 % (ref 40–75)
PDW BLD-RTO: 14.6 % (ref 10–15.5)
PLATELET # BLD: 215 K/UL (ref 140–440)
PMV BLD AUTO: 10 FL (ref 9–13)
POTASSIUM SERPL-SCNC: 3.6 MMOL/L (ref 3.5–5.5)
PROTHROMBIN TIME: 11 SEC (ref 9–13)
RBC: 2.99 M/UL (ref 3.8–5.8)
SODIUM BLD-SCNC: 142 MMOL/L (ref 133–145)
TACROLIMUS BLOOD: 8 NG/ML (ref 2–20)
TOTAL IRON BINDING CAPACITY: 268 MCG/DL (ref 228–428)
TOTAL PROTEIN: 7.3 G/DL (ref 6.4–8.3)
UIBC: 65 MCG/DL (ref 110–370)
WBC: 4.3 K/UL (ref 4–11)

## 2024-02-15 ENCOUNTER — TELEPHONE (OUTPATIENT)
Age: 49
End: 2024-02-15

## 2024-02-15 NOTE — TELEPHONE ENCOUNTER
LVM for patient regarding recent lab results from 2/13/24. Informed patient his labs are stable with potassium level increasing from 2.9 to 3.6. Patient to continue potassium supplements. Patients next appointment with Dr. Keita on 3/13/24, Requested patient have labs redrawn the week prior.

## 2024-03-13 ENCOUNTER — OFFICE VISIT (OUTPATIENT)
Age: 49
End: 2024-03-13
Payer: COMMERCIAL

## 2024-03-13 VITALS
HEIGHT: 72 IN | BODY MASS INDEX: 24.52 KG/M2 | DIASTOLIC BLOOD PRESSURE: 64 MMHG | WEIGHT: 181 LBS | HEART RATE: 102 BPM | SYSTOLIC BLOOD PRESSURE: 98 MMHG | OXYGEN SATURATION: 97 % | RESPIRATION RATE: 18 BRPM

## 2024-03-13 DIAGNOSIS — Z94.4 LIVER TRANSPLANT RECIPIENT (HCC): ICD-10-CM

## 2024-03-13 DIAGNOSIS — E87.6 HYPOKALEMIA: ICD-10-CM

## 2024-03-13 DIAGNOSIS — R63.0 ANOREXIA: Primary | ICD-10-CM

## 2024-03-13 PROCEDURE — 99215 OFFICE O/P EST HI 40 MIN: CPT | Performed by: NURSE PRACTITIONER

## 2024-03-13 RX ORDER — POTASSIUM CHLORIDE 1500 MG/1
20 TABLET, EXTENDED RELEASE ORAL ONCE
Qty: 1 TABLET | Refills: 0
Start: 2024-03-13 | End: 2024-03-13

## 2024-03-13 RX ORDER — DRONABINOL 10 MG/1
10 CAPSULE ORAL
Qty: 180 CAPSULE | Refills: 3 | Status: SHIPPED | OUTPATIENT
Start: 2024-03-13 | End: 2025-03-08

## 2024-03-13 NOTE — PROGRESS NOTES
Magnesium 1.6 - 2.5 mg/dL 1.6  1.5 (L)  1.7  1.9  1.5 (L)    Ammonia <32 UMOL/L            Latest Ref Rng 1/24/2024 1/31/2024 2/13/2024 3/13/2024   DOMINICK - Transplant Immune Suppression        Tacrolimus 2.0 - 20.0 ng/mL 5.0  7.5  8.0  4.7    Tacrolimus 2.0 - 20.0 ng/mL       Tacrolimus Level 2.0 - 20.0 ng/mL         SEROLOGIES:  Not available or performed.  Testing was performed today.    LIVER HISTOLOGY:  Not available or performed    ENDOSCOPIC PROCEDURES:  Not available or performed    RADIOLOGY:  Not available or performed    OTHER TESTING:  Not available or performed    FOLLOW-UP:  All of the issues listed above in the Assessment and Plan were discussed with the patient.  All questions were answered.  The patient expressed a clear understanding of the above.    Laboratory studies should be performed twice weekly to assess liver graft function and blood levels of immune suppression.    The patient has a follow-up appointment at the liver transplant center in 2/2023 which will be just a few weeks before he can be listed for renal transplant in the safety net program    FOLLOW-UP:  All of the issues listed above in the Assessment and Plan were discussed with the patient.  All questions were answered.  The patient expressed a clear understanding of the above.    40 minutes total time spent with this patient with more than 50% of this time spent counseling and coordinating care as described above.     Laboratory studies every 4 weeks for now.        Follow-up [facility] [place] 3 months.      ASIYA Tran-C  [facility] [place] [facility]  [address], [address]   [place] [address] [phone]

## 2024-03-14 ENCOUNTER — TELEPHONE (OUTPATIENT)
Age: 49
End: 2024-03-14

## 2024-03-14 LAB
A/G RATIO: 1.4 RATIO (ref 1.1–2.6)
ALBUMIN SERPL-MCNC: 4.4 G/DL (ref 3.5–5)
ALP BLD-CCNC: 231 U/L (ref 25–115)
ALT SERPL-CCNC: 13 U/L (ref 5–40)
ANION GAP SERPL CALCULATED.3IONS-SCNC: 20 MMOL/L (ref 3–15)
AST SERPL-CCNC: 24 U/L (ref 10–37)
BASOPHILS # BLD: 1 % (ref 0–2)
BASOPHILS ABSOLUTE: 0 K/UL (ref 0–0.2)
BILIRUB SERPL-MCNC: 0.6 MG/DL (ref 0.2–1.2)
BILIRUBIN DIRECT: 0.3 MG/DL (ref 0–0.3)
BUN BLDV-MCNC: 47 MG/DL (ref 6–22)
CALCIUM SERPL-MCNC: 8.1 MG/DL (ref 8.4–10.5)
CHLORIDE BLD-SCNC: 91 MMOL/L (ref 98–110)
CO2: 22 MMOL/L (ref 20–32)
CREAT SERPL-MCNC: 12.2 MG/DL (ref 0.5–1.2)
EOSINOPHIL # BLD: 1 % (ref 0–6)
EOSINOPHILS ABSOLUTE: 0 K/UL (ref 0–0.5)
FERRITIN: 3419 NG/ML (ref 22–322)
GLOBULIN: 3.1 G/DL (ref 2–4)
GLOMERULAR FILTRATION RATE: 4.7 ML/MIN/1.73 SQ.M.
GLUCOSE: 83 MG/DL (ref 70–99)
HCT VFR BLD CALC: 23.7 % (ref 39.3–51.6)
HEMOGLOBIN: 8 G/DL (ref 13.1–17.2)
INR BLD: 1.15 (ref 0.89–1.29)
IRON % SATURATION: 20 % (ref 20–50)
IRON: 50 MCG/DL (ref 45–160)
LYMPHOCYTES # BLD: 33 % (ref 20–45)
LYMPHOCYTES ABSOLUTE: 1.3 K/UL (ref 1–4.8)
MAGNESIUM: 1.5 MG/DL (ref 1.6–2.5)
MCH RBC QN AUTO: 28 PG (ref 26–34)
MCHC RBC AUTO-ENTMCNC: 34 G/DL (ref 31–36)
MCV RBC AUTO: 83 FL (ref 80–95)
MONOCYTES ABSOLUTE: 0.6 K/UL (ref 0.1–1)
MONOCYTES: 14 % (ref 3–12)
NEUTROPHILS ABSOLUTE: 2 K/UL (ref 1.8–7.7)
NEUTROPHILS: 52 % (ref 40–75)
PDW BLD-RTO: 14.7 % (ref 10–15.5)
PLATELET # BLD: 183 K/UL (ref 140–440)
PMV BLD AUTO: 10.2 FL (ref 9–13)
POTASSIUM SERPL-SCNC: 3.3 MMOL/L (ref 3.5–5.5)
PROTHROMBIN TIME: 12.4 SEC (ref 9–13)
RBC: 2.85 M/UL (ref 3.8–5.8)
SODIUM BLD-SCNC: 133 MMOL/L (ref 133–145)
TACROLIMUS BLOOD: 4.7 NG/ML (ref 2–20)
TOTAL IRON BINDING CAPACITY: 246 MCG/DL (ref 228–428)
TOTAL PROTEIN: 7.5 G/DL (ref 6.4–8.3)
UIBC: 196 MCG/DL (ref 110–370)
WBC: 3.9 K/UL (ref 4–11)

## 2024-03-14 NOTE — TELEPHONE ENCOUNTER
Texted patient per patients request with newly scheduled appointments:    MRI Abdomen with MR[facility] @ [facility]  Tuesday, 4/4/24 @ 0800, arrive @ 0730    Nothing to eat or drink after midnight    Have labs drawn on same day as MRI    F/U with Dawson Moss NP, on 6/17/24 @ 0905

## 2024-03-30 LAB
A/G RATIO: 1.4 RATIO (ref 1.1–2.6)
ALBUMIN SERPL-MCNC: 4.1 G/DL (ref 3.5–5)
ALP BLD-CCNC: 202 U/L (ref 25–115)
ALT SERPL-CCNC: 12 U/L (ref 5–40)
ANION GAP SERPL CALCULATED.3IONS-SCNC: 23 MMOL/L (ref 3–15)
AST SERPL-CCNC: 20 U/L (ref 10–37)
BASOPHILS # BLD: 1 % (ref 0–2)
BASOPHILS ABSOLUTE: 0 K/UL (ref 0–0.2)
BILIRUB SERPL-MCNC: 0.4 MG/DL (ref 0.2–1.2)
BILIRUBIN DIRECT: 0.3 MG/DL (ref 0–0.3)
BUN BLDV-MCNC: 97 MG/DL (ref 6–22)
CALCIUM SERPL-MCNC: 8.3 MG/DL (ref 8.4–10.5)
CHLORIDE BLD-SCNC: 99 MMOL/L (ref 98–110)
CO2: 16 MMOL/L (ref 20–32)
CREAT SERPL-MCNC: 16.9 MG/DL (ref 0.5–1.2)
EOSINOPHIL # BLD: 4 % (ref 0–6)
EOSINOPHILS ABSOLUTE: 0.1 K/UL (ref 0–0.5)
FERRITIN: 2961 NG/ML (ref 22–322)
GLOBULIN: 2.9 G/DL (ref 2–4)
GLOMERULAR FILTRATION RATE: 3.2 ML/MIN/1.73 SQ.M.
GLUCOSE: 117 MG/DL (ref 70–99)
HCT VFR BLD CALC: 21.4 % (ref 39.3–51.6)
HEMOGLOBIN: 6.8 G/DL (ref 13.1–17.2)
INR BLD: 1.03 (ref 0.89–1.29)
IRON % SATURATION: ABNORMAL
IRON: 219 MCG/DL (ref 45–160)
LYMPHOCYTES # BLD: 48 % (ref 20–45)
LYMPHOCYTES ABSOLUTE: 1.4 K/UL (ref 1–4.8)
MAGNESIUM: 1.7 MG/DL (ref 1.6–2.5)
MCH RBC QN AUTO: 28 PG (ref 26–34)
MCHC RBC AUTO-ENTMCNC: 32 G/DL (ref 31–36)
MCV RBC AUTO: 89 FL (ref 80–95)
MONOCYTES ABSOLUTE: 0.3 K/UL (ref 0.1–1)
MONOCYTES: 11 % (ref 3–12)
NEUTROPHILS ABSOLUTE: 1.1 K/UL (ref 1.8–7.7)
NEUTROPHILS: 37 % (ref 40–75)
PDW BLD-RTO: 15.5 % (ref 10–15.5)
PLATELET # BLD: 160 K/UL (ref 140–440)
PMV BLD AUTO: 10.3 FL (ref 9–13)
POTASSIUM SERPL-SCNC: 3.9 MMOL/L (ref 3.5–5.5)
PROTHROMBIN TIME: 11.2 SEC (ref 9–13)
RBC: 2.41 M/UL (ref 3.8–5.8)
SODIUM BLD-SCNC: 138 MMOL/L (ref 133–145)
TACROLIMUS BLOOD: 5.9 NG/ML (ref 2–20)
TOTAL IRON BINDING CAPACITY: ABNORMAL
TOTAL PROTEIN: 7 G/DL (ref 6.4–8.3)
UIBC: <20 MCG/DL (ref 110–370)
WBC: 3 K/UL (ref 4–11)

## 2024-04-01 ENCOUNTER — TELEPHONE (OUTPATIENT)
Age: 49
End: 2024-04-01

## 2024-04-01 DIAGNOSIS — E87.6 HYPOKALEMIA: ICD-10-CM

## 2024-04-02 ENCOUNTER — HOSPITAL ENCOUNTER (OUTPATIENT)
Facility: HOSPITAL | Age: 49
Setting detail: INFUSION SERIES
End: 2024-04-02

## 2024-04-02 ENCOUNTER — TELEPHONE (OUTPATIENT)
Age: 49
End: 2024-04-02

## 2024-04-02 NOTE — TELEPHONE ENCOUNTER
[facility] left a voicemail needing  imaging order faxed to  I faxed what was in system if different order is needed please fax to this number

## 2024-04-03 ENCOUNTER — CLINICAL DOCUMENTATION (OUTPATIENT)
Age: 49
End: 2024-04-03

## 2024-04-03 NOTE — PROGRESS NOTES
Per request, imaging order dated 3/13/24 faxed to [facility] Attn: Mayra at [phone]. Confirmation received.

## 2024-04-04 RX ORDER — POTASSIUM CHLORIDE 1500 MG/1
TABLET, EXTENDED RELEASE ORAL
Qty: 130 TABLET | Refills: 0 | Status: SHIPPED | OUTPATIENT
Start: 2024-04-04

## 2024-04-08 ENCOUNTER — TELEPHONE (OUTPATIENT)
Age: 49
End: 2024-04-08

## 2024-04-08 NOTE — TELEPHONE ENCOUNTER
Called patient to remind him of setting up appointments for his blood tranfusions and type and cross labs. Informed patient last week his hemoglobin was 6.8. He wanted to wait to have his MRI Abdomen which was last Wednesday. Since then the infusion center has called several times and I have called several times to remind him. Today, I gave him the phone number to call to schedule the appointments with the [facility]. Patient verbally confirmed understanding. Informed Dawson Moss NP of above situation.    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 Have requested patient stop giving himself iron when doing his dialysis at home. Patient verbally confirmed understanding at that time.

## 2024-04-09 ENCOUNTER — HOSPITAL ENCOUNTER (OUTPATIENT)
Facility: HOSPITAL | Age: 49
Discharge: HOME | End: 2024-04-09
Payer: COMMERCIAL

## 2024-04-09 PROCEDURE — 36415 COLL VENOUS BLD VENIPUNCTURE: CPT

## 2024-04-09 PROCEDURE — 86850 RBC ANTIBODY SCREEN: CPT

## 2024-04-09 PROCEDURE — 86900 BLOOD TYPING SEROLOGIC ABO: CPT

## 2024-04-09 PROCEDURE — P9040 RBC LEUKOREDUCED IRRADIATED: HCPCS

## 2024-04-09 PROCEDURE — 86901 BLOOD TYPING SEROLOGIC RH(D): CPT

## 2024-04-09 PROCEDURE — 86923 COMPATIBILITY TEST ELECTRIC: CPT

## 2024-04-11 ENCOUNTER — HOSPITAL ENCOUNTER (OUTPATIENT)
Facility: HOSPITAL | Age: 49
Setting detail: INFUSION SERIES
Discharge: HOME | End: 2024-04-11
Payer: COMMERCIAL

## 2024-04-11 VITALS
OXYGEN SATURATION: 95 % | RESPIRATION RATE: 16 BRPM | SYSTOLIC BLOOD PRESSURE: 85 MMHG | HEART RATE: 63 BPM | TEMPERATURE: 97.9 F | DIASTOLIC BLOOD PRESSURE: 49 MMHG

## 2024-04-11 LAB
BASO+EOS+MONOS # BLD AUTO: 0.2 K/UL (ref 0–2.3)
BASO+EOS+MONOS NFR BLD AUTO: 8 % (ref 0.1–17)
DIFFERENTIAL METHOD BLD: ABNORMAL
ERYTHROCYTE [DISTWIDTH] IN BLOOD BY AUTOMATED COUNT: 14.8 % (ref 11.5–14.5)
FERRITIN SERPL-MCNC: 2088 NG/ML (ref 8–388)
HCT VFR BLD AUTO: 18.9 % (ref 36–48)
HGB BLD-MCNC: 6 G/DL (ref 12–16)
IRON SATN MFR SERPL: 107 % (ref 20–50)
IRON SERPL-MCNC: 182 UG/DL (ref 50–175)
LYMPHOCYTES # BLD: 1 K/UL (ref 1.1–5.9)
LYMPHOCYTES NFR BLD: 36 % (ref 14–44)
MCH RBC QN AUTO: 26.5 PG (ref 25–35)
MCHC RBC AUTO-ENTMCNC: 31.7 G/DL (ref 31–37)
MCV RBC AUTO: 83.6 FL (ref 78–102)
NEUTS SEG # BLD: 1.6 K/UL (ref 1.8–9.5)
NEUTS SEG NFR BLD: 56 % (ref 40–70)
PLATELET # BLD AUTO: 92 K/UL (ref 140–440)
RBC # BLD AUTO: 2.26 M/UL (ref 4.1–5.1)
TIBC SERPL-MCNC: 170 UG/DL (ref 250–450)
WBC # BLD AUTO: 2.8 K/UL (ref 4.5–13)

## 2024-04-11 PROCEDURE — 6370000000 HC RX 637 (ALT 250 FOR IP): Performed by: INTERNAL MEDICINE

## 2024-04-11 PROCEDURE — 85025 COMPLETE CBC W/AUTO DIFF WBC: CPT

## 2024-04-11 PROCEDURE — P9040 RBC LEUKOREDUCED IRRADIATED: HCPCS

## 2024-04-11 PROCEDURE — 83550 IRON BINDING TEST: CPT

## 2024-04-11 PROCEDURE — 82728 ASSAY OF FERRITIN: CPT

## 2024-04-11 PROCEDURE — 36430 TRANSFUSION BLD/BLD COMPNT: CPT

## 2024-04-11 PROCEDURE — 83540 ASSAY OF IRON: CPT

## 2024-04-11 RX ORDER — ACETAMINOPHEN 325 MG/1
650 TABLET ORAL SEE ADMIN INSTRUCTIONS
Status: COMPLETED | OUTPATIENT
Start: 2024-04-11 | End: 2024-04-11

## 2024-04-11 RX ORDER — DIPHENHYDRAMINE HCL 25 MG
25 CAPSULE ORAL SEE ADMIN INSTRUCTIONS
Status: DISCONTINUED | OUTPATIENT
Start: 2024-04-11 | End: 2024-04-11

## 2024-04-11 RX ORDER — SODIUM CHLORIDE 9 MG/ML
INJECTION, SOLUTION INTRAVENOUS PRN
Status: DISCONTINUED | OUTPATIENT
Start: 2024-04-11 | End: 2024-04-12 | Stop reason: HOSPADM

## 2024-04-11 RX ADMIN — ACETAMINOPHEN 650 MG: 325 TABLET, FILM COATED ORAL at 08:40

## 2024-04-11 NOTE — PROGRESS NOTES
[facility] Progress Note    Date: 2024    Name: Valeriano Fong III    MRN: [id number]         : 1975      Mr. Fong arrived to [facility] at 0804, accompanied by his wife. Pt states he has had blood transfusions before without problems/concerns.    Mr. Fong was assessed and education was provided.     Mr. Fong's vitals were reviewed.  Vitals:    24 1036   BP: 93/63   Pulse: 64   Resp: 16   Temp: 97.9 °F (36.6 °C)   SpO2: 99%       24 g IV inserted in patient's hand right, condition patent and no redness x2 attempts. Brisk blood return noted, labs drawn per order (ferritin, CBC, and Iron and TIBC) and flushed with 10 mL NS without difficulty.    Normal saline initiated at KVO per order.    Pre-medications were administered as ordered (Tylenol 650 mg PO, Benadryl held as pt states he took Benadryl at home at 0800 today).     30 minutes after pre-medications were administered 1st unit of PRBCs was started at 60 mL/hr at 1021. After 15 minutes (1036) pt's vitals were stable and pt denied any signs/symptoms of a reaction. Rate increased to 155 mL/hr for remained of the unit until completion at 1235.    2 unit of PRBCs was started at 60 mL/hr at 1316. After 15 minutes (1331) pt's vitals were stable and pt denied any signs/symptoms of a reaction. Rate increased to 155 mL/hr for remained of the unit until completion at 1520 followed by normal saline flush. Pt was observed for 29 minutes following transfusion (per patient request), VS remained stable.  Patient Vitals for the past 12 hrs:   Temp Pulse Resp BP SpO2   24 1520 97.9 °F (36.6 °C) 63 16 (!) 85/49 --   24 1331 97.9 °F (36.6 °C) 63 16 (!) 85/46 --   24 1316 97.9 °F (36.6 °C) 61 16 (!) 94/58 95 %   24 1235 97.9 °F (36.6 °C) 64 16 (!) 85/55 --   24 1036 97.9 °F (36.6 °C) 64 16 93/63 99 %   24 1021 97.6 °F (36.4 °C) 66 16 (!) 85/58 --   24 0815 -- 94 16 (!) 113/58 97 %       Mr. Fong tolerated transfusion without

## 2024-04-12 LAB
ABO + RH BLD: NORMAL
BLD PROD TYP BPU: NORMAL
BLD PROD TYP BPU: NORMAL
BLOOD BANK BLOOD PRODUCT EXPIRATION DATE: NORMAL
BLOOD BANK BLOOD PRODUCT EXPIRATION DATE: NORMAL
BLOOD BANK DISPENSE STATUS: NORMAL
BLOOD BANK DISPENSE STATUS: NORMAL
BLOOD BANK ISBT PRODUCT BLOOD TYPE: 9500
BLOOD BANK ISBT PRODUCT BLOOD TYPE: 9500
BLOOD BANK PRODUCT CODE: NORMAL
BLOOD BANK PRODUCT CODE: NORMAL
BLOOD BANK UNIT TYPE AND RH: NORMAL
BLOOD BANK UNIT TYPE AND RH: NORMAL
BLOOD GROUP ANTIBODIES SERPL: NORMAL
BPU ID: NORMAL
BPU ID: NORMAL
CROSSMATCH RESULT: NORMAL
CROSSMATCH RESULT: NORMAL
SPECIMEN EXP DATE BLD: NORMAL
UNIT DIVISION: 0
UNIT DIVISION: 0
UNIT ISSUE DATE/TIME: NORMAL
UNIT ISSUE DATE/TIME: NORMAL

## 2024-04-15 ENCOUNTER — APPOINTMENT (OUTPATIENT)
Facility: HOSPITAL | Age: 49
End: 2024-04-15
Payer: COMMERCIAL

## 2024-04-15 ENCOUNTER — HOSPITAL ENCOUNTER (INPATIENT)
Facility: HOSPITAL | Age: 49
LOS: 3 days | Discharge: ANOTHER HOSPITAL | End: 2024-04-18
Attending: STUDENT IN AN ORGANIZED HEALTH CARE EDUCATION/TRAINING PROGRAM | Admitting: INTERNAL MEDICINE
Payer: COMMERCIAL

## 2024-04-15 DIAGNOSIS — E86.1 HYPOTENSION DUE TO HYPOVOLEMIA: ICD-10-CM

## 2024-04-15 DIAGNOSIS — Z99.2 ESRD ON DIALYSIS (HCC): ICD-10-CM

## 2024-04-15 DIAGNOSIS — E16.2 HYPOGLYCEMIA: ICD-10-CM

## 2024-04-15 DIAGNOSIS — E87.20 METABOLIC ACIDOSIS: ICD-10-CM

## 2024-04-15 DIAGNOSIS — N19 UREMIA: ICD-10-CM

## 2024-04-15 DIAGNOSIS — R04.0 EPISTAXIS: ICD-10-CM

## 2024-04-15 DIAGNOSIS — A41.9 SEPTICEMIA (HCC): Primary | ICD-10-CM

## 2024-04-15 DIAGNOSIS — N18.6 ESRD ON DIALYSIS (HCC): ICD-10-CM

## 2024-04-15 DIAGNOSIS — T68.XXXA HYPOTHERMIA, INITIAL ENCOUNTER: ICD-10-CM

## 2024-04-15 PROBLEM — E87.4 METABOLIC ACIDOSIS WITH RESPIRATORY ACIDOSIS: Status: ACTIVE | Noted: 2024-04-15

## 2024-04-15 LAB
ALBUMIN SERPL-MCNC: 3.6 G/DL (ref 3.4–5)
ALBUMIN/GLOB SERPL: 1.2 (ref 0.8–1.7)
ALP SERPL-CCNC: 248 U/L (ref 45–117)
ALT SERPL-CCNC: 13 U/L (ref 16–61)
ANION GAP SERPL CALC-SCNC: 22 MMOL/L (ref 3–18)
AST SERPL-CCNC: 16 U/L (ref 10–38)
BASE DEFICIT BLD-SCNC: 24.6 MMOL/L
BASOPHILS # BLD: 0 K/UL (ref 0–0.1)
BASOPHILS NFR BLD: 0 % (ref 0–2)
BILIRUB SERPL-MCNC: 0.8 MG/DL (ref 0.2–1)
BUN SERPL-MCNC: 171 MG/DL (ref 7–18)
BUN/CREAT SERPL: 6 (ref 12–20)
CALCIUM SERPL-MCNC: 7.5 MG/DL (ref 8.5–10.1)
CHLORIDE SERPL-SCNC: 108 MMOL/L (ref 100–111)
CO2 SERPL-SCNC: 5 MMOL/L (ref 21–32)
CREAT SERPL-MCNC: 30.4 MG/DL (ref 0.6–1.3)
DIFFERENTIAL METHOD BLD: ABNORMAL
EOSINOPHIL # BLD: 0 K/UL (ref 0–0.4)
EOSINOPHIL NFR BLD: 1 % (ref 0–5)
ERYTHROCYTE [DISTWIDTH] IN BLOOD BY AUTOMATED COUNT: 15.9 % (ref 11.6–14.5)
FLUAV RNA SPEC QL NAA+PROBE: NOT DETECTED
FLUBV RNA SPEC QL NAA+PROBE: NOT DETECTED
GLOBULIN SER CALC-MCNC: 2.9 G/DL (ref 2–4)
GLUCOSE BLD STRIP.AUTO-MCNC: 156 MG/DL (ref 70–110)
GLUCOSE BLD STRIP.AUTO-MCNC: 51 MG/DL (ref 74–106)
GLUCOSE BLD STRIP.AUTO-MCNC: 63 MG/DL (ref 70–110)
GLUCOSE BLD STRIP.AUTO-MCNC: 64 MG/DL (ref 70–110)
GLUCOSE SERPL-MCNC: 65 MG/DL (ref 74–99)
HCO3 BLD-SCNC: 3.9 MMOL/L (ref 22–26)
HCT VFR BLD AUTO: 26.2 % (ref 36–48)
HGB BLD-MCNC: 8.8 G/DL (ref 13–16)
IMM GRANULOCYTES # BLD AUTO: 0 K/UL
IMM GRANULOCYTES NFR BLD AUTO: 0 %
LACTATE BLD-SCNC: <0.4 MMOL/L (ref 0.4–2)
LYMPHOCYTES # BLD: 1.3 K/UL (ref 0.9–3.6)
LYMPHOCYTES NFR BLD: 34 % (ref 21–52)
MCH RBC QN AUTO: 28.7 PG (ref 24–34)
MCHC RBC AUTO-ENTMCNC: 33.6 G/DL (ref 31–37)
MCV RBC AUTO: 85.3 FL (ref 78–100)
MONOCYTES # BLD: 0.2 K/UL (ref 0.05–1.2)
MONOCYTES NFR BLD: 4 % (ref 3–10)
NEUTS SEG # BLD: 2.4 K/UL (ref 1.8–8)
NEUTS SEG NFR BLD: 61 % (ref 40–73)
NRBC # BLD: 0 K/UL (ref 0–0.01)
NRBC BLD-RTO: 0 PER 100 WBC
PCO2 BLD: 14.1 MMHG (ref 35–45)
PH BLD: 7.05 (ref 7.35–7.45)
PLATELET # BLD AUTO: 90 K/UL (ref 135–420)
PLATELET COMMENT: ABNORMAL
PMV BLD AUTO: 11.1 FL (ref 9.2–11.8)
PO2 BLD: 54 MMHG (ref 80–100)
POTASSIUM SERPL-SCNC: 5 MMOL/L (ref 3.5–5.5)
PROCALCITONIN SERPL-MCNC: 22.56 NG/ML
PROT SERPL-MCNC: 6.5 G/DL (ref 6.4–8.2)
RBC # BLD AUTO: 3.07 M/UL (ref 4.35–5.65)
RBC MORPH BLD: ABNORMAL
SAO2 % BLD: 73.7 % (ref 92–97)
SARS-COV-2 RNA RESP QL NAA+PROBE: NOT DETECTED
SERVICE CMNT-IMP: ABNORMAL
SERVICE CMNT-IMP: ABNORMAL
SODIUM SERPL-SCNC: 135 MMOL/L (ref 136–145)
SPECIMEN TYPE: ABNORMAL
TROPONIN I SERPL HS-MCNC: 16 NG/L (ref 0–78)
WBC # BLD AUTO: 3.9 K/UL (ref 4.6–13.2)
WBC MORPH BLD: ABNORMAL

## 2024-04-15 PROCEDURE — 86923 COMPATIBILITY TEST ELECTRIC: CPT

## 2024-04-15 PROCEDURE — 36556 INSERT NON-TUNNEL CV CATH: CPT

## 2024-04-15 PROCEDURE — 96360 HYDRATION IV INFUSION INIT: CPT

## 2024-04-15 PROCEDURE — 96361 HYDRATE IV INFUSION ADD-ON: CPT

## 2024-04-15 PROCEDURE — 99285 EMERGENCY DEPT VISIT HI MDM: CPT

## 2024-04-15 PROCEDURE — 2000000000 HC ICU R&B

## 2024-04-15 PROCEDURE — 84484 ASSAY OF TROPONIN QUANT: CPT

## 2024-04-15 PROCEDURE — 71046 X-RAY EXAM CHEST 2 VIEWS: CPT

## 2024-04-15 PROCEDURE — 87636 SARSCOV2 & INF A&B AMP PRB: CPT

## 2024-04-15 PROCEDURE — 6370000000 HC RX 637 (ALT 250 FOR IP): Performed by: INTERNAL MEDICINE

## 2024-04-15 PROCEDURE — 86850 RBC ANTIBODY SCREEN: CPT

## 2024-04-15 PROCEDURE — 82803 BLOOD GASES ANY COMBINATION: CPT

## 2024-04-15 PROCEDURE — 96375 TX/PRO/DX INJ NEW DRUG ADDON: CPT

## 2024-04-15 PROCEDURE — 86901 BLOOD TYPING SEROLOGIC RH(D): CPT

## 2024-04-15 PROCEDURE — 2580000003 HC RX 258: Performed by: INTERNAL MEDICINE

## 2024-04-15 PROCEDURE — 6360000002 HC RX W HCPCS: Performed by: STUDENT IN AN ORGANIZED HEALTH CARE EDUCATION/TRAINING PROGRAM

## 2024-04-15 PROCEDURE — 2500000003 HC RX 250 WO HCPCS: Performed by: STUDENT IN AN ORGANIZED HEALTH CARE EDUCATION/TRAINING PROGRAM

## 2024-04-15 PROCEDURE — 2580000003 HC RX 258

## 2024-04-15 PROCEDURE — 86900 BLOOD TYPING SEROLOGIC ABO: CPT

## 2024-04-15 PROCEDURE — 5A1D70Z PERFORMANCE OF URINARY FILTRATION, INTERMITTENT, LESS THAN 6 HOURS PER DAY: ICD-10-PCS | Performed by: INTERNAL MEDICINE

## 2024-04-15 PROCEDURE — 85025 COMPLETE CBC W/AUTO DIFF WBC: CPT

## 2024-04-15 PROCEDURE — 80053 COMPREHEN METABOLIC PANEL: CPT

## 2024-04-15 PROCEDURE — 84145 PROCALCITONIN (PCT): CPT

## 2024-04-15 PROCEDURE — 6360000002 HC RX W HCPCS: Performed by: INTERNAL MEDICINE

## 2024-04-15 PROCEDURE — 96374 THER/PROPH/DIAG INJ IV PUSH: CPT

## 2024-04-15 PROCEDURE — 2580000003 HC RX 258: Performed by: STUDENT IN AN ORGANIZED HEALTH CARE EDUCATION/TRAINING PROGRAM

## 2024-04-15 PROCEDURE — 82962 GLUCOSE BLOOD TEST: CPT

## 2024-04-15 PROCEDURE — 71250 CT THORAX DX C-: CPT

## 2024-04-15 PROCEDURE — 83605 ASSAY OF LACTIC ACID: CPT

## 2024-04-15 PROCEDURE — 93005 ELECTROCARDIOGRAM TRACING: CPT | Performed by: STUDENT IN AN ORGANIZED HEALTH CARE EDUCATION/TRAINING PROGRAM

## 2024-04-15 PROCEDURE — 87040 BLOOD CULTURE FOR BACTERIA: CPT

## 2024-04-15 RX ORDER — DEXTROSE MONOHYDRATE 100 MG/ML
INJECTION, SOLUTION INTRAVENOUS
Status: COMPLETED
Start: 2024-04-15 | End: 2024-04-16

## 2024-04-15 RX ORDER — 0.9 % SODIUM CHLORIDE 0.9 %
500 INTRAVENOUS SOLUTION INTRAVENOUS ONCE
Status: COMPLETED | OUTPATIENT
Start: 2024-04-15 | End: 2024-04-15

## 2024-04-15 RX ORDER — DEXTROSE MONOHYDRATE 100 MG/ML
INJECTION, SOLUTION INTRAVENOUS CONTINUOUS PRN
Status: DISCONTINUED | OUTPATIENT
Start: 2024-04-15 | End: 2024-04-18 | Stop reason: HOSPADM

## 2024-04-15 RX ORDER — HEPARIN SODIUM 1000 [USP'U]/ML
4000 INJECTION, SOLUTION INTRAVENOUS; SUBCUTANEOUS ONCE
Status: COMPLETED | OUTPATIENT
Start: 2024-04-15 | End: 2024-04-16

## 2024-04-15 RX ORDER — ONDANSETRON 2 MG/ML
4 INJECTION INTRAMUSCULAR; INTRAVENOUS EVERY 6 HOURS PRN
Status: DISCONTINUED | OUTPATIENT
Start: 2024-04-15 | End: 2024-04-18 | Stop reason: HOSPADM

## 2024-04-15 RX ORDER — ACETAMINOPHEN 325 MG/1
650 TABLET ORAL EVERY 6 HOURS PRN
Status: DISCONTINUED | OUTPATIENT
Start: 2024-04-15 | End: 2024-04-18 | Stop reason: HOSPADM

## 2024-04-15 RX ORDER — SODIUM CHLORIDE 0.9 % (FLUSH) 0.9 %
5-40 SYRINGE (ML) INJECTION PRN
Status: DISCONTINUED | OUTPATIENT
Start: 2024-04-15 | End: 2024-04-18 | Stop reason: HOSPADM

## 2024-04-15 RX ORDER — ONDANSETRON 2 MG/ML
4 INJECTION INTRAMUSCULAR; INTRAVENOUS ONCE
Status: COMPLETED | OUTPATIENT
Start: 2024-04-15 | End: 2024-04-15

## 2024-04-15 RX ORDER — MYCOPHENOLIC ACID 180 MG/1
180 TABLET, DELAYED RELEASE ORAL 4 TIMES DAILY
Status: DISCONTINUED | OUTPATIENT
Start: 2024-04-15 | End: 2024-04-18 | Stop reason: HOSPADM

## 2024-04-15 RX ORDER — SODIUM CHLORIDE 0.9 % (FLUSH) 0.9 %
5-40 SYRINGE (ML) INJECTION EVERY 12 HOURS SCHEDULED
Status: DISCONTINUED | OUTPATIENT
Start: 2024-04-15 | End: 2024-04-18 | Stop reason: HOSPADM

## 2024-04-15 RX ORDER — ACETAMINOPHEN 650 MG/1
650 SUPPOSITORY RECTAL EVERY 6 HOURS PRN
Status: DISCONTINUED | OUTPATIENT
Start: 2024-04-15 | End: 2024-04-18 | Stop reason: HOSPADM

## 2024-04-15 RX ORDER — DEXTROSE MONOHYDRATE 100 MG/ML
INJECTION, SOLUTION INTRAVENOUS
Status: DISPENSED
Start: 2024-04-15 | End: 2024-04-16

## 2024-04-15 RX ORDER — ONDANSETRON 4 MG/1
4 TABLET, ORALLY DISINTEGRATING ORAL EVERY 8 HOURS PRN
Status: DISCONTINUED | OUTPATIENT
Start: 2024-04-15 | End: 2024-04-18 | Stop reason: HOSPADM

## 2024-04-15 RX ORDER — GLUCAGON 1 MG/ML
1 KIT INJECTION PRN
Status: DISCONTINUED | OUTPATIENT
Start: 2024-04-15 | End: 2024-04-18 | Stop reason: HOSPADM

## 2024-04-15 RX ORDER — TACROLIMUS 1 MG/1
2 CAPSULE ORAL 2 TIMES DAILY
Status: DISCONTINUED | OUTPATIENT
Start: 2024-04-15 | End: 2024-04-18 | Stop reason: HOSPADM

## 2024-04-15 RX ORDER — SODIUM CHLORIDE 9 MG/ML
INJECTION, SOLUTION INTRAVENOUS CONTINUOUS
Status: DISCONTINUED | OUTPATIENT
Start: 2024-04-15 | End: 2024-04-15 | Stop reason: SDUPTHER

## 2024-04-15 RX ORDER — HEPARIN SODIUM 5000 [USP'U]/ML
5000 INJECTION, SOLUTION INTRAVENOUS; SUBCUTANEOUS EVERY 8 HOURS SCHEDULED
Status: DISCONTINUED | OUTPATIENT
Start: 2024-04-15 | End: 2024-04-18 | Stop reason: HOSPADM

## 2024-04-15 RX ORDER — POLYETHYLENE GLYCOL 3350 17 G/17G
17 POWDER, FOR SOLUTION ORAL DAILY PRN
Status: DISCONTINUED | OUTPATIENT
Start: 2024-04-15 | End: 2024-04-18 | Stop reason: HOSPADM

## 2024-04-15 RX ORDER — SODIUM CHLORIDE 9 MG/ML
INJECTION, SOLUTION INTRAVENOUS PRN
Status: DISCONTINUED | OUTPATIENT
Start: 2024-04-15 | End: 2024-04-18 | Stop reason: HOSPADM

## 2024-04-15 RX ADMIN — HEPARIN SODIUM 5000 UNITS: 5000 INJECTION INTRAVENOUS; SUBCUTANEOUS at 22:42

## 2024-04-15 RX ADMIN — DEXTROSE MONOHYDRATE 125 ML: 100 INJECTION, SOLUTION INTRAVENOUS at 23:49

## 2024-04-15 RX ADMIN — WATER 1000 MG: 1 INJECTION INTRAMUSCULAR; INTRAVENOUS; SUBCUTANEOUS at 17:05

## 2024-04-15 RX ADMIN — AZITHROMYCIN MONOHYDRATE 500 MG: 500 INJECTION, POWDER, LYOPHILIZED, FOR SOLUTION INTRAVENOUS at 17:39

## 2024-04-15 RX ADMIN — TACROLIMUS 2 MG: 1 CAPSULE ORAL at 22:42

## 2024-04-15 RX ADMIN — ACETAMINOPHEN 650 MG: 325 TABLET ORAL at 23:40

## 2024-04-15 RX ADMIN — SODIUM CHLORIDE 500 ML: 9 INJECTION, SOLUTION INTRAVENOUS at 17:07

## 2024-04-15 RX ADMIN — SODIUM BICARBONATE: 84 INJECTION, SOLUTION INTRAVENOUS at 22:00

## 2024-04-15 RX ADMIN — VANCOMYCIN HYDROCHLORIDE 1500 MG: 10 INJECTION, POWDER, LYOPHILIZED, FOR SOLUTION INTRAVENOUS at 22:36

## 2024-04-15 RX ADMIN — DEXTROSE MONOHYDRATE 125 ML: 10 INJECTION, SOLUTION INTRAVENOUS at 23:49

## 2024-04-15 RX ADMIN — ONDANSETRON 4 MG: 2 INJECTION INTRAMUSCULAR; INTRAVENOUS at 17:06

## 2024-04-15 RX ADMIN — DEXTROSE MONOHYDRATE 125 ML: 100 INJECTION, SOLUTION INTRAVENOUS at 18:17

## 2024-04-15 ASSESSMENT — PAIN - FUNCTIONAL ASSESSMENT: PAIN_FUNCTIONAL_ASSESSMENT: NONE - DENIES PAIN

## 2024-04-15 NOTE — ED TRIAGE NOTES
Pt sts he has been having blood tinged sinus drainage coming out of nose for last 3 days.   Pt appears weak and short of breath in triage. Sts he thinks he has had a low grade fever.   No cough.

## 2024-04-15 NOTE — ED PROVIDER NOTES
[facility] EMERGENCY DEPT  EMERGENCY DEPARTMENT ENCOUNTER    Patient Name: Valeriano Fong III  MRN: [id number]  YOB: 1975  Provider: Summer Polanco MD  PCP: Geovani Reeves MD  Time/Date of evaluation: 3:38 PM EDT on 4/15/24    History of Presenting Illness     Chief Complaint   Patient presents with    Nasal Congestion    Fever    Shortness of Breath       History Provided by: Patient and Patient's Wife   History is limited by: Nothing    HISTORY (Narative):   Valeriano Fong III is a 49 y.o. male with previous liver transplant 3/12/2022 due to presumed hepatic failure after Humira, ESRD on dialysis 3-4x/week, Crohn's disease who presents to the ED bed ER05/05 with malaise, dry cough, sinus congestion, intermittent nosebleeds, and nausea with several episodes of non-bilious/non-bloody vomiting over the last 5 days. He reports that he was not feeling well last week, so he did not attend dialysis. Afterward, he continued to feel worse, so he has not been back to dialysis in at least 5 days. He had a primary care appointment that he was attempting to wait for as he thought he may have a sinus infection due to frequent nosebleeds, but his wife was concerned about his appearance and the length of missed dialysis, so she brought him here for further evaluation. Patient reports compliance with his anti-rejection meds. No fevers at home.     Nursing Notes were all reviewed and agreed with or any disagreements were addressed in the HPI.    Past History     PAST MEDICAL HISTORY:  Past Medical History:   Diagnosis Date    Crohn disease (HCC)     Liver disease     Renal failure        PAST SURGICAL HISTORY:  Past Surgical History:   Procedure Laterality Date    OTHER SURGICAL HISTORY      bowel obstruction and ostomy    OTHER SURGICAL HISTORY  2001    bowel resection secondary to tear in bowel and abscess    XR MIDLINE EQUAL OR GREATER THAN 5 YEARS  7/2/2021    XR MIDLINE EQUAL OR GREATER THAN 5 YEARS 7/2/2021       FAMILY  initial forces of Septal leads     CBC with Auto Differential    Collection Time: 04/15/24  4:15 PM   Result Value Ref Range    WBC 3.9 (L) 4.6 - 13.2 K/uL    RBC 3.07 (L) 4.35 - 5.65 M/uL    Hemoglobin 8.8 (L) 13.0 - 16.0 g/dL    Hematocrit 26.2 (L) 36.0 - 48.0 %    MCV 85.3 78.0 - 100.0 FL    MCH 28.7 24.0 - 34.0 PG    MCHC 33.6 31.0 - 37.0 g/dL    RDW 15.9 (H) 11.6 - 14.5 %    Platelets 90 (L) 135 - 420 K/uL    MPV 11.1 9.2 - 11.8 FL    Nucleated RBCs 0.0 0  WBC    nRBC 0.00 0.00 - 0.01 K/uL    Neutrophils % 61 40 - 73 %    Lymphocytes % 34 21 - 52 %    Monocytes % 4 3 - 10 %    Eosinophils % 1 0 - 5 %    Basophils % 0 0 - 2 %    Immature Granulocytes % 0 %    Neutrophils Absolute 2.4 1.8 - 8.0 K/UL    Lymphocytes Absolute 1.3 0.9 - 3.6 K/UL    Monocytes Absolute 0.2 0.05 - 1.2 K/UL    Eosinophils Absolute 0.0 0.0 - 0.4 K/UL    Basophils Absolute 0.0 0.0 - 0.1 K/UL    Immature Granulocytes Absolute 0.0 K/UL    Differential Type MANUAL      Platelet Comment SLIDE ESTIMATE CONFIRMS PLT COUNT      RBC Comment POLYCHROMASIA  1+        RBC Comment RONI CELLS  1+        RBC Comment OVALOCYTES  2+        WBC Comment REACTIVE LYMPHS     Comprehensive Metabolic Panel    Collection Time: 04/15/24  4:15 PM   Result Value Ref Range    Sodium 135 (L) 136 - 145 mmol/L    Potassium 5.0 3.5 - 5.5 mmol/L    Chloride 108 100 - 111 mmol/L    CO2 5 (LL) 21 - 32 mmol/L    Anion Gap 22 (H) 3.0 - 18 mmol/L    Glucose 65 (L) 74 - 99 mg/dL     (H) 7.0 - 18 MG/DL    Creatinine 30.40 (H) 0.6 - 1.3 MG/DL    Bun/Cre Ratio 6 (L) 12 - 20      Est, Glom Filt Rate 2 (L) >60 ml/min/1.73m2    Calcium 7.5 (L) 8.5 - 10.1 MG/DL    Total Bilirubin 0.8 0.2 - 1.0 MG/DL    ALT 13 (L) 16 - 61 U/L    AST 16 10 - 38 U/L    Alk Phosphatase 248 (H) 45 - 117 U/L    Total Protein 6.5 6.4 - 8.2 g/dL    Albumin 3.6 3.4 - 5.0 g/dL    Globulin 2.9 2.0 - 4.0 g/dL    Albumin/Globulin Ratio 1.2 0.8 - 1.7     Troponin    Collection Time: 04/15/24  4:15 PM

## 2024-04-16 ENCOUNTER — APPOINTMENT (OUTPATIENT)
Facility: HOSPITAL | Age: 49
End: 2024-04-16
Payer: COMMERCIAL

## 2024-04-16 ENCOUNTER — APPOINTMENT (OUTPATIENT)
Facility: HOSPITAL | Age: 49
End: 2024-04-16
Attending: INTERNAL MEDICINE
Payer: COMMERCIAL

## 2024-04-16 PROBLEM — Z92.25 PERSONAL HISTORY OF IMMUNOSUPRESSION THERAPY: Status: ACTIVE | Noted: 2024-04-16

## 2024-04-16 PROBLEM — A41.9 SEPSIS WITHOUT ACUTE ORGAN DYSFUNCTION (HCC): Status: ACTIVE | Noted: 2024-04-16

## 2024-04-16 PROBLEM — T68.XXXA HYPOTHERMIA: Status: ACTIVE | Noted: 2024-04-16

## 2024-04-16 PROBLEM — E87.20 METABOLIC ACIDOSIS: Status: ACTIVE | Noted: 2024-04-16

## 2024-04-16 PROBLEM — I95.9 HYPOTENSION: Status: ACTIVE | Noted: 2024-04-16

## 2024-04-16 LAB
ALBUMIN SERPL-MCNC: 2.8 G/DL (ref 3.4–5)
ALBUMIN/GLOB SERPL: 1.3 (ref 0.8–1.7)
ALP SERPL-CCNC: 196 U/L (ref 45–117)
ALT SERPL-CCNC: 9 U/L (ref 16–61)
ANION GAP SERPL CALC-SCNC: 17 MMOL/L (ref 3–18)
ANION GAP SERPL CALC-SCNC: 19 MMOL/L (ref 3–18)
ARTERIAL PATENCY WRIST A: POSITIVE
AST SERPL-CCNC: 15 U/L (ref 10–38)
BASE DEFICIT BLD-SCNC: 10.4 MMOL/L
BASE DEFICIT BLD-SCNC: 9.8 MMOL/L
BDY SITE: ABNORMAL
BILIRUB SERPL-MCNC: 0.9 MG/DL (ref 0.2–1)
BUN SERPL-MCNC: 90 MG/DL (ref 7–18)
BUN SERPL-MCNC: 91 MG/DL (ref 7–18)
BUN/CREAT SERPL: 5 (ref 12–20)
BUN/CREAT SERPL: 6 (ref 12–20)
CALCIUM SERPL-MCNC: 6.8 MG/DL (ref 8.5–10.1)
CALCIUM SERPL-MCNC: 7.4 MG/DL (ref 8.5–10.1)
CHLORIDE SERPL-SCNC: 108 MMOL/L (ref 100–111)
CHLORIDE SERPL-SCNC: 109 MMOL/L (ref 100–111)
CO2 SERPL-SCNC: 13 MMOL/L (ref 21–32)
CO2 SERPL-SCNC: 13 MMOL/L (ref 21–32)
CREAT SERPL-MCNC: 14.4 MG/DL (ref 0.6–1.3)
CREAT SERPL-MCNC: 17.4 MG/DL (ref 0.6–1.3)
ECHO BSA: 2.02 M2
EKG ATRIAL RATE: 126 BPM
EKG ATRIAL RATE: 94 BPM
EKG DIAGNOSIS: NORMAL
EKG DIAGNOSIS: NORMAL
EKG P AXIS: 40 DEGREES
EKG P AXIS: 69 DEGREES
EKG P-R INTERVAL: 134 MS
EKG P-R INTERVAL: 164 MS
EKG Q-T INTERVAL: 394 MS
EKG Q-T INTERVAL: 400 MS
EKG QRS DURATION: 80 MS
EKG QRS DURATION: 88 MS
EKG QTC CALCULATION (BAZETT): 492 MS
EKG QTC CALCULATION (BAZETT): 579 MS
EKG R AXIS: 19 DEGREES
EKG R AXIS: 44 DEGREES
EKG T AXIS: 46 DEGREES
EKG T AXIS: 58 DEGREES
EKG VENTRICULAR RATE: 126 BPM
EKG VENTRICULAR RATE: 94 BPM
GAS FLOW.O2 O2 DELIVERY SYS: ABNORMAL
GLOBULIN SER CALC-MCNC: 2.2 G/DL (ref 2–4)
GLUCOSE BLD STRIP.AUTO-MCNC: 104 MG/DL (ref 70–110)
GLUCOSE BLD STRIP.AUTO-MCNC: 109 MG/DL (ref 70–110)
GLUCOSE BLD STRIP.AUTO-MCNC: 110 MG/DL (ref 70–110)
GLUCOSE BLD STRIP.AUTO-MCNC: 120 MG/DL (ref 70–110)
GLUCOSE BLD STRIP.AUTO-MCNC: 143 MG/DL (ref 70–110)
GLUCOSE BLD STRIP.AUTO-MCNC: 50 MG/DL (ref 70–110)
GLUCOSE BLD STRIP.AUTO-MCNC: 64 MG/DL (ref 70–110)
GLUCOSE BLD STRIP.AUTO-MCNC: 66 MG/DL (ref 70–110)
GLUCOSE BLD STRIP.AUTO-MCNC: 66 MG/DL (ref 70–110)
GLUCOSE BLD STRIP.AUTO-MCNC: 68 MG/DL (ref 70–110)
GLUCOSE BLD STRIP.AUTO-MCNC: 73 MG/DL (ref 70–110)
GLUCOSE BLD STRIP.AUTO-MCNC: 76 MG/DL (ref 70–110)
GLUCOSE BLD STRIP.AUTO-MCNC: 85 MG/DL (ref 70–110)
GLUCOSE BLD STRIP.AUTO-MCNC: 89 MG/DL (ref 70–110)
GLUCOSE BLD STRIP.AUTO-MCNC: 94 MG/DL (ref 70–110)
GLUCOSE SERPL-MCNC: 76 MG/DL (ref 74–99)
GLUCOSE SERPL-MCNC: 96 MG/DL (ref 74–99)
HBV SURFACE AB SER QL IA: NEGATIVE
HBV SURFACE AB SERPL IA-ACNC: 4.94 MIU/ML
HBV SURFACE AG SER QL: <0.1 INDEX
HBV SURFACE AG SER QL: NEGATIVE
HCO3 BLD-SCNC: 11.5 MMOL/L (ref 22–26)
HCO3 BLD-SCNC: 12.8 MMOL/L (ref 22–26)
HEP BS AB COMMENT: ABNORMAL
LACTATE BLD-SCNC: 3.48 MMOL/L (ref 0.4–2)
LACTATE SERPL-SCNC: 1.2 MMOL/L (ref 0.4–2)
MAGNESIUM SERPL-MCNC: 1.2 MG/DL (ref 1.6–2.6)
O2/TOTAL GAS SETTING VFR VENT: 21 %
PCO2 BLD: 12.4 MMHG (ref 35–45)
PCO2 BLD: 19.1 MMHG (ref 35–45)
PH BLD: 7.43 (ref 7.35–7.45)
PH BLD: 7.57 (ref 7.35–7.45)
PHOSPHATE SERPL-MCNC: 2.7 MG/DL (ref 2.5–4.9)
PO2 BLD: 105 MMHG (ref 80–100)
PO2 BLD: 22 MMHG (ref 80–100)
POTASSIUM SERPL-SCNC: 2.5 MMOL/L (ref 3.5–5.5)
POTASSIUM SERPL-SCNC: 2.6 MMOL/L (ref 3.5–5.5)
PROT SERPL-MCNC: 5 G/DL (ref 6.4–8.2)
SAO2 % BLD: 51.2 % (ref 92–97)
SAO2 % BLD: 98.4 % (ref 92–97)
SERVICE CMNT-IMP: ABNORMAL
SERVICE CMNT-IMP: ABNORMAL
SODIUM SERPL-SCNC: 138 MMOL/L (ref 136–145)
SODIUM SERPL-SCNC: 141 MMOL/L (ref 136–145)
SPECIMEN TYPE: ABNORMAL
SPECIMEN TYPE: ABNORMAL
VANCOMYCIN SERPL-MCNC: 14.8 UG/ML (ref 5–40)

## 2024-04-16 PROCEDURE — 83605 ASSAY OF LACTIC ACID: CPT

## 2024-04-16 PROCEDURE — 84100 ASSAY OF PHOSPHORUS: CPT

## 2024-04-16 PROCEDURE — 2580000003 HC RX 258

## 2024-04-16 PROCEDURE — 36600 WITHDRAWAL OF ARTERIAL BLOOD: CPT

## 2024-04-16 PROCEDURE — 70450 CT HEAD/BRAIN W/O DYE: CPT

## 2024-04-16 PROCEDURE — 87340 HEPATITIS B SURFACE AG IA: CPT

## 2024-04-16 PROCEDURE — 36556 INSERT NON-TUNNEL CV CATH: CPT

## 2024-04-16 PROCEDURE — 2580000003 HC RX 258: Performed by: INTERNAL MEDICINE

## 2024-04-16 PROCEDURE — 2500000003 HC RX 250 WO HCPCS

## 2024-04-16 PROCEDURE — 93970 EXTREMITY STUDY: CPT

## 2024-04-16 PROCEDURE — 36415 COLL VENOUS BLD VENIPUNCTURE: CPT

## 2024-04-16 PROCEDURE — 82803 BLOOD GASES ANY COMBINATION: CPT

## 2024-04-16 PROCEDURE — 93005 ELECTROCARDIOGRAM TRACING: CPT | Performed by: INTERNAL MEDICINE

## 2024-04-16 PROCEDURE — 80202 ASSAY OF VANCOMYCIN: CPT

## 2024-04-16 PROCEDURE — 3430000000 HC RX DIAGNOSTIC RADIOPHARMACEUTICAL: Performed by: STUDENT IN AN ORGANIZED HEALTH CARE EDUCATION/TRAINING PROGRAM

## 2024-04-16 PROCEDURE — A4216 STERILE WATER/SALINE, 10 ML: HCPCS | Performed by: INTERNAL MEDICINE

## 2024-04-16 PROCEDURE — 82962 GLUCOSE BLOOD TEST: CPT

## 2024-04-16 PROCEDURE — 86706 HEP B SURFACE ANTIBODY: CPT

## 2024-04-16 PROCEDURE — 6360000002 HC RX W HCPCS: Performed by: INTERNAL MEDICINE

## 2024-04-16 PROCEDURE — 83735 ASSAY OF MAGNESIUM: CPT

## 2024-04-16 PROCEDURE — 80053 COMPREHEN METABOLIC PANEL: CPT

## 2024-04-16 PROCEDURE — P9047 ALBUMIN (HUMAN), 25%, 50ML: HCPCS | Performed by: INTERNAL MEDICINE

## 2024-04-16 PROCEDURE — C9113 INJ PANTOPRAZOLE SODIUM, VIA: HCPCS | Performed by: INTERNAL MEDICINE

## 2024-04-16 PROCEDURE — 80197 ASSAY OF TACROLIMUS: CPT

## 2024-04-16 PROCEDURE — 3E033XZ INTRODUCTION OF VASOPRESSOR INTO PERIPHERAL VEIN, PERCUTANEOUS APPROACH: ICD-10-PCS | Performed by: INTERNAL MEDICINE

## 2024-04-16 PROCEDURE — 6370000000 HC RX 637 (ALT 250 FOR IP): Performed by: INTERNAL MEDICINE

## 2024-04-16 PROCEDURE — A9540 TC99M MAA: HCPCS | Performed by: STUDENT IN AN ORGANIZED HEALTH CARE EDUCATION/TRAINING PROGRAM

## 2024-04-16 PROCEDURE — 2000000000 HC ICU R&B

## 2024-04-16 PROCEDURE — 94640 AIRWAY INHALATION TREATMENT: CPT

## 2024-04-16 RX ORDER — DEXTROSE MONOHYDRATE 100 MG/ML
INJECTION, SOLUTION INTRAVENOUS CONTINUOUS
Status: DISCONTINUED | OUTPATIENT
Start: 2024-04-16 | End: 2024-04-16 | Stop reason: ALTCHOICE

## 2024-04-16 RX ORDER — NOREPINEPHRINE BITARTRATE 0.06 MG/ML
1-30 INJECTION, SOLUTION INTRAVENOUS CONTINUOUS
Status: DISCONTINUED | OUTPATIENT
Start: 2024-04-16 | End: 2024-04-17

## 2024-04-16 RX ORDER — ARFORMOTEROL TARTRATE 15 UG/2ML
15 SOLUTION RESPIRATORY (INHALATION)
Status: DISCONTINUED | OUTPATIENT
Start: 2024-04-16 | End: 2024-04-18 | Stop reason: HOSPADM

## 2024-04-16 RX ORDER — NOREPINEPHRINE BITARTRATE 0.06 MG/ML
INJECTION, SOLUTION INTRAVENOUS
Status: COMPLETED
Start: 2024-04-16 | End: 2024-04-16

## 2024-04-16 RX ORDER — DEXTROSE AND SODIUM CHLORIDE 5; .9 G/100ML; G/100ML
INJECTION, SOLUTION INTRAVENOUS CONTINUOUS
Status: DISCONTINUED | OUTPATIENT
Start: 2024-04-16 | End: 2024-04-16

## 2024-04-16 RX ORDER — DEXTROSE AND SODIUM CHLORIDE 5; .45 G/100ML; G/100ML
INJECTION, SOLUTION INTRAVENOUS CONTINUOUS
Status: DISCONTINUED | OUTPATIENT
Start: 2024-04-16 | End: 2024-04-16 | Stop reason: ALTCHOICE

## 2024-04-16 RX ORDER — ALBUMIN (HUMAN) 12.5 G/50ML
12.5 SOLUTION INTRAVENOUS EVERY 6 HOURS
Status: DISCONTINUED | OUTPATIENT
Start: 2024-04-16 | End: 2024-04-18 | Stop reason: HOSPADM

## 2024-04-16 RX ORDER — DEXTROSE MONOHYDRATE 100 MG/ML
INJECTION, SOLUTION INTRAVENOUS CONTINUOUS
Status: DISPENSED | OUTPATIENT
Start: 2024-04-16 | End: 2024-04-17

## 2024-04-16 RX ORDER — SODIUM CHLORIDE 9 MG/ML
INJECTION, SOLUTION INTRAVENOUS ONCE
Status: COMPLETED | OUTPATIENT
Start: 2024-04-16 | End: 2024-04-16

## 2024-04-16 RX ORDER — MIDODRINE HYDROCHLORIDE 2.5 MG/1
5 TABLET ORAL
Status: DISCONTINUED | OUTPATIENT
Start: 2024-04-16 | End: 2024-04-16

## 2024-04-16 RX ORDER — MAGNESIUM SULFATE 1 G/100ML
1000 INJECTION INTRAVENOUS ONCE
Status: COMPLETED | OUTPATIENT
Start: 2024-04-16 | End: 2024-04-16

## 2024-04-16 RX ORDER — ALBUMIN (HUMAN) 12.5 G/50ML
12.5 SOLUTION INTRAVENOUS ONCE
Status: COMPLETED | OUTPATIENT
Start: 2024-04-16 | End: 2024-04-16

## 2024-04-16 RX ORDER — MIDODRINE HYDROCHLORIDE 10 MG/1
10 TABLET ORAL
Status: DISCONTINUED | OUTPATIENT
Start: 2024-04-16 | End: 2024-04-18 | Stop reason: HOSPADM

## 2024-04-16 RX ORDER — POTASSIUM CHLORIDE 7.45 MG/ML
10 INJECTION INTRAVENOUS ONCE
Status: COMPLETED | OUTPATIENT
Start: 2024-04-16 | End: 2024-04-16

## 2024-04-16 RX ORDER — POTASSIUM CHLORIDE 7.45 MG/ML
10 INJECTION INTRAVENOUS
Status: COMPLETED | OUTPATIENT
Start: 2024-04-16 | End: 2024-04-16

## 2024-04-16 RX ORDER — SODIUM BICARBONATE 650 MG/1
650 TABLET ORAL 4 TIMES DAILY
Status: DISCONTINUED | OUTPATIENT
Start: 2024-04-16 | End: 2024-04-18 | Stop reason: HOSPADM

## 2024-04-16 RX ORDER — HEPARIN SODIUM 1000 [USP'U]/ML
4000 INJECTION, SOLUTION INTRAVENOUS; SUBCUTANEOUS PRN
Status: DISCONTINUED | OUTPATIENT
Start: 2024-04-16 | End: 2024-04-18 | Stop reason: HOSPADM

## 2024-04-16 RX ORDER — DEXTROSE MONOHYDRATE 100 MG/ML
INJECTION, SOLUTION INTRAVENOUS
Status: COMPLETED
Start: 2024-04-16 | End: 2024-04-16

## 2024-04-16 RX ADMIN — SODIUM BICARBONATE 650 MG TABLET 650 MG: at 21:01

## 2024-04-16 RX ADMIN — ARFORMOTEROL TARTRATE 15 MCG: 15 SOLUTION RESPIRATORY (INHALATION) at 14:20

## 2024-04-16 RX ADMIN — SODIUM CHLORIDE, PRESERVATIVE FREE 10 ML: 5 INJECTION INTRAVENOUS at 08:42

## 2024-04-16 RX ADMIN — DEXTROSE MONOHYDRATE: 100 INJECTION, SOLUTION INTRAVENOUS at 04:41

## 2024-04-16 RX ADMIN — TACROLIMUS 2 MG: 1 CAPSULE ORAL at 21:00

## 2024-04-16 RX ADMIN — Medication 5 MCG/MIN: at 00:37

## 2024-04-16 RX ADMIN — MYCOPHENOLIC ACID 180 MG: 180 TABLET, DELAYED RELEASE ORAL at 08:41

## 2024-04-16 RX ADMIN — SODIUM CHLORIDE: 9 INJECTION, SOLUTION INTRAVENOUS at 16:38

## 2024-04-16 RX ADMIN — SODIUM CHLORIDE, PRESERVATIVE FREE 40 MG: 5 INJECTION INTRAVENOUS at 08:27

## 2024-04-16 RX ADMIN — MIDODRINE HYDROCHLORIDE 10 MG: 10 TABLET ORAL at 11:47

## 2024-04-16 RX ADMIN — DEXTROSE MONOHYDRATE 125 ML: 10 INJECTION, SOLUTION INTRAVENOUS at 01:36

## 2024-04-16 RX ADMIN — ALBUMIN (HUMAN) 12.5 G: 0.25 INJECTION, SOLUTION INTRAVENOUS at 17:12

## 2024-04-16 RX ADMIN — MYCOPHENOLIC ACID 180 MG: 180 TABLET, DELAYED RELEASE ORAL at 17:25

## 2024-04-16 RX ADMIN — POTASSIUM CHLORIDE 10 MEQ: 7.46 INJECTION, SOLUTION INTRAVENOUS at 11:54

## 2024-04-16 RX ADMIN — POTASSIUM CHLORIDE 10 MEQ: 7.46 INJECTION, SOLUTION INTRAVENOUS at 06:28

## 2024-04-16 RX ADMIN — NOREPINEPHRINE BITARTRATE 5 MCG/MIN: 0.06 INJECTION, SOLUTION INTRAVENOUS at 00:37

## 2024-04-16 RX ADMIN — SODIUM CHLORIDE, PRESERVATIVE FREE 10 ML: 5 INJECTION INTRAVENOUS at 21:06

## 2024-04-16 RX ADMIN — POTASSIUM CHLORIDE 10 MEQ: 7.46 INJECTION, SOLUTION INTRAVENOUS at 10:52

## 2024-04-16 RX ADMIN — MIDODRINE HYDROCHLORIDE 10 MG: 10 TABLET ORAL at 17:12

## 2024-04-16 RX ADMIN — MYCOPHENOLIC ACID 180 MG: 180 TABLET, DELAYED RELEASE ORAL at 13:20

## 2024-04-16 RX ADMIN — POTASSIUM CHLORIDE 10 MEQ: 7.46 INJECTION, SOLUTION INTRAVENOUS at 09:22

## 2024-04-16 RX ADMIN — VANCOMYCIN HYDROCHLORIDE 750 MG: 750 INJECTION, POWDER, LYOPHILIZED, FOR SOLUTION INTRAVENOUS at 12:30

## 2024-04-16 RX ADMIN — DEXTROSE MONOHYDRATE 125 ML: 10 INJECTION, SOLUTION INTRAVENOUS at 16:19

## 2024-04-16 RX ADMIN — DEXTROSE MONOHYDRATE 125 ML: 10 INJECTION, SOLUTION INTRAVENOUS at 12:09

## 2024-04-16 RX ADMIN — HEPARIN SODIUM 4000 UNITS: 1000 INJECTION INTRAVENOUS; SUBCUTANEOUS at 01:21

## 2024-04-16 RX ADMIN — POTASSIUM CHLORIDE 10 MEQ: 7.46 INJECTION, SOLUTION INTRAVENOUS at 08:27

## 2024-04-16 RX ADMIN — SODIUM BICARBONATE 650 MG TABLET 650 MG: at 13:20

## 2024-04-16 RX ADMIN — PIPERACILLIN AND TAZOBACTAM 3375 MG: 3; .375 INJECTION, POWDER, LYOPHILIZED, FOR SOLUTION INTRAVENOUS at 13:41

## 2024-04-16 RX ADMIN — HEPARIN SODIUM 5000 UNITS: 5000 INJECTION INTRAVENOUS; SUBCUTANEOUS at 15:27

## 2024-04-16 RX ADMIN — DEXTROSE MONOHYDRATE: 100 INJECTION, SOLUTION INTRAVENOUS at 16:40

## 2024-04-16 RX ADMIN — HEPARIN SODIUM 5000 UNITS: 5000 INJECTION INTRAVENOUS; SUBCUTANEOUS at 21:01

## 2024-04-16 RX ADMIN — ARFORMOTEROL TARTRATE 15 MCG: 15 SOLUTION RESPIRATORY (INHALATION) at 19:35

## 2024-04-16 RX ADMIN — DEXTROSE AND SODIUM CHLORIDE: 5; 450 INJECTION, SOLUTION INTRAVENOUS at 02:06

## 2024-04-16 RX ADMIN — MYCOPHENOLIC ACID 180 MG: 180 TABLET, DELAYED RELEASE ORAL at 21:00

## 2024-04-16 RX ADMIN — HEPARIN SODIUM 5000 UNITS: 5000 INJECTION INTRAVENOUS; SUBCUTANEOUS at 06:28

## 2024-04-16 RX ADMIN — ALBUMIN (HUMAN) 12.5 G: 0.25 INJECTION, SOLUTION INTRAVENOUS at 10:58

## 2024-04-16 RX ADMIN — SODIUM BICARBONATE 650 MG TABLET 650 MG: at 17:12

## 2024-04-16 RX ADMIN — TACROLIMUS 2 MG: 1 CAPSULE ORAL at 08:41

## 2024-04-16 RX ADMIN — PIPERACILLIN AND TAZOBACTAM 3375 MG: 3; .375 INJECTION, POWDER, LYOPHILIZED, FOR SOLUTION INTRAVENOUS at 02:10

## 2024-04-16 RX ADMIN — DEXTROSE AND SODIUM CHLORIDE: 5; 900 INJECTION, SOLUTION INTRAVENOUS at 12:45

## 2024-04-16 RX ADMIN — MAGNESIUM SULFATE HEPTAHYDRATE 1000 MG: 1 INJECTION, SOLUTION INTRAVENOUS at 10:58

## 2024-04-16 RX ADMIN — POTASSIUM CHLORIDE 10 MEQ: 7.46 INJECTION, SOLUTION INTRAVENOUS at 13:00

## 2024-04-16 RX ADMIN — KIT FOR THE PREPARATION OF TECHNETIUM TC 99M ALBUMIN AGGREGATED 6.2 MILLICURIE: 2.5 INJECTION, POWDER, FOR SOLUTION INTRAVENOUS at 10:27

## 2024-04-16 NOTE — PROGRESS NOTES
[facility]   Pharmacy Pharmacokinetic Monitoring Service - Vancomycin    Valerinao Fong III is a 49 y.o. male starting on vancomycin therapy for Sepsis. Pharmacy consulted by  for monitoring and adjustment.    Target Concentration: Pre-Dialysis Concentration 15-20 mg/L  Additional Antimicrobials: zosyn  Pertinent Laboratory Values:   Wt Readings from Last 1 Encounters:   04/15/24 80 kg (176 lb 5.9 oz)     Temp Readings from Last 1 Encounters:   04/15/24 97.6 °F (36.4 °C) (Oral)     Recent Labs     04/15/24  1615   CREATININE 30.40*   *   WBC 3.9*     Procalcitonin: 22.56 (04/15/24)    Pertinent Cultures:  Culture Date Source Results   04/15/24 blood  In process     Plan:    Concentration-guided dosing due to renal impairment and intermittent hemodialysis   Start vancomycin 1500mg iv once,given (04/15/24 at 2236)  Vancomycin random level ordered for 04/16/24 @ 1000.  Pharmacy will continue to monitor patient and adjust therapy as indicated    Thank you for the consult,  Jaquelin Wills RPH  4/16/2024 12:25 AM

## 2024-04-16 NOTE — PROGRESS NOTES
TREATMENT SUMMARY     Patient dialyzed in room 101/ ICU 1  Tolerated HD w/ hypoglycemia, tachycardia, tachypnea, and AMS. HD terminated after 2 hours  Access functioning well without complication of BFR or difficulty accessing   BFR  250-400  DFR  500-800  143 ml removed via UF with a net positive gain of +1000 ml  Report given to Aisha Cochran, RN with all questions answered     TREATMENT NOTES      2310 increased restlessness noted AEB frequency frequent body movements/ body postioning/ head turning, increased respirations > 30 RRPM, tachycardia (). Patient denies any complaint when questioned, answers questions appropriately. UF/fluid removal suspended @ 143ml removed, BFR/ blood flow rate reduced to 250ml/min, DFR /dialysate flow rate reduced to 500ml/min.     2330 HD suspended r/t hypoglycemia, tachycardia, tachypnea. Nephrology called/ messaged     2355 HD restarted     0020 Hypotension with SBP >90. Patient denies any complaint.    0045 HD terminated r/t to AMS/ decreased alertness,  tachycardia,  tachypnea.Code stroke called by Aisha Cochran, RN primary RN.

## 2024-04-16 NOTE — PROGRESS NOTES
Zosyn (Piperacillin/Tazobactam) Extended Infusion    Valeriano Fong III, a 49 y.o. yo male, has been converted to an extended infusion of Zosyn while in the intensive care unit.   Patient is a dialysis patient   Extended infusions will run over 4 hours (240 minutes).  Dose adjusted to zosyn 3.375g IV Q12H  (Prior dose: zosyn 3.375g IV q8H)    No results for input(s): \"CREA\" in the last 72 hours.  Ht Readings from Last 1 Encounters:   04/15/24 1.829 m (6')     Wt Readings from Last 1 Encounters:   04/15/24 80 kg (176 lb 5.9 oz)       CrCl : Serum creatinine: 30.4 mg/dL (H) 04/15/24 1615  Estimated creatinine clearance: 3 mL/min (A)    Renal adjustment of extended infusion of Zosyn  3.375 or 4.5 gm every 8 hours for CrCl >/= 20 ml/min  3.375 or 4.5 gm every 12 hours for CrCl < 20 ml/min, intermittent HD or PD     Pharmacy to continue to monitor daily.

## 2024-04-16 NOTE — ED NOTES
TRANSFER - OUT REPORT:    Verbal report given to PASTOR Leonard on Valeriano Fong III  being transferred to ICU for routine progression of patient care       Report consisted of patient's Situation, Background, Assessment and   Recommendations(SBAR).     Information from the following report(s) Nurse Handoff Report, Index, ED Encounter Summary, ED SBAR, Adult Overview, Intake/Output, MAR, and Recent Results was reviewed with the receiving nurse.    [place] Assessment:                           Lines:   Peripheral IV 04/15/24 Right Hand (Active)   Site Assessment Clean, dry & intact 04/15/24 1655   Line Status Blood return noted;Normal saline locked 04/15/24 1655       Peripheral IV 04/15/24 Left Antecubital (Active)   Site Assessment Clean, dry & intact 04/15/24 1753   Line Status Blood return noted;Normal saline locked 04/15/24 1753        Opportunity for questions and clarification was provided.      Patient transported with:  Registered Nurse

## 2024-04-16 NOTE — PROGRESS NOTES
2202-Pt arrived to the unit, glasses, street clothes, and wallet with the patient, placed in patient belonging bag. Pt given full CHG bath, placed in hospital gown. 4 eyes skin check performed with Tammy, Charge RN. Pt has chronic Abd wound, cleansed and redressed. VSS. Pt appears unwell. No distress noted. Weight performed. Basic required admission questions completed by myself or Tammy. Pt alert and oriented, signed consent for HD. Diaylsis RN at bedside. Plan, 3 hours HD.     2330-Pt tachycardia and tachypnea. Dialysis RN at bedside, stopping treatment. Pt sats 100%. RT called, performed assessment. MD notified via telephone. Pt shivering, given tylenol.     2344-BS low, 64. Pt refused to drink or eat anything. Given 125 mL of D10 per MD.     0002-BS recheck 85.     0040-Change in mentation noted, pt responds to voice briefly before falling back asleep. HR in the 120s. Respirations in 30s/40s. RT at bedside to do venous gas. Labs drawn. Code stroke called d/t change in LOC. MD notified.     0043-BS. 76. MD notified, orders placed to change rate of pts current IVF. MD at bedside. Code stroke canceled by MD. Orders to obtain STAT dry CT of head.     0115-Pt taken to CT by this RN and charge RN Tammy. Pt on cardiac monitor.     0118-Return to bedside, noted pt to be hypotensive. MD notified and orders placed for Levophed gtt.     0132-BS rechecked. BS 50. Will give bolus 125 mL per hypoglcemia protocol. (This is bolus number 2 from me)       0150-Recheck . Pt more alert. VSS on Levo.     0400-VSS. Pt appears less distressed. Will cont to monitor.     0434-Pt sweaty, lethargic. BS checked, 68. Pt started on D10 gtt per hypoglycemia protocol. Will recheck per protocol.     0449-BS recheck 66. Will cont to monitor.     0507-BS recheck, 73. Will cont to monitor     0515-Receveid critical potassium. MD lopes. Returned paged, results correctly repeated to MD Pace. Order received to give 10 MEQ replacement,

## 2024-04-16 NOTE — PROGRESS NOTES
[facility]   Pharmacy Pharmacokinetic Monitoring Service - Vancomycin    Consulting Provider: Dr. Pace   Indication: Sepsis - 7 day Tx  Target Concentration: Pre-Dialysis Concentration 15-20 mg/L  Day of Therapy: 2  Additional Antimicrobials: Zosyn    Pertinent Laboratory Values:   Wt Readings from Last 1 Encounters:   04/15/24 80 kg (176 lb 5.9 oz)     Temp Readings from Last 1 Encounters:   04/16/24 98.5 °F (36.9 °C) (Oral)     Recent Labs     04/15/24  1615 04/16/24  0050 04/16/24  0623   CREATININE 30.40* 14.40* 17.40*   * 91* 90*   WBC 3.9*  --   --      Recent vancomycin administrations                     vancomycin (VANCOCIN) 1500 mg in sodium chloride 0.9% 500 mL IVPB (mg) 1,500 mg New Bag 04/15/24 2236               Plan:  Patient received Vancomycin 1500 mg IV at 22:36 4/15/2024  Patient Had HD at 01:00 4/16/2024  Vancomycin Random Level at 09:12 4/16/2024 ==>14.8 mg/l  Will re-dose with Vancomycin 750 mg IV today at 11:00  HD Scheduled for 4/17/2024  Pharmacy will continue to monitor patient and adjust therapy as indicated    ANIA KUHN RPH, BCPS  4/16/2024 10:43 AM   [phone]

## 2024-04-16 NOTE — PROGRESS NOTES
Hospitalist Progress Note    Patient: Valeriano Fong III MRN: [id number]  CSN: [id number]    YOB: 1975  Age: 49 y.o.  Sex: male    DOA: 4/15/2024 LOS:  LOS: 1 day                Assessment/Plan     Active Hospital Problems    Diagnosis     Sepsis without acute organ dysfunction (HCC) [A41.9]     Metabolic acidosis [E87.20]     Hypothermia [T68.XXXA]     Hypotension [I95.9]     Personal history of immunosupression therapy [Z92.25]     Metabolic acidosis with respiratory acidosis [E87.4]     Crohn's disease (HCC) [K50.90]     ESRD on dialysis (HCC) [N18.6, Z99.2]     Liver transplant status (HCC) [Z94.4]         Chief complaint :  Malaise  48 y/o male with complex past medical history here with metabolic acidosis, multiple missed hemodialysis sessions, hypothermia concerning for sepsis and immunosuppression.       CRITICAL CARE PLAN    Resp -   Acute respiratory failure with hypoxia -  Continue with oxygen by NC as needed, wean as tolerated  Pleural effusion.     ID -  blood  cx.   Sepsis  Procalcitonin elevated at 22.  On immunosuppression.  No clear source identified.  Has TDC  Concern for CRBSI  ANTIBIOTICS - vancomycin, zosyn.     CVS - Monitor HD.   Hypotension, ?septic shock.  On midodrine, albumin  On pressors (levophed) wean as tolerated.    Heme/onc - Follow H&H, plts.   Anemia - secondary to ESRD    Renal - Trend BUN, Cr. Check and replace Mg, K, phos.  ESRD - HD per nephrology  Metabolic acidosis - received bicarb.    Endocrine -  Follow FSG    Neuro/ Pain/ Sedation -   Stable mental status.    GI -   S/p liver transplant  Continue on immunosuppressants.    Prophylaxis - DVT: heparin,     [id number]  35 minutes of critical care time spent in the direct evaluation and treatment of this high risk patient. The reason for providing this level of medical care for this critically ill patient was due a critical illness that impaired one or more vital organ systems such that there was a high probability  of imminent or life threatening deterioration in the patients condition. This care involved high complexity decision making to assess, manipulate, and support vital system functions, to treat this degreee vital organ system failure and to prevent further life threatening deterioration of the patient’s condition.      Disposition : TBD    Review of systems  General: No fevers or chills.  Cardiovascular: No chest pain or pressure. No palpitations.   Pulmonary: No shortness of breath.   Gastrointestinal: No nausea, vomiting.     Physical Exam:  General: Awake, cooperative, no acute distress    HEENT: NC, Atraumatic.  PERRLA, anicteric sclerae.  Lungs: CTA Bilaterally. No Wheezing/Rhonchi/Rales.  Heart:  S1 S2,  No murmur, No Rubs, No Gallops  Abdomen: Soft, Non distended, Non tender.  +Bowel sounds,   Extremities: No c/c/e  Psych:   Not anxious or agitated.  Neurologic:  No acute neurological deficit.         Vital signs/Intake and Output:  Visit Vitals  /69   Pulse 74   Temp 97.8 °F (36.6 °C) (Oral)   Resp 14   Ht 1.829 m (6')   Wt 80 kg (176 lb 5.9 oz)   SpO2 100%   BMI 23.92 kg/m²     Current Shift:  No intake/output data recorded.  Last three shifts:  04/15 0701 - 04/16 1900  In: 2346.8 [P.O.:240; I.V.:413.8]  Out: 143             Labs: Results:       Chemistry Recent Labs     04/15/24  1615 04/16/24  0050 04/16/24  0623   * 141 138   K 5.0 2.6* 2.5*    109 108   CO2 5* 13* 13*   * 91* 90*   GLOB 2.9 2.2  --    ,No results found for: \"LACTA\"   CBC w/Diff Recent Labs     04/15/24  1615   WBC 3.9*   RBC 3.07*   HGB 8.8*   HCT 26.2*   PLT 90*      Cardiac Enzymes Lab Results   Component Value Date    TROPHS 16 04/15/2024   ,  Lab Results   Component Value Date    BNP 4,533 (H) 02/10/2022      Coagulation No results for input(s): \"INR\", \"APTT\" in the last 72 hours.    Invalid input(s): \"PTP\"    Lipid Panel No results found for: \"CHOL\", \"CHOLPOCT\", \"CHOLX\", \"CHLST\", \"CHOLV\", \"665999\", \"HDL\",  \"HDLC\", \"LDL\", \"LDLC\", \"852110\", \"VLDLC\", \"VLDL\", \"TGLX\", \"TRIGL\"   Pancreas No results for input(s): \"LIPASE\" in the last 72 hours.,No results for input(s): \"AMYLASE\" in the last 72 hours.   Liver Enzymes No results for input(s): \"TP\", \"ALB\" in the last 72 hours.    Invalid input(s): \"TBIL\", \"AP\", \"SGOT\", \"GPT\", \"DBIL\"   Thyroid Studies No results found for: \"T4\", \"T3RU\", \"TSH\"     Procedures/imaging: see electronic medical records for all procedures/Xrays and details which were not copied into this note but were reviewed prior to creation of Plan    TIME: E/M Time spent with patient and patient care issues: [] 31-40 mins  [] 41-49 mins  [x] 50 mins or more.     This time also includes physician non-face-to-face service time visit on the date of service such as  Preparing to see the patient (eg, review of tests)  Obtaining and/or reviewing separately obtained history  Performing a medically necessary appropriate examination and/or evaluation  Counseling and educating the patient/family/caregiver  Ordering medications, tests, or procedures  Referring and communicating with other health care professionals as needed  Documenting clinical information in the electronic or other health record  Independently interpreting results (not reported separately) and communicating results to the patient/family/caregiver  Care coordination and discharge planning with Case Management.

## 2024-04-16 NOTE — CONSULTS
[facility]  (A Division of [facility] [place] [facility])                                                           Date of Admission: 4/15/2024       Reason for Consult: sepsis in liver transplant patient  Referring MD: Michael Bee    C/C: sepsis    Current Antimicrobials:    Prior Antimicrobials:  Vancomycin and Zosyn 4/15 to date    Immunosuppression:  Tacrolimus and Mycophenolate      Allergy to antibiotics: NA       Assessment--ID related:     Critically ill and immunosuppressed patient:    Severe sepsis and hypotension--unclear source but multiple possible etiology: CRBSI with indwelling R HD cath Vs GI source. Transplant patient and atypical/OI possible. No marked lung abnormality on CT CAP( done WO contrast)  --Procalcitonin is high 22-- bacterial infection likley    Pancytopenia- could be multifactorial-drugs/viral infection/sepsis    Uremia with skipped HD--ESRD on HD--home. 4X weekly.    Liver transplant 3/2022- [facility]-- on immunosuppressants. Follows with Transplant service  --CMV IgG +    Crohns diseases- Chronic open wound on abdomen, hx of ileostomy takedown    Microlab data:    4/15-Viral test /PCR for COVID 19/Influenza A and B -negative.   4/15- Blood culture X2- Negative    Respiratory culture    Co-morbidities:  Hx of etoh cirrhosis,hx of MRSA/VRE    Metabolic acidosis/electrolyte abnormalities    Recommendation -- ID related:     Agree with broad spectrum antibiotics- vanco and zosyn- would cover gi and skin pathogens  FU blood cultures until final. If CRBSI, bacteremia typically present  Trend serology- for Pro-calcitonin  CMV serology  Further transplant related ID issues- may need higher transplant ID services -- will defer to referring team.    Thank you for involving me in the care of this patient. Please do not hesitate to contact me on the above number if question or concern.            HPI:      Valeriano Fong III is a 49 y.o. male with PMH of liver  soft, non-distended, active bowel sounds. Appropriate surgical scars for stated surgeries. Non-tender   Genitourinary:  deferred   Extremities:   no clubbing, cyanosis; no joint effusions or swelling   Neurologic:  No gross focal sensory abnormalities;  Cranial nerves intact   Psychiatric:   appropriate and interactive.        Labs: Results:   Chemistry Recent Labs     04/15/24  1615 04/16/24  0050 04/16/24  0623   * 141 138   K 5.0 2.6* 2.5*    109 108   CO2 5* 13* 13*   * 91* 90*   GLOB 2.9 2.2  --       CBC w/Diff Recent Labs     04/15/24  1615   WBC 3.9*   RBC 3.07*   HGB 8.8*   HCT 26.2*   PLT 90*            No results found for: \"SDES\" No components found for: \"CULT\"         Imaging:   All imaging reviewed from Admission to present as per radiology interpretation in [facility]    Radiology report last 24 hours:    NM LUNG SCAN PERFUSION ONLY    Result Date: 4/16/2024  INDICATION: Tachycardia. COMPARISON: 2022. A perfusion scan was performed with 6.2 mCi Tc MAA injected intravenously .  Perfusion images demonstrates blunting of the right CP angle consistent with right pleural effusion and basilar atelectasis.. LPO view anteriorly is obscured by overlying soft tissue.     1. Low probability. There is blunting right posterior CP angle consistent with effusion/atelectasis. LPO view is compromised by overlying soft tissue.    CT HEAD WO CONTRAST    Result Date: 4/16/2024  EXAM: CT HEAD WO CONTRAST INDICATION: change in mentation COMPARISON: January 4, 2024. CONTRAST: None. TECHNIQUE: Unenhanced CT of the head was performed using 5 mm images. Brain and bone windows were generated. Coronal and sagittal reformats. CT dose reduction was achieved through use of a standardized protocol tailored for this examination and automatic exposure control for dose modulation.  FINDINGS: The ventricles and sulci are normal in size, shape and configuration. There is no significant white matter disease. There is  aorta is normal in caliber. No acute bony abnormalities.  No aggressive appearing bony lesions.      1.  Small right-sided pleural effusion. 2.  No acute abnormalities in the abdomen and pelvis.     XR CHEST (2 VW)    Result Date: 4/15/2024  EXAM: XR CHEST (2 VW) ACC#: [id number] INDICATION: sob COMPARISON: 1/5/2024 TECHNIQUE: PA and lateral views of the chest. FINDINGS: Right-sided central venous catheter is again visualized. There is elevation of the right hemidiaphragm. There may be a small right-sided pleural effusion. The lungs are otherwise clear. The cardiomediastinal configuration is within normal limits. No acute bony abnormalities.     Elevation of the right hemidiaphragm. There may be a small right-sided pleural effusion.      Patsy Thompson MD  [facility])  Office #:     [phone]  [phone] Option #8   Office Fax: [phone]

## 2024-04-16 NOTE — CARE COORDINATION
04/16/24 1410   Service Assessment   Patient Orientation Alert and Oriented   Cognition Alert   History Provided By Patient   Primary Caregiver Self   Accompanied By/Relationship spouse at bedside   Support Systems Spouse/Significant Other   Patient's Healthcare Decision Maker is: Named in Scanned ACP Document   PCP Verified by CM Yes   Last Visit to PCP Within last 3 months   Prior Functional Level Independent in ADLs/IADLs   Current Functional Level Independent in ADLs/IADLs   Can patient return to prior living arrangement Yes   Ability to make needs known: Good   Family able to assist with home care needs: Yes   Would you like for me to discuss the discharge plan with any other family members/significant others, and if so, who? 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 Other (comment)   Potential Assistance Purchasing Medications No   Type of Home Care Services None   Patient expects to be discharged to: House   Follow Up Appointment: Best Day/Time  Monday AM   One/Two Story Residence One story   History of falls? 0   Services At/After Discharge   Transition of Care Consult (CM Consult) Other   Services At/After Discharge None    Resource Information Provided? No   Mode of Transport at Discharge BLS   Confirm Follow Up Transport Family   Condition of Participation:  Discharge Planning   The Plan for Transition of Care is related to the following treatment goals: home with PCP Hawa nuñez follow up   The Patient and/or Patient Representative was provided with a Choice of Provider? Patient   The Patient and/Or Patient Representative agree with the Discharge Plan? Yes   Freedom of Choice list was provided with basic dialogue that supports the patient's individualized plan of care/goals, treatment preferences, and shares the quality data associated with the providers?  Yes     SW spoke with spouse Jenn, regarding DC plan. Spouse stated that patient is a home HD patient and is working full time from home with [employer]. Patient attends [facility] and spouse stated that patient is typically able to do own dialysis but since he has not been feeling well, patient has missed several sessions. Per spouse, she attempted to reach out to HD center and did not get a call back. Spouse stated that patient had a transplant in march 2022 and follows Dr. Nuñez. Per spouse, patients Mds to have a conference call 04/17 regarding plan of care. Hepatology to be in touch with nephrology at [facility] for further plan (per spouse). Spouse stated that she is to have an oral surgery Thursday and if needed patients daughter, Alannah [phone], will be available. Unsure DC need at this time. SW to follow to assist with DC planning needs.

## 2024-04-16 NOTE — H&P
Dotty Vazquez Hospitalist H+P.      Assessment and Plan:    48 y/o male with complex past medical history here with metabolic acidosis, multiple missed hemodialysis sessions, hypothermia concerning for sepsis and immunosuppression.  Nephrology consulted for hemodialysis.  To be admitted to the ICU for continuation of care.      #Anion Gap Metabolic acidosis 2/2 missed dialysis.   -Admit ICU overnight.  -Suspect acidosis 2/2 missed HD.  -RR elevated as he tries to blow off CO2.  -Bicarbonate infusion.  -Nephrology consulted.  Arranging dialysis.    #Hypothermia query sepsis.  -This is always a tricky one.  While no clear focus of infection identified, I frequently find infection with hypothermia.  R/o sepsis.  -Lactic acid normal.  -Continue Rocephin and Azithromycin as a precaution. Rx Vancomycin as well.  -F/u on cultures.  -Continue gentle rehydration while avoiding volume overload.   -Sepsis reperfusion exam completed.      \"Pleural effusion.  -Suspect 2/2 missed HD.    #Immune Suppression.  -Continue immunosuppressives.       #Hyponatremia.  -Slight.  -Trend.     #ESRD.  -Cont HD.  -Dr. Ni consulted.     #Hypertension.  -Monitor bp.    #GERD.  -Noted.  Stable.     #Liver transplant recipient.  -Continue anti-rejection meds.     #Anemia of chronic kidney disease.  -Noted.  -Trend.    #DVT px.  -SQ heparin.     #GI px.  -Protonix.    #Code.  -Full.    -Dispo.  -Anticipate 2-3 days in the hospital.        70 minutes of direct critical care time.  Very complex patient with potentially life and or organ system threatening acute illness.  Very high complexity decision making utilized in this patient who required ICU level care. Time in: 20:50  Time out: 22:00.     Addendum:    Developed shaking chills during dialysis.  Procalcitonin > 20.  Suspect bloodstream infection.  Continue Vancomycin.  Change Rocephin and Azithromycin to Zosyn.  BP becoming soft.  Continue cautious hydration.  Add norepinephrine.  Follow.  IVAN Moss - CNP)    0 Result Notes  Details    Reading Physician Reading Date Result Priority   Rudolph Bravo MD  [phone] 4/15/2024      Narrative & Impression  EXAM: CT CHEST ABDOMEN PELVIS WO CONTRAST  ACC#: [id number]     INDICATION: H/o ESRD, vomiting, sepsis, pleural effusion     COMPARISON: 2/15/2022, 9/2/2022  IV CONTRAST: None.     ORAL CONTRAST: None.     TECHNIQUE:  Thin axial images were obtained through the chest, abdomen, and pelvis. Coronal  and sagittal reformats were generated. CT dose reduction was achieved through  use of a standardized protocol tailored for this examination and automatic  exposure control for dose modulation.     FINDINGS:  CHEST:  Coronary artery calcium: present     Right-sided central venous catheter is noted with the tip near the right  atrium-superior vena cava junction.  The thoracic aorta is normal in caliber. No significant pericardial effusion.  There is no significant lymphadenopathy.       There is a small right-sided pleural effusion with mild adjacent atelectasis.  There is no right-sided effusion. There is no pneumothorax.     There are no acute infiltrates.     There are no acute fractures or aggressive appearing bony abnormalities.        ABDOMEN/PELVIS:  No obvious masses are visualized in the liver.   No obvious masses in the  spleen. Mild splenomegaly is again noted..   The pancreas is grossly  unremarkable. The gallbladder is surgically absent. No significant biliary  dilatation.     No prominent adrenal nodules.     There is no hydronephrosis. No definite renal stones.  There are no stones in the bilateral ureters.   No stones in the bladder.  No bladder wall thickening.      There is no free fluid, free air, or abscesses.   No significant lymphadenopathy.      No evidence of intestinal obstruction.       The abdominal aorta is normal in caliber.      No acute bony abnormalities.  No aggressive appearing bony lesions.          IMPRESSION:  1.

## 2024-04-16 NOTE — PLAN OF CARE
Problem: Skin/Tissue Integrity  Goal: Absence of new skin breakdown  Description: 1.  Monitor for areas of redness and/or skin breakdown  2.  Assess vascular access sites hourly  3.  Every 4-6 hours minimum:  Change oxygen saturation probe site  4.  Every 4-6 hours:  If on nasal continuous positive airway pressure, respiratory therapy assess nares and determine need for appliance change or resting period.  Outcome: Not Progressing     Problem: Discharge Planning  Goal: Discharge to home or other facility with appropriate resources  Outcome: Progressing     Problem: Safety - Adult  Goal: Free from fall injury  Outcome: Progressing     Problem: Skin/Tissue Integrity  Goal: Absence of new skin breakdown  Description: 1.  Monitor for areas of redness and/or skin breakdown  2.  Assess vascular access sites hourly  3.  Every 4-6 hours minimum:  Change oxygen saturation probe site  4.  Every 4-6 hours:  If on nasal continuous positive airway pressure, respiratory therapy assess nares and determine need for appliance change or resting period.  Outcome: Not Progressing

## 2024-04-16 NOTE — CONSULTS
[facility]  Pulmonary, Critical Care, and Sleep Medicine    Name: Valeriano Fong III MRN: [id number]   : 1975 Hospital: [facility]    Date: 2024  Room: [address] [place] [address] Note                                              Consult requesting physician: Dr. Pace  Reason for Consult: Hypotension, metabolic acidosis, renal failure, liver failure, immunosuppression.    IMPRESSION:   Acute hypoxemic respiratory failure  J 96.00  Metabolic acidosis                             E 87.20  Sepsis                                                A 41.9  Hypotension                                        I95.9  End-stage renal disease  Status post liver transplant        Active Hospital Problems    Diagnosis Date Noted    Sepsis without acute organ dysfunction (HCC) [A41.9] 2024    Metabolic acidosis [E87.20] 2024    Hypothermia [T68.XXXA] 2024    Hypotension [I95.9] 2024    Personal history of immunosupression therapy [Z92.25] 2024    Metabolic acidosis with respiratory acidosis [E87.4] 04/15/2024    Crohn's disease (HCC) [K50.90] 10/14/2023    ESRD on dialysis (HCC) [N18.6, Z99.2] 10/31/2022    Liver transplant status (HCC) [Z94.4] 2022        Code status: Full Code       RECOMMENDATIONS:     Respiratory:   Acute hypoxemic respiratory failure currently on nasal cannula oxygenation improving.  Minimal fluid overload upon CT of the chest.  Immunocompromise currently on antibiotics.  Add bronchodilators.  Incentive spirometry.  VQ scan pending.  Suspicion for pulmonary embolism is low.          PVL pending  IMPRESSION:  1.  Small right-sided pleural effusion.  2.  No acute abnormalities in the abdomen and pelvis.              Specimen Collected: 04/15/24 19:46 EDT         .   Keep SPO2 >=92%. HOB 30 degree elevation all the time. Aggressive pulmonary toileting. Aspiration precautions. Incentive spirometry.    CVS:   Hypotension with negative  monitoring for fluid overload.  Potassium replaced as per nephrology patient will have another trial of dialysis today.        I/O last 24 hrs:   Intake/Output Summary (Last 24 hours) at 4/16/2024 0945  Last data filed at 4/16/2024 0045  Gross per 24 hour   Intake 1143 ml   Output 143 ml   Net 1000 ml             History taken from patient, EMR     Review of Systems:  A comprehensive review of systems was negative except for that written in the HPI.       Review of Systems:   HEENT: occ epistaxis, no nasal drainage, no difficulty in swallowing, no redness in eyes  Respiratory: as above  Cardiovascular: no chest pain, no palpitations, no chronic leg edema, no syncope  Gastrointestinal: no abd pain, occ vomiting, no diarrhea, no bleeding symptoms  Genitourinary: No urinary symptoms or hematuria  Musculoskeletal: Neg  Neurological: No focal weakness, no seizures, no headaches  Behvioral/Psych: No anxiety, no depression  Constitutional: No fever, no chills, no weight loss, no night sweats     Allergies   Allergen Reactions    Azathioprine Rash, Itching and Other (See Comments)    Naproxen Itching, Nausea And Vomiting and Other (See Comments)     heartburn        Past Medical History:   Diagnosis Date    Crohn disease (HCC)     Liver disease     Renal failure       Past Surgical History:   Procedure Laterality Date    OTHER SURGICAL HISTORY      bowel obstruction and ostomy    OTHER SURGICAL HISTORY  2001    bowel resection secondary to tear in bowel and abscess    XR MIDLINE EQUAL OR GREATER THAN 5 YEARS  7/2/2021    XR MIDLINE EQUAL OR GREATER THAN 5 YEARS 7/2/2021      Social History     Tobacco Use    Smoking status: Former    Smokeless tobacco: Former   Substance Use Topics    Alcohol use: Not Currently      History reviewed. No pertinent family history.   Prior to Admission medications    Medication Sig Start Date End Date Taking? Authorizing Provider   KLOR-CON M20 20 MEQ extended release tablet TAKE 2 TABLETS BY

## 2024-04-16 NOTE — CONSULTS
Nephrology Consult    Patient: Valeriano Fong III  Requesting physician: Dr. YUE Pace  Reason for consult: ESRD management    CC: Weakness    History of Present Illness:  Valeriano Fong III is a 49 y.o.  male with a past medical history significant for liver transplant, chronic hypotension, anemia, Crohn's dz, ESRD on HD who presented to our hospital with worsening weakness.  Pt has been dialyzing with [facility] under the care of [facility].  Pt reports poor po intake, noticed to have severe acidosis on admission.  No cp or but has mild SOB.  CXR showed small right sided pleural effusion.  Pt was then admitted to the ICU and Nephrology was consulted for further evaluation and management of his ESRD and urgent HD was arranged overnight.      Past Medical History:  Patient Active Problem List   Diagnosis    Liver transplant status (HCC)    Long-term use of immunosuppressant medication    Liver transplant complication (HCC)    Alcohol use disorder in remission    ESRD on dialysis (HCC)    Crohn's disease (HCC)    Acute encephalopathy    Malaise and fatigue    Uremia    Hypokalemia    Metabolic acidosis with respiratory acidosis    Sepsis without acute organ dysfunction (HCC)    Metabolic acidosis    Hypothermia    Hypotension    Personal history of immunosupression therapy       Social History:  Social History     Socioeconomic History    Marital status:      Spouse name: None    Number of children: None    Years of education: None    Highest education level: None   Tobacco Use    Smoking status: Former    Smokeless tobacco: Former   Substance and Sexual Activity    Alcohol use: Not Currently    Drug use: Not Currently     Social Determinants of Health     Food Insecurity: No Food Insecurity (4/15/2024)    Hunger Vital Sign     Worried About Running Out of Food in the Last Year: Never true     Ran Out of Food in the Last Year: Never true   Transportation Needs: No Transportation Needs (4/15/2024)    PRAPARE -  Transportation     Lack of Transportation (Medical): No     Lack of Transportation (Non-Medical): No   Housing Stability: Low Risk  (4/15/2024)    Housing Stability Vital Sign     Unable to Pay for Housing in the Last Year: No     Number of Places Lived in the Last Year: 1     Unstable Housing in the Last Year: No       Family History:  History reviewed. No pertinent family history.  Pt reports + kidney disease in the family.    Allergy:  Allergies   Allergen Reactions    Azathioprine Rash, Itching and Other (See Comments)    Naproxen Itching, Nausea And Vomiting and Other (See Comments)     heartburn          Medication:  Home meds: reviewed    Inpatient meds:  Current Facility-Administered Medications   Medication Dose Route Frequency    norepinephrine (LEVOPHED) 16 mg in sodium chloride 0.9 % 250 mL infusion  1-30 mcg/min IntraVENous Continuous    Vancomycin random level due 04/16/24 at 1000  1 each Other Once    piperacillin-tazobactam (ZOSYN) 3,375 mg in sodium chloride 0.9 % 50 mL extended IVPB (mini-bag)  3,375 mg IntraVENous Q12H    heparin (porcine) injection 4,000 Units  4,000 Units IntraCATHeter PRN    potassium chloride 10 mEq/100 mL IVPB (Peripheral Line)  10 mEq IntraVENous Q1H    midodrine (PROAMATINE) tablet 5 mg  5 mg Oral TID WC    albumin human 25% IV solution 12.5 g  12.5 g IntraVENous Once    sodium chloride flush 0.9 % injection 5-40 mL  5-40 mL IntraVENous 2 times per day    sodium chloride flush 0.9 % injection 5-40 mL  5-40 mL IntraVENous PRN    0.9 % sodium chloride infusion   IntraVENous PRN    heparin (porcine) injection 5,000 Units  5,000 Units SubCUTAneous 3 times per day    ondansetron (ZOFRAN-ODT) disintegrating tablet 4 mg  4 mg Oral Q8H PRN    Or    ondansetron (ZOFRAN) injection 4 mg  4 mg IntraVENous Q6H PRN    polyethylene glycol (GLYCOLAX) packet 17 g  17 g Oral Daily PRN    acetaminophen (TYLENOL) tablet 650 mg  650 mg Oral Q6H PRN    Or    acetaminophen (TYLENOL) suppository  Pt instructed to take  Synthroid at her regularly scheduled time. Pt instructed to take Synthroid at her regularly scheduled time.

## 2024-04-17 ENCOUNTER — APPOINTMENT (OUTPATIENT)
Facility: HOSPITAL | Age: 49
End: 2024-04-17
Attending: INTERNAL MEDICINE
Payer: COMMERCIAL

## 2024-04-17 LAB
ALBUMIN SERPL-MCNC: 2.6 G/DL (ref 3.4–5)
ALBUMIN SERPL-MCNC: 2.7 G/DL (ref 3.4–5)
ALBUMIN SERPL-MCNC: 2.8 G/DL (ref 3.4–5)
ALBUMIN/GLOB SERPL: 1.2 (ref 0.8–1.7)
ALP SERPL-CCNC: 149 U/L (ref 45–117)
ALT SERPL-CCNC: 10 U/L (ref 16–61)
ANION GAP SERPL CALC-SCNC: 14 MMOL/L (ref 3–18)
ANION GAP SERPL CALC-SCNC: 15 MMOL/L (ref 3–18)
AST SERPL-CCNC: 17 U/L (ref 10–38)
BASOPHILS # BLD: 0 K/UL (ref 0–0.1)
BASOPHILS NFR BLD: 0 % (ref 0–2)
BILIRUB DIRECT SERPL-MCNC: 0.3 MG/DL (ref 0–0.2)
BILIRUB SERPL-MCNC: 0.7 MG/DL (ref 0.2–1)
BUN SERPL-MCNC: 100 MG/DL (ref 7–18)
BUN SERPL-MCNC: 103 MG/DL (ref 7–18)
BUN/CREAT SERPL: 6 (ref 12–20)
BUN/CREAT SERPL: 6 (ref 12–20)
CALCIUM SERPL-MCNC: 7.6 MG/DL (ref 8.5–10.1)
CALCIUM SERPL-MCNC: 7.8 MG/DL (ref 8.5–10.1)
CHLORIDE SERPL-SCNC: 105 MMOL/L (ref 100–111)
CHLORIDE SERPL-SCNC: 105 MMOL/L (ref 100–111)
CO2 SERPL-SCNC: 13 MMOL/L (ref 21–32)
CO2 SERPL-SCNC: 16 MMOL/L (ref 21–32)
CREAT SERPL-MCNC: 17.8 MG/DL (ref 0.6–1.3)
CREAT SERPL-MCNC: 17.8 MG/DL (ref 0.6–1.3)
DIFFERENTIAL METHOD BLD: ABNORMAL
ECHO AO ASC DIAM: 3.6 CM
ECHO AO ASCENDING AORTA INDEX: 1.78 CM/M2
ECHO AO ROOT DIAM: 3.5 CM
ECHO AO ROOT INDEX: 1.73 CM/M2
ECHO AV AREA PEAK VELOCITY: 3 CM2
ECHO AV AREA VTI: 2.8 CM2
ECHO AV AREA/BSA PEAK VELOCITY: 1.5 CM2/M2
ECHO AV AREA/BSA VTI: 1.4 CM2/M2
ECHO AV MEAN GRADIENT: 3 MMHG
ECHO AV MEAN VELOCITY: 0.8 M/S
ECHO AV PEAK GRADIENT: 5 MMHG
ECHO AV PEAK VELOCITY: 1.2 M/S
ECHO AV VELOCITY RATIO: 0.83
ECHO AV VTI: 23.6 CM
ECHO BSA: 2.01 M2
ECHO LA DIAMETER INDEX: 1.83 CM/M2
ECHO LA DIAMETER: 3.7 CM
ECHO LA TO AORTIC ROOT RATIO: 1.06
ECHO LA VOL A-L A2C: 42 ML (ref 18–58)
ECHO LA VOL A-L A4C: 77 ML (ref 18–58)
ECHO LA VOL BP: 54 ML (ref 18–58)
ECHO LA VOL MOD A2C: 38 ML (ref 18–58)
ECHO LA VOL MOD A4C: 65 ML (ref 18–58)
ECHO LA VOL/BSA BIPLANE: 27 ML/M2 (ref 16–34)
ECHO LA VOLUME AREA LENGTH: 62 ML
ECHO LA VOLUME INDEX A-L A2C: 21 ML/M2 (ref 16–34)
ECHO LA VOLUME INDEX A-L A4C: 38 ML/M2 (ref 16–34)
ECHO LA VOLUME INDEX AREA LENGTH: 31 ML/M2 (ref 16–34)
ECHO LA VOLUME INDEX MOD A2C: 19 ML/M2 (ref 16–34)
ECHO LA VOLUME INDEX MOD A4C: 32 ML/M2 (ref 16–34)
ECHO LV E' LATERAL VELOCITY: 16 CM/S
ECHO LV E' SEPTAL VELOCITY: 12 CM/S
ECHO LV EDV A2C: 127 ML
ECHO LV EDV A4C: 138 ML
ECHO LV EDV BP: 132 ML (ref 67–155)
ECHO LV EDV INDEX A4C: 68 ML/M2
ECHO LV EDV INDEX BP: 65 ML/M2
ECHO LV EDV NDEX A2C: 63 ML/M2
ECHO LV EJECTION FRACTION A2C: 69 %
ECHO LV EJECTION FRACTION A4C: 67 %
ECHO LV EJECTION FRACTION BIPLANE: 68 % (ref 55–100)
ECHO LV ESV A2C: 39 ML
ECHO LV ESV A4C: 45 ML
ECHO LV ESV BP: 42 ML (ref 22–58)
ECHO LV ESV INDEX A2C: 19 ML/M2
ECHO LV ESV INDEX A4C: 22 ML/M2
ECHO LV ESV INDEX BP: 21 ML/M2
ECHO LV FRACTIONAL SHORTENING: 37 % (ref 28–44)
ECHO LV INTERNAL DIMENSION DIASTOLE INDEX: 2.57 CM/M2
ECHO LV INTERNAL DIMENSION DIASTOLIC: 5.2 CM (ref 4.2–5.9)
ECHO LV INTERNAL DIMENSION SYSTOLIC INDEX: 1.63 CM/M2
ECHO LV INTERNAL DIMENSION SYSTOLIC: 3.3 CM
ECHO LV IVSD: 1 CM (ref 0.6–1)
ECHO LV MASS 2D: 220.8 G (ref 88–224)
ECHO LV MASS INDEX 2D: 109.3 G/M2 (ref 49–115)
ECHO LV POSTERIOR WALL DIASTOLIC: 1.2 CM (ref 0.6–1)
ECHO LV RELATIVE WALL THICKNESS RATIO: 0.46
ECHO LVOT AREA: 3.5 CM2
ECHO LVOT AV VTI INDEX: 0.78
ECHO LVOT DIAM: 2.1 CM
ECHO LVOT MEAN GRADIENT: 2 MMHG
ECHO LVOT PEAK GRADIENT: 4 MMHG
ECHO LVOT PEAK VELOCITY: 1 M/S
ECHO LVOT STROKE VOLUME INDEX: 31.5 ML/M2
ECHO LVOT SV: 63.7 ML
ECHO LVOT VTI: 18.4 CM
ECHO MV A VELOCITY: 0.57 M/S
ECHO MV E DECELERATION TIME (DT): 231.4 MS
ECHO MV E VELOCITY: 1.02 M/S
ECHO MV E/A RATIO: 1.79
ECHO MV E/E' LATERAL: 6.38
ECHO MV E/E' RATIO (AVERAGED): 7.44
ECHO PV MAX VELOCITY: 0.8 M/S
ECHO PV MEAN GRADIENT: 2 MMHG
ECHO PV MEAN VELOCITY: 0.6 M/S
ECHO PV PEAK GRADIENT: 2 MMHG
ECHO RA VOLUME: 45 ML
ECHO RA VOLUME: 48 ML
ECHO RV TAPSE: 1.8 CM (ref 1.7–?)
EOSINOPHIL # BLD: 0 K/UL (ref 0–0.4)
EOSINOPHIL NFR BLD: 3 % (ref 0–5)
ERYTHROCYTE [DISTWIDTH] IN BLOOD BY AUTOMATED COUNT: 15.6 % (ref 11.6–14.5)
GLOBULIN SER CALC-MCNC: 2.3 G/DL (ref 2–4)
GLUCOSE BLD STRIP.AUTO-MCNC: 119 MG/DL (ref 70–110)
GLUCOSE BLD STRIP.AUTO-MCNC: 130 MG/DL (ref 70–110)
GLUCOSE BLD STRIP.AUTO-MCNC: 69 MG/DL (ref 70–110)
GLUCOSE BLD STRIP.AUTO-MCNC: 92 MG/DL (ref 70–110)
GLUCOSE SERPL-MCNC: 112 MG/DL (ref 74–99)
GLUCOSE SERPL-MCNC: 121 MG/DL (ref 74–99)
HCT VFR BLD AUTO: 18.3 % (ref 36–48)
HGB BLD-MCNC: 6.4 G/DL (ref 13–16)
HISTORY CHECK: NORMAL
HISTORY CHECK: NORMAL
IMM GRANULOCYTES # BLD AUTO: 0 K/UL
IMM GRANULOCYTES NFR BLD AUTO: 0 %
INR PPP: 1.5 (ref 0.9–1.1)
LYMPHOCYTES # BLD: 0.2 K/UL (ref 0.9–3.6)
LYMPHOCYTES NFR BLD: 19 % (ref 21–52)
MAGNESIUM SERPL-MCNC: 1.6 MG/DL (ref 1.6–2.6)
MAGNESIUM SERPL-MCNC: 1.6 MG/DL (ref 1.6–2.6)
MCH RBC QN AUTO: 27.9 PG (ref 24–34)
MCHC RBC AUTO-ENTMCNC: 35 G/DL (ref 31–37)
MCV RBC AUTO: 79.9 FL (ref 78–100)
MONOCYTES # BLD: 0.1 K/UL (ref 0.05–1.2)
MONOCYTES NFR BLD: 7 % (ref 3–10)
NEUTS BAND NFR BLD MANUAL: 1 % (ref 0–5)
NEUTS SEG # BLD: 0.9 K/UL (ref 1.8–8)
NEUTS SEG NFR BLD: 70 % (ref 40–73)
NRBC # BLD: 0 K/UL (ref 0–0.01)
NRBC BLD-RTO: 0 PER 100 WBC
PHOSPHATE SERPL-MCNC: 4.6 MG/DL (ref 2.5–4.9)
PHOSPHATE SERPL-MCNC: 4.8 MG/DL (ref 2.5–4.9)
PLATELET # BLD AUTO: 56 K/UL (ref 135–420)
PLATELET COMMENT: ABNORMAL
PMV BLD AUTO: 11.4 FL (ref 9.2–11.8)
POTASSIUM SERPL-SCNC: 2.7 MMOL/L (ref 3.5–5.5)
POTASSIUM SERPL-SCNC: 2.9 MMOL/L (ref 3.5–5.5)
POTASSIUM SERPL-SCNC: 3.1 MMOL/L (ref 3.5–5.5)
PROT SERPL-MCNC: 5.1 G/DL (ref 6.4–8.2)
PROTHROMBIN TIME: 18.6 SEC (ref 11.9–14.7)
RBC # BLD AUTO: 2.29 M/UL (ref 4.35–5.65)
RBC MORPH BLD: ABNORMAL
SODIUM SERPL-SCNC: 133 MMOL/L (ref 136–145)
SODIUM SERPL-SCNC: 135 MMOL/L (ref 136–145)
VANCOMYCIN SERPL-MCNC: 15.2 UG/ML (ref 5–40)
WBC # BLD AUTO: 1.2 K/UL (ref 4.6–13.2)

## 2024-04-17 PROCEDURE — 2580000003 HC RX 258: Performed by: INTERNAL MEDICINE

## 2024-04-17 PROCEDURE — 94640 AIRWAY INHALATION TREATMENT: CPT

## 2024-04-17 PROCEDURE — 83735 ASSAY OF MAGNESIUM: CPT

## 2024-04-17 PROCEDURE — 84132 ASSAY OF SERUM POTASSIUM: CPT

## 2024-04-17 PROCEDURE — 80069 RENAL FUNCTION PANEL: CPT

## 2024-04-17 PROCEDURE — P9040 RBC LEUKOREDUCED IRRADIATED: HCPCS

## 2024-04-17 PROCEDURE — 6360000002 HC RX W HCPCS: Performed by: INTERNAL MEDICINE

## 2024-04-17 PROCEDURE — 2000000000 HC ICU R&B

## 2024-04-17 PROCEDURE — 99223 1ST HOSP IP/OBS HIGH 75: CPT | Performed by: INTERNAL MEDICINE

## 2024-04-17 PROCEDURE — 85025 COMPLETE CBC W/AUTO DIFF WBC: CPT

## 2024-04-17 PROCEDURE — 6370000000 HC RX 637 (ALT 250 FOR IP): Performed by: INTERNAL MEDICINE

## 2024-04-17 PROCEDURE — 80202 ASSAY OF VANCOMYCIN: CPT

## 2024-04-17 PROCEDURE — 2500000003 HC RX 250 WO HCPCS: Performed by: INTERNAL MEDICINE

## 2024-04-17 PROCEDURE — 82962 GLUCOSE BLOOD TEST: CPT

## 2024-04-17 PROCEDURE — 80076 HEPATIC FUNCTION PANEL: CPT

## 2024-04-17 PROCEDURE — P9047 ALBUMIN (HUMAN), 25%, 50ML: HCPCS | Performed by: INTERNAL MEDICINE

## 2024-04-17 PROCEDURE — C9113 INJ PANTOPRAZOLE SODIUM, VIA: HCPCS | Performed by: INTERNAL MEDICINE

## 2024-04-17 PROCEDURE — 36430 TRANSFUSION BLD/BLD COMPNT: CPT

## 2024-04-17 PROCEDURE — 85610 PROTHROMBIN TIME: CPT

## 2024-04-17 PROCEDURE — 90935 HEMODIALYSIS ONE EVALUATION: CPT

## 2024-04-17 PROCEDURE — 93306 TTE W/DOPPLER COMPLETE: CPT

## 2024-04-17 PROCEDURE — 30233N1 TRANSFUSION OF NONAUTOLOGOUS RED BLOOD CELLS INTO PERIPHERAL VEIN, PERCUTANEOUS APPROACH: ICD-10-PCS | Performed by: INTERNAL MEDICINE

## 2024-04-17 RX ORDER — SODIUM CHLORIDE 9 MG/ML
INJECTION, SOLUTION INTRAVENOUS PRN
Status: DISCONTINUED | OUTPATIENT
Start: 2024-04-17 | End: 2024-04-18 | Stop reason: HOSPADM

## 2024-04-17 RX ORDER — POTASSIUM CHLORIDE 20 MEQ/1
20 TABLET, EXTENDED RELEASE ORAL DAILY
Status: DISCONTINUED | OUTPATIENT
Start: 2024-04-17 | End: 2024-04-18 | Stop reason: HOSPADM

## 2024-04-17 RX ORDER — DEXTROSE MONOHYDRATE 100 MG/ML
INJECTION, SOLUTION INTRAVENOUS CONTINUOUS
Status: DISCONTINUED | OUTPATIENT
Start: 2024-04-18 | End: 2024-04-18 | Stop reason: HOSPADM

## 2024-04-17 RX ORDER — POTASSIUM CHLORIDE 7.45 MG/ML
10 INJECTION INTRAVENOUS
Status: COMPLETED | OUTPATIENT
Start: 2024-04-17 | End: 2024-04-17

## 2024-04-17 RX ORDER — NOREPINEPHRINE BITARTRATE 0.06 MG/ML
1-30 INJECTION, SOLUTION INTRAVENOUS CONTINUOUS
Status: DISCONTINUED | OUTPATIENT
Start: 2024-04-17 | End: 2024-04-18 | Stop reason: HOSPADM

## 2024-04-17 RX ADMIN — SODIUM BICARBONATE 650 MG TABLET 650 MG: at 20:32

## 2024-04-17 RX ADMIN — POTASSIUM CHLORIDE 10 MEQ: 7.46 INJECTION, SOLUTION INTRAVENOUS at 15:44

## 2024-04-17 RX ADMIN — SODIUM CHLORIDE, PRESERVATIVE FREE 40 MG: 5 INJECTION INTRAVENOUS at 08:48

## 2024-04-17 RX ADMIN — POTASSIUM CHLORIDE 10 MEQ: 7.46 INJECTION, SOLUTION INTRAVENOUS at 21:28

## 2024-04-17 RX ADMIN — MIDODRINE HYDROCHLORIDE 10 MG: 10 TABLET ORAL at 08:47

## 2024-04-17 RX ADMIN — TACROLIMUS 2 MG: 1 CAPSULE ORAL at 08:47

## 2024-04-17 RX ADMIN — MIDODRINE HYDROCHLORIDE 10 MG: 10 TABLET ORAL at 16:12

## 2024-04-17 RX ADMIN — MIDODRINE HYDROCHLORIDE 10 MG: 10 TABLET ORAL at 12:44

## 2024-04-17 RX ADMIN — POTASSIUM CHLORIDE 20 MEQ: 1500 TABLET, EXTENDED RELEASE ORAL at 15:45

## 2024-04-17 RX ADMIN — ALBUMIN (HUMAN) 12.5 G: 0.25 INJECTION, SOLUTION INTRAVENOUS at 05:22

## 2024-04-17 RX ADMIN — POTASSIUM BICARBONATE 20 MEQ: 782 TABLET, EFFERVESCENT ORAL at 06:03

## 2024-04-17 RX ADMIN — ALBUMIN (HUMAN) 12.5 G: 0.25 INJECTION, SOLUTION INTRAVENOUS at 11:07

## 2024-04-17 RX ADMIN — HEPARIN SODIUM 5000 UNITS: 5000 INJECTION INTRAVENOUS; SUBCUTANEOUS at 12:59

## 2024-04-17 RX ADMIN — SODIUM CHLORIDE, PRESERVATIVE FREE 10 ML: 5 INJECTION INTRAVENOUS at 20:33

## 2024-04-17 RX ADMIN — VANCOMYCIN HYDROCHLORIDE 750 MG: 750 INJECTION, POWDER, LYOPHILIZED, FOR SOLUTION INTRAVENOUS at 23:15

## 2024-04-17 RX ADMIN — POTASSIUM CHLORIDE 10 MEQ: 7.46 INJECTION, SOLUTION INTRAVENOUS at 18:10

## 2024-04-17 RX ADMIN — DEXTROSE MONOHYDRATE: 100 INJECTION, SOLUTION INTRAVENOUS at 23:34

## 2024-04-17 RX ADMIN — ONDANSETRON 4 MG: 2 INJECTION INTRAMUSCULAR; INTRAVENOUS at 22:51

## 2024-04-17 RX ADMIN — MYCOPHENOLIC ACID 180 MG: 180 TABLET, DELAYED RELEASE ORAL at 11:07

## 2024-04-17 RX ADMIN — SODIUM BICARBONATE 650 MG TABLET 650 MG: at 15:45

## 2024-04-17 RX ADMIN — SODIUM CHLORIDE, PRESERVATIVE FREE 10 ML: 5 INJECTION INTRAVENOUS at 08:48

## 2024-04-17 RX ADMIN — POTASSIUM CHLORIDE 10 MEQ: 7.46 INJECTION, SOLUTION INTRAVENOUS at 20:23

## 2024-04-17 RX ADMIN — Medication 8 MCG/MIN: at 08:42

## 2024-04-17 RX ADMIN — MYCOPHENOLIC ACID 180 MG: 180 TABLET, DELAYED RELEASE ORAL at 18:11

## 2024-04-17 RX ADMIN — PIPERACILLIN AND TAZOBACTAM 3375 MG: 3; .375 INJECTION, POWDER, LYOPHILIZED, FOR SOLUTION INTRAVENOUS at 01:31

## 2024-04-17 RX ADMIN — ONDANSETRON 4 MG: 2 INJECTION INTRAMUSCULAR; INTRAVENOUS at 09:00

## 2024-04-17 RX ADMIN — POTASSIUM CHLORIDE 10 MEQ: 7.46 INJECTION, SOLUTION INTRAVENOUS at 17:08

## 2024-04-17 RX ADMIN — SODIUM BICARBONATE 650 MG TABLET 650 MG: at 12:44

## 2024-04-17 RX ADMIN — TACROLIMUS 2 MG: 1 CAPSULE ORAL at 20:33

## 2024-04-17 RX ADMIN — PIPERACILLIN AND TAZOBACTAM 3375 MG: 3; .375 INJECTION, POWDER, LYOPHILIZED, FOR SOLUTION INTRAVENOUS at 15:22

## 2024-04-17 RX ADMIN — ALBUMIN (HUMAN) 12.5 G: 0.25 INJECTION, SOLUTION INTRAVENOUS at 16:12

## 2024-04-17 RX ADMIN — ALBUMIN (HUMAN) 12.5 G: 0.25 INJECTION, SOLUTION INTRAVENOUS at 00:19

## 2024-04-17 RX ADMIN — DEXTROSE MONOHYDRATE: 100 INJECTION, SOLUTION INTRAVENOUS at 00:23

## 2024-04-17 RX ADMIN — DEXTROSE MONOHYDRATE 125 ML: 10 INJECTION, SOLUTION INTRAVENOUS at 23:13

## 2024-04-17 RX ADMIN — ARFORMOTEROL TARTRATE 15 MCG: 15 SOLUTION RESPIRATORY (INHALATION) at 07:50

## 2024-04-17 RX ADMIN — HEPARIN SODIUM 5000 UNITS: 5000 INJECTION INTRAVENOUS; SUBCUTANEOUS at 05:22

## 2024-04-17 RX ADMIN — SODIUM BICARBONATE 650 MG TABLET 650 MG: at 08:47

## 2024-04-17 RX ADMIN — MYCOPHENOLIC ACID 180 MG: 180 TABLET, DELAYED RELEASE ORAL at 20:33

## 2024-04-17 RX ADMIN — ARFORMOTEROL TARTRATE 15 MCG: 15 SOLUTION RESPIRATORY (INHALATION) at 20:42

## 2024-04-17 RX ADMIN — MYCOPHENOLIC ACID 180 MG: 180 TABLET, DELAYED RELEASE ORAL at 14:08

## 2024-04-17 NOTE — PROGRESS NOTES
Hospitalist Progress Note    Patient: Valeriano Fong III MRN: [id number]  CSN: [id number]    YOB: 1975  Age: 49 y.o.  Sex: male    DOA: 4/15/2024 LOS:  LOS: 2 days                Assessment/Plan     Active Hospital Problems    Diagnosis     Sepsis without acute organ dysfunction (HCC) [A41.9]     Metabolic acidosis [E87.20]     Hypothermia [T68.XXXA]     Hypotension [I95.9]     Personal history of immunosupression therapy [Z92.25]     Metabolic acidosis with respiratory acidosis [E87.4]     Crohn's disease (HCC) [K50.90]     ESRD on dialysis (HCC) [N18.6, Z99.2]     Liver transplant status (HCC) [Z94.4]         Chief complaint :  Malaise  48 y/o male with complex past medical history here with metabolic acidosis, multiple missed hemodialysis sessions, hypothermia concerning for sepsis and immunosuppression.       CRITICAL CARE PLAN    Resp -   Acute respiratory failure with hypoxia -  Continue with oxygen by NC as needed, wean as tolerated  Pleural effusion.     ID -  blood  cx.   Sepsis  Procalcitonin elevated at 22.  On immunosuppression.  No clear source identified.  Has TDC  Concern for CRBSI  ANTIBIOTICS - vancomycin, zosyn.     CVS - Monitor HD.   Hypotension, ?septic shock.  On midodrine, albumin  On pressors (levophed) wean as tolerated.    Heme/onc - Follow H&H, plts.   Anemia - secondary to ESRD    Renal - Trend BUN, Cr. Check and replace Mg, K, phos.  ESRD - HD per nephrology  Metabolic acidosis - received bicarb.    Endocrine -  Follow FSG    Neuro/ Pain/ Sedation -   Stable mental status.    GI -   S/p liver transplant  Continue on immunosuppressants.    Prophylaxis - DVT: heparin,     [id number]  35 minutes of critical care time spent in the direct evaluation and treatment of this high risk patient. The reason for providing this level of medical care for this critically ill patient was due a critical illness that impaired one or more vital organ systems such that there was a high probability  of imminent or life threatening deterioration in the patients condition. This care involved high complexity decision making to assess, manipulate, and support vital system functions, to treat this degreee vital organ system failure and to prevent further life threatening deterioration of the patient’s condition.      Disposition : TBD    Review of systems  General: No fevers or chills.  Cardiovascular: No chest pain or pressure. No palpitations.   Pulmonary: No shortness of breath.   Gastrointestinal: No nausea, vomiting.     Physical Exam:  General: Awake, cooperative, no acute distress    HEENT: NC, Atraumatic.  PERRLA, anicteric sclerae.  Lungs: CTA Bilaterally. No Wheezing/Rhonchi/Rales.  Heart:  S1 S2,  No murmur, No Rubs, No Gallops  Abdomen: Soft, Non distended, Non tender.  +Bowel sounds,   Extremities: No c/c/e  Psych:   Not anxious or agitated.  Neurologic:  No acute neurological deficit.         Vital signs/Intake and Output:  Visit Vitals  /63   Pulse 66   Temp 99.4 °F (37.4 °C) (Oral)   Resp 20   Ht 1.829 m (6')   Wt 79.8 kg (176 lb)   SpO2 100%   BMI 23.87 kg/m²     Current Shift:  04/17 0701 - 04/17 1900  In: 672.9 [P.O.:100; I.V.:72.9]  Out: 30   Last three shifts:  04/15 1901 - 04/17 0700  In: 3614.6 [P.O.:240; I.V.:1531.6]  Out: 443             Labs: Results:       Chemistry Recent Labs     04/15/24  1615 04/16/24  0050 04/16/24  0623 04/17/24  0008 04/17/24  0449 04/17/24  1351   * 141 138 135* 133*  --    K 5.0 2.6* 2.5* 3.1* 2.9* 2.7*    109 108 105 105  --    CO2 5* 13* 13* 16* 13*  --    * 91* 90* 100* 103*  --    GLOB 2.9 2.2  --   --   --  2.3   ,No results found for: \"LACTA\"   CBC w/Diff Recent Labs     04/15/24  1615 04/17/24  1351   WBC 3.9* 1.2*   RBC 3.07* 2.29*   HGB 8.8* 6.4*   HCT 26.2* 18.3*   PLT 90* 56*      Cardiac Enzymes Lab Results   Component Value Date    TROPHS 16 04/15/2024   ,  Lab Results   Component Value Date    BNP 4,533 (H) 02/10/2022       Coagulation Recent Labs     04/17/24  0449   INR 1.5*       Lipid Panel No results found for: \"CHOL\", \"CHOLPOCT\", \"CHOLX\", \"CHLST\", \"CHOLV\", \"765001\", \"HDL\", \"HDLC\", \"LDL\", \"LDLC\", \"613042\", \"VLDLC\", \"VLDL\", \"TGLX\", \"TRIGL\"   Pancreas No results for input(s): \"LIPASE\" in the last 72 hours.,No results for input(s): \"AMYLASE\" in the last 72 hours.   Liver Enzymes No results for input(s): \"TP\", \"ALB\" in the last 72 hours.    Invalid input(s): \"TBIL\", \"AP\", \"SGOT\", \"GPT\", \"DBIL\"   Thyroid Studies No results found for: \"T4\", \"T3RU\", \"TSH\"     Procedures/imaging: see electronic medical records for all procedures/Xrays and details which were not copied into this note but were reviewed prior to creation of Plan    TIME: E/M Time spent with patient and patient care issues: [] 31-40 mins  [] 41-49 mins  [x] 50 mins or more.     This time also includes physician non-face-to-face service time visit on the date of service such as  Preparing to see the patient (eg, review of tests)  Obtaining and/or reviewing separately obtained history  Performing a medically necessary appropriate examination and/or evaluation  Counseling and educating the patient/family/caregiver  Ordering medications, tests, or procedures  Referring and communicating with other health care professionals as needed  Documenting clinical information in the electronic or other health record  Independently interpreting results (not reported separately) and communicating results to the patient/family/caregiver  Care coordination and discharge planning with Case Management.

## 2024-04-17 NOTE — PROGRESS NOTES
[facility]  (A Division of [facility] Emergency Department)                                                           Date of Admission: 4/15/2024       Reason for Consult: sepsis in liver transplant patient  Referring MD: Michael Bee    C/C: sepsis    Current Antimicrobials:    Prior Antimicrobials:  Vancomycin and Zosyn 4/15 to date    Immunosuppression:  Tacrolimus and Mycophenolate      Allergy to antibiotics: NA       Assessment--ID related:     Critically ill and immunosuppressed patient:    Severe sepsis and hypotension--unclear source but multiple possible etiology: CRBSI with indwelling R HD cath Vs GI source. Transplant patient and atypical/OI possible. No marked lung abnormality on CT CAP( done WO contrast)  --Procalcitonin is high 22-- bacterial infection likley  --MRSA and VRE history in the past    Pancytopenia- could be multifactorial-drugs/viral infection/sepsis  Leucopenia/ thrombocytopenia/anemia- progressively worse    Uremia with skipped HD--ESRD on HD--home. 4X weekly.    Liver transplant 3/2022- [facility]-- on immunosuppressants. Follows with Transplant service  --CMV IgG +    Crohns diseases- Chronic open wound on abdomen, hx of ileostomy takedown    Microlab data:    4/15-Viral test /PCR for COVID 19/Influenza A and B -negative.   4/15- Blood culture X2- Negative    Respiratory culture    Co-morbidities:  Hx of etoh cirrhosis,hx of MRSA/VRE    Metabolic acidosis/electrolyte abnormalities    Recommendation -- ID related:     Stephane with Zosyn and Vancomycin- would cover GI/ skin rashid. If Neuropenia worsens with fever, will repeat blood cutlure and add antifungal  FU blood cultures until final- NGSF. If CRBSI, bacteremia typically present  Procalcitonin in am. Check fungitell/crypto serology  FU CXR imaging per ICU  FU CMV serology   Further transplant related ID issues- may need higher transplant ID services       Discussed with ( in bold) 1. Patient / family-wife  WO CONTRAST INDICATION: change in mentation COMPARISON: January 4, 2024. CONTRAST: None. TECHNIQUE: Unenhanced CT of the head was performed using 5 mm images. Brain and bone windows were generated. Coronal and sagittal reformats. CT dose reduction was achieved through use of a standardized protocol tailored for this examination and automatic exposure control for dose modulation.  FINDINGS: The ventricles and sulci are normal in size, shape and configuration. There is no significant white matter disease. There is no intracranial hemorrhage, extra-axial collection, or mass effect. The basilar cisterns are open. No CT evidence of acute infarct. The bone windows demonstrate no abnormalities. The visualized portions of the paranasal sinuses and mastoid air cells are clear.     No acute intracranial process.     CT CHEST ABDOMEN PELVIS WO CONTRAST Additional Contrast? None    Result Date: 4/15/2024  EXAM: CT CHEST ABDOMEN PELVIS WO CONTRAST ACC#: [id number] INDICATION: H/o ESRD, vomiting, sepsis, pleural effusion COMPARISON: 2/15/2022, 9/2/2022 IV CONTRAST: None. ORAL CONTRAST: None. TECHNIQUE: Thin axial images were obtained through the chest, abdomen, and pelvis. Coronal and sagittal reformats were generated. CT dose reduction was achieved through use of a standardized protocol tailored for this examination and automatic exposure control for dose modulation. FINDINGS: CHEST: Coronary artery calcium: present Right-sided central venous catheter is noted with the tip near the right atrium-superior vena cava junction. The thoracic aorta is normal in caliber. No significant pericardial effusion. There is no significant lymphadenopathy.  There is a small right-sided pleural effusion with mild adjacent atelectasis. There is no right-sided effusion. There is no pneumothorax. There are no acute infiltrates. There are no acute fractures or aggressive appearing bony abnormalities. ABDOMEN/PELVIS: No obvious masses are visualized

## 2024-04-17 NOTE — PROGRESS NOTES
Nephrology Progress Jere    CC: Weakness    History of Present Illness:  Valeriano Fong III is a 49 y.o.  male with a past medical history significant for liver transplant, chronic hypotension, anemia, Crohn's dz, ESRD on Home HD who presented to our hospital with worsening weakness.  Pt has been dialyzing with C under the care of [facility].  Pt reports poor po intake, noticed to have severe acidosis on admission.  No cp or but has mild SOB.  CXR showed small right sided pleural effusion.  Pt was then admitted to the ICU and Nephrology was consulted for further evaluation and management of his ESRD.  Stable today.  Neg COVID/influenza.  Pending HD today.    Past Medical History:  Patient Active Problem List   Diagnosis    Liver transplant status (HCC)    Long-term use of immunosuppressant medication    Liver transplant complication (HCC)    Alcohol use disorder in remission    ESRD on dialysis (HCC)    Crohn's disease (HCC)    Acute encephalopathy    Malaise and fatigue    Uremia    Hypokalemia    Metabolic acidosis with respiratory acidosis    Sepsis without acute organ dysfunction (HCC)    Metabolic acidosis    Hypothermia    Hypotension    Personal history of immunosupression therapy       Social History:  Social History     Socioeconomic History    Marital status:      Spouse name: None    Number of children: None    Years of education: None    Highest education level: None   Tobacco Use    Smoking status: Former    Smokeless tobacco: Former   Substance and Sexual Activity    Alcohol use: Not Currently    Drug use: Not Currently     Social Determinants of Health     Food Insecurity: No Food Insecurity (4/15/2024)    Hunger Vital Sign     Worried About Running Out of Food in the Last Year: Never true     Ran Out of Food in the Last Year: Never true   Transportation Needs: No Transportation Needs (4/15/2024)    PRAPARE - Transportation     Lack of Transportation (Medical): No     Lack of  Has h.o. non compliance.  Liver transplant on IS  Hyponatremia  Metabolic acidosis  Anemia   Chronic hypotension  Weakness  Hypokalemia  Hypomagnesemia    Plan:     Will arrange for HD today  Use current access for HD  Use higher K bath  Recheck K 2-3 hrs post HD today, d/w ICU nurse    Cont abx, following Cx  Immunosuppression per team    Check AM renal panel  PCCM on board  Strict I/Os    Fran Schwarz MD  [facility]  [phone]

## 2024-04-17 NOTE — CONSULTS
[facility]      Medhat Keita MD, FACP, FACG, FAASLD      Imelda Signh, EV Haile, [facility]   Zenobia Méndezaruna, [facility]   Faithbenjie Bryan, [facility]  Nando Moss, [facility]   Lucinda Choudhury, [facility]   Shantell Aston, [facility]   [address], [address]   [place]  [address] [phone]   FAX: [phone]  [facility]   [address], [address]   [place]  [address] [phone]   FAX: [phone]       HEPATOLOGY CONSULT NOTE  I was asked to see this patient in consultation for evaluation and management of liver transplant immune suppression.  I have reviewed the Emergency room note, Hospital admission note, Notes by all other physicians who have seen the patient during this hospitalization to date.  I have reviewed the problem list, all past medical history and the reason for this hospitalization.  I have reviewed the allergies and the medications the patient was taking at home prior to this hospitalization.    HISTORY:  The patient is well known to me and regularly cared for at [facility].  He is a 49 y.o. male who underwent a liver transplant at [facility] in 3/2022.     The patient is taking the following for immune suppression: Tacrolimus, Myfortic.     He had severe CKD prior to and after the LT, developed ESRD and has been on chronic home dialysis 3-4 days per week for > 1 year.    He is in the post-LT safety net program to undergo renal transplant on inactive list.    He has Crohns disease and an entero-cutaneous fistular prior to and following LT which recently healed.      He was last seen in the office in 3/2024.  He was doing well at that time, and is working full time from home     He started feeling poorly 1 week ago and stopped  10 - 38 U/L 16 15    Total Bilirubin 0.2 - 1.0 MG/DL 0.8 0.9    WBC 4.6 - 13.2 K/uL 3.9 (L)     Hemoglobin Quant 13.0 - 16.0 g/dL 8.8 (L)     Platelet Count 135 - 420 K/uL 90 (L)     INR 0.9 - 1.1     1.5 (H)   (LL): Data is critically low  (L): Data is abnormally low  (H): Data is abnormally high      Medhat Keita MD  [facility] [address], [address]  [place]  [address] [phone]  [facility]

## 2024-04-17 NOTE — PROGRESS NOTES
1930- Report and care received, assessment completed per flow sheet.     0015- Reassessment completed, alert and oriented x 4.    0102-  notified of potassium and magnesium.    0400- Reassessment completed.   Cimzia Pregnancy And Lactation Text: This medication crosses the placenta but can be considered safe in certain situations. Cimzia may be excreted in breast milk.

## 2024-04-17 NOTE — PROGRESS NOTES
Comprehensive Nutrition Assessment      Type and Reason for Visit:  Initial, Positive Nutrition Screen (MST >/= 2)    Nutrition Recommendations/Plan:   Diet as ordered,   Add Nepro with Carb Steady (each provides 425 kcal, 19g protein)  Twice daily   Continue to monitor tolerance of PO, compliance of oral supplements, weight, labs (Na+, BG), and plan of care during admission.           Nutrition History and Allergies:   Hx significant for liver failure s/p liver transplant (2022) who follows with the [facility], ESRD on hemodialysis, anemia of CKD, immune suppression, Crohn's disease, HTN, HLD, osteoporosis and GERD here with malaise. Poor appetite for 1-2 weeks prior to admission (<75% of regular intake). Pt reports vomiting when he tried to eat. Gradual wt loss over the past year, more rapid over the past 3 months: -11.1% (~10kg). NKFA. Take MVT at home    Nutrition Assessment:      Valeriano Fong III is a 49 y.o. male  admitted on 4/15 for Nasal Congestion, Fever, and Shortness of Breath  Per visit pt is laying in bed, alert and oriented . Undergoing HD  No significant wt change noticed since admission .   No N/V at this time.  Current on General diet - pt shares his appetite coming back. Still, delon intake d/t temporary npo for dialysis.   Meds and Labs reviewed   NFPE indicates mild muscle and subc fat loss . See malnutrition assessment for details.     Consider poor po intake prior to admission and current dialysis schedule - recommend renal ONS to maximize energy and protein intakes opportunity.     Wt Readings from Last 10 Encounters:   04/17/24 79.8 kg (176 lb)   03/13/24 82.1 kg (181 lb)   01/06/24 89.8 kg (198 lb)   12/13/23 90.3 kg (199 lb)   09/20/23 88.9 kg (196 lb)   06/12/23 86.2 kg (190 lb)   03/08/23 90.3 kg (199 lb)   12/20/22 90 kg (198 lb 8 oz)   09/28/22 84.5 kg (186 lb 6 oz)   09/07/22 83.5 kg (184 lb)        Nutrition Related Findings:    Pertinent meds: PPI, NaBicarb,  inadequate protein-energy intake as evidenced by Criteria as identified in malnutrition assessment    Nutrition Interventions:   Food and/or Nutrient Delivery: Continue Current Diet, Start Oral Nutrition Supplement  Nutrition Education/Counseling: No recommendation at this time  Coordination of Nutrition Care: Continue to monitor while inpatient       Goals:     Goals: Meet at least 75% of estimated needs, PO intake 75% or greater, prior to discharge       Nutrition Monitoring and Evaluation:      Food/Nutrient Intake Outcomes: Food and Nutrient Intake, Supplement Intake  Physical Signs/Symptoms Outcomes: Biochemical Data    Discharge Planning:    Continue current diet     Katharine Lew RD  Contact: [phone]

## 2024-04-17 NOTE — PLAN OF CARE
Problem: Discharge Planning  Goal: Discharge to home or other facility with appropriate resources  Outcome: Progressing     Problem: Skin/Tissue Integrity  Goal: Absence of new skin breakdown  Description: 1.  Monitor for areas of redness and/or skin breakdown  2.  Assess vascular access sites hourly  3.  Every 4-6 hours minimum:  Change oxygen saturation probe site  4.  Every 4-6 hours:  If on nasal continuous positive airway pressure, respiratory therapy assess nares and determine need for appliance change or resting period.  4/17/2024 0804 by Sera Jin RN  Outcome: Progressing  4/16/2024 2020 by Toshia Us RN  Outcome: Progressing     Problem: Safety - Adult  Goal: Free from fall injury  4/17/2024 0804 by Sera Jin RN  Outcome: Progressing  4/16/2024 2020 by Toshia Us RN  Outcome: Progressing     Problem: Chronic Conditions and Co-morbidities  Goal: Patient's chronic conditions and co-morbidity symptoms are monitored and maintained or improved  4/17/2024 0804 by Sera Jin RN  Outcome: Progressing  4/16/2024 2020 by Toshia Us RN  Outcome: Progressing  Flowsheets (Taken 4/16/2024 1930)  Care Plan - Patient's Chronic Conditions and Co-Morbidity Symptoms are Monitored and Maintained or Improved:   Monitor and assess patient's chronic conditions and comorbid symptoms for stability, deterioration, or improvement   Collaborate with multidisciplinary team to address chronic and comorbid conditions and prevent exacerbation or deterioration   Update acute care plan with appropriate goals if chronic or comorbid symptoms are exacerbated and prevent overall improvement and discharge     Problem: ABCDS Injury Assessment  Goal: Absence of physical injury  4/17/2024 0804 by Sera Jin RN  Outcome: Progressing  4/16/2024 2020 by Toshia Us RN  Outcome: Progressing     Problem: Discharge Planning  Goal: Discharge to home or other facility with appropriate resources  Outcome:

## 2024-04-17 NOTE — DIALYSIS
04/17/24  Dialysis treatment  Code Status-full  Diagnosis-Metabolic acidosis    Treatment time-3hrs  Fluid removed-0  BVP-63.8   Medications given-heparin lock  Hepatitis Status-4/16/24 AG-neg, AB-succ    Access-  Rt CVC chest, dressing c/d/I. Ports cleaned, blood aspirated and lines flushed without any issues. Good blood flow noted and no s/s of infection present    Treatment-  0906 Dialysis started  0918 b/p already trending down but pt denies any issues, Uf turned off. Pt on levophed drip, will have primary nurse increase dose  1206 Dialysis completed and blood rinsed back. New caps placed on each port, pt stable    Pre and Post Report given Sera Jin RN

## 2024-04-17 NOTE — PROGRESS NOTES
Dr. Bryant requested a hematology consult. Dr. Morris updated and will be in tomorrow morning to assess the patient.

## 2024-04-17 NOTE — PLAN OF CARE
Problem: Chronic Conditions and Co-morbidities  Goal: Patient's chronic conditions and co-morbidity symptoms are monitored and maintained or improved  4/17/2024 0919 by Karon Morelos, RN  Outcome: Progressing  4/17/2024 0804 by Sera Jin RN  Outcome: Progressing  4/16/2024 2020 by Toshia Us RN  Outcome: Progressing  Flowsheets (Taken 4/16/2024 1930)  Care Plan - Patient's Chronic Conditions and Co-Morbidity Symptoms are Monitored and Maintained or Improved:   Monitor and assess patient's chronic conditions and comorbid symptoms for stability, deterioration, or improvement   Collaborate with multidisciplinary team to address chronic and comorbid conditions and prevent exacerbation or deterioration   Update acute care plan with appropriate goals if chronic or comorbid symptoms are exacerbated and prevent overall improvement and discharge   Receiving dialysis today

## 2024-04-17 NOTE — CONSENT
Informed Consent for Blood Component Transfusion Note    I have discussed with the patient the rationale for blood component transfusion; its benefits in treating or preventing fatigue, organ damage, or death; and its risk which includes mild transfusion reactions, rare risk of blood borne infection, or more serious but rare reactions. I have discussed the alternatives to transfusion, including the risk and consequences of not receiving transfusion. The patient had an opportunity to ask questions and had agreed to proceed with transfusion of blood components.    Electronically signed by Clarita Bryant MD on 4/17/24 at 3:16 PM EDT

## 2024-04-17 NOTE — PROGRESS NOTES
[facility]  Pulmonary, Critical Care, and Sleep Medicine    Name: Valeriano Fong III MRN: [id number]   : 1975 Hospital: [facility]    Date: 2024  Room: [address] [place] [address] Note                                              Consult requesting physician: Dr. Pace  Reason for Consult: Hypotension, metabolic acidosis, renal failure, liver failure, immunosuppression.    IMPRESSION:   Acute hypoxemic respiratory failure  J 96.00  Metabolic acidosis                             E 87.20  Sepsis                                                A 41.9  Hypotension                                        I95.9  End-stage renal disease  Status post liver transplant        Active Hospital Problems    Diagnosis Date Noted    Sepsis without acute organ dysfunction (HCC) [A41.9] 2024    Metabolic acidosis [E87.20] 2024    Hypothermia [T68.XXXA] 2024    Hypotension [I95.9] 2024    Personal history of immunosupression therapy [Z92.25] 2024    Metabolic acidosis with respiratory acidosis [E87.4] 04/15/2024    Crohn's disease (HCC) [K50.90] 10/14/2023    ESRD on dialysis (HCC) [N18.6, Z99.2] 10/31/2022    Liver transplant status (HCC) [Z94.4] 2022        Code status: Full Code       RECOMMENDATIONS:     Respiratory:   Acute hypoxemic respiratory failure currently on nasal cannula oxygenation improving.  Minimal fluid overload upon CT of the chest.  Immunocompromise currently on antibiotics.  Add bronchodilators.  Incentive spirometry.  VQ scan   Negative  IMPRESSION:  1. Low probability.  There is blunting right posterior CP angle consistent with effusion/atelectasis.  LPO view is compromised by overlying soft tissue.           Specimen Collected: 24 12:24 EDT           Suspicion for pulmonary embolism is low.      PVL pending  IMPRESSION:  1.  Small right-sided pleural effusion.  2.  No acute abnormalities in the abdomen and pelvis.               Specimen Collected: 04/15/24 19:46 EDT         .   Keep SPO2 >=92%. HOB 30 degree elevation all the time. Aggressive pulmonary toileting. Aspiration precautions. Incentive spirometry.    CVS:   Hypotension with negative troponins most likely related to sepsis.  Continue fluid resuscitation currently on Levophed 7-9 GOAL BP .90  Baseline low BP  Patient has dialysis catheter but we are using midlines for infusion for 48 hours then reevaluate if central line needed.  Add albumins for hypertension end-stage liver disease.  Add midodrine due to ESRD/liver transplant baseline low BP  Midodrine was added  Echo pending   Trop negative  Ecg stable  Baseline low blood pressure in the 90s but does not require at home midodrine prior to this admission.      ID:   Covid and influenza negative on 4/15/2024  I suspect sepsis had hypoglycemia and hypothermia on admission  Neutropenia improving WBC 3.9  Bcx2 on admission negative  Immunosuppression for liver transplant   Blood culture x2 performed in ED  No obvious source of sepsis could be HD cath but no obvious signs of infection  CT normal  Sepsis bundle and protocol followed. Follow serial lactic acid, frequent BMP check, fluid resuscitation.  IVF monitor for overload due to ESRD  Small open wound in abdomen for 2 year after transplant Ct not showing and signficant abscess?  Albumins   Follow cultures.   Lactic acid 1.2 normal but pro calcitonin elevated   Deescalate antibiotic when appropriate.    Hematology/Oncology:   Chronic severe anemia due to ESRD  Hb 8.8 on admission   Plt stable    Renal:   ESRd missed HD   Severe metabolic acidosis on admission required bicarbonate drip  Now weaned to off  Oral bicarbonate  Resume HD  Give potassium ( very low discussed with nephrology)    GI/:   LFT today pending but yeserday stable  Status post liver transplant in 2022  On immunosuppression but missed a couple of days  Check prograf level  Resume  And resume  Units at 04/17/24 0522    ondansetron (ZOFRAN-ODT) disintegrating tablet 4 mg  4 mg Oral Q8H PRN Gabe Pace MD        Or    ondansetron (ZOFRAN) injection 4 mg  4 mg IntraVENous Q6H PRN Gabe Pace MD   4 mg at 04/17/24 0900    polyethylene glycol (GLYCOLAX) packet 17 g  17 g Oral Daily PRN Gabe Pace MD        [Held by provider] acetaminophen (TYLENOL) tablet 650 mg  650 mg Oral Q6H PRN Gabe Pace MD   650 mg at 04/15/24 2340    Or    [Held by provider] acetaminophen (TYLENOL) suppository 650 mg  650 mg Rectal Q6H PRGabe Mondragon MD        mycophenolate (MYFORTIC) DR tablet 180 mg  180 mg Oral 4x Daily Gabe Pace MD   180 mg at 04/16/24 2100    tacrolimus (PROGRAF) capsule 2 mg  2 mg Oral BID Gabe Pace MD   2 mg at 04/17/24 0847    pantoprazole (PROTONIX) 40 mg in sodium chloride (PF) 0.9 % 10 mL injection  40 mg IntraVENous Daily Gabe Pace MD   40 mg at 04/17/24 0848    Vancomycin-Pharmacy to dose   Other RX Placeholder Gabe Pace MD        glucose chewable tablet 16 g  4 tablet Oral PRN Gabe Pace MD        dextrose bolus 10% 125 mL  125 mL IntraVENous PRGabe Mondragon MD   Stopped at 04/16/24 1643    Or    dextrose bolus 10% 250 mL  250 mL IntraVENous PRGabe Mondragon MD        glucagon injection 1 mg  1 mg SubCUTAneous PRN Gabe Pace MD        dextrose 10 % infusion   IntraVENous Continuous Gabe Cruz MD   Stopped at 04/16/24 0645           Objective:   Intake/Output:     Intake/Output Summary (Last 24 hours) at 4/17/2024 1015  Last data filed at 4/17/2024 0618  Gross per 24 hour   Intake 2471.64 ml   Output 300 ml   Net 2171.64 ml         Vital Signs:    /66   Pulse 77   Temp 98.2 °F (36.8 °C) (Oral)   Resp 24   Ht 1.829 m (6')   Wt 79.8 kg (176 lb)   SpO2 100%   BMI 23.87 kg/m²     Weight:  Wt Readings from Last 3 Encounters:   04/17/24 79.8 kg (176 lb)   03/13/24 82.1 kg (181 lb)

## 2024-04-18 ENCOUNTER — HOSPITAL ENCOUNTER (INPATIENT)
Facility: HOSPITAL | Age: 49
End: 2024-04-18
Payer: COMMERCIAL

## 2024-04-18 ENCOUNTER — APPOINTMENT (OUTPATIENT)
Facility: HOSPITAL | Age: 49
End: 2024-04-18
Payer: COMMERCIAL

## 2024-04-18 VITALS
WEIGHT: 208.11 LBS | RESPIRATION RATE: 16 BRPM | DIASTOLIC BLOOD PRESSURE: 66 MMHG | OXYGEN SATURATION: 100 % | TEMPERATURE: 97.8 F | HEIGHT: 72 IN | HEART RATE: 65 BPM | BODY MASS INDEX: 28.19 KG/M2 | SYSTOLIC BLOOD PRESSURE: 97 MMHG

## 2024-04-18 LAB
ALBUMIN SERPL-MCNC: 2.6 G/DL (ref 3.4–5)
ALBUMIN SERPL-MCNC: 2.7 G/DL (ref 3.4–5)
ALBUMIN/GLOB SERPL: 1.4 (ref 0.8–1.7)
ALP SERPL-CCNC: 127 U/L (ref 45–117)
ALT SERPL-CCNC: 9 U/L (ref 16–61)
ANION GAP SERPL CALC-SCNC: 10 MMOL/L (ref 3–18)
ANION GAP SERPL CALC-SCNC: 8 MMOL/L (ref 3–18)
AST SERPL-CCNC: 15 U/L (ref 10–38)
BASOPHILS # BLD: 0 K/UL (ref 0–0.1)
BASOPHILS NFR BLD: 0 % (ref 0–2)
BILIRUB SERPL-MCNC: 0.8 MG/DL (ref 0.2–1)
BUN SERPL-MCNC: 41 MG/DL (ref 7–18)
BUN SERPL-MCNC: 43 MG/DL (ref 7–18)
BUN/CREAT SERPL: 4 (ref 12–20)
BUN/CREAT SERPL: 4 (ref 12–20)
CA-I SERPL-SCNC: 1.08 MMOL/L (ref 1.12–1.32)
CALCIUM SERPL-MCNC: 7.4 MG/DL (ref 8.5–10.1)
CALCIUM SERPL-MCNC: 7.7 MG/DL (ref 8.5–10.1)
CHLORIDE SERPL-SCNC: 104 MMOL/L (ref 100–111)
CHLORIDE SERPL-SCNC: 104 MMOL/L (ref 100–111)
CO2 SERPL-SCNC: 24 MMOL/L (ref 21–32)
CO2 SERPL-SCNC: 26 MMOL/L (ref 21–32)
CREAT SERPL-MCNC: 10 MG/DL (ref 0.6–1.3)
CREAT SERPL-MCNC: 9.88 MG/DL (ref 0.6–1.3)
CRYPTOC AG SER QL IA: NEGATIVE
DIFFERENTIAL METHOD BLD: ABNORMAL
EOSINOPHIL # BLD: 0 K/UL (ref 0–0.4)
EOSINOPHIL NFR BLD: 2 % (ref 0–5)
ERYTHROCYTE [DISTWIDTH] IN BLOOD BY AUTOMATED COUNT: 15.6 % (ref 11.6–14.5)
GLOBULIN SER CALC-MCNC: 2 G/DL (ref 2–4)
GLUCOSE BLD STRIP.AUTO-MCNC: 100 MG/DL (ref 70–110)
GLUCOSE BLD STRIP.AUTO-MCNC: 100 MG/DL (ref 70–110)
GLUCOSE SERPL-MCNC: 100 MG/DL (ref 74–99)
GLUCOSE SERPL-MCNC: 102 MG/DL (ref 74–99)
HCT VFR BLD AUTO: 17.7 % (ref 36–48)
HCT VFR BLD AUTO: 22.6 % (ref 36–48)
HGB BLD-MCNC: 6.1 G/DL (ref 13–16)
HGB BLD-MCNC: 7.6 G/DL (ref 13–16)
HISTORY CHECK: NORMAL
IMM GRANULOCYTES # BLD AUTO: 0 K/UL
IMM GRANULOCYTES NFR BLD AUTO: 0 %
LYMPHOCYTES # BLD: 0.7 K/UL (ref 0.9–3.6)
LYMPHOCYTES NFR BLD: 45 % (ref 21–52)
MCH RBC QN AUTO: 27.7 PG (ref 24–34)
MCHC RBC AUTO-ENTMCNC: 33.6 G/DL (ref 31–37)
MCV RBC AUTO: 82.5 FL (ref 78–100)
MONOCYTES # BLD: 0.2 K/UL (ref 0.05–1.2)
MONOCYTES NFR BLD: 15 % (ref 3–10)
NEUTS SEG # BLD: 0.5 K/UL (ref 1.8–8)
NEUTS SEG NFR BLD: 38 % (ref 40–73)
NRBC # BLD: 0 K/UL (ref 0–0.01)
NRBC BLD-RTO: 0 PER 100 WBC
PHOSPHATE SERPL-MCNC: 3.3 MG/DL (ref 2.5–4.9)
PLATELET # BLD AUTO: 40 K/UL (ref 135–420)
PLATELET COMMENT: ABNORMAL
PMV BLD AUTO: 12.8 FL (ref 9.2–11.8)
POTASSIUM SERPL-SCNC: 3 MMOL/L (ref 3.5–5.5)
POTASSIUM SERPL-SCNC: 3.4 MMOL/L (ref 3.5–5.5)
POTASSIUM SERPL-SCNC: 3.4 MMOL/L (ref 3.5–5.5)
PROCALCITONIN SERPL-MCNC: 53.22 NG/ML
PROT SERPL-MCNC: 4.7 G/DL (ref 6.4–8.2)
RBC # BLD AUTO: 2.74 M/UL (ref 4.35–5.65)
RBC MORPH BLD: ABNORMAL
SODIUM SERPL-SCNC: 138 MMOL/L (ref 136–145)
SODIUM SERPL-SCNC: 138 MMOL/L (ref 136–145)
TACROLIMUS BLD-MCNC: 7.3 NG/ML (ref 2–20)
WBC # BLD AUTO: 1.4 K/UL (ref 4.6–13.2)

## 2024-04-18 PROCEDURE — 87327 CRYPTOCOCCUS NEOFORM AG IA: CPT

## 2024-04-18 PROCEDURE — 93975 VASCULAR STUDY: CPT

## 2024-04-18 PROCEDURE — P9047 ALBUMIN (HUMAN), 25%, 50ML: HCPCS | Performed by: INTERNAL MEDICINE

## 2024-04-18 PROCEDURE — 80069 RENAL FUNCTION PANEL: CPT

## 2024-04-18 PROCEDURE — 80053 COMPREHEN METABOLIC PANEL: CPT

## 2024-04-18 PROCEDURE — 85014 HEMATOCRIT: CPT

## 2024-04-18 PROCEDURE — 84145 PROCALCITONIN (PCT): CPT

## 2024-04-18 PROCEDURE — 87427 SHIGA-LIKE TOXIN AG IA: CPT

## 2024-04-18 PROCEDURE — 93005 ELECTROCARDIOGRAM TRACING: CPT | Performed by: INTERNAL MEDICINE

## 2024-04-18 PROCEDURE — 85018 HEMOGLOBIN: CPT

## 2024-04-18 PROCEDURE — 87205 SMEAR GRAM STAIN: CPT

## 2024-04-18 PROCEDURE — 6360000002 HC RX W HCPCS: Performed by: INTERNAL MEDICINE

## 2024-04-18 PROCEDURE — 2580000003 HC RX 258: Performed by: INTERNAL MEDICINE

## 2024-04-18 PROCEDURE — 2720000010 HC SURG SUPPLY STERILE

## 2024-04-18 PROCEDURE — 87070 CULTURE OTHR SPECIMN AEROBIC: CPT

## 2024-04-18 PROCEDURE — 82330 ASSAY OF CALCIUM: CPT

## 2024-04-18 PROCEDURE — C9113 INJ PANTOPRAZOLE SODIUM, VIA: HCPCS | Performed by: INTERNAL MEDICINE

## 2024-04-18 PROCEDURE — 97602 WOUND(S) CARE NON-SELECTIVE: CPT

## 2024-04-18 PROCEDURE — 85025 COMPLETE CBC W/AUTO DIFF WBC: CPT

## 2024-04-18 PROCEDURE — 36430 TRANSFUSION BLD/BLD COMPNT: CPT

## 2024-04-18 PROCEDURE — 82962 GLUCOSE BLOOD TEST: CPT

## 2024-04-18 PROCEDURE — 74176 CT ABD & PELVIS W/O CONTRAST: CPT

## 2024-04-18 PROCEDURE — P9040 RBC LEUKOREDUCED IRRADIATED: HCPCS

## 2024-04-18 PROCEDURE — 84132 ASSAY OF SERUM POTASSIUM: CPT

## 2024-04-18 PROCEDURE — 6370000000 HC RX 637 (ALT 250 FOR IP): Performed by: INTERNAL MEDICINE

## 2024-04-18 PROCEDURE — 2500000003 HC RX 250 WO HCPCS: Performed by: INTERNAL MEDICINE

## 2024-04-18 PROCEDURE — 94640 AIRWAY INHALATION TREATMENT: CPT

## 2024-04-18 RX ORDER — POTASSIUM CHLORIDE 7.45 MG/ML
10 INJECTION INTRAVENOUS
Status: COMPLETED | OUTPATIENT
Start: 2024-04-18 | End: 2024-04-18

## 2024-04-18 RX ORDER — DEXTROSE MONOHYDRATE 100 MG/ML
INJECTION, SOLUTION INTRAVENOUS CONTINUOUS
Status: DISCONTINUED | OUTPATIENT
Start: 2024-04-18 | End: 2024-04-18 | Stop reason: HOSPADM

## 2024-04-18 RX ORDER — MIDODRINE HYDROCHLORIDE 10 MG/1
10 TABLET ORAL
Qty: 30 TABLET | Refills: 0
Start: 2024-04-18

## 2024-04-18 RX ORDER — CALCIUM GLUCONATE 20 MG/ML
1000 INJECTION, SOLUTION INTRAVENOUS ONCE
Status: COMPLETED | OUTPATIENT
Start: 2024-04-18 | End: 2024-04-18

## 2024-04-18 RX ORDER — SODIUM BICARBONATE 650 MG/1
650 TABLET ORAL 4 TIMES DAILY
Qty: 30 TABLET | Refills: 0
Start: 2024-04-18

## 2024-04-18 RX ORDER — METOCLOPRAMIDE HYDROCHLORIDE 5 MG/ML
5 INJECTION INTRAMUSCULAR; INTRAVENOUS EVERY 6 HOURS PRN
Status: DISCONTINUED | OUTPATIENT
Start: 2024-04-18 | End: 2024-04-18 | Stop reason: HOSPADM

## 2024-04-18 RX ORDER — ALBUMIN (HUMAN) 12.5 G/50ML
12.5 SOLUTION INTRAVENOUS EVERY 6 HOURS
Qty: 30 EACH | Refills: 0
Start: 2024-04-18

## 2024-04-18 RX ORDER — SODIUM CHLORIDE 9 MG/ML
INJECTION, SOLUTION INTRAVENOUS PRN
Status: DISCONTINUED | OUTPATIENT
Start: 2024-04-18 | End: 2024-04-18 | Stop reason: HOSPADM

## 2024-04-18 RX ADMIN — SODIUM CHLORIDE, PRESERVATIVE FREE 40 MG: 5 INJECTION INTRAVENOUS at 08:46

## 2024-04-18 RX ADMIN — POTASSIUM CHLORIDE 10 MEQ: 7.45 INJECTION INTRAVENOUS at 13:22

## 2024-04-18 RX ADMIN — ALBUMIN (HUMAN) 12.5 G: 0.25 INJECTION, SOLUTION INTRAVENOUS at 05:06

## 2024-04-18 RX ADMIN — SODIUM BICARBONATE 650 MG TABLET 650 MG: at 12:10

## 2024-04-18 RX ADMIN — CALCIUM GLUCONATE 1000 MG: 20 INJECTION, SOLUTION INTRAVENOUS at 09:54

## 2024-04-18 RX ADMIN — MIDODRINE HYDROCHLORIDE 10 MG: 10 TABLET ORAL at 08:46

## 2024-04-18 RX ADMIN — FILGRASTIM-AAFI 480 MCG: 480 INJECTION, SOLUTION SUBCUTANEOUS at 09:20

## 2024-04-18 RX ADMIN — POTASSIUM CHLORIDE 10 MEQ: 7.46 INJECTION, SOLUTION INTRAVENOUS at 02:43

## 2024-04-18 RX ADMIN — PIPERACILLIN AND TAZOBACTAM 3375 MG: 3; .375 INJECTION, POWDER, LYOPHILIZED, FOR SOLUTION INTRAVENOUS at 01:32

## 2024-04-18 RX ADMIN — DEXTROSE MONOHYDRATE: 100 INJECTION, SOLUTION INTRAVENOUS at 09:19

## 2024-04-18 RX ADMIN — ALBUMIN (HUMAN) 12.5 G: 0.25 INJECTION, SOLUTION INTRAVENOUS at 13:21

## 2024-04-18 RX ADMIN — POTASSIUM CHLORIDE 10 MEQ: 7.45 INJECTION INTRAVENOUS at 09:14

## 2024-04-18 RX ADMIN — MIDODRINE HYDROCHLORIDE 10 MG: 10 TABLET ORAL at 12:10

## 2024-04-18 RX ADMIN — ARFORMOTEROL TARTRATE 15 MCG: 15 SOLUTION RESPIRATORY (INHALATION) at 07:09

## 2024-04-18 RX ADMIN — SODIUM CHLORIDE, PRESERVATIVE FREE 10 ML: 5 INJECTION INTRAVENOUS at 08:46

## 2024-04-18 RX ADMIN — POTASSIUM CHLORIDE 10 MEQ: 7.45 INJECTION INTRAVENOUS at 10:53

## 2024-04-18 RX ADMIN — METOCLOPRAMIDE 5 MG: 5 INJECTION, SOLUTION INTRAMUSCULAR; INTRAVENOUS at 12:10

## 2024-04-18 RX ADMIN — SODIUM BICARBONATE 650 MG TABLET 650 MG: at 08:46

## 2024-04-18 RX ADMIN — TACROLIMUS 2 MG: 1 CAPSULE ORAL at 08:46

## 2024-04-18 RX ADMIN — ONDANSETRON 4 MG: 2 INJECTION INTRAMUSCULAR; INTRAVENOUS at 08:46

## 2024-04-18 RX ADMIN — POTASSIUM CHLORIDE 10 MEQ: 7.46 INJECTION, SOLUTION INTRAVENOUS at 01:32

## 2024-04-18 RX ADMIN — POTASSIUM CHLORIDE 20 MEQ: 1500 TABLET, EXTENDED RELEASE ORAL at 08:46

## 2024-04-18 RX ADMIN — ALBUMIN (HUMAN) 12.5 G: 0.25 INJECTION, SOLUTION INTRAVENOUS at 00:03

## 2024-04-18 NOTE — DISCHARGE SUMMARY
Discharge Summary    Patient: Valeriano Fong III MRN: [id number]  CSN: [id number]    YOB: 1975  Age: 49 y.o.  Sex: male    DOA: 4/15/2024 LOS:  LOS: 3 days   Discharge Date:      Primary Care Provider:  Geovani Reeves MD    Admission Diagnoses: Epistaxis [R04.0]  Metabolic acidosis [E87.20]  Uremia [N19]  Hypoglycemia [E16.2]  Septicemia (HCC) [A41.9]  Metabolic acidosis with respiratory acidosis [E87.4]  Hypothermia, initial encounter [T68.XXXA]    Discharge Diagnoses:    Active Hospital Problems    Diagnosis     Sepsis without acute organ dysfunction (HCC) [A41.9]     Metabolic acidosis [E87.20]     Hypothermia [T68.XXXA]     Hypotension [I95.9]     Personal history of immunosupression therapy [Z92.25]     Metabolic acidosis with respiratory acidosis [E87.4]     Crohn's disease (HCC) [K50.90]     ESRD on dialysis (HCC) [N18.6, Z99.2]     Liver transplant status (HCC) [Z94.4]        Discharge Medications:        Medication List        START taking these medications      albumin human 25% 25 % IV solution  Infuse 50 mLs intravenously every 6 hours     filgrastim-aafi 480 MCG/0.8ML Sosy injection  Commonly known as: NIVESTYM  Inject 0.8 mLs into the skin daily  Start taking on: April 19, 2024     midodrine 10 MG tablet  Commonly known as: PROAMATINE  Take 1 tablet by mouth 3 times daily (with meals)     sodium bicarbonate 650 MG tablet  Take 1 tablet by mouth 4 times daily            CONTINUE taking these medications      pantoprazole 40 MG tablet  Commonly known as: PROTONIX  Take 1 tablet by mouth every morning (before breakfast)     tacrolimus 1 MG capsule  Commonly known as: Prograf  Take 2 capsules by mouth 2 times daily            STOP taking these medications      acetaminophen 325 MG tablet  Commonly known as: TYLENOL     aspirin 81 MG EC tablet     diphenhydrAMINE 25 MG tablet  Commonly known as: SOMINEX     droNABinol 10 MG capsule  Commonly known as: MARINOL     Klor-Con M20 20 MEQ extended  to eat.  He has felt chilled.  A few times he thought he might have a fever.  Vital signs demonstrated moderate tachycardia with a heart rate of 105.  He was hypothermic with a temperature of 94.5.  Blood pressure 112/58.  In the emergency department routine blood testing demonstrated a fairly significant acidosis.  An arterial blood gas was obtained that confirmed his acidosis.  Upon further questioning, it turns out that he has missed dialysis for the past week or so.  A CT scan was performed and this demonstrated a small right-sided pleural effusion.  No acute infiltrates were noted.  No acute abnormalities were noted in the abdomen and pelvis.  As a precaution he received Rocephin and azithromycin.  His lactic acid was normal.  Nephrology was contacted and is arranging hemodialysis.  We have been asked to admit Mr. Bal to the intensive care unit for further evaluation and treatment.     Hospital Course :   Mr. Fong was admitted to ICU. He was seen and followed by PCCM, nephrology, ID, hepatology, heme/onc.    CRITICAL CARE PLAN     Resp -   Acute respiratory failure with hypoxia -  Continue with oxygen by NC as needed, wean as tolerated  Pleural effusion.   VQ scan low probability  Encourage incentive spirometry.     ID -  .   Sepsis  Procalcitonin increasing.  On immunosuppression.  No clear source identified.  Has TDC  Concern for CRBSI  Or possible GI source  blood  cx no growth to date.  COVID 19, influenza A&B negative.  ANTIBIOTICS - vancomycin, zosyn.      CVS - Monitor HD.   Hypotension, ?septic shock.   Likely has baseline low BP.  On midodrine, albumin  Off pressors (levophed).  Echo with normal LV systolic function, EF of 55-60%     Heme/onc - Follow H&H, plts.   Anemia - secondary to ESRD  Pancytopenia - likely multifactorial, medications/infection. Getting worse  Heme/onc consulted, recommend neupogen     Renal - Trended BUN, Cr. Checked and replaced Mg, K, phos.  ESRD - HD per

## 2024-04-18 NOTE — CONSULTS
LOGO HERE    [facility]  Phone: [phone]  Paging : (Tl) [phone] ([facility]) [phone]  ([place]) [phone] ([facility]) [phone]     Admission History and Physical    Chief Complaint:  Valeriano Fong III is a 49 y.o. male who is being admitted on April 15 with metabolic acidosis, multiple missed hemodialysis sessions, hypothermia concerning for sepsis and immunosuppression.  He was admitted to the ICU for care.  On admission, he had an anion gap acidosis due to missed dialysis sessions.  Nephrology has been consulted.  He is tachypneic blowing off his CO2 to try to correct the acid-base balance imbalance.  He is hypothermic and possibly septic.  Lactic acid was normal.  He was empirically started on Rocephin and azithromycin and vancomycin.  Cultures were done.  He has had a liver transplant and is on immunosuppression.  He has a pleural effusion due to fluid overload from missing dialysis.  Other problems include a history of hypertension GERD liver transplant recipient anemia of kidney disease.    Patient has an extensive history to include liver failure status post liver transplant and is followed by Dr. Mascorro.  He has end-stage renal disease on hemodialysis.  He has anemia of renal failure, history of Crohn's disease, history of hypertension hyperlipidemia osteoporosis GERD with generalized malaise.  Has had some nosebleeding on and off.  He has not been eating much of anything over the last few days.  He does have emesis with eating.  He did not take his temperature but thought he might have a fever.  He had a metabolic acidosis.  He has missed at least a week of hemodialysis.  He has a small right pleural effusion.    He received emergency dialysis.  He was seen by ICU and pulmonary and felt to have acute hypoxic respiratory failure with possible sepsis metabolic acidosis hypotension end-stage renal disease status post liver transplant.  He was hypothermic, he is a  yet.    Ferritin between 3400 on March 13, 2088 on  April 11  Iron level 182 iron saturation 107    Culture from January 6 showed Bacteroides this was a wound culture possibly from the open wound on his abdomen.  Multiple blood cultures are negative.        Plan:   #1.  Sepsis with hypotension  Immunocompromised host with liver transplant on mycophenolate and tacrolimus, renal failure on dialysis who missed dialysis for about a week, cultures have been done.  He remains on Levophed.  2.  Leukopenia and neutropenia will start filgrastim  3.  So far platelets are adequate.  Will transfuse if they continue to fall less than 20,000  4.  Transfused 2 units of blood with renal failure induced anemia.  Not sure how much erythropoietin he is getting that he is iron overloaded.  Ferritin is an acute phase reactant and is elevated due to the sepsis nonetheless he still is iron overloaded  5.  Our service will follow with you.  Await his morning labs                Chivo Morris MD MD  [facility]

## 2024-04-18 NOTE — CONSULTS
IP WOUND CONSULT    Valeriano Fong III  MEDICAL RECORD NUMBER:  [id number]  AGE: 49 y.o.   GENDER: male  : 1975  TODAY'S DATE:  2024    GENERAL     [] Follow-up   [x] New Consult    [x] Present on Admission  [] Hospital Acquired    Valeriano Fong III is a 49 y.o. male referred by:   [x] Physician  [] Nursing  [] Other:         PAST MEDICAL HISTORY    Past Medical History:   Diagnosis Date    Crohn disease (HCC)     Liver disease     Renal failure         PAST SURGICAL HISTORY    Past Surgical History:   Procedure Laterality Date    OTHER SURGICAL HISTORY      bowel obstruction and ostomy    OTHER SURGICAL HISTORY      bowel resection secondary to tear in bowel and abscess    XR MIDLINE EQUAL OR GREATER THAN 5 YEARS  2021    XR MIDLINE EQUAL OR GREATER THAN 5 YEARS 2021       FAMILY HISTORY    History reviewed. No pertinent family history.    SOCIAL HISTORY    Social History     Tobacco Use    Smoking status: Former    Smokeless tobacco: Former   Substance Use Topics    Alcohol use: Not Currently    Drug use: Not Currently       ALLERGIES    Allergies   Allergen Reactions    Azathioprine Rash, Itching and Other (See Comments)    Naproxen Itching, Nausea And Vomiting and Other (See Comments)     heartburn         MEDICATIONS    No current facility-administered medications on file prior to encounter.     Current Outpatient Medications on File Prior to Encounter   Medication Sig Dispense Refill    KLOR-CON M20 20 MEQ extended release tablet TAKE 2 TABLETS BY MOUTH 2 TIMES DAILY FOR 10 DAYS THEN, 1 TABLET BY MOUTH EVERY MORNING FOR 80 DAYS.. 130 tablet 0    potassium chloride (K-TAB) 20 MEQ TBCR extended release tablet Take 1 tablet by mouth once for 1 dose 1 tablet 0    droNABinol (MARINOL) 10 MG capsule Take 1 capsule by mouth 2 times daily (before meals) for 360 days. Max Daily Amount: 20 mg 180 capsule 3    tacrolimus (PROGRAF) 1 MG capsule Take 2 capsules by mouth 2 times daily 360 capsule 3

## 2024-04-18 NOTE — PROGRESS NOTES
[facility]   Pharmacy Pharmacokinetic Monitoring Service - Vancomycin    Consulting Provider: Dr. Pace  Indication: Sepsis of Unknown Etiology  Target Concentration: Dosing based on anticipated concentration <15 mg/L due to renal impairment/insufficiency  Day of Therapy: 4  Additional Antimicrobials: Zosyn     Pertinent Laboratory Values:   Wt Readings from Last 1 Encounters:   04/18/24 94.4 kg (208 lb 1.8 oz)     Temp Readings from Last 1 Encounters:   04/18/24 97.8 °F (36.6 °C) (Oral)     Estimated Creatinine Clearance: 11 mL/min (A) (based on SCr of 10 mg/dL (H)).  Recent Labs     04/17/24  1351 04/18/24  0605 04/18/24  0743   CREATININE  --  9.88* 10.00*   BUN  --  41* 43*   WBC 1.2* 1.4*  --      Procalcitonin: 53.22 ng/mL    Recent vancomycin administrations                     vancomycin (VANCOCIN) 750 mg in sodium chloride 0.9 % 250 mL IVPB (vial-mate) (mg) 750 mg New Bag 04/17/24 2315    vancomycin (VANCOCIN) 750 mg in sodium chloride 0.9 % 250 mL IVPB (vial-mate) (mg) 750 mg New Bag 04/16/24 1230    Vancomycin random level due 04/16/24 at 1000 (each) 1 each Given 04/16/24 1052    vancomycin (VANCOCIN) 1500 mg in sodium chloride 0.9% 500 mL IVPB (mg) 1,500 mg New Bag 04/15/24 2236                  Plan:  Current dosing regimen continues to be concentration-guided due to renal impairment and intermittent hemodialysis  Continue current dose: Vancomycin 750 mg IV x 1 dose (last given on 4/17/24 @ 2315)  Vancomycin Random Level ordered for 04/18/24 at 1800  Following vancomycin random level results, patient's dose of Vancomycin 750 mg IV x 1 dose may be altered.  Pharmacy will continue to monitor patient and adjust therapy as indicated    Thank you for the consult,  Yesi Ramos, PharmD  Clinical Pharmacist  [phone]  4/18/2024 4:31 PM

## 2024-04-18 NOTE — PROGRESS NOTES
0800- Potassium replacement x3 doses, plan for CT, nephrology plan for dialysis in AM   1500- Transportation initiated per transfer center. Ambulance staff arrived, Message was not received. Emtala completed and signed by patient, house supervisor Rn, and MD. Wife updated. Report called to christa BAUMANN at [facility]. Patient H&P discussed, labs discussed, last set of vitals obtained, patient agreeable to transfer.

## 2024-04-18 NOTE — PROGRESS NOTES
1930- Report and care received, assessment completed per flow sheet.     2308- Hypoglycemia noted, D10 administered per protocol,  notified, orders received.    2321- Reassessment completed.     0315- Reassessment completed.     0450-  notified of difficulty identifying p waves, increased bradycardia, and rhythm appearing slightly different, orders for EKG, MD to view EKG in EMR.

## 2024-04-18 NOTE — PROGRESS NOTES
PT order received and chart reviewed. Pt unable to be seen for skilled PT evaluation due to leaving the floor for CT, 1255.  Patient returned from CT and in room initiating PT evaluation however ultrasound arrived reporting need to see pt, 1328.  Will follow up as pt schedule allows.  Arianne Flower, PT, DPT

## 2024-04-18 NOTE — PROGRESS NOTES
infusion   IntraVENous PRN Clarita Bryant MD        Vancomycin Random Level 4/18/24 @ 1400   Other RX Placeholder Gabe Pace MD        piperacillin-tazobactam (ZOSYN) 3,375 mg in sodium chloride 0.9 % 50 mL extended IVPB (mini-bag)  3,375 mg IntraVENous Q12H Gabe Pace MD 12.5 mL/hr at 04/18/24 0132 3,375 mg at 04/18/24 0132    heparin (porcine) injection 4,000 Units  4,000 Units IntraCATHeter PRN Penny Ni MD        albumin human 25% IV solution 12.5 g  12.5 g IntraVENous Q6H Clarita Bryant MD   Stopped at 04/18/24 0700    midodrine (PROAMATINE) tablet 10 mg  10 mg Oral TID WC Clarita Bryant MD   10 mg at 04/18/24 0846    sodium bicarbonate tablet 650 mg  650 mg Oral 4x Daily Clarita Bryant MD   650 mg at 04/18/24 0846    arformoterol tartrate (BROVANA) nebulizer solution 15 mcg  15 mcg Nebulization BID RT Clarita Bryant MD   15 mcg at 04/18/24 0709    sodium chloride flush 0.9 % injection 5-40 mL  5-40 mL IntraVENous 2 times per day Gabe Pace MD   10 mL at 04/18/24 0846    sodium chloride flush 0.9 % injection 5-40 mL  5-40 mL IntraVENous PRN Gabe Pace MD        0.9 % sodium chloride infusion   IntraVENous PRN Gabe Pace MD        [Held by provider] heparin (porcine) injection 5,000 Units  5,000 Units SubCUTAneous 3 times per day Gabe Pace MD   5,000 Units at 04/17/24 1259    ondansetron (ZOFRAN-ODT) disintegrating tablet 4 mg  4 mg Oral Q8H PRN Gabe Pace MD        Or    ondansetron (ZOFRAN) injection 4 mg  4 mg IntraVENous Q6H PRN Gabe Pace MD   4 mg at 04/18/24 0846    polyethylene glycol (GLYCOLAX) packet 17 g  17 g Oral Daily PRN Gabe Pace MD        [Held by provider] acetaminophen (TYLENOL) tablet 650 mg  650 mg Oral Q6H PRN Gabe Pace MD   650 mg at 04/15/24 2340    Or    [Held by provider] acetaminophen (TYLENOL) suppository 650 mg  650 mg Rectal Q6H PRN Gabe Pace MD           Supple, symmetric. No lymphadenopathy. Trachea midline.  Lung:   Moderate air entry bilateral equal. No rales. No rhonchi. No wheezing. No stridors. No prolongded expiration. No accessory muscle use.  Heart:   Regular rate & rhythm. S1 S2 present. No murmur.  No JVD.  Abdomen:  Soft. NT. ND. +BS. No masses.post op scrring and small open wound ozzing for 2 years per pateitn discharge serosanginous  Extremities:  No pedal edema. No cyanosis. No clubbing.  Pulses: 2+ and symmetric in DP. Capillary refill: normal.   Lymphatic:  No cervical or supraclavicular palpable lymphadenopathy.  Musculoskeletal: No joint swelling. No tenderness.   Skin:   Color, texture, turgor normal. No rashes or lesions. Right upper chest HD cath no discharge or redness    Data:       Recent Results (from the past 24 hour(s))   Echo (TTE) complete (PRN contrast/bubble/strain/3D)    Collection Time: 04/17/24  9:25 AM   Result Value Ref Range    Body Surface Area 2.01 m2    IVSd 1.0 0.6 - 1.0 cm    LVIDd 5.2 4.2 - 5.9 cm    LVIDs 3.3 cm    LVOT Diameter 2.1 cm    LVPWd 1.2 (A) 0.6 - 1.0 cm    EF BP 68 55 - 100 %    LV Ejection Fraction A2C 69 %    LV Ejection Fraction A4C 67 %    LV EDV A2C 127 mL    LV EDV A4C 138 mL    LV EDV  67 - 155 mL    LV ESV A2C 39 mL    LV ESV A4C 45 mL    LV ESV BP 42 22 - 58 mL    LVOT Peak Gradient 4 mmHg    LVOT Mean Gradient 2 mmHg    LVOT SV 63.7 ml    LVOT Peak Velocity 1.0 m/s    LVOT VTI 18.4 cm    LA Diameter 3.7 cm    LA Volume A/L 62 mL    LA Volume A-L A4C 42 18 - 58 mL    LA Volume A-L A4C 77 (A) 18 - 58 mL    LA Volume MOD A2C 38 18 - 58 mL    LA Volume MOD A4C 65 (A) 18 - 58 mL    LA Volume BP 54 18 - 58 mL    RA Volume 48 ml    RA Volume 45 ml    AV Area by Peak Velocity 3.0 cm2    AV Area by VTI 2.8 cm2    AV Peak Gradient 5 mmHg    AV Mean Gradient 3 mmHg    AV Peak Velocity 1.2 m/s    AV Mean Velocity 0.8 m/s    AV VTI 23.6 cm    MV A Velocity 0.57 m/s    MV E Wave Deceleration Time 231.4 ms  7 3 - 10 %    Eosinophils % 3 0 - 5 %    Basophils % 0 0 - 2 %    Immature Granulocytes % 0 %    Neutrophils Absolute 0.9 (L) 1.8 - 8.0 K/UL    Lymphocytes Absolute 0.2 (L) 0.9 - 3.6 K/UL    Monocytes Absolute 0.1 0.05 - 1.2 K/UL    Eosinophils Absolute 0.0 0.0 - 0.4 K/UL    Basophils Absolute 0.0 0.0 - 0.1 K/UL    Immature Granulocytes Absolute 0.0 K/UL    Differential Type MANUAL      Platelet Comment DECREASED PLATELETS      RBC Comment OVALOCYTES  2+        RBC Comment ANISOCYTOSIS  1+        RBC Comment RONI CELLS  1+       Potassium    Collection Time: 04/17/24  1:51 PM   Result Value Ref Range    Potassium 2.7 (LL) 3.5 - 5.5 mmol/L   Hepatic Function Panel    Collection Time: 04/17/24  1:51 PM   Result Value Ref Range    Total Protein 5.1 (L) 6.4 - 8.2 g/dL    Albumin 2.8 (L) 3.4 - 5.0 g/dL    Globulin 2.3 2.0 - 4.0 g/dL    Albumin/Globulin Ratio 1.2 0.8 - 1.7      Total Bilirubin 0.7 0.2 - 1.0 MG/DL    Bilirubin, Direct 0.3 (H) 0.0 - 0.2 MG/DL    Alk Phosphatase 149 (H) 45 - 117 U/L    AST 17 10 - 38 U/L    ALT 10 (L) 16 - 61 U/L   PREPARE RBC (CROSSMATCH), 1 Units    Collection Time: 04/17/24  3:15 PM   Result Value Ref Range    History Check Historical check performed    PREPARE RBC (CROSSMATCH), 1 Units    Collection Time: 04/17/24  4:45 PM   Result Value Ref Range    History Check Historical check performed    Vancomycin Level, Random    Collection Time: 04/17/24  5:51 PM   Result Value Ref Range    Vancomycin Rm 15.2 5.0 - 40.0 UG/ML   POCT Glucose    Collection Time: 04/17/24  6:15 PM   Result Value Ref Range    POC Glucose 92 70 - 110 mg/dL   POCT Glucose    Collection Time: 04/17/24 11:05 PM   Result Value Ref Range    POC Glucose 69 (L) 70 - 110 mg/dL   POCT Glucose    Collection Time: 04/17/24 11:32 PM   Result Value Ref Range    POC Glucose 119 (H) 70 - 110 mg/dL   POCT Glucose    Collection Time: 04/18/24 12:05 AM   Result Value Ref Range    POC Glucose 100 70 - 110 mg/dL   Potassium

## 2024-04-18 NOTE — PROGRESS NOTES
I HAD EXTENSIVE DISCUSSION WITH [facility] ICU/TRANSPLANT TEAM  Patient is accepted to [facility] icu  Hold Cellcept continue tacrolimus no other recommendation  Accepting icu physician  Dr. Allen  No beds currently  JORY Bryant MD

## 2024-04-18 NOTE — PROGRESS NOTES
[facility]   Pharmacy Pharmacokinetic Monitoring Service - Vancomycin    Consulting Provider: Dr. Pace   Indication: Sepsis of unknown etiology  Target Concentration: Pre-Dialysis Concentration 15-20 mg/L  Day of Therapy: 3  Additional Antimicrobials: zosyn, Azithromycin.    Pertinent Laboratory Values:   Wt Readings from Last 1 Encounters:   04/17/24 79.8 kg (176 lb)     Temp Readings from Last 1 Encounters:   04/17/24 98.4 °F (36.9 °C) (Oral)     Recent Labs     04/15/24  1615 04/16/24  0050 04/17/24  0008 04/17/24  0449 04/17/24  1351   CREATININE 30.40*   < > 17.80* 17.80*  --    *   < > 100* 103*  --    WBC 3.9*  --   --   --  1.2*    < > = values in this interval not displayed.       Recent vancomycin administrations                     vancomycin (VANCOCIN) 750 mg in sodium chloride 0.9 % 250 mL IVPB (vial-mate) (mg) 750 mg New Bag 04/16/24 1230    Vancomycin random level due 04/16/24 at 1000 (each) 1 each Given 04/16/24 1052    vancomycin (VANCOCIN) 1500 mg in sodium chloride 0.9% 500 mL IVPB (mg) 1,500 mg New Bag 04/15/24 2236                    Assessment:  Date/Time Current Dose Concentration Dialysis Session   04/17/2024 750mg Random level= 15.2 @1800 04/17/24 @0906     Plan:  Concentration-guided dosing due to renal impairment and intermittent hemodialysis   Current dosing regimen of vancomycin 750 mg IV is therapeutic   Continue current dose of Vancomycin 750 mg  IV x 1 dose  Pharmacy will continue to monitor patient and adjust therapy as indicated    Thank you for the consult,VIJAY Wills [facility]  4/17/2024 9:49 PM

## 2024-04-18 NOTE — PROGRESS NOTES
Nephrology Progress note    Valeriano Fong III  MRN: [id number]  : 1975    Admit Date: 4/15/2024      Impression:      [place] [facility]  [place] transplant on [place]  Hyponatremia, improved  Metabolic acidosis, improved  Anemia, transfused prn   Chronic hypotension  Weakness  Hypokalemia  Hypomagnesemia     Plan:      HD tomorrow   Cont abx, following Blood and Sputum Cx.   BC no growth in 3 days.   Immunosuppression per team/Hepatologist  Supplement K   Start Epo   Renal Panel, Mg, CBC in a.m.    Subjective:     Valeriano Fong III is a 49 y.o. male  with a past medical history significant for Liver transplant, Chronic Hypotension, Anemia, Crohn's dz, ESRD on Home HD who presented to our hospital with worsening weakness.  Pt has been [place] [facility] [place] the care of [facility].  Pt reports poor po intake, noticed to have severe acidosis on admission.  No cp or but has mild SOB.  CXR showed small right sided pleural effusion.  Pt was then admitted to the ICU and Nephrology was consulted for further evaluation and management of his ESRD.  Neg COVID/influenza     Patient underwent urgent  HD on  and routine HD     Denies SOB today    Principal Problem:    Metabolic acidosis with respiratory acidosis  Active Problems:    Liver transplant status (HCC)    ESRD on dialysis (HCC)    Crohn's disease (HCC)    Sepsis without acute organ dysfunction (HCC)    Metabolic acidosis    Hypothermia    Hypotension    Personal history of immunosupression therapy  Resolved Problems:    * No resolved hospital problems. *    Current Facility-Administered Medications   Medication Dose Route Frequency    0.9 % sodium chloride infusion   IntraVENous PRN    filgrastim-aafi (NIVESTYM) injection 480 mcg  480 mcg SubCUTAneous Daily    dextrose 10 % infusion   IntraVENous Continuous    metoclopramide (REGLAN) injection 5 mg  5 mg IntraVENous Q6H PRN    potassium chloride 10 mEq/100 mL IVPB (Peripheral Line)  10 mEq IntraVENous  Q1H    [Held by provider] norepinephrine (LEVOPHED) 16 mg in sodium chloride 0.9 % 250 mL infusion  1-30 mcg/min IntraVENous Continuous    0.9 % sodium chloride infusion   IntraVENous PRN    potassium chloride (KLOR-CON M) extended release tablet 20 mEq  20 mEq Oral Daily    0.9 % sodium chloride infusion   IntraVENous PRN    Vancomycin Random Level 4/18/24 @ 1400   Other RX Placeholder    piperacillin-tazobactam (ZOSYN) 3,375 mg in sodium chloride 0.9 % 50 mL extended IVPB (mini-bag)  3,375 mg IntraVENous Q12H    heparin (porcine) injection 4,000 Units  4,000 Units IntraCATHeter PRN    albumin human 25% IV solution 12.5 g  12.5 g IntraVENous Q6H    midodrine (PROAMATINE) tablet 10 mg  10 mg Oral TID WC    sodium bicarbonate tablet 650 mg  650 mg Oral 4x Daily    arformoterol tartrate (BROVANA) nebulizer solution 15 mcg  15 mcg Nebulization BID RT    sodium chloride flush 0.9 % injection 5-40 mL  5-40 mL IntraVENous 2 times per day    sodium chloride flush 0.9 % injection 5-40 mL  5-40 mL IntraVENous PRN    0.9 % sodium chloride infusion   IntraVENous PRN    [Held by provider] heparin (porcine) injection 5,000 Units  5,000 Units SubCUTAneous 3 times per day    ondansetron (ZOFRAN-ODT) disintegrating tablet 4 mg  4 mg Oral Q8H PRN    Or    ondansetron (ZOFRAN) injection 4 mg  4 mg IntraVENous Q6H PRN    polyethylene glycol (GLYCOLAX) packet 17 g  17 g Oral Daily PRN    [Held by provider] acetaminophen (TYLENOL) tablet 650 mg  650 mg Oral Q6H PRN    Or    [Held by provider] acetaminophen (TYLENOL) suppository 650 mg  650 mg Rectal Q6H PRN    [Held by provider] mycophenolate (MYFORTIC) DR tablet 180 mg  180 mg Oral 4x Daily    tacrolimus (PROGRAF) capsule 2 mg  2 mg Oral BID    pantoprazole (PROTONIX) 40 mg in sodium chloride (PF) 0.9 % 10 mL injection  40 mg IntraVENous Daily    Vancomycin-Pharmacy to dose   Other RX Placeholder    glucose chewable tablet 16 g  4 tablet Oral PRN    dextrose bolus 10% 125 mL  125 mL  IntraVENous PRN    Or    dextrose bolus 10% 250 mL  250 mL IntraVENous PRN    glucagon injection 1 mg  1 mg SubCUTAneous PRN    dextrose 10 % infusion   IntraVENous Continuous PRN         Allergy:   Allergies   Allergen Reactions    Azathioprine Rash, Itching and Other (See Comments)    Naproxen Itching, Nausea And Vomiting and Other (See Comments)     heartburn          Objective:     BP (!) 103/59   Pulse 53   Temp 98.3 °F (36.8 °C) (Oral)   Resp 21   Ht 1.829 m (6')   Wt 94.4 kg (208 lb 1.8 oz)   SpO2 100%   BMI 28.23 kg/m²       Intake/Output Summary (Last 24 hours) at 4/18/2024 1157  Last data filed at 4/18/2024 0645  Gross per 24 hour   Intake 2477.3 ml   Output 30 ml   Net 2447.3 ml       Physical Exam:       General: No acute distress   HENT: Atraumatic and normocephalic   Eyes: Sclera clear, normal conjunctiva   Neck: Supple, No JVD   Cardiovascular: Normal S1 & S2, RRR, no m/r/g   Pulmonary/Chest Wall: Clear to auscultation bilaterally   Abdominal: Soft and non-tender, Bowel sounds normal   Musculoskeletal: No edema   Neurological: Awake and alert.   No focal motor deficits    HD access: [facility] TDC in place      Data Review:  Lab Results   Component Value Date/Time     04/18/2024 07:43 AM    K 3.4 04/18/2024 07:43 AM     04/18/2024 07:43 AM    CO2 26 04/18/2024 07:43 AM    BUN 43 04/18/2024 07:43 AM    CREATININE 10.00 04/18/2024 07:43 AM    GFRAA 15 09/07/2022 09:33 AM     Lab Results   Component Value Date/Time    WBC 1.4 04/18/2024 06:05 AM    HGB 7.6 04/18/2024 06:05 AM    HCT 22.6 04/18/2024 06:05 AM    PLT 40 04/18/2024 06:05 AM    MCV 82.5 04/18/2024 06:05 AM     Lab Results   Component Value Date/Time    PHOS 3.3 04/18/2024 06:05 AM     Lab Results   Component Value Date/Time    IRON 182 04/11/2024 09:50 AM    TIBC 170 04/11/2024 09:50 AM     No results found for: \"FERR\"          JESSICA GIVENS MD, MPH [facility]  [phone]

## 2024-04-18 NOTE — PROGRESS NOTES
6.1* 7.6*   HCT 26.2* 18.3* 17.7* 22.6*   PLT 90* 56*  --  40*              No results found for: \"SDES\" No components found for: \"CULT\"         Imaging:   All imaging reviewed from Admission to present as per radiology interpretation in [facility]    Radiology report last 24 hours:    NM LUNG SCAN PERFUSION ONLY    Result Date: 4/16/2024  INDICATION: Tachycardia. COMPARISON: 2022. A perfusion scan was performed with 6.2 mCi Tc MAA injected intravenously .  Perfusion images demonstrates blunting of the right CP angle consistent with right pleural effusion and basilar atelectasis.. LPO view anteriorly is obscured by overlying soft tissue.     1. Low probability. There is blunting right posterior CP angle consistent with effusion/atelectasis. LPO view is compromised by overlying soft tissue.    CT HEAD WO CONTRAST    Result Date: 4/16/2024  EXAM: CT HEAD WO CONTRAST INDICATION: change in mentation COMPARISON: January 4, 2024. CONTRAST: None. TECHNIQUE: Unenhanced CT of the head was performed using 5 mm images. Brain and bone windows were generated. Coronal and sagittal reformats. CT dose reduction was achieved through use of a standardized protocol tailored for this examination and automatic exposure control for dose modulation.  FINDINGS: The ventricles and sulci are normal in size, shape and configuration. There is no significant white matter disease. There is no intracranial hemorrhage, extra-axial collection, or mass effect. The basilar cisterns are open. No CT evidence of acute infarct. The bone windows demonstrate no abnormalities. The visualized portions of the paranasal sinuses and mastoid air cells are clear.     No acute intracranial process.     CT CHEST ABDOMEN PELVIS WO CONTRAST Additional Contrast? None    Result Date: 4/15/2024  EXAM: CT CHEST ABDOMEN PELVIS WO CONTRAST ACC#: [id number] INDICATION: H/o ESRD, vomiting, sepsis, pleural effusion COMPARISON: 2/15/2022, 9/2/2022 IV CONTRAST: None. ORAL  CONTRAST: None. TECHNIQUE: Thin axial images were obtained through the chest, abdomen, and pelvis. Coronal and sagittal reformats were generated. CT dose reduction was achieved through use of a standardized protocol tailored for this examination and automatic exposure control for dose modulation. FINDINGS: CHEST: Coronary artery calcium: present Right-sided central venous catheter is noted with the tip near the right atrium-superior vena cava junction. The thoracic aorta is normal in caliber. No significant pericardial effusion. There is no significant lymphadenopathy.  There is a small right-sided pleural effusion with mild adjacent atelectasis. There is no right-sided effusion. There is no pneumothorax. There are no acute infiltrates. There are no acute fractures or aggressive appearing bony abnormalities. ABDOMEN/PELVIS: No obvious masses are visualized in the liver.   No obvious masses in the spleen. Mild splenomegaly is again noted..   The pancreas is grossly unremarkable. The gallbladder is surgically absent. No significant biliary dilatation. No prominent adrenal nodules. There is no hydronephrosis. No definite renal stones. There are no stones in the bilateral ureters. No stones in the bladder.  No bladder wall thickening. There is no free fluid, free air, or abscesses. No significant lymphadenopathy. No evidence of intestinal obstruction.  The abdominal aorta is normal in caliber. No acute bony abnormalities.  No aggressive appearing bony lesions.      1.  Small right-sided pleural effusion. 2.  No acute abnormalities in the abdomen and pelvis.     XR CHEST (2 VW)    Result Date: 4/15/2024  EXAM: XR CHEST (2 VW) ACC#: [id number] INDICATION: sob COMPARISON: 1/5/2024 TECHNIQUE: PA and lateral views of the chest. FINDINGS: Right-sided central venous catheter is again visualized. There is elevation of the right hemidiaphragm. There may be a small right-sided pleural effusion. The lungs are otherwise clear.

## 2024-04-19 LAB
ABO + RH BLD: NORMAL
BACTERIA SPEC CULT: NORMAL
BACTERIA SPEC CULT: NORMAL
BLD PROD TYP BPU: NORMAL
BLOOD BANK BLOOD PRODUCT EXPIRATION DATE: NORMAL
BLOOD BANK BLOOD PRODUCT EXPIRATION DATE: NORMAL
BLOOD BANK DISPENSE STATUS: NORMAL
BLOOD BANK ISBT PRODUCT BLOOD TYPE: 9500
BLOOD BANK ISBT PRODUCT BLOOD TYPE: 9500
BLOOD BANK PRODUCT CODE: NORMAL
BLOOD BANK PRODUCT CODE: NORMAL
BLOOD BANK UNIT TYPE AND RH: NORMAL
BLOOD BANK UNIT TYPE AND RH: NORMAL
BLOOD GROUP ANTIBODIES SERPL: NORMAL
BPU ID: NORMAL
CALLED TO: NORMAL
CMV DNA SERPL NAA+PROBE-ACNC: NEGATIVE IU/ML
CMV DNA SERPL NAA+PROBE-LOG IU: NORMAL LOG10 IU/ML
CROSSMATCH RESULT: NORMAL
EKG ATRIAL RATE: 57 BPM
EKG DIAGNOSIS: NORMAL
EKG P AXIS: 47 DEGREES
EKG P-R INTERVAL: 140 MS
EKG Q-T INTERVAL: 474 MS
EKG QRS DURATION: 100 MS
EKG QTC CALCULATION (BAZETT): 461 MS
EKG R AXIS: 31 DEGREES
EKG T AXIS: 45 DEGREES
EKG VENTRICULAR RATE: 57 BPM
SERVICE CMNT-IMP: NORMAL
SPECIMEN EXP DATE BLD: NORMAL
UNIT DIVISION: 0
UNIT ISSUE DATE/TIME: NORMAL
UNIT ISSUE DATE/TIME: NORMAL

## 2024-04-19 PROCEDURE — 93010 ELECTROCARDIOGRAM REPORT: CPT | Performed by: INTERNAL MEDICINE

## 2024-04-20 LAB
BACTERIA SPEC CULT: NORMAL
E COLI SXT STL QL IA: NEGATIVE
GRAM STN SPEC: NORMAL
SERVICE CMNT-IMP: NORMAL
SPECIMEN SOURCE: NORMAL

## 2024-04-21 LAB
BACTERIA SPEC CULT: NORMAL
BACTERIA SPEC CULT: NORMAL
SERVICE CMNT-IMP: NORMAL
SERVICE CMNT-IMP: NORMAL

## 2024-04-22 ENCOUNTER — TELEPHONE (OUTPATIENT)
Age: 49
End: 2024-04-22

## 2024-04-22 NOTE — TELEPHONE ENCOUNTER
Called patients wife, Lolis, to give her a hospital f/u appointment with Dawson Moss NP, on 5/2/24 @ 3:30 pm. Lolis verbally confirmed understanding and stated during his hospital stay [facility] stopped his myfortis, started midodrine for low blood pressure and needs to f/u with his PCP this week regarding the midodrine. Patient will get a f/u appointment with [facility] Hepatology and [facility] Nephrology.

## 2024-04-24 ENCOUNTER — TELEPHONE (OUTPATIENT)
Age: 49
End: 2024-04-24

## 2024-04-24 NOTE — TELEPHONE ENCOUNTER
Received an email from Rekha Solorio RN, [facility] Transplant Coordinator, who informed us patient tested positive for parvovirus, stating \"it may have been what has set this past hospitalization off. Informed Dr. Keita who stated, \"there is nothing to do but let it run its course.\" Will inform patient when he comes in for his f/u hospital appointment next week.

## 2024-04-26 NOTE — PROGRESS NOTES
unable to determine / Unknown  -- Refer to Clinical Documentation Reviewer    PROVIDER RESPONSE TEXT:    Acute Respiratory Failure has been ruled out after study.    Query created by: Catalina Mccain on 4/17/2024 11:26 AM      QUERY TEXT:    Patient admitted with Sepsis. Noted to have Moderate malnutrition in Dietary   note on 4/17. If possible, please document in progress notes and discharge   summary if you are evaluating and /or treating any of the following:      The medical record reflects the following:  Risk Factors: poor po intake    Clinical Indicators:  RD Note 4/17-  Malnutrition Assessment:  Malnutrition Status:  Moderate malnutrition (04/17/24 1035)  Context:  Acute Illness  Findings of the 6 clinical characteristics of malnutrition:  Energy Intake:  75% or less of estimated energy requirements for 7 or more   days (x 1-2 weeks)  Weight Loss:  Greater than 7.5% over 3 months (-11.1%)  Body Fat Loss:  Mild body fat loss Triceps, Orbital  Muscle Mass Loss:  Mild muscle mass loss Temples (temporalis), Calf   (gastrocnemius), Thigh (quadriceps)  Fluid Accumulation:  No significant fluid accumulation    Treatment:  Diet as ordered,  Add Nepro with Carb Steady (each provides 425 kcal, 19g protein)  Twice daily  Continue to monitor tolerance of PO, compliance of oral supplements, weight,   labs (Na+, BG), and plan of care during admission.      ASPEN Criteria:    https://aspenjournals.onlinelibrary.andrea.com/doi/full/10.1177/027323397794710  5      Thank you,  ANTONY Castillo,RN  Clinical Documentation  [email or web address]  Ph: [phone]  or via Tackk  Options provided:  -- Protein calorie malnutrition moderate  -- Other - I will add my own diagnosis  -- Disagree - Not applicable / Not valid  -- Disagree - Clinically unable to determine / Unknown  -- Refer to Clinical Documentation Reviewer    PROVIDER RESPONSE TEXT:    This patient has moderate protein calorie malnutrition.    Query  created by: Catalina Mccain on 4/17/2024 11:29 AM      Electronically signed by:  Steve Erazo MD 4/26/2024 11:18 AM

## 2024-04-29 LAB
A/G RATIO: 1.5 RATIO (ref 1.1–2.6)
ALBUMIN: 3.9 G/DL (ref 3.5–5)
ALP BLD-CCNC: 157 U/L (ref 25–115)
ALT SERPL-CCNC: 9 U/L (ref 5–40)
ANION GAP SERPL CALCULATED.3IONS-SCNC: 15 MMOL/L (ref 3–15)
AST SERPL-CCNC: 24 U/L (ref 10–37)
BASOPHILS # BLD: 1 % (ref 0–2)
BASOPHILS ABSOLUTE: 0 K/UL (ref 0–0.2)
BILIRUB SERPL-MCNC: 0.3 MG/DL (ref 0.2–1.2)
BILIRUBIN DIRECT: <0.2 MG/DL (ref 0–0.3)
BUN BLDV-MCNC: 33 MG/DL (ref 6–22)
CALCIUM SERPL-MCNC: 8.9 MG/DL (ref 8.4–10.5)
CHLORIDE BLD-SCNC: 103 MMOL/L (ref 98–110)
CO2: 20 MMOL/L (ref 20–32)
CREAT SERPL-MCNC: 12.4 MG/DL (ref 0.5–1.2)
EOSINOPHIL # BLD: 5 % (ref 0–6)
EOSINOPHILS ABSOLUTE: 0.2 K/UL (ref 0–0.5)
FERRITIN: 2000 NG/ML (ref 22–322)
GLOBULIN: 2.6 G/DL (ref 2–4)
GLOMERULAR FILTRATION RATE: 4.6 ML/MIN/1.73 SQ.M.
GLUCOSE: 86 MG/DL (ref 70–99)
HCT VFR BLD CALC: 29.9 % (ref 39.3–51.6)
HEMOGLOBIN: 9.4 G/DL (ref 13.1–17.2)
INR BLD: 1.07 (ref 0.89–1.29)
IRON % SATURATION: 28 % (ref 20–50)
IRON: 58 MCG/DL (ref 45–160)
LYMPHOCYTES # BLD: 47 % (ref 20–45)
LYMPHOCYTES ABSOLUTE: 1.6 K/UL (ref 1–4.8)
MAGNESIUM: 1.7 MG/DL (ref 1.6–2.5)
MCH RBC QN AUTO: 28 PG (ref 26–34)
MCHC RBC AUTO-ENTMCNC: 31 G/DL (ref 31–36)
MCV RBC AUTO: 90 FL (ref 80–95)
MONOCYTES ABSOLUTE: 0.4 K/UL (ref 0.1–1)
MONOCYTES: 10 % (ref 3–12)
NEUTROPHILS ABSOLUTE: 1.3 K/UL (ref 1.8–7.7)
NEUTROPHILS: 36 % (ref 40–75)
PDW BLD-RTO: 14.8 % (ref 10–15.5)
PLATELET # BLD: 147 K/UL (ref 140–440)
PMV BLD AUTO: 10.8 FL (ref 9–13)
POTASSIUM SERPL-SCNC: 4.2 MMOL/L (ref 3.5–5.5)
PROTHROMBIN TIME: 11.6 SEC (ref 9–13)
RBC: 3.31 M/UL (ref 3.8–5.8)
SODIUM BLD-SCNC: 138 MMOL/L (ref 133–145)
TACROLIMUS BLOOD: 5.5 NG/ML (ref 2–20)
TOTAL IRON BINDING CAPACITY: 204 MCG/DL (ref 228–428)
TOTAL PROTEIN: 6.5 G/DL (ref 6.4–8.3)
UIBC: 146 MCG/DL (ref 110–370)
WBC: 3.4 K/UL (ref 4–11)

## 2024-05-01 ENCOUNTER — OFFICE VISIT (OUTPATIENT)
Age: 49
End: 2024-05-01
Payer: COMMERCIAL

## 2024-05-01 VITALS
HEIGHT: 72 IN | OXYGEN SATURATION: 91 % | SYSTOLIC BLOOD PRESSURE: 89 MMHG | WEIGHT: 187 LBS | BODY MASS INDEX: 25.33 KG/M2 | DIASTOLIC BLOOD PRESSURE: 62 MMHG | HEART RATE: 72 BPM

## 2024-05-01 DIAGNOSIS — K76.9 LIVER DISEASE: ICD-10-CM

## 2024-05-01 DIAGNOSIS — Z94.4 LIVER TRANSPLANT RECIPIENT (HCC): Primary | ICD-10-CM

## 2024-05-01 PROCEDURE — 99214 OFFICE O/P EST MOD 30 MIN: CPT | Performed by: INTERNAL MEDICINE

## 2024-05-01 RX ORDER — MIDODRINE HYDROCHLORIDE 5 MG/1
TABLET ORAL
COMMUNITY
Start: 2024-04-21

## 2024-05-14 ENCOUNTER — TELEPHONE (OUTPATIENT)
Age: 49
End: 2024-05-14

## 2024-05-14 NOTE — TELEPHONE ENCOUNTER
Called patient to inquire how he is feeling. Patient stated he is \"feeling good.\" Asked him if he had heard from Bree Williamson, [facility] Kidney [facility]x Coordinator. Patient stated he had but was unable to set up an appointment with them as soon as she wanted it.     Called Bree Williamson who stated she has been working on getting him up to [facility] for a CTA of Heart and office visit with Dr. Desai, Nephrologist. She has him tentatively scheduled for July 8, 2024 but still needs to confirm with Dr. Desai and patient.    Pt is \"Inactive\" at this time but once activated he will receive a kidney very quickly d/t safety net priority.

## 2024-05-15 DIAGNOSIS — B19.10 HEPATITIS B INFECTION WITHOUT DELTA AGENT WITHOUT HEPATIC COMA, UNSPECIFIED CHRONICITY: ICD-10-CM

## 2024-05-15 DIAGNOSIS — Z94.4 LIVER TRANSPLANT STATUS (HCC): Primary | ICD-10-CM

## 2024-05-25 NOTE — PROGRESS NOTES
[facility]      Medhat Keita MD, FACP, FACG, FAASLD      Imelda Singh, PA-C    Monet Haile, [facility]   Zenobia Méndezaruna, [facility]   Faith Irvin, [facility]-  Nando Moss, [facility]   Lucinda Choudhury, [facility]   Shantell Aston, [facility]   [address], [address]   [place]  [address] [phone]   FAX: [phone]  [facility]   [address], [address]   [place]  [address] [phone]   FAX: [phone]       Patient Care Team:  Geovani Reeves MD as PCP - General (Family Medicine)  Sabrina Hutson RN as Nurse Navigator (Hepatology)      Patient Active Problem List   Diagnosis    Liver transplant status (HCC)    Long-term use of immunosuppressant medication    Liver transplant complication (HCC)    Alcohol use disorder in remission    ESRD on dialysis (HCC)    Crohn's disease (HCC)    Acute encephalopathy    Malaise and fatigue    Uremia    Hypokalemia    Metabolic acidosis with respiratory acidosis    Sepsis without acute organ dysfunction (HCC)    Metabolic acidosis    Hypothermia    Hypotension    Personal history of immunosupression therapy       Valeriano Fong III returns to the [facility] for management of liver transplant graft function, to monitor and adjust immune suppression and to assess for recurrence of the primary liver disease.  The active problem list, all pertinent past medical history, medications, liver histology, endoscopic studies, radiologic findings and laboratory findings related to the liver disorder were reviewed with the patient.      The patient underwent a liver transplant at [facility] in 3/2022.    The patient is taking the following for immune suppression: Tacrolimus, Myfortic.    He remains on dialysis and is

## 2024-06-06 DIAGNOSIS — Z94.4 LIVER TRANSPLANT STATUS (HCC): ICD-10-CM

## 2024-06-06 DIAGNOSIS — Z79.60 LONG-TERM USE OF IMMUNOSUPPRESSANT MEDICATION: Primary | ICD-10-CM

## 2024-06-11 ENCOUNTER — APPOINTMENT (OUTPATIENT)
Facility: HOSPITAL | Age: 49
End: 2024-06-11
Payer: COMMERCIAL

## 2024-06-11 ENCOUNTER — HOSPITAL ENCOUNTER (EMERGENCY)
Facility: HOSPITAL | Age: 49
Discharge: ANOTHER HOSPITAL | End: 2024-06-11
Attending: EMERGENCY MEDICINE
Payer: COMMERCIAL

## 2024-06-11 VITALS
HEIGHT: 72 IN | WEIGHT: 180 LBS | RESPIRATION RATE: 22 BRPM | HEART RATE: 82 BPM | DIASTOLIC BLOOD PRESSURE: 64 MMHG | TEMPERATURE: 99 F | BODY MASS INDEX: 24.38 KG/M2 | OXYGEN SATURATION: 99 % | SYSTOLIC BLOOD PRESSURE: 127 MMHG

## 2024-06-11 DIAGNOSIS — N18.6 ESRD (END STAGE RENAL DISEASE) ON DIALYSIS (HCC): ICD-10-CM

## 2024-06-11 DIAGNOSIS — A41.9 SEPTICEMIA (HCC): ICD-10-CM

## 2024-06-11 DIAGNOSIS — Z99.2 ESRD (END STAGE RENAL DISEASE) ON DIALYSIS (HCC): ICD-10-CM

## 2024-06-11 DIAGNOSIS — Z94.4 HISTORY OF LIVER TRANSPLANT (HCC): ICD-10-CM

## 2024-06-11 DIAGNOSIS — E87.5 HYPERKALEMIA: ICD-10-CM

## 2024-06-11 DIAGNOSIS — J90 PLEURAL EFFUSION: ICD-10-CM

## 2024-06-11 DIAGNOSIS — E16.2 HYPOGLYCEMIA: Primary | ICD-10-CM

## 2024-06-11 LAB
ALBUMIN SERPL-MCNC: 2.6 G/DL (ref 3.4–5)
ALBUMIN/GLOB SERPL: 0.9 (ref 0.8–1.7)
ALP SERPL-CCNC: 262 U/L (ref 45–117)
ALT SERPL-CCNC: 26 U/L (ref 16–61)
AMMONIA PLAS-SCNC: 22 UMOL/L (ref 11–32)
ANION GAP SERPL CALC-SCNC: 14 MMOL/L (ref 3–18)
AST SERPL-CCNC: 29 U/L (ref 10–38)
BASOPHILS # BLD: 0 K/UL (ref 0–0.1)
BASOPHILS NFR BLD: 0 % (ref 0–2)
BILIRUB SERPL-MCNC: 0.6 MG/DL (ref 0.2–1)
BUN SERPL-MCNC: 82 MG/DL (ref 7–18)
BUN/CREAT SERPL: 4 (ref 12–20)
CALCIUM SERPL-MCNC: 7.4 MG/DL (ref 8.5–10.1)
CHLORIDE SERPL-SCNC: 112 MMOL/L (ref 100–111)
CO2 SERPL-SCNC: 13 MMOL/L (ref 21–32)
CREAT SERPL-MCNC: 18.8 MG/DL (ref 0.6–1.3)
DIFFERENTIAL METHOD BLD: ABNORMAL
EOSINOPHIL # BLD: 0 K/UL (ref 0–0.4)
EOSINOPHIL NFR BLD: 0 % (ref 0–5)
ERYTHROCYTE [DISTWIDTH] IN BLOOD BY AUTOMATED COUNT: 15.3 % (ref 11.6–14.5)
ETHANOL SERPL-MCNC: 4 MG/DL (ref 0–3)
GLOBULIN SER CALC-MCNC: 2.8 G/DL (ref 2–4)
GLUCOSE BLD STRIP.AUTO-MCNC: 40 MG/DL (ref 70–110)
GLUCOSE BLD STRIP.AUTO-MCNC: 45 MG/DL (ref 70–110)
GLUCOSE BLD STRIP.AUTO-MCNC: 72 MG/DL (ref 70–110)
GLUCOSE BLD STRIP.AUTO-MCNC: 73 MG/DL (ref 70–110)
GLUCOSE BLD STRIP.AUTO-MCNC: 76 MG/DL (ref 70–110)
GLUCOSE SERPL-MCNC: 41 MG/DL (ref 74–99)
HCT VFR BLD AUTO: 29.8 % (ref 36–48)
HGB BLD-MCNC: 9 G/DL (ref 13–16)
IMM GRANULOCYTES # BLD AUTO: 0.2 K/UL (ref 0–0.04)
IMM GRANULOCYTES NFR BLD AUTO: 1 % (ref 0–0.5)
LACTATE BLD-SCNC: 0.87 MMOL/L (ref 0.4–2)
LYMPHOCYTES # BLD: 0.6 K/UL (ref 0.9–3.6)
LYMPHOCYTES NFR BLD: 3 % (ref 21–52)
MCH RBC QN AUTO: 27.8 PG (ref 24–34)
MCHC RBC AUTO-ENTMCNC: 30.2 G/DL (ref 31–37)
MCV RBC AUTO: 92 FL (ref 78–100)
MONOCYTES # BLD: 0.6 K/UL (ref 0.05–1.2)
MONOCYTES NFR BLD: 3 % (ref 3–10)
NEUTS SEG # BLD: 20.2 K/UL (ref 1.8–8)
NEUTS SEG NFR BLD: 93 % (ref 40–73)
NRBC # BLD: 0 K/UL (ref 0–0.01)
NRBC BLD-RTO: 0 PER 100 WBC
PLATELET # BLD AUTO: 83 K/UL (ref 135–420)
PLATELET COMMENT: ABNORMAL
PMV BLD AUTO: 11.1 FL (ref 9.2–11.8)
POTASSIUM SERPL-SCNC: 5.7 MMOL/L (ref 3.5–5.5)
PROT SERPL-MCNC: 5.4 G/DL (ref 6.4–8.2)
RBC # BLD AUTO: 3.24 M/UL (ref 4.35–5.65)
RBC MORPH BLD: ABNORMAL
SODIUM SERPL-SCNC: 139 MMOL/L (ref 136–145)
TROPONIN I SERPL HS-MCNC: 13 NG/L (ref 0–78)
TSH SERPL DL<=0.05 MIU/L-ACNC: 5.09 UIU/ML (ref 0.36–3.74)
WBC # BLD AUTO: 21.6 K/UL (ref 4.6–13.2)

## 2024-06-11 PROCEDURE — 96360 HYDRATION IV INFUSION INIT: CPT

## 2024-06-11 PROCEDURE — 96366 THER/PROPH/DIAG IV INF ADDON: CPT

## 2024-06-11 PROCEDURE — 80053 COMPREHEN METABOLIC PANEL: CPT

## 2024-06-11 PROCEDURE — 99285 EMERGENCY DEPT VISIT HI MDM: CPT

## 2024-06-11 PROCEDURE — 6360000002 HC RX W HCPCS: Performed by: EMERGENCY MEDICINE

## 2024-06-11 PROCEDURE — 87040 BLOOD CULTURE FOR BACTERIA: CPT

## 2024-06-11 PROCEDURE — 96365 THER/PROPH/DIAG IV INF INIT: CPT

## 2024-06-11 PROCEDURE — 85025 COMPLETE CBC W/AUTO DIFF WBC: CPT

## 2024-06-11 PROCEDURE — 84443 ASSAY THYROID STIM HORMONE: CPT

## 2024-06-11 PROCEDURE — 96368 THER/DIAG CONCURRENT INF: CPT

## 2024-06-11 PROCEDURE — 74176 CT ABD & PELVIS W/O CONTRAST: CPT

## 2024-06-11 PROCEDURE — 83605 ASSAY OF LACTIC ACID: CPT

## 2024-06-11 PROCEDURE — 2580000003 HC RX 258: Performed by: EMERGENCY MEDICINE

## 2024-06-11 PROCEDURE — 80197 ASSAY OF TACROLIMUS: CPT

## 2024-06-11 PROCEDURE — 82140 ASSAY OF AMMONIA: CPT

## 2024-06-11 PROCEDURE — 96361 HYDRATE IV INFUSION ADD-ON: CPT

## 2024-06-11 PROCEDURE — 2500000003 HC RX 250 WO HCPCS: Performed by: EMERGENCY MEDICINE

## 2024-06-11 PROCEDURE — 82077 ASSAY SPEC XCP UR&BREATH IA: CPT

## 2024-06-11 PROCEDURE — 84484 ASSAY OF TROPONIN QUANT: CPT

## 2024-06-11 PROCEDURE — 82962 GLUCOSE BLOOD TEST: CPT

## 2024-06-11 PROCEDURE — 71045 X-RAY EXAM CHEST 1 VIEW: CPT

## 2024-06-11 RX ORDER — TACROLIMUS 1 MG/1
2 CAPSULE ORAL 2 TIMES DAILY
Status: DISCONTINUED | OUTPATIENT
Start: 2024-06-11 | End: 2024-06-12 | Stop reason: HOSPADM

## 2024-06-11 RX ORDER — DEXTROSE MONOHYDRATE AND SODIUM CHLORIDE 5; .9 G/100ML; G/100ML
INJECTION, SOLUTION INTRAVENOUS CONTINUOUS
Status: DISCONTINUED | OUTPATIENT
Start: 2024-06-11 | End: 2024-06-11

## 2024-06-11 RX ORDER — DEXTROSE MONOHYDRATE 100 MG/ML
INJECTION, SOLUTION INTRAVENOUS CONTINUOUS
Status: DISCONTINUED | OUTPATIENT
Start: 2024-06-11 | End: 2024-06-11

## 2024-06-11 RX ORDER — DEXTROSE MONOHYDRATE 50 MG/ML
INJECTION, SOLUTION INTRAVENOUS CONTINUOUS
Status: DISCONTINUED | OUTPATIENT
Start: 2024-06-11 | End: 2024-06-11

## 2024-06-11 RX ADMIN — TACROLIMUS 2 MG: 1 CAPSULE ORAL at 22:18

## 2024-06-11 RX ADMIN — PIPERACILLIN AND TAZOBACTAM 4500 MG: 4; .5 INJECTION, POWDER, LYOPHILIZED, FOR SOLUTION INTRAVENOUS at 15:48

## 2024-06-11 RX ADMIN — SODIUM BICARBONATE: 84 INJECTION, SOLUTION INTRAVENOUS at 17:39

## 2024-06-11 RX ADMIN — DEXTROSE AND SODIUM CHLORIDE: 5; 900 INJECTION, SOLUTION INTRAVENOUS at 14:24

## 2024-06-11 RX ADMIN — VANCOMYCIN HYDROCHLORIDE 2000 MG: 10 INJECTION, POWDER, LYOPHILIZED, FOR SOLUTION INTRAVENOUS at 16:19

## 2024-06-11 RX ADMIN — DEXTROSE AND SODIUM CHLORIDE: 5; 900 INJECTION, SOLUTION INTRAVENOUS at 12:35

## 2024-06-11 RX ADMIN — DEXTROSE MONOHYDRATE 125 ML: 100 INJECTION, SOLUTION INTRAVENOUS at 14:17

## 2024-06-11 RX ADMIN — DEXTROSE MONOHYDRATE 250 ML: 100 INJECTION, SOLUTION INTRAVENOUS at 12:32

## 2024-06-11 NOTE — ED NOTES
[facility] EMERGENCY DEPARTMENT TRANSFER OF CARE         Patient care Handed off from Dr Hare   to Derik Flowers, DO   @ 10:31 PM     Discussed the patient's complaint, presentation, vitals and differential diagnosis.  Discussed the patient's current status, and response to treatments  Reviewed critical lab values, allergies, and other factors.  Rounded at the patient's bedside, the patient and family's questions and concerns were addressed.  Issues or problems that are still being evaluated are: Pending CT of the abdomen and pelvis anticipate likely transfer to [facility].  Recent Results (from the past 24 hour(s))   EKG 12 Lead    Collection Time: 06/11/24 11:39 AM   Result Value Ref Range    Ventricular Rate 49 BPM    Atrial Rate 49 BPM    P-R Interval 154 ms    QRS Duration 90 ms    Q-T Interval 520 ms    QTc Calculation (Bazett) 469 ms    P Axis 50 degrees    R Axis 28 degrees    T Axis 62 degrees    Diagnosis       Sinus bradycardia  Anterior infarct , age undetermined  Abnormal ECG  When compared with ECG of 18-APR-2024 05:13,  No significant change was found     CBC with Auto Differential    Collection Time: 06/11/24 11:45 AM   Result Value Ref Range    WBC 21.6 (H) 4.6 - 13.2 K/uL    RBC 3.24 (L) 4.35 - 5.65 M/uL    Hemoglobin 9.0 (L) 13.0 - 16.0 g/dL    Hematocrit 29.8 (L) 36.0 - 48.0 %    MCV 92.0 78.0 - 100.0 FL    MCH 27.8 24.0 - 34.0 PG    MCHC 30.2 (L) 31.0 - 37.0 g/dL    RDW 15.3 (H) 11.6 - 14.5 %    Platelets 83 (L) 135 - 420 K/uL    MPV 11.1 9.2 - 11.8 FL    Nucleated RBCs 0.0 0  WBC    nRBC 0.00 0.00 - 0.01 K/uL    Neutrophils % 93 (H) 40 - 73 %    Lymphocytes % 3 (L) 21 - 52 %    Monocytes % 3 3 - 10 %    Eosinophils % 0 0 - 5 %    Basophils % 0 0 - 2 %    Immature Granulocytes % 1 (H) 0.0 - 0.5 %    Neutrophils Absolute 20.2 (H) 1.8 - 8.0 K/UL    Lymphocytes Absolute 0.6 (L) 0.9 - 3.6 K/UL    Monocytes Absolute 0.6 0.05 - 1.2 K/UL    Eosinophils Absolute 0.0 0.0 - 0.4 K/UL    Basophils

## 2024-06-11 NOTE — ED PROVIDER NOTES
[facility] EMERGENCY DEPT  EMERGENCY DEPARTMENT ENCOUNTER    Patient Name: Valeriano Fong III  MRN: [id number]  YOB: 1975  Provider: Kentrell Hare MD  PCP: Geovani Reeves MD   Time/Date of evaluation: 11:59 AM EDT on 6/11/24    History of Presenting Illness     Chief Complaint   Patient presents with    Hypoglycemia       History Provided by: EMS and Patient   History is limited by: Nothing    HISTORY (Narative):   Valeriano Fong III is a 49 y.o. male with a PMHX of Crohn's disease, liver disease, liver transplant, ESRD  who presents to the emergency department (room 9) by EMS C/O mental status changes onset this morning. Associated sxs include low blood glucose. Patient denies any other sxs or complaints.  Per EMS, they responded at the patient's residence initially due to altered mental status.  EMS reports initial blood glucose was 30.  They gave 1 or 25 mL of D10 and repeat blood glucose was 117.  Patient states that he has not done dialysis in the last 4 days due to not feeling well and a nonfunctional home dialysis machine.  Patient was last admitted to [facility] in April 2024 but required transfer to [facility] for transplant team and transplant ID. Patient states that he had his liver replaced at [facility].  Dr. Mascorro is his liver doctor.      Nursing Notes were all reviewed and agreed with or any disagreements were addressed in the HPI.    Past History     PAST MEDICAL HISTORY:  Past Medical History:   Diagnosis Date    Crohn disease (HCC)     Liver disease     Renal failure        PAST SURGICAL HISTORY:  Past Surgical History:   Procedure Laterality Date    OTHER SURGICAL HISTORY      bowel obstruction and ostomy    OTHER SURGICAL HISTORY  2001    bowel resection secondary to tear in bowel and abscess    XR MIDLINE EQUAL OR GREATER THAN 5 YEARS  7/2/2021    XR MIDLINE EQUAL OR GREATER THAN 5 YEARS 7/2/2021       FAMILY HISTORY:  No family history on file.    SOCIAL HISTORY:  Social History     Tobacco

## 2024-06-11 NOTE — ED TRIAGE NOTES
Pt arrived via EMS. C/C hypoglycemia. Pt stood up from bed defecated on himself, then attempted to sit back on the bed, but slid onto the floor. EMS reported that pt was altered initially.     BGL was 30 upon EMS arrives. They gave 125 mL of D10 and repeat BGL was 117.     Hx of liver transplant and dialysis.     Pt is on a new medication that can lower his BGL.

## 2024-06-12 NOTE — ED NOTES
Dialysis nurse at bedside and made aware that pt is being transported with transport ETA to  @ 2200. Dr Lionle colon with dialysis nurse not starting dialysis d/t pt leaving soon.

## 2024-06-12 NOTE — PROGRESS NOTES
21:42 pm Arrived to dialyze patient. Was informed by Primary Nurse Margy Celis RN that patient was being transferred to another facility. ETA for  time is 22:00 pm. Dr. Flowers and Dr Schwarz aware. Patient remains in room in stable condition, freely breathing room air.

## 2024-06-15 LAB — TACROLIMUS BLD-MCNC: 3.7 NG/ML (ref 2–20)

## 2024-06-16 LAB
BACTERIA SPEC CULT: NORMAL
BACTERIA SPEC CULT: NORMAL
EKG ATRIAL RATE: 49 BPM
EKG DIAGNOSIS: NORMAL
EKG P AXIS: 50 DEGREES
EKG P-R INTERVAL: 154 MS
EKG Q-T INTERVAL: 520 MS
EKG QRS DURATION: 90 MS
EKG QTC CALCULATION (BAZETT): 469 MS
EKG R AXIS: 28 DEGREES
EKG T AXIS: 62 DEGREES
EKG VENTRICULAR RATE: 49 BPM
SERVICE CMNT-IMP: NORMAL
SERVICE CMNT-IMP: NORMAL

## 2024-06-24 DIAGNOSIS — N18.6 ESRD ON DIALYSIS (HCC): ICD-10-CM

## 2024-06-24 DIAGNOSIS — Z99.2 ESRD ON DIALYSIS (HCC): ICD-10-CM

## 2024-06-24 DIAGNOSIS — Z79.60 LONG-TERM USE OF IMMUNOSUPPRESSANT MEDICATION: Primary | ICD-10-CM

## 2024-06-24 DIAGNOSIS — Z94.4 LIVER TRANSPLANT STATUS (HCC): ICD-10-CM

## 2024-06-25 RX ORDER — CIPROFLOXACIN 500 MG/1
TABLET, FILM COATED ORAL
COMMUNITY
Start: 2024-06-19

## 2024-06-25 RX ORDER — LEVOTHYROXINE SODIUM 0.05 MG/1
1 TABLET ORAL
COMMUNITY
Start: 2024-06-20

## 2024-06-26 ENCOUNTER — TELEMEDICINE (OUTPATIENT)
Age: 49
End: 2024-06-26

## 2024-06-26 DIAGNOSIS — Z94.4 LIVER TRANSPLANT RECIPIENT (HCC): Primary | ICD-10-CM

## 2024-06-26 NOTE — PROGRESS NOTES
history and multiple medical issues, performing the examination, explaining and then documenting the natural history of the liver disease, various issues related to liver transplantation.      Medhat Keita MD  [facility] [address], [address]  [place]  [address] [phone]  [facility]

## 2024-08-01 ENCOUNTER — TELEPHONE (OUTPATIENT)
Age: 49
End: 2024-08-01

## 2024-08-01 NOTE — TELEPHONE ENCOUNTER
Patient returned my text message, confirming the appointment date/time work for him and he will get labs drawn next week.    Texted new appointment time to patient for f/u with Dr. Keita, 10/4/24 @ 0900, virtual visit. Waiting for text confirmation from patient.

## 2024-10-08 RX ORDER — CALCIUM CARBONATE 500 MG/1
TABLET, CHEWABLE ORAL
COMMUNITY
Start: 2024-09-17

## 2024-10-09 ENCOUNTER — TELEMEDICINE (OUTPATIENT)
Age: 49
End: 2024-10-09

## 2024-10-09 DIAGNOSIS — K21.9 GASTROESOPHAGEAL REFLUX DISEASE WITHOUT ESOPHAGITIS: ICD-10-CM

## 2024-10-09 RX ORDER — PANTOPRAZOLE SODIUM 40 MG/1
40 TABLET, DELAYED RELEASE ORAL
Qty: 90 TABLET | Refills: 3
Start: 2024-10-09 | End: 2025-10-04

## 2024-10-09 NOTE — PROGRESS NOTES
of the issues listed above in the Assessment and Plan were discussed with the patient.  All questions were answered.  The patient expressed a clear understanding of the above.    Laboratory studies every 4 weeks for now.        Follow-up [facility] [place] [facility] 3 months.        Medhat Keita MD  [facility] [place] [address], [address]  [place]  [address] [phone]  [facility]

## 2024-10-14 NOTE — WOUND CARE
IP WOUND CONSULT    Christy Bishop III  MEDICAL RECORD NUMBER:  [id number]  AGE: 52 y.o. GENDER: male  : 1975  TODAY'S DATE:  2022    GENERAL     [] Follow-up   [x] New Consult    Christy Bishop III is a 52 y.o. male referred by:   [x] Physician  [x] Nursing  [] Other:         PAST MEDICAL HISTORY    Past Medical History:   Diagnosis Date    Crohn disease ([facility] [address]     Liver disease         PAST SURGICAL HISTORY    Past Surgical History:   Procedure Laterality Date    HX OTHER SURGICAL      bowel obstruction and ostomy    HX OTHER SURGICAL  2001    bowel resection secondary to tear in bowel and abscess       FAMILY HISTORY    No family history on file. SOCIAL HISTORY    Social History     Tobacco Use    Smoking status: Former    Smokeless tobacco: Former   Substance Use Topics    Alcohol use: Not Currently    Drug use: Not Currently       ALLERGIES    Allergies   Allergen Reactions    Naprosyn [Naproxen] Itching    Other Medication Other (comments)     Some Crohns disease medication       MEDICATIONS    No current facility-administered medications on file prior to encounter. Current Outpatient Medications on File Prior to Encounter   Medication Sig Dispense Refill    pantoprazole (PROTONIX) 40 mg tablet Take 1 Tablet by mouth Daily (before breakfast). Indications: gastroesophageal reflux disease 90 Tablet 3    mycophenolate sodium (Myfortic) 360 mg delayed release tablet Take 3 Tablets by mouth four (4) times daily for 90 days. 360 Tablet 2    ursodioL (ACTIGALL) 300 mg capsule Take 300 mg by mouth two (2) times a day. tacrolimus (PROGRAF) 1 mg capsule Take 1 mg by mouth every twelve (12) hours. 4mg BID      dapsone 25 mg tablet Take 50 mg by mouth in the morning. aspirin delayed-release 81 mg tablet Take  by mouth daily. dronabinoL (MARINOL) 5 mg capsule Take 10 mg by mouth two (2) times a day. metoclopramide HCl (REGLAN) 10 mg tablet Take 10 mg by mouth two (2) times a day. cholecalciferol (VITAMIN D3) 25 mcg (1,000 unit) cap Take 5,000 Units by mouth in the morning. ondansetron hcl (ZOFRAN) 4 mg tablet Take 4 mg by mouth every eight (8) hours as needed for Nausea or Vomiting.      mirtazapine (REMERON) 30 mg tablet Take  by mouth nightly. Wt Readings from Last 3 Encounters:   09/01/22 81.6 kg (180 lb)   08/22/22 83.9 kg (185 lb)   08/03/22 84.4 kg (186 lb 2 oz)       [email or web address] Vitals  /84   Pulse 84 Comment: 83   Temp 98 °F (36.7 °C)   Resp 16   Ht 6' (1.829 m)   Wt 81.6 kg (180 lb)   SpO2 98%   BMI 24.41 kg/m²       ASSESSMENT     Skin impairment Identification:  Type: non-healing surgical    Contributing Factors: anticoagulation therapy, diabetes, immunosuppression, and malnutrition    Wound Abdomen Medial;Mid Scot sized open area with drainage 09/01/22 (Active)   Wound Image   09/02/22 0909   Wound Etiology Non-Healing Surgical 09/02/22 0909   Dressing Status Breakthrough drainage noted 09/02/22 0909   Cleansed Wound cleanser 09/02/22 0909   Dressing/Treatment Zinc paste;Collagen;Dry dressing;Silicone border;Tape/Soft cloth adhesive tape 09/02/22 0909   Wound Length (cm) 1 cm 09/02/22 0909   Wound Width (cm) 1.5 cm 09/02/22 0909   Wound Depth (cm) 0.1 cm 09/02/22 0909   Wound Surface Area (cm^2) 1.5 cm^2 09/02/22 0909   Wound Volume (cm^3) 0.15 cm^3 09/02/22 0909   Wound Assessment Hyper granulation tissue 09/02/22 0909   Drainage Amount Moderate 09/02/22 0909   Drainage Description Serous; Yellow 09/02/22 0909   Wound Odor Mild 09/02/22 0909   Ирина-Wound/Incision Assessment Excoriated 09/02/22 0909   Edges Defined edges 09/02/22 0909   Wound Thickness Description Full thickness 09/02/22 0909   Number of days: 1          PLAN     Skin Care & Pressure Relief Recommendations  N/A    Physician/Provider notified: Yes  Recommendations: Keep this area clean and dry, apply a zinc based ointment around wound to protect the periwound, apply collagen, alginate, dry dressing and border. Patient should follow up in wound clinic for wound management.     Teaching completed with:   [x] Patient           [] Family member       [] Caregiver          [] Nursing  [] Other    Patient/Caregiver Teaching:  Level of patient/caregiver understanding able to:   [x] Indicates understanding       [] Needs reinforcement  [] Unsuccessful      [] Verbal Understanding  [] Demonstrated understanding       [] No evidence of learning  [] Refused teaching         [] N/A       Electronically signed by Maynor Ovalle RN on 9/2/2022 at 9:15 AM 129

## 2024-10-17 ENCOUNTER — TELEPHONE (OUTPATIENT)
Age: 49
End: 2024-10-17

## 2024-10-17 NOTE — TELEPHONE ENCOUNTER
Requested patient have labs drawn for the month of Ocotber. Patient stated, \"I will have them drawn next week.\" Patient has lab orders.

## 2024-10-24 LAB
A/G RATIO: 1.4 RATIO (ref 1.1–2.6)
ALBUMIN: 4.9 G/DL (ref 3.5–5)
ALP BLD-CCNC: 354 U/L (ref 25–115)
ALT SERPL-CCNC: 11 U/L (ref 5–40)
ANION GAP SERPL CALCULATED.3IONS-SCNC: 18 MMOL/L (ref 3–15)
AST SERPL-CCNC: 21 U/L (ref 10–37)
BASOPHILS ABSOLUTE: 0 K/UL (ref 0–0.2)
BASOPHILS RELATIVE PERCENT: 0 % (ref 0–2)
BILIRUB SERPL-MCNC: 0.5 MG/DL (ref 0.2–1.2)
BILIRUBIN DIRECT: <0.2 MG/DL (ref 0–0.3)
BUN BLDV-MCNC: 30 MG/DL (ref 6–22)
CALCIUM SERPL-MCNC: 9.1 MG/DL (ref 8.4–10.5)
CHLORIDE BLD-SCNC: 94 MMOL/L (ref 98–110)
CO2: 23 MMOL/L (ref 20–32)
CREAT SERPL-MCNC: 5.4 MG/DL (ref 0.5–1.2)
EOSINOPHIL # BLD: 3 % (ref 0–6)
EOSINOPHILS ABSOLUTE: 0.3 K/UL (ref 0–0.5)
FERRITIN: 1586 NG/ML (ref 22–322)
GFR, ESTIMATED: 12.4 ML/MIN/1.73 SQ.M.
GLOBULIN: 3.6 G/DL (ref 2–4)
GLUCOSE: 96 MG/DL (ref 70–99)
HCT VFR BLD CALC: 38.6 % (ref 39.3–51.6)
HEMOGLOBIN: 13 G/DL (ref 13.1–17.2)
IRON % SATURATION: 21 % (ref 20–50)
IRON: 49 MCG/DL (ref 45–160)
LYMPHOCYTES # BLD: 19 % (ref 20–45)
LYMPHOCYTES ABSOLUTE: 1.7 K/UL (ref 1–4.8)
MAGNESIUM: 1.6 MG/DL (ref 1.6–2.5)
MCH RBC QN AUTO: 29 PG (ref 26–34)
MCHC RBC AUTO-ENTMCNC: 34 G/DL (ref 31–36)
MCV RBC AUTO: 87 FL (ref 80–95)
MONOCYTES ABSOLUTE: 0.9 K/UL (ref 0.1–1)
MONOCYTES: 10 % (ref 3–12)
NEUTROPHILS ABSOLUTE: 6.1 K/UL (ref 1.8–7.7)
NEUTROPHILS: 68 % (ref 40–75)
PDW BLD-RTO: 15.9 % (ref 10–15.5)
PLATELET # BLD: 175 K/UL (ref 140–440)
PMV BLD AUTO: 8.9 FL (ref 9–13)
POTASSIUM SERPL-SCNC: 3.5 MMOL/L (ref 3.5–5.5)
RBC # BLD: 4.46 M/UL (ref 3.8–5.8)
SODIUM BLD-SCNC: 135 MMOL/L (ref 133–145)
TACROLIMUS BLOOD: 4.9 NG/ML (ref 2–20)
TOTAL IRON BINDING CAPACITY: 230 MCG/DL (ref 228–428)
TOTAL PROTEIN: 8.5 G/DL (ref 6.4–8.3)
TSH SERPL DL<=0.05 MIU/L-ACNC: 3.67 MCU/ML (ref 0.27–4.2)
UIBC: 181 MCG/DL (ref 110–370)
WBC # BLD: 9 K/UL (ref 4–11)

## 2024-10-27 PROBLEM — R53.83 MALAISE AND FATIGUE: Status: RESOLVED | Noted: 2024-01-05 | Resolved: 2024-10-27

## 2024-10-27 PROBLEM — G93.40 ACUTE ENCEPHALOPATHY: Status: RESOLVED | Noted: 2024-01-04 | Resolved: 2024-10-27

## 2024-10-27 PROBLEM — I95.9 HYPOTENSION: Status: RESOLVED | Noted: 2024-04-16 | Resolved: 2024-10-27

## 2024-10-27 PROBLEM — E87.20 METABOLIC ACIDOSIS: Status: RESOLVED | Noted: 2024-04-16 | Resolved: 2024-10-27

## 2024-10-27 PROBLEM — A41.9 SEPSIS WITHOUT ACUTE ORGAN DYSFUNCTION (HCC): Status: RESOLVED | Noted: 2024-04-16 | Resolved: 2024-10-27

## 2024-10-27 PROBLEM — N19 UREMIA: Status: RESOLVED | Noted: 2024-01-05 | Resolved: 2024-10-27

## 2024-10-27 PROBLEM — T68.XXXA HYPOTHERMIA: Status: RESOLVED | Noted: 2024-04-16 | Resolved: 2024-10-27

## 2024-10-27 PROBLEM — E87.4 METABOLIC ACIDOSIS WITH RESPIRATORY ACIDOSIS: Status: RESOLVED | Noted: 2024-04-15 | Resolved: 2024-10-27

## 2024-10-27 PROBLEM — Z92.25 PERSONAL HISTORY OF IMMUNOSUPRESSION THERAPY: Status: RESOLVED | Noted: 2024-04-16 | Resolved: 2024-10-27

## 2024-10-27 PROBLEM — R53.81 MALAISE AND FATIGUE: Status: RESOLVED | Noted: 2024-01-05 | Resolved: 2024-10-27

## 2024-11-02 LAB
A/G RATIO: 1.3 RATIO (ref 1.1–2.6)
ALBUMIN: 4.7 G/DL (ref 3.5–5)
ALP BLD-CCNC: 335 U/L (ref 25–115)
ALT SERPL-CCNC: 12 U/L (ref 5–40)
ANION GAP SERPL CALCULATED.3IONS-SCNC: 16 MMOL/L (ref 3–15)
AST SERPL-CCNC: 27 U/L (ref 10–37)
BASOPHILS ABSOLUTE: 0 K/UL (ref 0–0.2)
BASOPHILS RELATIVE PERCENT: 1 % (ref 0–2)
BILIRUB SERPL-MCNC: 0.6 MG/DL (ref 0.2–1.2)
BILIRUBIN DIRECT: <0.2 MG/DL (ref 0–0.3)
BUN BLDV-MCNC: 32 MG/DL (ref 6–22)
CALCIUM SERPL-MCNC: 9 MG/DL (ref 8.4–10.5)
CHLORIDE BLD-SCNC: 93 MMOL/L (ref 98–110)
CO2: 26 MMOL/L (ref 20–32)
CREAT SERPL-MCNC: 5.8 MG/DL (ref 0.5–1.2)
EOSINOPHIL # BLD: 4 % (ref 0–6)
EOSINOPHILS ABSOLUTE: 0.3 K/UL (ref 0–0.5)
FERRITIN: 1699 NG/ML (ref 22–322)
GFR, ESTIMATED: 11.4 ML/MIN/1.73 SQ.M.
GLOBULIN: 3.6 G/DL (ref 2–4)
GLUCOSE: 72 MG/DL (ref 70–99)
HCT VFR BLD CALC: 38.2 % (ref 39.3–51.6)
HEMOGLOBIN: 12.6 G/DL (ref 13.1–17.2)
IRON % SATURATION: 63 % (ref 20–50)
IRON: 149 MCG/DL (ref 45–160)
LYMPHOCYTES # BLD: 15 % (ref 20–45)
LYMPHOCYTES ABSOLUTE: 1.1 K/UL (ref 1–4.8)
MAGNESIUM: 1.8 MG/DL (ref 1.6–2.5)
MCH RBC QN AUTO: 29 PG (ref 26–34)
MCHC RBC AUTO-ENTMCNC: 33 G/DL (ref 31–36)
MCV RBC AUTO: 88 FL (ref 80–95)
MONOCYTES ABSOLUTE: 0.7 K/UL (ref 0.1–1)
MONOCYTES: 9 % (ref 3–12)
NEUTROPHILS ABSOLUTE: 5.4 K/UL (ref 1.8–7.7)
NEUTROPHILS: 71 % (ref 40–75)
PDW BLD-RTO: 14.8 % (ref 10–15.5)
PLATELET # BLD: 152 K/UL (ref 140–440)
PMV BLD AUTO: 9.3 FL (ref 9–13)
POTASSIUM SERPL-SCNC: 3.2 MMOL/L (ref 3.5–5.5)
RBC # BLD: 4.34 M/UL (ref 3.8–5.8)
SODIUM BLD-SCNC: 135 MMOL/L (ref 133–145)
TACROLIMUS BLOOD: 4.6 NG/ML (ref 2–20)
TOTAL IRON BINDING CAPACITY: 237 MCG/DL (ref 228–428)
TOTAL PROTEIN: 8.3 G/DL (ref 6.4–8.3)
TSH SERPL DL<=0.05 MIU/L-ACNC: 3.25 MCU/ML (ref 0.27–4.2)
UIBC: 88 MCG/DL (ref 110–370)
WBC # BLD: 7.6 K/UL (ref 4–11)

## 2024-11-04 DIAGNOSIS — Z94.4 LIVER TRANSPLANT RECIPIENT (HCC): ICD-10-CM

## 2024-11-04 DIAGNOSIS — R74.8 ELEVATED LIVER ENZYMES: Primary | ICD-10-CM

## 2024-11-07 ENCOUNTER — TELEPHONE (OUTPATIENT)
Age: 49
End: 2024-11-07

## 2024-11-07 NOTE — TELEPHONE ENCOUNTER
Texted patient this upcoming Ultrasound of Abdomen appointment for Wednesday, 11/13/24 @ 0845, arrive At 0815, NPO 8 hours prior.

## 2024-12-05 DIAGNOSIS — Z94.4 LIVER TRANSPLANT RECIPIENT (HCC): Primary | ICD-10-CM

## 2024-12-12 DIAGNOSIS — Z94.4 LIVER TRANSPLANT RECIPIENT (HCC): ICD-10-CM

## 2024-12-12 RX ORDER — TACROLIMUS 1 MG/1
2 CAPSULE ORAL 2 TIMES DAILY
Qty: 360 CAPSULE | Refills: 3 | Status: SHIPPED | OUTPATIENT
Start: 2024-12-12

## 2024-12-17 LAB
A/G RATIO: 1.3 RATIO (ref 1.1–2.6)
ALBUMIN: 4.5 G/DL (ref 3.5–5)
ALP BLD-CCNC: 247 U/L (ref 25–115)
ALT SERPL-CCNC: 11 U/L (ref 5–40)
ANION GAP SERPL CALCULATED.3IONS-SCNC: 16 MMOL/L (ref 3–15)
AST SERPL-CCNC: 18 U/L (ref 10–37)
BASOPHILS ABSOLUTE: 0 K/UL (ref 0–0.2)
BASOPHILS RELATIVE PERCENT: 1 % (ref 0–2)
BILIRUB SERPL-MCNC: 0.4 MG/DL (ref 0.2–1.2)
BILIRUBIN DIRECT: <0.2 MG/DL (ref 0–0.3)
BUN BLDV-MCNC: 27 MG/DL (ref 6–22)
CALCIUM SERPL-MCNC: 9.5 MG/DL (ref 8.4–10.5)
CHLORIDE BLD-SCNC: 96 MMOL/L (ref 98–110)
CO2: 26 MMOL/L (ref 20–32)
CREAT SERPL-MCNC: 5.9 MG/DL (ref 0.5–1.2)
EOSINOPHIL # BLD: 3 % (ref 0–6)
EOSINOPHILS ABSOLUTE: 0.2 K/UL (ref 0–0.5)
FERRITIN: 1410 NG/ML (ref 22–322)
GFR, ESTIMATED: 11.2 ML/MIN/1.73 SQ.M.
GLOBULIN: 3.6 G/DL (ref 2–4)
GLUCOSE: 75 MG/DL (ref 70–99)
HCT VFR BLD CALC: 33.6 % (ref 39.3–51.6)
HEMOGLOBIN: 11.3 G/DL (ref 13.1–17.2)
HEPATITIS B VIRUS QUANT TND: NORMAL IU/ML
IRON % SATURATION: 23 % (ref 20–50)
IRON: 52 MCG/DL (ref 45–160)
LYMPHOCYTES # BLD: 19 % (ref 20–45)
LYMPHOCYTES ABSOLUTE: 1.4 K/UL (ref 1–4.8)
MAGNESIUM: 1.7 MG/DL (ref 1.6–2.5)
MCH RBC QN AUTO: 31 PG (ref 26–34)
MCHC RBC AUTO-ENTMCNC: 34 G/DL (ref 31–36)
MCV RBC AUTO: 91 FL (ref 80–95)
MONOCYTES ABSOLUTE: 0.5 K/UL (ref 0.1–1)
MONOCYTES: 7 % (ref 3–12)
NEUTROPHILS ABSOLUTE: 5.1 K/UL (ref 1.8–7.7)
NEUTROPHILS: 71 % (ref 40–75)
PDW BLD-RTO: 14.4 % (ref 10–15.5)
PLATELET # BLD: 151 K/UL (ref 140–440)
PMV BLD AUTO: 9.2 FL (ref 9–13)
POTASSIUM SERPL-SCNC: 3.6 MMOL/L (ref 3.5–5.5)
RBC # BLD: 3.69 M/UL (ref 3.8–5.8)
SODIUM BLD-SCNC: 138 MMOL/L (ref 133–145)
TACROLIMUS BLOOD: 8 NG/ML (ref 2–20)
TOTAL IRON BINDING CAPACITY: 228 MCG/DL (ref 228–428)
TOTAL PROTEIN: 8.1 G/DL (ref 6.4–8.3)
TSH SERPL DL<=0.05 MIU/L-ACNC: 2.94 MCU/ML (ref 0.27–4.2)
UIBC: 176 MCG/DL (ref 110–370)
WBC # BLD: 7.2 K/UL (ref 4–11)

## 2024-12-21 LAB
A/G RATIO: 1.1 RATIO (ref 1.1–2.6)
ALBUMIN: 4.4 G/DL (ref 3.5–5)
ALP BLD-CCNC: 247 U/L (ref 25–115)
ALT SERPL-CCNC: 10 U/L (ref 5–40)
ANION GAP SERPL CALCULATED.3IONS-SCNC: 16 MMOL/L (ref 3–15)
AST SERPL-CCNC: 20 U/L (ref 10–37)
BASOPHILS ABSOLUTE: 0 K/UL (ref 0–0.2)
BASOPHILS RELATIVE PERCENT: 1 % (ref 0–2)
BILIRUB SERPL-MCNC: 0.5 MG/DL (ref 0.2–1.2)
BILIRUBIN DIRECT: <0.2 MG/DL (ref 0–0.3)
BUN BLDV-MCNC: 24 MG/DL (ref 6–22)
CALCIUM SERPL-MCNC: 9.7 MG/DL (ref 8.4–10.5)
CHLORIDE BLD-SCNC: 97 MMOL/L (ref 98–110)
CO2: 27 MMOL/L (ref 20–32)
CREAT SERPL-MCNC: 4.7 MG/DL (ref 0.5–1.2)
EOSINOPHIL # BLD: 3 % (ref 0–6)
EOSINOPHILS ABSOLUTE: 0.2 K/UL (ref 0–0.5)
FERRITIN: 1548 NG/ML (ref 22–322)
GFR, ESTIMATED: 14.6 ML/MIN/1.73 SQ.M.
GLOBULIN: 3.9 G/DL (ref 2–4)
GLUCOSE: 93 MG/DL (ref 70–99)
HCT VFR BLD CALC: 35.1 % (ref 39.3–51.6)
HEMOGLOBIN: 11.8 G/DL (ref 13.1–17.2)
IRON % SATURATION: 40 % (ref 20–50)
IRON: 96 MCG/DL (ref 45–160)
LYMPHOCYTES # BLD: 18 % (ref 20–45)
LYMPHOCYTES ABSOLUTE: 1.4 K/UL (ref 1–4.8)
MAGNESIUM: 1.7 MG/DL (ref 1.6–2.5)
MCH RBC QN AUTO: 31 PG (ref 26–34)
MCHC RBC AUTO-ENTMCNC: 34 G/DL (ref 31–36)
MCV RBC AUTO: 92 FL (ref 80–95)
MONOCYTES ABSOLUTE: 0.4 K/UL (ref 0.1–1)
MONOCYTES: 5 % (ref 3–12)
NEUTROPHILS ABSOLUTE: 5.8 K/UL (ref 1.8–7.7)
NEUTROPHILS: 74 % (ref 40–75)
PDW BLD-RTO: 14.3 % (ref 10–15.5)
PLATELET # BLD: 168 K/UL (ref 140–440)
PMV BLD AUTO: 9.3 FL (ref 9–13)
POTASSIUM SERPL-SCNC: 3.5 MMOL/L (ref 3.5–5.5)
RBC # BLD: 3.82 M/UL (ref 3.8–5.8)
SODIUM BLD-SCNC: 140 MMOL/L (ref 133–145)
TACROLIMUS BLOOD: 4.9 NG/ML (ref 2–20)
TOTAL IRON BINDING CAPACITY: 240 MCG/DL (ref 228–428)
TOTAL PROTEIN: 8.3 G/DL (ref 6.4–8.3)
TSH SERPL DL<=0.05 MIU/L-ACNC: 3.2 MCU/ML (ref 0.27–4.2)
UIBC: 144 MCG/DL (ref 110–370)
WBC # BLD: 7.8 K/UL (ref 4–11)

## 2025-01-15 ENCOUNTER — TELEMEDICINE (OUTPATIENT)
Age: 50
End: 2025-01-15
Payer: COMMERCIAL

## 2025-01-15 DIAGNOSIS — Z94.4 LIVER TRANSPLANT STATUS (HCC): ICD-10-CM

## 2025-01-15 DIAGNOSIS — Z99.2 ESRD ON DIALYSIS (HCC): Primary | ICD-10-CM

## 2025-01-15 DIAGNOSIS — N18.6 ESRD ON DIALYSIS (HCC): Primary | ICD-10-CM

## 2025-01-15 PROCEDURE — 99215 OFFICE O/P EST HI 40 MIN: CPT | Performed by: INTERNAL MEDICINE

## 2025-01-15 NOTE — PROGRESS NOTES
Valeriano Fong III, was evaluated through a synchronous (real-time) audio-video encounter. The patient (or guardian if applicable) is aware that this is a billable service, which includes applicable co-pays. This Virtual Visit was conducted with patient's (and/or legal guardian's) consent. Patient identification was verified, and a caregiver was present when appropriate.   The patient was located at Home: [address] [facility] [address]  Provider was located at Facility (Appt Dept): [address]  [place] [address]  Confirm you are appropriately licensed, registered, or certified to deliver care in the state where the patient is located as indicated above. If you are not or unsure, please re-schedule the visit: Yes, I confirm.      Total time spent for this encounter: Not billed by time    --Medhat Keita MD on 1/26/2025 at 9:23 AM    An electronic signature was used to authenticate this note.      [facility]     Medhat Keita MD, FACP, MACG, FAAS   MD Imelda Duffy, PA-C    Monet Haile, [facility]-BC   Zenobia Lora, ACN-   Faith Bryan, FNP-C  Nando Moss FNP-C   Lucinda Choudhury, [facility]   [address] [place] [address], [address]   [place]  [address] [phone]   FAX: [phone]  [facility]   [address], [address]   [place]  [address] [phone]   FAX: [phone]       Patient Care Team:  Geovani Reeves MD as PCP - General (Family Medicine)  Sabrina Hutson, PASTOR as Nurse Navigator (Hepatology)      Patient Active Problem List   Diagnosis    Liver transplant status (HCC)    Long-term use of immunosuppressant medication    Liver transplant complication (HCC)    Alcohol use disorder in remission    ESRD on dialysis (HCC)

## 2025-02-05 ENCOUNTER — TELEPHONE (OUTPATIENT)
Age: 50
End: 2025-02-05

## 2025-02-05 NOTE — TELEPHONE ENCOUNTER
Sent text message with his next appointment with Dawson Moss NP, on Wed, 4/16/25 @ 1030, virtual visit. Requested patient have labs drawn one week ahead of office visit.

## 2025-04-15 DIAGNOSIS — D50.9 IRON DEFICIENCY ANEMIA, UNSPECIFIED IRON DEFICIENCY ANEMIA TYPE: ICD-10-CM

## 2025-04-15 DIAGNOSIS — Z94.4 LIVER TRANSPLANT RECIPIENT (HCC): ICD-10-CM

## 2025-04-15 DIAGNOSIS — Z94.4 LIVER TRANSPLANT STATUS: ICD-10-CM

## 2025-04-15 DIAGNOSIS — B25.1 CYTOMEGALIC INCLUSION VIRUS HEPATITIS (HCC): Primary | ICD-10-CM

## 2025-04-15 DIAGNOSIS — Z79.60 LONG-TERM USE OF IMMUNOSUPPRESSANT MEDICATION: Primary | ICD-10-CM

## 2025-05-19 ENCOUNTER — RESULTS FOLLOW-UP (OUTPATIENT)
Age: 50
End: 2025-05-19

## 2025-05-19 DIAGNOSIS — Z79.60 LONG-TERM USE OF IMMUNOSUPPRESSANT MEDICATION: Primary | ICD-10-CM

## 2025-05-19 DIAGNOSIS — D50.9 IRON DEFICIENCY ANEMIA, UNSPECIFIED IRON DEFICIENCY ANEMIA TYPE: ICD-10-CM

## 2025-05-19 DIAGNOSIS — Z94.4 LIVER TRANSPLANT STATUS (HCC): ICD-10-CM

## 2025-05-19 LAB
A/G RATIO: 1.3 RATIO (ref 1.1–2.6)
ALBUMIN: 4 G/DL (ref 3.5–5)
ALP BLD-CCNC: 92 U/L (ref 25–115)
ALT SERPL-CCNC: 6 U/L (ref 5–40)
ANION GAP SERPL CALCULATED.3IONS-SCNC: 14 MMOL/L (ref 3–15)
AST SERPL-CCNC: 14 U/L (ref 10–37)
BASOPHILS # BLD: 1 % (ref 0–2)
BASOPHILS ABSOLUTE: 0.1 K/UL (ref 0–0.2)
BILIRUB SERPL-MCNC: 0.4 MG/DL (ref 0.2–1.2)
BILIRUBIN DIRECT: <0.2 MG/DL (ref 0–0.3)
BUN BLDV-MCNC: 45 MG/DL (ref 6–22)
CALCIUM SERPL-MCNC: 9.3 MG/DL (ref 8.4–10.5)
CHLORIDE BLD-SCNC: 96 MMOL/L (ref 98–110)
CO2: 24 MMOL/L (ref 20–32)
CREAT SERPL-MCNC: 10.1 MG/DL (ref 0.5–1.2)
EOSINOPHIL # BLD: 4 % (ref 0–6)
EOSINOPHILS ABSOLUTE: 0.3 K/UL (ref 0–0.5)
FERRITIN: 1517 NG/ML (ref 22–322)
GFR, ESTIMATED: 5.9 ML/MIN/1.73 SQ.M.
GLOBULIN: 3.1 G/DL (ref 2–4)
GLUCOSE: 97 MG/DL (ref 70–99)
HCT VFR BLD CALC: 32.4 % (ref 39.3–51.6)
HEMOGLOBIN: 10.5 G/DL (ref 13.1–17.2)
IRON % SATURATION: 42 % (ref 20–50)
IRON: 88 MCG/DL (ref 45–160)
LYMPHOCYTES # BLD: 23 % (ref 20–45)
LYMPHOCYTES ABSOLUTE: 1.7 K/UL (ref 1–4.8)
MAGNESIUM: 1.8 MG/DL (ref 1.6–2.5)
MCH RBC QN AUTO: 30 PG (ref 26–34)
MCHC RBC AUTO-ENTMCNC: 32 G/DL (ref 31–36)
MCV RBC AUTO: 93 FL (ref 80–95)
MONOCYTES ABSOLUTE: 0.5 K/UL (ref 0.1–1)
MONOCYTES: 7 % (ref 3–12)
NEUTROPHILS ABSOLUTE: 5 K/UL (ref 1.8–7.7)
NEUTROPHILS SEGMENTED: 66 % (ref 40–75)
PDW BLD-RTO: 14.4 % (ref 10–15.5)
PLATELET # BLD: 172 K/UL (ref 140–440)
PMV BLD AUTO: 9.2 FL (ref 9–13)
POTASSIUM SERPL-SCNC: 4.1 MMOL/L (ref 3.5–5.5)
RBC # BLD: 3.47 M/UL (ref 3.8–5.8)
SODIUM BLD-SCNC: 134 MMOL/L (ref 133–145)
TACROLIMUS BLOOD: 2.8 NG/ML (ref 2–20)
TOTAL IRON BINDING CAPACITY: 209 MCG/DL (ref 228–428)
TOTAL PROTEIN: 7.1 G/DL (ref 6.4–8.3)
TSH SERPL DL<=0.05 MIU/L-ACNC: 3.17 MCU/ML (ref 0.27–4.2)
UNSATURATED IRON BINDING CAPACITY: 121 MCG/DL (ref 110–370)
WBC # BLD: 7.6 K/UL (ref 4–11)

## 2025-05-21 ENCOUNTER — TELEMEDICINE (OUTPATIENT)
Age: 50
End: 2025-05-21
Payer: COMMERCIAL

## 2025-05-21 DIAGNOSIS — Z94.4 LIVER TRANSPLANT RECIPIENT (HCC): Primary | ICD-10-CM

## 2025-05-21 PROCEDURE — 99214 OFFICE O/P EST MOD 30 MIN: CPT | Performed by: INTERNAL MEDICINE

## 2025-05-21 RX ORDER — TRIAMCINOLONE ACETONIDE 0.25 MG/ML
LOTION TOPICAL
COMMUNITY
Start: 2025-02-21

## 2025-05-21 RX ORDER — KETOCONAZOLE 20 MG/ML
SHAMPOO, SUSPENSION TOPICAL
COMMUNITY
Start: 2025-02-21

## 2025-05-21 RX ORDER — CALCIUM ACETATE 667 MG/1
CAPSULE ORAL
COMMUNITY
Start: 2025-04-10

## 2025-05-21 RX ORDER — ALBUTEROL SULFATE 90 UG/1
2 INHALANT RESPIRATORY (INHALATION) EVERY 4 HOURS
COMMUNITY
Start: 2025-04-25

## 2025-05-21 NOTE — PROGRESS NOTES
graft function and blood levels of immune suppression.    The patient has a follow-up appointment at the liver transplant center in 2/2023 which will be just a few weeks before he can be listed for renal transplant in the safety net program    FOLLOW-UP:  All of the issues listed above in the Assessment and Plan were discussed with the patient.  All questions were answered.  The patient expressed a clear understanding of the above.    Laboratory studies every 4 weeks for now.        Follow-up [facility] 3 months.        Mdehat Keita MD  [address], [address]  [place]  [address] [phone]  [facility]

## 2025-06-10 ENCOUNTER — TELEPHONE (OUTPATIENT)
Age: 50
End: 2025-06-10

## 2025-06-10 DIAGNOSIS — Z94.4 LIVER TRANSPLANT STATUS (HCC): ICD-10-CM

## 2025-06-10 DIAGNOSIS — Z94.4 LIVER TRANSPLANT STATUS (HCC): Primary | ICD-10-CM

## 2025-06-10 NOTE — TELEPHONE ENCOUNTER
Called patient to inform him of new f/u visit with Dr. Keita in [facility] on 9/15/25 @ 4 pm, virtual visit. Patient informed me he is at [facility] s/p kidney transplant on 6/6/25. Patient will likely go home today or tomorrow.

## 2025-07-01 LAB — HEPATITIS B VIRUS QUANT TND: NORMAL IU/ML
